# Patient Record
Sex: FEMALE | Race: WHITE | Employment: FULL TIME | URBAN - METROPOLITAN AREA
[De-identification: names, ages, dates, MRNs, and addresses within clinical notes are randomized per-mention and may not be internally consistent; named-entity substitution may affect disease eponyms.]

---
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
## 2018-02-25 PROBLEM — Z3A.15 15 WEEKS GESTATION OF PREGNANCY: Status: ACTIVE | Noted: 2018-02-25

## 2018-03-31 ENCOUNTER — HOSPITAL ENCOUNTER (OUTPATIENT)
Age: 23
Discharge: HOME | End: 2018-04-01
Attending: OBSTETRICS & GYNECOLOGY | Admitting: OBSTETRICS & GYNECOLOGY
Payer: COMMERCIAL

## 2018-03-31 PROBLEM — Z3A.20 20 WEEKS GESTATION OF PREGNANCY: Status: ACTIVE | Noted: 2018-03-31

## 2018-03-31 PROBLEM — Z3A.20 PREGNANCY WITH 20 COMPLETED WEEKS GESTATION: Status: ACTIVE | Noted: 2018-03-31

## 2018-03-31 LAB
ABO/RH: NORMAL
AMPHETAMINE SCREEN, URINE: NOT DETECTED
ANTIBODY SCREEN: NORMAL
BACTERIA: ABNORMAL /HPF
BARBITURATE SCREEN URINE: NOT DETECTED
BASOPHILS ABSOLUTE: 0.05 E9/L (ref 0–0.2)
BASOPHILS RELATIVE PERCENT: 0.4 % (ref 0–2)
BENZODIAZEPINE SCREEN, URINE: NOT DETECTED
BILIRUBIN URINE: NEGATIVE
BILIRUBIN URINE: NEGATIVE
BLOOD, URINE: NEGATIVE
BLOOD, URINE: NEGATIVE
CANNABINOID SCREEN URINE: NOT DETECTED
CLARITY: ABNORMAL
CLARITY: CLEAR
COCAINE METABOLITE SCREEN URINE: NOT DETECTED
COLOR: YELLOW
COLOR: YELLOW
EOSINOPHILS ABSOLUTE: 0.08 E9/L (ref 0.05–0.5)
EOSINOPHILS RELATIVE PERCENT: 0.7 % (ref 0–6)
GLUCOSE URINE: NEGATIVE MG/DL
GLUCOSE URINE: NEGATIVE MG/DL
HCT VFR BLD CALC: 37.2 % (ref 34–48)
HEMOGLOBIN: 12.5 G/DL (ref 11.5–15.5)
IMMATURE GRANULOCYTES #: 0.27 E9/L
IMMATURE GRANULOCYTES %: 2.4 % (ref 0–5)
KETONES, URINE: NEGATIVE MG/DL
KETONES, URINE: NEGATIVE MG/DL
LEUKOCYTE ESTERASE, URINE: NEGATIVE
LEUKOCYTE ESTERASE, URINE: NEGATIVE
LYMPHOCYTES ABSOLUTE: 2.04 E9/L (ref 1.5–4)
LYMPHOCYTES RELATIVE PERCENT: 18 % (ref 20–42)
MCH RBC QN AUTO: 30 PG (ref 26–35)
MCHC RBC AUTO-ENTMCNC: 33.6 % (ref 32–34.5)
MCV RBC AUTO: 89.2 FL (ref 80–99.9)
METHADONE SCREEN, URINE: NOT DETECTED
MONOCYTES ABSOLUTE: 0.94 E9/L (ref 0.1–0.95)
MONOCYTES RELATIVE PERCENT: 8.3 % (ref 2–12)
NEUTROPHILS ABSOLUTE: 7.97 E9/L (ref 1.8–7.3)
NEUTROPHILS RELATIVE PERCENT: 70.2 % (ref 43–80)
NITRITE, URINE: NEGATIVE
NITRITE, URINE: NEGATIVE
OPIATE SCREEN URINE: NOT DETECTED
PDW BLD-RTO: 14.5 FL (ref 11.5–15)
PH UA: 5.5 (ref 5–9)
PH UA: 7 (ref 5–9)
PHENCYCLIDINE SCREEN URINE: NOT DETECTED
PLATELET # BLD: 186 E9/L (ref 130–450)
PMV BLD AUTO: 11.7 FL (ref 7–12)
PROPOXYPHENE SCREEN: NOT DETECTED
PROTEIN UA: ABNORMAL MG/DL
PROTEIN UA: NEGATIVE MG/DL
RBC # BLD: 4.17 E12/L (ref 3.5–5.5)
RBC UA: ABNORMAL /HPF (ref 0–2)
SPECIFIC GRAVITY UA: 1.01 (ref 1–1.03)
SPECIFIC GRAVITY UA: >=1.03 (ref 1–1.03)
UROBILINOGEN, URINE: 0.2 E.U./DL
UROBILINOGEN, URINE: 0.2 E.U./DL
WBC # BLD: 11.4 E9/L (ref 4.5–11.5)
WBC UA: ABNORMAL /HPF (ref 0–5)

## 2018-03-31 PROCEDURE — 99213 OFFICE O/P EST LOW 20 MIN: CPT | Performed by: OBSTETRICS & GYNECOLOGY

## 2018-03-31 PROCEDURE — 36415 COLL VENOUS BLD VENIPUNCTURE: CPT

## 2018-03-31 PROCEDURE — 86901 BLOOD TYPING SEROLOGIC RH(D): CPT

## 2018-03-31 PROCEDURE — 87088 URINE BACTERIA CULTURE: CPT

## 2018-03-31 PROCEDURE — 85460 HEMOGLOBIN FETAL: CPT

## 2018-03-31 PROCEDURE — 86900 BLOOD TYPING SEROLOGIC ABO: CPT

## 2018-03-31 PROCEDURE — G0378 HOSPITAL OBSERVATION PER HR: HCPCS

## 2018-03-31 PROCEDURE — 81001 URINALYSIS AUTO W/SCOPE: CPT

## 2018-03-31 PROCEDURE — 85025 COMPLETE CBC W/AUTO DIFF WBC: CPT

## 2018-03-31 PROCEDURE — 6360000002 HC RX W HCPCS

## 2018-03-31 PROCEDURE — 2580000003 HC RX 258: Performed by: OBSTETRICS & GYNECOLOGY

## 2018-03-31 PROCEDURE — 86850 RBC ANTIBODY SCREEN: CPT

## 2018-03-31 PROCEDURE — 81003 URINALYSIS AUTO W/O SCOPE: CPT

## 2018-03-31 PROCEDURE — 80307 DRUG TEST PRSMV CHEM ANLYZR: CPT

## 2018-03-31 RX ORDER — SODIUM CHLORIDE, SODIUM LACTATE, POTASSIUM CHLORIDE, AND CALCIUM CHLORIDE .6; .31; .03; .02 G/100ML; G/100ML; G/100ML; G/100ML
500 INJECTION, SOLUTION INTRAVENOUS ONCE
Status: COMPLETED | OUTPATIENT
Start: 2018-03-31 | End: 2018-03-31

## 2018-03-31 RX ORDER — SODIUM CHLORIDE, SODIUM LACTATE, POTASSIUM CHLORIDE, CALCIUM CHLORIDE 600; 310; 30; 20 MG/100ML; MG/100ML; MG/100ML; MG/100ML
INJECTION, SOLUTION INTRAVENOUS CONTINUOUS
Status: DISCONTINUED | OUTPATIENT
Start: 2018-03-31 | End: 2018-04-01 | Stop reason: HOSPADM

## 2018-03-31 RX ADMIN — CEFAZOLIN SODIUM 2 G: 2 SOLUTION INTRAVENOUS at 18:09

## 2018-03-31 RX ADMIN — SODIUM CHLORIDE, POTASSIUM CHLORIDE, SODIUM LACTATE AND CALCIUM CHLORIDE: 600; 310; 30; 20 INJECTION, SOLUTION INTRAVENOUS at 20:29

## 2018-03-31 RX ADMIN — SODIUM CHLORIDE, POTASSIUM CHLORIDE, SODIUM LACTATE AND CALCIUM CHLORIDE 500 ML: 600; 310; 30; 20 INJECTION, SOLUTION INTRAVENOUS at 17:10

## 2018-03-31 RX ADMIN — SODIUM CHLORIDE, POTASSIUM CHLORIDE, SODIUM LACTATE AND CALCIUM CHLORIDE: 600; 310; 30; 20 INJECTION, SOLUTION INTRAVENOUS at 18:11

## 2018-04-01 VITALS
BODY MASS INDEX: 21.44 KG/M2 | HEART RATE: 89 BPM | DIASTOLIC BLOOD PRESSURE: 52 MMHG | WEIGHT: 121 LBS | TEMPERATURE: 98.7 F | HEIGHT: 63 IN | SYSTOLIC BLOOD PRESSURE: 98 MMHG | RESPIRATION RATE: 14 BRPM

## 2018-04-01 PROBLEM — O34.219 PREVIOUS CESAREAN SECTION COMPLICATING PREGNANCY, ANTEPARTUM CONDITION OR COMPLICATION: Status: ACTIVE | Noted: 2018-04-01

## 2018-04-01 PROBLEM — O46.8X2 SUBCHORIONIC HEMORRHAGE IN SECOND TRIMESTER: Status: ACTIVE | Noted: 2018-04-01

## 2018-04-01 PROBLEM — O41.8X20 SUBCHORIONIC HEMORRHAGE IN SECOND TRIMESTER: Status: ACTIVE | Noted: 2018-04-01

## 2018-04-01 PROBLEM — Z3A.15 15 WEEKS GESTATION OF PREGNANCY: Status: RESOLVED | Noted: 2018-02-25 | Resolved: 2018-04-01

## 2018-04-01 LAB
APTT: 28.1 SEC (ref 24.5–35.1)
D DIMER: 260 NG/ML DDU
FIBRINOGEN: 413 MG/DL (ref 225–540)
HCT VFR BLD CALC: 33.2 % (ref 34–48)
HEMOGLOBIN: 10.9 G/DL (ref 11.5–15.5)
INR BLD: 1
KLEIHAUER BETKE: NORMAL
MCH RBC QN AUTO: 30 PG (ref 26–35)
MCHC RBC AUTO-ENTMCNC: 32.8 % (ref 32–34.5)
MCV RBC AUTO: 91.5 FL (ref 80–99.9)
PDW BLD-RTO: 14.5 FL (ref 11.5–15)
PLATELET # BLD: 174 E9/L (ref 130–450)
PMV BLD AUTO: 11.4 FL (ref 7–12)
PROTHROMBIN TIME: 11.1 SEC (ref 9.3–12.4)
RBC # BLD: 3.63 E12/L (ref 3.5–5.5)
WBC # BLD: 9 E9/L (ref 4.5–11.5)

## 2018-04-01 PROCEDURE — 36415 COLL VENOUS BLD VENIPUNCTURE: CPT

## 2018-04-01 PROCEDURE — 99243 OFF/OP CNSLTJ NEW/EST LOW 30: CPT | Performed by: OBSTETRICS & GYNECOLOGY

## 2018-04-01 PROCEDURE — 6360000002 HC RX W HCPCS: Performed by: OBSTETRICS & GYNECOLOGY

## 2018-04-01 PROCEDURE — 85378 FIBRIN DEGRADE SEMIQUANT: CPT

## 2018-04-01 PROCEDURE — 85384 FIBRINOGEN ACTIVITY: CPT

## 2018-04-01 PROCEDURE — 76817 TRANSVAGINAL US OBSTETRIC: CPT

## 2018-04-01 PROCEDURE — 76811 OB US DETAILED SNGL FETUS: CPT | Performed by: OBSTETRICS & GYNECOLOGY

## 2018-04-01 PROCEDURE — 76817 TRANSVAGINAL US OBSTETRIC: CPT | Performed by: OBSTETRICS & GYNECOLOGY

## 2018-04-01 PROCEDURE — 2580000003 HC RX 258: Performed by: OBSTETRICS & GYNECOLOGY

## 2018-04-01 PROCEDURE — 76805 OB US >/= 14 WKS SNGL FETUS: CPT

## 2018-04-01 PROCEDURE — 85730 THROMBOPLASTIN TIME PARTIAL: CPT

## 2018-04-01 PROCEDURE — 85027 COMPLETE CBC AUTOMATED: CPT

## 2018-04-01 PROCEDURE — 85610 PROTHROMBIN TIME: CPT

## 2018-04-01 RX ADMIN — SODIUM CHLORIDE, POTASSIUM CHLORIDE, SODIUM LACTATE AND CALCIUM CHLORIDE: 600; 310; 30; 20 INJECTION, SOLUTION INTRAVENOUS at 13:11

## 2018-04-01 RX ADMIN — CEFAZOLIN SODIUM 1 G: 1 SOLUTION INTRAVENOUS at 10:47

## 2018-04-01 RX ADMIN — CEFAZOLIN SODIUM 1 G: 1 SOLUTION INTRAVENOUS at 02:07

## 2018-04-03 LAB — URINE CULTURE, ROUTINE: NORMAL

## 2018-04-11 ENCOUNTER — ROUTINE PRENATAL (OUTPATIENT)
Dept: OBGYN CLINIC | Age: 23
End: 2018-04-11
Payer: COMMERCIAL

## 2018-04-11 VITALS
SYSTOLIC BLOOD PRESSURE: 115 MMHG | HEART RATE: 102 BPM | WEIGHT: 120 LBS | DIASTOLIC BLOOD PRESSURE: 71 MMHG | BODY MASS INDEX: 21.26 KG/M2

## 2018-04-11 DIAGNOSIS — R10.2 PELVIC PRESSURE IN PREGNANCY, ANTEPARTUM, SECOND TRIMESTER: ICD-10-CM

## 2018-04-11 DIAGNOSIS — Z34.90 PREGNANCY, UNSPECIFIED GESTATIONAL AGE: ICD-10-CM

## 2018-04-11 DIAGNOSIS — O46.8X2 SUBCHORIONIC HEMORRHAGE OF PLACENTA IN SECOND TRIMESTER, SINGLE OR UNSPECIFIED FETUS: Primary | ICD-10-CM

## 2018-04-11 DIAGNOSIS — O26.892 PELVIC PRESSURE IN PREGNANCY, ANTEPARTUM, SECOND TRIMESTER: ICD-10-CM

## 2018-04-11 DIAGNOSIS — O41.8X20 SUBCHORIONIC HEMORRHAGE OF PLACENTA IN SECOND TRIMESTER, SINGLE OR UNSPECIFIED FETUS: Primary | ICD-10-CM

## 2018-04-11 DIAGNOSIS — O34.219 PREVIOUS CESAREAN SECTION COMPLICATING PREGNANCY, ANTEPARTUM CONDITION OR COMPLICATION: ICD-10-CM

## 2018-04-11 LAB
GLUCOSE URINE, POC: NORMAL
PROTEIN UA: POSITIVE

## 2018-04-11 PROCEDURE — 76817 TRANSVAGINAL US OBSTETRIC: CPT | Performed by: OBSTETRICS & GYNECOLOGY

## 2018-04-11 PROCEDURE — 81002 URINALYSIS NONAUTO W/O SCOPE: CPT | Performed by: OBSTETRICS & GYNECOLOGY

## 2018-04-11 PROCEDURE — 76816 OB US FOLLOW-UP PER FETUS: CPT | Performed by: OBSTETRICS & GYNECOLOGY

## 2018-04-11 PROCEDURE — 99213 OFFICE O/P EST LOW 20 MIN: CPT | Performed by: OBSTETRICS & GYNECOLOGY

## 2018-04-11 PROCEDURE — 1036F TOBACCO NON-USER: CPT | Performed by: OBSTETRICS & GYNECOLOGY

## 2018-04-11 PROCEDURE — G8427 DOCREV CUR MEDS BY ELIG CLIN: HCPCS | Performed by: OBSTETRICS & GYNECOLOGY

## 2018-04-11 PROCEDURE — G8420 CALC BMI NORM PARAMETERS: HCPCS | Performed by: OBSTETRICS & GYNECOLOGY

## 2018-04-11 PROCEDURE — 99201 HC NEW PT, E/M LEVEL 1: CPT

## 2018-04-11 RX ORDER — ASCORBIC ACID, CHOLECALCIFEROL, .ALPHA.-TOCOPHEROL ACETATE, DL-, PYRIDOXINE HYDROCHLORIDE, FOLIC ACID, CYANOCOBALAMIN, BIOTIN, CALCIUM CARBONATE, FERROUS ASPARTO GLYCINATE, IRON, POTASSIUM IODIDE, MAGNESIUM OXIDE, DOCONEXENT AND LOWBUSH BLUEBERRY 60; 1000; 10; 26; 400; 13; 280; 80; 9; 9; 150; 25; 350; 25; 600 MG/1; [IU]/1; [IU]/1; MG/1; UG/1; UG/1; UG/1; MG/1; MG/1; MG/1; UG/1; MG/1; MG/1; MG/1; UG/1
CAPSULE, GELATIN COATED ORAL
Refills: 11 | COMMUNITY
Start: 2018-01-02 | End: 2019-02-17

## 2018-04-23 ENCOUNTER — HOSPITAL ENCOUNTER (OUTPATIENT)
Age: 23
Setting detail: OBSERVATION
Discharge: HOME | End: 2018-04-24
Attending: OBSTETRICS & GYNECOLOGY | Admitting: OBSTETRICS & GYNECOLOGY
Payer: COMMERCIAL

## 2018-04-23 PROBLEM — Z34.92 PREGNANT AND NOT YET DELIVERED, SECOND TRIMESTER: Status: ACTIVE | Noted: 2018-04-23

## 2018-04-23 LAB
BACTERIA: ABNORMAL /HPF
BILIRUBIN URINE: NEGATIVE
BLOOD, URINE: NEGATIVE
CLARITY: CLEAR
COLOR: YELLOW
EPITHELIAL CELLS, UA: ABNORMAL /HPF
FETAL FIBRONECTIN: NEGATIVE
GLUCOSE URINE: NEGATIVE MG/DL
KETONES, URINE: NEGATIVE MG/DL
LEUKOCYTE ESTERASE, URINE: ABNORMAL
NITRITE, URINE: NEGATIVE
PH UA: 6.5 (ref 5–9)
PROTEIN UA: NEGATIVE MG/DL
RBC UA: ABNORMAL /HPF (ref 0–2)
SPECIFIC GRAVITY UA: 1.01 (ref 1–1.03)
UROBILINOGEN, URINE: 0.2 E.U./DL
WBC UA: ABNORMAL /HPF (ref 0–5)

## 2018-04-23 PROCEDURE — G0378 HOSPITAL OBSERVATION PER HR: HCPCS

## 2018-04-23 PROCEDURE — 81001 URINALYSIS AUTO W/SCOPE: CPT

## 2018-04-23 PROCEDURE — 2580000003 HC RX 258: Performed by: OBSTETRICS & GYNECOLOGY

## 2018-04-23 PROCEDURE — G0379 DIRECT REFER HOSPITAL OBSERV: HCPCS

## 2018-04-23 PROCEDURE — 82731 ASSAY OF FETAL FIBRONECTIN: CPT

## 2018-04-23 RX ORDER — SODIUM CHLORIDE, SODIUM LACTATE, POTASSIUM CHLORIDE, CALCIUM CHLORIDE 600; 310; 30; 20 MG/100ML; MG/100ML; MG/100ML; MG/100ML
INJECTION, SOLUTION INTRAVENOUS CONTINUOUS
Status: DISCONTINUED | OUTPATIENT
Start: 2018-04-23 | End: 2018-04-24 | Stop reason: HOSPADM

## 2018-04-23 RX ADMIN — SODIUM CHLORIDE, POTASSIUM CHLORIDE, SODIUM LACTATE AND CALCIUM CHLORIDE: 600; 310; 30; 20 INJECTION, SOLUTION INTRAVENOUS at 22:18

## 2018-04-24 VITALS
HEIGHT: 63 IN | SYSTOLIC BLOOD PRESSURE: 108 MMHG | DIASTOLIC BLOOD PRESSURE: 62 MMHG | TEMPERATURE: 98 F | HEART RATE: 77 BPM | BODY MASS INDEX: 21.44 KG/M2 | WEIGHT: 121 LBS | RESPIRATION RATE: 14 BRPM

## 2018-04-24 PROBLEM — Z3A.20 20 WEEKS GESTATION OF PREGNANCY: Status: RESOLVED | Noted: 2018-03-31 | Resolved: 2018-04-24

## 2018-04-24 PROCEDURE — G0378 HOSPITAL OBSERVATION PER HR: HCPCS

## 2018-04-24 PROCEDURE — 99224 PR SBSQ OBSERVATION CARE/DAY 15 MINUTES: CPT | Performed by: OBSTETRICS & GYNECOLOGY

## 2018-04-24 PROCEDURE — 76816 OB US FOLLOW-UP PER FETUS: CPT | Performed by: OBSTETRICS & GYNECOLOGY

## 2018-04-24 PROCEDURE — 76817 TRANSVAGINAL US OBSTETRIC: CPT

## 2018-04-24 PROCEDURE — 2580000003 HC RX 258: Performed by: OBSTETRICS & GYNECOLOGY

## 2018-04-24 PROCEDURE — 99201 HC NEW PT, OUTPT VISIT LEVEL 1: CPT

## 2018-04-24 PROCEDURE — 76816 OB US FOLLOW-UP PER FETUS: CPT

## 2018-04-24 PROCEDURE — 76817 TRANSVAGINAL US OBSTETRIC: CPT | Performed by: OBSTETRICS & GYNECOLOGY

## 2018-04-24 RX ADMIN — SODIUM CHLORIDE, POTASSIUM CHLORIDE, SODIUM LACTATE AND CALCIUM CHLORIDE: 600; 310; 30; 20 INJECTION, SOLUTION INTRAVENOUS at 03:07

## 2018-04-24 ASSESSMENT — PAIN DESCRIPTION - DESCRIPTORS: DESCRIPTORS: ACHING

## 2018-05-22 ENCOUNTER — HOSPITAL ENCOUNTER (OUTPATIENT)
Age: 23
Discharge: HOME | End: 2018-05-22
Attending: OBSTETRICS & GYNECOLOGY | Admitting: OBSTETRICS & GYNECOLOGY
Payer: COMMERCIAL

## 2018-05-22 VITALS
DIASTOLIC BLOOD PRESSURE: 60 MMHG | HEART RATE: 87 BPM | TEMPERATURE: 97.8 F | BODY MASS INDEX: 22.5 KG/M2 | RESPIRATION RATE: 14 BRPM | SYSTOLIC BLOOD PRESSURE: 102 MMHG | WEIGHT: 127 LBS | HEIGHT: 63 IN

## 2018-05-22 PROBLEM — Z3A.28 28 WEEKS GESTATION OF PREGNANCY: Status: ACTIVE | Noted: 2018-05-22

## 2018-05-22 LAB
BILIRUBIN URINE: NEGATIVE
BLOOD, URINE: NEGATIVE
CLARITY: CLEAR
COLOR: YELLOW
GLUCOSE URINE: NEGATIVE MG/DL
KETONES, URINE: NEGATIVE MG/DL
LEUKOCYTE ESTERASE, URINE: NEGATIVE
NITRITE, URINE: NEGATIVE
PH UA: 7 (ref 5–9)
PROTEIN UA: NEGATIVE MG/DL
SPECIFIC GRAVITY UA: <=1.005 (ref 1–1.03)
UROBILINOGEN, URINE: 0.2 E.U./DL

## 2018-05-22 PROCEDURE — 81003 URINALYSIS AUTO W/O SCOPE: CPT

## 2018-05-22 PROCEDURE — 2580000003 HC RX 258: Performed by: OBSTETRICS & GYNECOLOGY

## 2018-05-22 PROCEDURE — 36415 COLL VENOUS BLD VENIPUNCTURE: CPT

## 2018-05-22 PROCEDURE — 99212 OFFICE O/P EST SF 10 MIN: CPT

## 2018-05-22 PROCEDURE — 6360000002 HC RX W HCPCS: Performed by: OBSTETRICS & GYNECOLOGY

## 2018-05-22 RX ORDER — SODIUM CHLORIDE, SODIUM LACTATE, POTASSIUM CHLORIDE, AND CALCIUM CHLORIDE .6; .31; .03; .02 G/100ML; G/100ML; G/100ML; G/100ML
1000 INJECTION, SOLUTION INTRAVENOUS ONCE
Status: COMPLETED | OUTPATIENT
Start: 2018-05-22 | End: 2018-05-22

## 2018-05-22 RX ORDER — ONDANSETRON 2 MG/ML
4 INJECTION INTRAMUSCULAR; INTRAVENOUS EVERY 6 HOURS PRN
Status: DISCONTINUED | OUTPATIENT
Start: 2018-05-22 | End: 2018-05-22 | Stop reason: HOSPADM

## 2018-05-22 RX ADMIN — ONDANSETRON 4 MG: 2 INJECTION INTRAMUSCULAR; INTRAVENOUS at 18:12

## 2018-05-22 RX ADMIN — SODIUM CHLORIDE, POTASSIUM CHLORIDE, SODIUM LACTATE AND CALCIUM CHLORIDE 1000 ML: 600; 310; 30; 20 INJECTION, SOLUTION INTRAVENOUS at 18:05

## 2018-06-09 ENCOUNTER — HOSPITAL ENCOUNTER (OUTPATIENT)
Age: 23
Discharge: HOME | End: 2018-06-09
Attending: OBSTETRICS & GYNECOLOGY | Admitting: OBSTETRICS & GYNECOLOGY
Payer: COMMERCIAL

## 2018-06-09 VITALS
DIASTOLIC BLOOD PRESSURE: 71 MMHG | HEIGHT: 63 IN | SYSTOLIC BLOOD PRESSURE: 115 MMHG | WEIGHT: 137 LBS | TEMPERATURE: 97.9 F | RESPIRATION RATE: 18 BRPM | BODY MASS INDEX: 24.27 KG/M2 | HEART RATE: 96 BPM

## 2018-06-09 PROBLEM — Z3A.30 30 WEEKS GESTATION OF PREGNANCY: Status: ACTIVE | Noted: 2018-06-09

## 2018-06-09 LAB
BACTERIA: ABNORMAL /HPF
BILIRUBIN URINE: NEGATIVE
BLOOD, URINE: NEGATIVE
CLARITY: ABNORMAL
COLOR: ABNORMAL
GLUCOSE URINE: NEGATIVE MG/DL
KETONES, URINE: NEGATIVE MG/DL
LEUKOCYTE ESTERASE, URINE: ABNORMAL
NEGATIVE CONTROL: NORMAL
NITRITE, URINE: NEGATIVE
PH UA: 5.5 (ref 5–9)
PROTEIN UA: NEGATIVE MG/DL
RBC UA: ABNORMAL /HPF (ref 0–2)
SPECIFIC GRAVITY UA: >=1.03 (ref 1–1.03)
UROBILINOGEN, URINE: 0.2 E.U./DL
WBC UA: >20 /HPF (ref 0–5)

## 2018-06-09 PROCEDURE — 81001 URINALYSIS AUTO W/SCOPE: CPT

## 2018-06-09 PROCEDURE — 6360000002 HC RX W HCPCS: Performed by: OBSTETRICS & GYNECOLOGY

## 2018-06-09 PROCEDURE — 84112 EVAL AMNIOTIC FLUID PROTEIN: CPT

## 2018-06-09 PROCEDURE — 2580000003 HC RX 258: Performed by: OBSTETRICS & GYNECOLOGY

## 2018-06-09 PROCEDURE — 99211 OFF/OP EST MAY X REQ PHY/QHP: CPT

## 2018-06-09 PROCEDURE — 87088 URINE BACTERIA CULTURE: CPT

## 2018-06-09 RX ORDER — SODIUM CHLORIDE, SODIUM LACTATE, POTASSIUM CHLORIDE, CALCIUM CHLORIDE 600; 310; 30; 20 MG/100ML; MG/100ML; MG/100ML; MG/100ML
INJECTION, SOLUTION INTRAVENOUS CONTINUOUS
Status: DISCONTINUED | OUTPATIENT
Start: 2018-06-09 | End: 2018-06-09 | Stop reason: HOSPADM

## 2018-06-09 RX ORDER — SODIUM CHLORIDE, SODIUM LACTATE, POTASSIUM CHLORIDE, AND CALCIUM CHLORIDE .6; .31; .03; .02 G/100ML; G/100ML; G/100ML; G/100ML
500 INJECTION, SOLUTION INTRAVENOUS ONCE
Status: COMPLETED | OUTPATIENT
Start: 2018-06-09 | End: 2018-06-09

## 2018-06-09 RX ADMIN — SODIUM CHLORIDE, POTASSIUM CHLORIDE, SODIUM LACTATE AND CALCIUM CHLORIDE 500 ML: 600; 310; 30; 20 INJECTION, SOLUTION INTRAVENOUS at 19:06

## 2018-06-09 RX ADMIN — WATER 1 G: 1 INJECTION INTRAMUSCULAR; INTRAVENOUS; SUBCUTANEOUS at 20:20

## 2018-06-11 LAB — URINE CULTURE, ROUTINE: NORMAL

## 2018-06-20 ENCOUNTER — ANESTHESIA (OUTPATIENT)
Dept: MATERNITY | Age: 23
End: 2018-06-20

## 2018-06-20 ENCOUNTER — ANESTHESIA EVENT (OUTPATIENT)
Dept: MATERNITY | Age: 23
End: 2018-06-20

## 2018-06-20 ENCOUNTER — HOSPITAL ENCOUNTER (OUTPATIENT)
Age: 23
Setting detail: OBSERVATION
Discharge: HOME | End: 2018-06-21
Attending: OBSTETRICS & GYNECOLOGY | Admitting: OBSTETRICS & GYNECOLOGY
Payer: COMMERCIAL

## 2018-06-20 PROBLEM — O26.853 SPOTTING AFFECTING PREGNANCY IN THIRD TRIMESTER: Status: ACTIVE | Noted: 2018-06-20

## 2018-06-20 LAB
BACTERIA: ABNORMAL /HPF
BILIRUBIN URINE: NEGATIVE
BLOOD, URINE: NEGATIVE
CLARITY: CLEAR
COLOR: ABNORMAL
GLUCOSE URINE: NEGATIVE MG/DL
KETONES, URINE: NEGATIVE MG/DL
LEUKOCYTE ESTERASE, URINE: NEGATIVE
MUCUS: PRESENT
NITRITE, URINE: NEGATIVE
PH UA: 6 (ref 5–9)
PROTEIN UA: ABNORMAL MG/DL
RBC UA: ABNORMAL /HPF (ref 0–2)
SPECIFIC GRAVITY UA: 1.02 (ref 1–1.03)
UROBILINOGEN, URINE: 0.2 E.U./DL
WBC UA: ABNORMAL /HPF (ref 0–5)

## 2018-06-20 PROCEDURE — 76817 TRANSVAGINAL US OBSTETRIC: CPT | Performed by: OBSTETRICS & GYNECOLOGY

## 2018-06-20 PROCEDURE — 96365 THER/PROPH/DIAG IV INF INIT: CPT

## 2018-06-20 PROCEDURE — 2580000003 HC RX 258: Performed by: OBSTETRICS & GYNECOLOGY

## 2018-06-20 PROCEDURE — 76816 OB US FOLLOW-UP PER FETUS: CPT | Performed by: OBSTETRICS & GYNECOLOGY

## 2018-06-20 PROCEDURE — 6360000002 HC RX W HCPCS: Performed by: OBSTETRICS & GYNECOLOGY

## 2018-06-20 PROCEDURE — 96372 THER/PROPH/DIAG INJ SC/IM: CPT

## 2018-06-20 PROCEDURE — 76817 TRANSVAGINAL US OBSTETRIC: CPT

## 2018-06-20 PROCEDURE — G0378 HOSPITAL OBSERVATION PER HR: HCPCS

## 2018-06-20 PROCEDURE — 76819 FETAL BIOPHYS PROFIL W/O NST: CPT

## 2018-06-20 PROCEDURE — 76816 OB US FOLLOW-UP PER FETUS: CPT

## 2018-06-20 PROCEDURE — 96366 THER/PROPH/DIAG IV INF ADDON: CPT

## 2018-06-20 PROCEDURE — 81001 URINALYSIS AUTO W/SCOPE: CPT

## 2018-06-20 PROCEDURE — 99242 OFF/OP CONSLTJ NEW/EST SF 20: CPT | Performed by: OBSTETRICS & GYNECOLOGY

## 2018-06-20 PROCEDURE — 76819 FETAL BIOPHYS PROFIL W/O NST: CPT | Performed by: OBSTETRICS & GYNECOLOGY

## 2018-06-20 RX ORDER — ONDANSETRON 2 MG/ML
4 INJECTION INTRAMUSCULAR; INTRAVENOUS EVERY 6 HOURS PRN
Status: DISCONTINUED | OUTPATIENT
Start: 2018-06-20 | End: 2018-06-21 | Stop reason: HOSPADM

## 2018-06-20 RX ORDER — MAGNESIUM SULFATE IN WATER 40 MG/ML
2 INJECTION, SOLUTION INTRAVENOUS ONCE
Status: DISCONTINUED | OUTPATIENT
Start: 2018-06-20 | End: 2018-06-20

## 2018-06-20 RX ORDER — SODIUM CHLORIDE, SODIUM LACTATE, POTASSIUM CHLORIDE, AND CALCIUM CHLORIDE .6; .31; .03; .02 G/100ML; G/100ML; G/100ML; G/100ML
250 INJECTION, SOLUTION INTRAVENOUS ONCE
Status: COMPLETED | OUTPATIENT
Start: 2018-06-20 | End: 2018-06-20

## 2018-06-20 RX ORDER — MAGNESIUM SULFATE IN WATER 40 MG/ML
2 INJECTION, SOLUTION INTRAVENOUS ONCE
Status: COMPLETED | OUTPATIENT
Start: 2018-06-20 | End: 2018-06-20

## 2018-06-20 RX ORDER — BETAMETHASONE SODIUM PHOSPHATE AND BETAMETHASONE ACETATE 3; 3 MG/ML; MG/ML
12 INJECTION, SUSPENSION INTRA-ARTICULAR; INTRALESIONAL; INTRAMUSCULAR; SOFT TISSUE EVERY 24 HOURS
Status: COMPLETED | OUTPATIENT
Start: 2018-06-20 | End: 2018-06-21

## 2018-06-20 RX ORDER — SODIUM CHLORIDE, SODIUM LACTATE, POTASSIUM CHLORIDE, CALCIUM CHLORIDE 600; 310; 30; 20 MG/100ML; MG/100ML; MG/100ML; MG/100ML
INJECTION, SOLUTION INTRAVENOUS CONTINUOUS
Status: DISCONTINUED | OUTPATIENT
Start: 2018-06-20 | End: 2018-06-21 | Stop reason: HOSPADM

## 2018-06-20 RX ORDER — ACETAMINOPHEN 325 MG/1
650 TABLET ORAL EVERY 4 HOURS PRN
Status: DISCONTINUED | OUTPATIENT
Start: 2018-06-20 | End: 2018-06-21 | Stop reason: HOSPADM

## 2018-06-20 RX ADMIN — SODIUM CHLORIDE, POTASSIUM CHLORIDE, SODIUM LACTATE AND CALCIUM CHLORIDE: 600; 310; 30; 20 INJECTION, SOLUTION INTRAVENOUS at 22:25

## 2018-06-20 RX ADMIN — MAGNESIUM SULFATE IN WATER: 40 INJECTION, SOLUTION INTRAVENOUS at 13:51

## 2018-06-20 RX ADMIN — BETAMETHASONE SODIUM PHOSPHATE AND BETAMETHASONE ACETATE 12 MG: 3; 3 INJECTION, SUSPENSION INTRA-ARTICULAR; INTRALESIONAL; INTRAMUSCULAR at 13:54

## 2018-06-20 RX ADMIN — SODIUM CHLORIDE, POTASSIUM CHLORIDE, SODIUM LACTATE AND CALCIUM CHLORIDE: 600; 310; 30; 20 INJECTION, SOLUTION INTRAVENOUS at 13:45

## 2018-06-20 RX ADMIN — MAGNESIUM SULFATE IN WATER 1 G/HR: 40 INJECTION, SOLUTION INTRAVENOUS at 14:10

## 2018-06-20 RX ADMIN — SODIUM CHLORIDE, POTASSIUM CHLORIDE, SODIUM LACTATE AND CALCIUM CHLORIDE 250 ML: 600; 310; 30; 20 INJECTION, SOLUTION INTRAVENOUS at 13:00

## 2018-06-21 VITALS
TEMPERATURE: 97.8 F | WEIGHT: 138 LBS | RESPIRATION RATE: 18 BRPM | HEIGHT: 63 IN | OXYGEN SATURATION: 99 % | DIASTOLIC BLOOD PRESSURE: 59 MMHG | SYSTOLIC BLOOD PRESSURE: 106 MMHG | BODY MASS INDEX: 24.45 KG/M2 | HEART RATE: 88 BPM

## 2018-06-21 PROCEDURE — 99213 OFFICE O/P EST LOW 20 MIN: CPT | Performed by: OBSTETRICS & GYNECOLOGY

## 2018-06-21 PROCEDURE — G0378 HOSPITAL OBSERVATION PER HR: HCPCS

## 2018-06-21 PROCEDURE — 76817 TRANSVAGINAL US OBSTETRIC: CPT

## 2018-06-21 PROCEDURE — 99212 OFFICE O/P EST SF 10 MIN: CPT

## 2018-06-21 PROCEDURE — 76819 FETAL BIOPHYS PROFIL W/O NST: CPT

## 2018-06-21 PROCEDURE — 2580000003 HC RX 258: Performed by: OBSTETRICS & GYNECOLOGY

## 2018-06-21 PROCEDURE — 76819 FETAL BIOPHYS PROFIL W/O NST: CPT | Performed by: OBSTETRICS & GYNECOLOGY

## 2018-06-21 PROCEDURE — 76817 TRANSVAGINAL US OBSTETRIC: CPT | Performed by: OBSTETRICS & GYNECOLOGY

## 2018-06-21 PROCEDURE — 96372 THER/PROPH/DIAG INJ SC/IM: CPT

## 2018-06-21 PROCEDURE — 6360000002 HC RX W HCPCS: Performed by: OBSTETRICS & GYNECOLOGY

## 2018-06-21 RX ADMIN — SODIUM CHLORIDE, POTASSIUM CHLORIDE, SODIUM LACTATE AND CALCIUM CHLORIDE: 600; 310; 30; 20 INJECTION, SOLUTION INTRAVENOUS at 09:00

## 2018-06-21 RX ADMIN — BETAMETHASONE SODIUM PHOSPHATE AND BETAMETHASONE ACETATE 12 MG: 3; 3 INJECTION, SUSPENSION INTRA-ARTICULAR; INTRALESIONAL; INTRAMUSCULAR at 14:06

## 2018-07-19 ENCOUNTER — HOSPITAL ENCOUNTER (OUTPATIENT)
Age: 23
Discharge: HOME | End: 2018-07-20
Attending: OBSTETRICS & GYNECOLOGY | Admitting: OBSTETRICS & GYNECOLOGY
Payer: COMMERCIAL

## 2018-07-19 PROBLEM — Z3A.36 PREGNANCY WITH 36 COMPLETED WEEKS GESTATION: Status: ACTIVE | Noted: 2018-07-19

## 2018-07-19 LAB
HCT VFR BLD CALC: 33 % (ref 34–48)
HEMOGLOBIN: 10.8 G/DL (ref 11.5–15.5)
MCH RBC QN AUTO: 27.6 PG (ref 26–35)
MCHC RBC AUTO-ENTMCNC: 32.7 % (ref 32–34.5)
MCV RBC AUTO: 84.2 FL (ref 80–99.9)
PDW BLD-RTO: 15.3 FL (ref 11.5–15)
PLATELET # BLD: 166 E9/L (ref 130–450)
PMV BLD AUTO: 12.6 FL (ref 7–12)
RBC # BLD: 3.92 E12/L (ref 3.5–5.5)
WBC # BLD: 10.2 E9/L (ref 4.5–11.5)

## 2018-07-19 PROCEDURE — 36415 COLL VENOUS BLD VENIPUNCTURE: CPT

## 2018-07-19 PROCEDURE — 86850 RBC ANTIBODY SCREEN: CPT

## 2018-07-19 PROCEDURE — 85027 COMPLETE CBC AUTOMATED: CPT

## 2018-07-19 PROCEDURE — 86900 BLOOD TYPING SEROLOGIC ABO: CPT

## 2018-07-19 PROCEDURE — G0378 HOSPITAL OBSERVATION PER HR: HCPCS

## 2018-07-19 PROCEDURE — 86901 BLOOD TYPING SEROLOGIC RH(D): CPT

## 2018-07-19 RX ORDER — SODIUM CHLORIDE, SODIUM LACTATE, POTASSIUM CHLORIDE, CALCIUM CHLORIDE 600; 310; 30; 20 MG/100ML; MG/100ML; MG/100ML; MG/100ML
INJECTION, SOLUTION INTRAVENOUS CONTINUOUS
Status: DISCONTINUED | OUTPATIENT
Start: 2018-07-19 | End: 2018-07-20 | Stop reason: HOSPADM

## 2018-07-20 VITALS
RESPIRATION RATE: 16 BRPM | TEMPERATURE: 98.4 F | SYSTOLIC BLOOD PRESSURE: 110 MMHG | DIASTOLIC BLOOD PRESSURE: 69 MMHG | HEART RATE: 84 BPM

## 2018-07-20 PROBLEM — Z3A.36 36 WEEKS GESTATION OF PREGNANCY: Status: ACTIVE | Noted: 2018-07-20

## 2018-07-20 LAB
ABO/RH: NORMAL
ANTIBODY SCREEN: NORMAL

## 2018-07-20 PROCEDURE — G0378 HOSPITAL OBSERVATION PER HR: HCPCS

## 2018-07-20 PROCEDURE — 99211 OFF/OP EST MAY X REQ PHY/QHP: CPT

## 2018-07-20 NOTE — PROGRESS NOTES
Dr. Dye Hurt rounding on patients. Instructed this nurse to recheck patient at 0700, if SVE unchanged, ok to discharge.

## 2018-07-20 NOTE — PROGRESS NOTES
Discharge instructions given and understood. Patient waiting for family member to give her ride home.

## 2018-07-20 NOTE — H&P
Department of Obstetrics and Gynecology  Attending Obstetrics History and Physical        CHIEF COMPLAINT:  Bleeding and contractions     HISTORY OF PRESENT ILLNESS:      The patient is a 25 y.o. female , Patient's last menstrual period was 2017.,  at 36w3d. OB History      Para Term  AB Living    3 1 1 0 1 1    SAB TAB Ectopic Molar Multiple Live Births    1 0 0 0   1      Patient presents with a chief complaint as above.     Estimated Due Date: Estimated Date of Delivery: 18    PRENATAL CARE:    Complicated by:   Patient Active Problem List   Diagnosis Code    Suspected shortening of cervix not found Z03.75    Near syncope R55    Subchorionic hemorrhage in second trimester O41.8X20, O46.8X2    Previous  section complicating pregnancy, antepartum condition or complication F39.425    Pregnant and not yet delivered, second trimester Z34.92    Premature uterine contractions causing threatened premature labor in second trimester O47.02    28 weeks gestation of pregnancy Z3A.28    30 weeks gestation of pregnancy Z3A.30    Spotting affecting pregnancy in third trimester O26.853    Threatened premature labor, third trimester O47.03    32 weeks gestation of pregnancy Z3A.32    Cervical shortening, antepartum condition or complication Q79.986       PAST OB HISTORY  OB History      Para Term  AB Living    3 1 1 0 1 1    SAB TAB Ectopic Molar Multiple Live Births    1 0 0 0   1          Past Medical History:        Diagnosis Date    Abdominal pain     Biliary dyskinesia     Epigastric abdominal pain     Gastritis     Hemorrhoids     Rectal bleed      Past Surgical History:        Procedure Laterality Date     SECTION  1/3/2015    CHOLECYSTECTOMY  2017    ROBOT ASSISTED    COLONOSCOPY  2012    COLONOSCOPY      DILATION AND CURETTAGE OF UTERUS      ablation    UPPER GASTROINTESTINAL ENDOSCOPY  2012    UPPER GASTROINTESTINAL

## 2018-08-06 ENCOUNTER — ANESTHESIA (OUTPATIENT)
Dept: LABOR AND DELIVERY | Age: 23
DRG: 540 | End: 2018-08-06
Payer: COMMERCIAL

## 2018-08-06 ENCOUNTER — ANESTHESIA EVENT (OUTPATIENT)
Dept: LABOR AND DELIVERY | Age: 23
DRG: 540 | End: 2018-08-06
Payer: COMMERCIAL

## 2018-08-06 ENCOUNTER — HOSPITAL ENCOUNTER (INPATIENT)
Age: 23
LOS: 3 days | Discharge: HOME | DRG: 540 | End: 2018-08-09
Attending: OBSTETRICS & GYNECOLOGY | Admitting: OBSTETRICS & GYNECOLOGY
Payer: COMMERCIAL

## 2018-08-06 ENCOUNTER — PREP FOR PROCEDURE (OUTPATIENT)
Dept: OBGYN | Age: 23
End: 2018-08-06

## 2018-08-06 VITALS — SYSTOLIC BLOOD PRESSURE: 102 MMHG | OXYGEN SATURATION: 100 % | DIASTOLIC BLOOD PRESSURE: 58 MMHG

## 2018-08-06 DIAGNOSIS — G89.18 POST-OPERATIVE PAIN: Primary | ICD-10-CM

## 2018-08-06 PROBLEM — Z34.93 THIRD TRIMESTER PREGNANCY: Status: ACTIVE | Noted: 2018-08-06

## 2018-08-06 LAB
ABO/RH: NORMAL
ANTIBODY SCREEN: NORMAL
HCT VFR BLD CALC: 34.9 % (ref 34–48)
HEMOGLOBIN: 11.6 G/DL (ref 11.5–15.5)
MCH RBC QN AUTO: 27.2 PG (ref 26–35)
MCHC RBC AUTO-ENTMCNC: 33.2 % (ref 32–34.5)
MCV RBC AUTO: 81.7 FL (ref 80–99.9)
PDW BLD-RTO: 15.9 FL (ref 11.5–15)
PLATELET # BLD: 197 E9/L (ref 130–450)
PMV BLD AUTO: 12.4 FL (ref 7–12)
RBC # BLD: 4.27 E12/L (ref 3.5–5.5)
WBC # BLD: 11.8 E9/L (ref 4.5–11.5)

## 2018-08-06 PROCEDURE — 86850 RBC ANTIBODY SCREEN: CPT

## 2018-08-06 PROCEDURE — 3700000001 HC ADD 15 MINUTES (ANESTHESIA): Performed by: OBSTETRICS & GYNECOLOGY

## 2018-08-06 PROCEDURE — 3609079900 HC CESAREAN SECTION: Performed by: OBSTETRICS & GYNECOLOGY

## 2018-08-06 PROCEDURE — 7100000000 HC PACU RECOVERY - FIRST 15 MIN: Performed by: OBSTETRICS & GYNECOLOGY

## 2018-08-06 PROCEDURE — 3700000000 HC ANESTHESIA ATTENDED CARE: Performed by: OBSTETRICS & GYNECOLOGY

## 2018-08-06 PROCEDURE — 6360000002 HC RX W HCPCS: Performed by: NURSE ANESTHETIST, CERTIFIED REGISTERED

## 2018-08-06 PROCEDURE — 85027 COMPLETE CBC AUTOMATED: CPT

## 2018-08-06 PROCEDURE — 36415 COLL VENOUS BLD VENIPUNCTURE: CPT

## 2018-08-06 PROCEDURE — 2580000003 HC RX 258: Performed by: NURSE ANESTHETIST, CERTIFIED REGISTERED

## 2018-08-06 PROCEDURE — 86901 BLOOD TYPING SEROLOGIC RH(D): CPT

## 2018-08-06 PROCEDURE — 59514 CESAREAN DELIVERY ONLY: CPT | Performed by: OBSTETRICS & GYNECOLOGY

## 2018-08-06 PROCEDURE — 2500000003 HC RX 250 WO HCPCS: Performed by: NURSE ANESTHETIST, CERTIFIED REGISTERED

## 2018-08-06 PROCEDURE — 6360000002 HC RX W HCPCS: Performed by: OBSTETRICS & GYNECOLOGY

## 2018-08-06 PROCEDURE — 1220000000 HC SEMI PRIVATE OB R&B

## 2018-08-06 PROCEDURE — 7100000001 HC PACU RECOVERY - ADDTL 15 MIN: Performed by: OBSTETRICS & GYNECOLOGY

## 2018-08-06 PROCEDURE — 86900 BLOOD TYPING SEROLOGIC ABO: CPT

## 2018-08-06 PROCEDURE — 6370000000 HC RX 637 (ALT 250 FOR IP): Performed by: OBSTETRICS & GYNECOLOGY

## 2018-08-06 PROCEDURE — 2709999900 HC NON-CHARGEABLE SUPPLY: Performed by: OBSTETRICS & GYNECOLOGY

## 2018-08-06 PROCEDURE — 2580000003 HC RX 258: Performed by: OBSTETRICS & GYNECOLOGY

## 2018-08-06 RX ORDER — SODIUM CHLORIDE 0.9 % (FLUSH) 0.9 %
10 SYRINGE (ML) INJECTION PRN
Status: DISCONTINUED | OUTPATIENT
Start: 2018-08-06 | End: 2018-08-09 | Stop reason: HOSPADM

## 2018-08-06 RX ORDER — ONDANSETRON 2 MG/ML
INJECTION INTRAMUSCULAR; INTRAVENOUS PRN
Status: DISCONTINUED | OUTPATIENT
Start: 2018-08-06 | End: 2018-08-06 | Stop reason: SDUPTHER

## 2018-08-06 RX ORDER — SODIUM CHLORIDE, SODIUM LACTATE, POTASSIUM CHLORIDE, CALCIUM CHLORIDE 600; 310; 30; 20 MG/100ML; MG/100ML; MG/100ML; MG/100ML
INJECTION, SOLUTION INTRAVENOUS CONTINUOUS PRN
Status: DISCONTINUED | OUTPATIENT
Start: 2018-08-06 | End: 2018-08-06 | Stop reason: SDUPTHER

## 2018-08-06 RX ORDER — SODIUM CHLORIDE, SODIUM LACTATE, POTASSIUM CHLORIDE, CALCIUM CHLORIDE 600; 310; 30; 20 MG/100ML; MG/100ML; MG/100ML; MG/100ML
INJECTION, SOLUTION INTRAVENOUS CONTINUOUS
Status: DISCONTINUED | OUTPATIENT
Start: 2018-08-06 | End: 2018-08-09 | Stop reason: HOSPADM

## 2018-08-06 RX ORDER — TRISODIUM CITRATE DIHYDRATE AND CITRIC ACID MONOHYDRATE 500; 334 MG/5ML; MG/5ML
30 SOLUTION ORAL ONCE
Status: DISCONTINUED | OUTPATIENT
Start: 2018-08-06 | End: 2018-08-06 | Stop reason: SDUPTHER

## 2018-08-06 RX ORDER — CITRIC ACID/SODIUM CITRATE 334-500MG
30 SOLUTION, ORAL ORAL ONCE
Status: CANCELLED | OUTPATIENT
Start: 2018-08-06 | End: 2018-08-06

## 2018-08-06 RX ORDER — SODIUM CHLORIDE 0.9 % (FLUSH) 0.9 %
10 SYRINGE (ML) INJECTION EVERY 12 HOURS SCHEDULED
Status: DISCONTINUED | OUTPATIENT
Start: 2018-08-06 | End: 2018-08-09 | Stop reason: HOSPADM

## 2018-08-06 RX ORDER — SODIUM CHLORIDE 0.9 % (FLUSH) 0.9 %
10 SYRINGE (ML) INJECTION PRN
Status: DISCONTINUED | OUTPATIENT
Start: 2018-08-06 | End: 2018-08-06 | Stop reason: HOSPADM

## 2018-08-06 RX ORDER — OXYCODONE HYDROCHLORIDE AND ACETAMINOPHEN 5; 325 MG/1; MG/1
2 TABLET ORAL EVERY 4 HOURS PRN
Status: DISCONTINUED | OUTPATIENT
Start: 2018-08-06 | End: 2018-08-07

## 2018-08-06 RX ORDER — ACETAMINOPHEN 325 MG/1
650 TABLET ORAL EVERY 4 HOURS PRN
Status: DISCONTINUED | OUTPATIENT
Start: 2018-08-06 | End: 2018-08-09 | Stop reason: HOSPADM

## 2018-08-06 RX ORDER — KETOROLAC TROMETHAMINE 30 MG/ML
30 INJECTION, SOLUTION INTRAMUSCULAR; INTRAVENOUS EVERY 6 HOURS
Status: DISCONTINUED | OUTPATIENT
Start: 2018-08-06 | End: 2018-08-07 | Stop reason: SDUPTHER

## 2018-08-06 RX ORDER — DOCUSATE SODIUM 100 MG/1
100 CAPSULE, LIQUID FILLED ORAL 2 TIMES DAILY
Status: DISCONTINUED | OUTPATIENT
Start: 2018-08-06 | End: 2018-08-09 | Stop reason: HOSPADM

## 2018-08-06 RX ORDER — SODIUM CHLORIDE, SODIUM LACTATE, POTASSIUM CHLORIDE, CALCIUM CHLORIDE 600; 310; 30; 20 MG/100ML; MG/100ML; MG/100ML; MG/100ML
INJECTION, SOLUTION INTRAVENOUS CONTINUOUS
Status: CANCELLED | OUTPATIENT
Start: 2018-08-06 | End: 2019-08-06

## 2018-08-06 RX ORDER — MEPERIDINE HYDROCHLORIDE 50 MG/ML
50 INJECTION INTRAMUSCULAR; INTRAVENOUS; SUBCUTANEOUS EVERY 4 HOURS PRN
Status: DISCONTINUED | OUTPATIENT
Start: 2018-08-06 | End: 2018-08-09 | Stop reason: HOSPADM

## 2018-08-06 RX ORDER — BUPIVACAINE HYDROCHLORIDE 7.5 MG/ML
INJECTION, SOLUTION INTRASPINAL
Status: COMPLETED
Start: 2018-08-06 | End: 2018-08-06

## 2018-08-06 RX ORDER — BISACODYL 10 MG
10 SUPPOSITORY, RECTAL RECTAL DAILY PRN
Status: DISCONTINUED | OUTPATIENT
Start: 2018-08-06 | End: 2018-08-09 | Stop reason: HOSPADM

## 2018-08-06 RX ORDER — SIMETHICONE 80 MG
80 TABLET,CHEWABLE ORAL EVERY 6 HOURS PRN
Status: DISCONTINUED | OUTPATIENT
Start: 2018-08-06 | End: 2018-08-09 | Stop reason: HOSPADM

## 2018-08-06 RX ORDER — PRENATAL WITH FERROUS FUM AND FOLIC ACID 3080; 920; 120; 400; 22; 1.84; 3; 20; 10; 1; 12; 200; 27; 25; 2 [IU]/1; [IU]/1; MG/1; [IU]/1; MG/1; MG/1; MG/1; MG/1; MG/1; MG/1; UG/1; MG/1; MG/1; MG/1; MG/1
1 TABLET ORAL DAILY
Status: DISCONTINUED | OUTPATIENT
Start: 2018-08-06 | End: 2018-08-09 | Stop reason: HOSPADM

## 2018-08-06 RX ORDER — TRISODIUM CITRATE DIHYDRATE AND CITRIC ACID MONOHYDRATE 500; 334 MG/5ML; MG/5ML
30 SOLUTION ORAL ONCE
Status: COMPLETED | OUTPATIENT
Start: 2018-08-06 | End: 2018-08-06

## 2018-08-06 RX ORDER — BUPIVACAINE HYDROCHLORIDE 7.5 MG/ML
INJECTION, SOLUTION INTRASPINAL PRN
Status: DISCONTINUED | OUTPATIENT
Start: 2018-08-06 | End: 2018-08-06 | Stop reason: SDUPTHER

## 2018-08-06 RX ORDER — KETOROLAC TROMETHAMINE 30 MG/ML
INJECTION, SOLUTION INTRAMUSCULAR; INTRAVENOUS PRN
Status: DISCONTINUED | OUTPATIENT
Start: 2018-08-06 | End: 2018-08-06 | Stop reason: SDUPTHER

## 2018-08-06 RX ORDER — SODIUM CHLORIDE 0.9 % (FLUSH) 0.9 %
10 SYRINGE (ML) INJECTION EVERY 12 HOURS SCHEDULED
Status: DISCONTINUED | OUTPATIENT
Start: 2018-08-06 | End: 2018-08-06 | Stop reason: HOSPADM

## 2018-08-06 RX ORDER — LANOLIN 100 %
OINTMENT (GRAM) TOPICAL
Status: DISCONTINUED | OUTPATIENT
Start: 2018-08-06 | End: 2018-08-09 | Stop reason: HOSPADM

## 2018-08-06 RX ORDER — IBUPROFEN 800 MG/1
800 TABLET ORAL EVERY 8 HOURS
Status: DISCONTINUED | OUTPATIENT
Start: 2018-08-07 | End: 2018-08-07 | Stop reason: SDUPTHER

## 2018-08-06 RX ORDER — SODIUM CHLORIDE, SODIUM LACTATE, POTASSIUM CHLORIDE, CALCIUM CHLORIDE 600; 310; 30; 20 MG/100ML; MG/100ML; MG/100ML; MG/100ML
INJECTION, SOLUTION INTRAVENOUS CONTINUOUS
Status: DISCONTINUED | OUTPATIENT
Start: 2018-08-06 | End: 2018-08-06 | Stop reason: SDUPTHER

## 2018-08-06 RX ADMIN — SODIUM CHLORIDE, POTASSIUM CHLORIDE, SODIUM LACTATE AND CALCIUM CHLORIDE: 600; 310; 30; 20 INJECTION, SOLUTION INTRAVENOUS at 18:08

## 2018-08-06 RX ADMIN — Medication 2 G: at 18:03

## 2018-08-06 RX ADMIN — PHENYLEPHRINE HYDROCHLORIDE 100 MCG: 10 INJECTION INTRAMUSCULAR; INTRAVENOUS; SUBCUTANEOUS at 18:32

## 2018-08-06 RX ADMIN — PHENYLEPHRINE HYDROCHLORIDE 100 MCG: 10 INJECTION INTRAMUSCULAR; INTRAVENOUS; SUBCUTANEOUS at 18:35

## 2018-08-06 RX ADMIN — SODIUM CHLORIDE, POTASSIUM CHLORIDE, SODIUM LACTATE AND CALCIUM CHLORIDE: 600; 310; 30; 20 INJECTION, SOLUTION INTRAVENOUS at 17:30

## 2018-08-06 RX ADMIN — BUPIVACAINE HYDROCHLORIDE IN DEXTROSE 1.6 ML: 7.5 INJECTION, SOLUTION SUBARACHNOID at 18:13

## 2018-08-06 RX ADMIN — SODIUM CHLORIDE, POTASSIUM CHLORIDE, SODIUM LACTATE AND CALCIUM CHLORIDE: 600; 310; 30; 20 INJECTION, SOLUTION INTRAVENOUS at 16:30

## 2018-08-06 RX ADMIN — KETOROLAC TROMETHAMINE 30 MG: 30 INJECTION, SOLUTION INTRAMUSCULAR; INTRAVENOUS at 18:40

## 2018-08-06 RX ADMIN — SODIUM CITRATE AND CITRIC ACID MONOHYDRATE 30 ML: 500; 334 SOLUTION ORAL at 18:00

## 2018-08-06 RX ADMIN — Medication 999 ML/HR: at 18:26

## 2018-08-06 RX ADMIN — ONDANSETRON HYDROCHLORIDE 4 MG: 2 INJECTION, SOLUTION INTRAMUSCULAR; INTRAVENOUS at 18:26

## 2018-08-06 ASSESSMENT — PULMONARY FUNCTION TESTS
PIF_VALUE: 0

## 2018-08-06 NOTE — H&P
Ebsfnz-MlEwk-TtHex-Meth-FA-DHA (PRENATE MINI) 18-0.6-0.4-350 MG CAPS,   Prenatal Vit-Fe Fumarate-FA (PRENATAL 1+1 PO), Take 1 tablet by mouth daily    REVIEW OF SYSTEMS:    CONSTITUTIONAL:  negative  RESPIRATORY:  negative  CARDIOVASCULAR:  negative  GASTROINTESTINAL:  negative  ALLERGIC/IMMUNOLOGIC:  negative  NEUROLOGICAL:  negative  BEHAVIOR/PSYCH:  negative    PHYSICAL EXAM:  Vitals:    18 1614 18 1637   BP: 115/65    Pulse: 108    Resp: 18    Temp: 98.2 °F (36.8 °C)    TempSrc: Oral    Weight:  144 lb (65.3 kg)   Height:  5' 3\" (1.6 m)     General appearance:  awake, alert, cooperative, no apparent distress, and appears stated age  Neurologic:  Awake, alert, oriented to name, place and time. Lungs:  No increased work of breathing, good air exchange  Abdomen:  Soft, non tender, gravid, consistent with her gestational age, EFW by Leopald's manouever was cephalic   Fetal heart rate:  Reassuring. Pelvis:  Adequate pelvis  Cervix: 3 cm 50% soft -2  Contraction frequency:  0 minutes    Membranes:  Intact    ASSESSMENT AND PLAN:    Labor: Admit,  routine labor orders  Fetus: Reassuring  GBS: No  Other: ,     Dr.J. Chrissy ADAMS

## 2018-08-06 NOTE — ANESTHESIA PRE PROCEDURE
Department of Anesthesiology  Preprocedure Note       Name:  Hilda Centeno   Age:  25 y.o.  :  1995                                          MRN:  [id number]         Date:  2018      Surgeon: Shalini Rodriguez):  Kailey Jones DO    Procedure: Procedure(s):   SECTION    Medications prior to admission:   Prior to Admission medications    Medication Sig Start Date End Date Taking? Authorizing Provider   Zuddfg-HxJvm-JfNab-Meth-FA-DHA (PRENATE MINI) 18-0.6-0.4-350 MG CAPS  18   Historical Provider, MD   Prenatal Vit-Fe Fumarate-FA (PRENATAL 1+1 PO) Take 1 tablet by mouth daily    Historical Provider, MD       Current medications:    Current Facility-Administered Medications   Medication Dose Route Frequency Provider Last Rate Last Dose    bupivacaine 0.75% in dextrose 8.25% (intrathecal) (SENSORCAINE) 0.75-8.25 % injection             sodium chloride flush 0.9 % injection 10 mL  10 mL Intravenous 2 times per day Gregory Lowe DO        sodium chloride flush 0.9 % injection 10 mL  10 mL Intravenous PRN Gregory Lowe DO        ceFAZolin (ANCEF) 2 g in dextrose 5 % 100 mL IVPB  2 g Intravenous On Call to [facility], DO        citric acid-sodium citrate (BICITRA) solution 30 mL  30 mL Oral Once Kailey Jones DO        lactated ringers infusion   Intravenous Continuous Gregory Lowe  mL/hr at 18 1630      oxytocin (PITOCIN) 30 units in 500 mL infusion  1 agueda-units/min Intravenous Continuous Gregory Lowe DO           Allergies:     Allergies   Allergen Reactions    No Known Allergies        Problem List:    Patient Active Problem List   Diagnosis Code    Suspected shortening of cervix not found Z03.75    Near syncope R55    Subchorionic hemorrhage in second trimester O41.8X20, O46.8X2    Previous  section complicating pregnancy, antepartum condition or complication V46.622    Pregnant and not yet delivered, second trimester Z34.92    Premature uterine contractions

## 2018-08-06 NOTE — L&D DELIVERY NOTE
PREOPERATIVE DIAGNOSES:     1.  39 week intrauterine pregnancy. 2.  repeat section      POSTOPERATIVE DIAGNOSES:     Same.      PROCEDURE:  repeat low transverse  section.      SURGEON: Dr.J. Chrissy ADAMS     ASST:  [facility]      ESTIMATED BLOOD LOSS:  350 mL.      COMPLICATIONS:  None.         ANESTHESIA: spinal       FINDINGS:   Baby girl at  [phone]  apgars 9 9 bw 8 1  .  Normal  tubes and ovaries bilaterally.       INDICATION/CONSENT: Risks were discussed with patient including bleeding requiring transfusion, infection, injury to bowel/urinary tract, anesthesia, postop thromboembolism and cardiorespiratory complications. Gives consent.         DETAILS OF PROCEDURE: After satisfactory anesthesia was instituted, the patient was prepped and draped in usual sterile fashion. Patient placed in dorsal supine position with leftward tilt of the abdomen. A Pfannenstiel skin incision was made using a sharp scalpel and carried down to the fascia using Bovie cautery. Bovie cautery was used to control hemostasis where needed. The fascia was then incised with Bovie cautery and extended laterally. Kocher clamps were then used to grasp the fascia both superiorly and inferiorly using blunt dissection and Bovie cautery. The midline was bluntly divided and the peritoneal cavity entered via sharp and blunt dissection. The incision was extended with blunt dissection. A bladder flap was created in the lower uterine segment using Metzenbaum scissors and blunt dissection. Bladder blade was placed and bladder retracted. An incision was made in the lower uterine segment with a sharp scalpel and extended laterally with blunt dissection. Amniotomy was performed and a viable fetus was delivered from Cephalic. The cord was clamped and baby was handed to the awaiting attendants. Apgar's and weight are found above. Cord blood and gases were obtained. The placenta was manually removed and uterus exteriorized.  The uterus was cleared of any

## 2018-08-06 NOTE — PROGRESS NOTES
Patient presents to L&D for scheduled repeat c/s. , IUP at 39 weeks. Denies vaginal bleeding, LOF, or contractions. Positive fetal movement. Placed on efm.

## 2018-08-06 NOTE — ANESTHESIA PROCEDURE NOTES
Spinal Block    Patient location during procedure: OB  Reason for block: procedure for pain, primary anesthetic and at surgeon's request  Staffing  Anesthesiologist: Levi Mckeon  Resident/CRNA: ANN ARMANDO  Other anesthesia staff: Justine Watson  Performed: other anesthesia staff   Preanesthetic Checklist  Completed: patient identified, site marked, surgical consent, pre-op evaluation, timeout performed, IV checked, risks and benefits discussed, monitors and equipment checked, anesthesia consent given, oxygen available and patient being monitored  Spinal Block  Patient position: sitting  Prep: ChloraPrep  Patient monitoring: cardiac monitor, continuous pulse ox and frequent blood pressure checks  Approach: midline  Location: L3/L4  Provider prep: sterile gloves and mask  Local infiltration: lidocaine  Agent: bupivacaine  Dose: 1.6  Dose: 1.6  Needle  Needle type: Pencan   Needle gauge: 25 G  Needle length: 3.5 in  Assessment  Swirl obtained: Yes  CSF: clear  Attempts: 1  Hemodynamics: stable

## 2018-08-06 NOTE — PROGRESS NOTES
PreOp DX:  39w Pregnancy    Previous C/Section      Post OP Dx:  39w Pregnancy delivered by                        Living female baby delivered      Procedure:  Surgery/ Assistance    Anesthesia: Spinal      Under Spinal anesthesia the abdomen was prepared and draped . With my technical assistance Dr Karen Escalera performed  a , opening the abdomen, incising the uterus and delivering a living female baby at [phone], weighing 8 lbs 1 oz/3660 gms with Apgar: 9/9    Then the uterus and the abdomen were closed. Procedure was well tolerated.

## 2018-08-07 LAB
ANION GAP SERPL CALCULATED.3IONS-SCNC: 10 MMOL/L (ref 7–16)
BUN BLDV-MCNC: 4 MG/DL (ref 6–20)
CALCIUM SERPL-MCNC: 8.4 MG/DL (ref 8.6–10.2)
CHLORIDE BLD-SCNC: 106 MMOL/L (ref 98–107)
CO2: 20 MMOL/L (ref 22–29)
CREAT SERPL-MCNC: 0.4 MG/DL (ref 0.5–1)
GFR AFRICAN AMERICAN: >60
GFR NON-AFRICAN AMERICAN: >60 ML/MIN/1.73
GLUCOSE BLD-MCNC: 83 MG/DL (ref 74–109)
HCT VFR BLD CALC: 33.1 % (ref 34–48)
HEMOGLOBIN: 10.7 G/DL (ref 11.5–15.5)
MCH RBC QN AUTO: 26.6 PG (ref 26–35)
MCHC RBC AUTO-ENTMCNC: 32.3 % (ref 32–34.5)
MCV RBC AUTO: 82.1 FL (ref 80–99.9)
PDW BLD-RTO: 16 FL (ref 11.5–15)
PLATELET # BLD: 193 E9/L (ref 130–450)
PMV BLD AUTO: 12.6 FL (ref 7–12)
POTASSIUM SERPL-SCNC: 4 MMOL/L (ref 3.5–5)
RBC # BLD: 4.03 E12/L (ref 3.5–5.5)
SODIUM BLD-SCNC: 136 MMOL/L (ref 132–146)
WBC # BLD: 14.5 E9/L (ref 4.5–11.5)

## 2018-08-07 PROCEDURE — 6370000000 HC RX 637 (ALT 250 FOR IP): Performed by: OBSTETRICS & GYNECOLOGY

## 2018-08-07 PROCEDURE — 85027 COMPLETE CBC AUTOMATED: CPT

## 2018-08-07 PROCEDURE — 6360000002 HC RX W HCPCS: Performed by: OBSTETRICS & GYNECOLOGY

## 2018-08-07 PROCEDURE — 2580000003 HC RX 258: Performed by: OBSTETRICS & GYNECOLOGY

## 2018-08-07 PROCEDURE — 80048 BASIC METABOLIC PNL TOTAL CA: CPT

## 2018-08-07 PROCEDURE — 1220000000 HC SEMI PRIVATE OB R&B

## 2018-08-07 PROCEDURE — 36415 COLL VENOUS BLD VENIPUNCTURE: CPT

## 2018-08-07 RX ORDER — KETOROLAC TROMETHAMINE 30 MG/ML
30 INJECTION, SOLUTION INTRAMUSCULAR; INTRAVENOUS EVERY 6 HOURS
Status: ACTIVE | OUTPATIENT
Start: 2018-08-07 | End: 2018-08-09

## 2018-08-07 RX ORDER — IBUPROFEN 800 MG/1
800 TABLET ORAL EVERY 8 HOURS
Status: DISCONTINUED | OUTPATIENT
Start: 2018-08-09 | End: 2018-08-07

## 2018-08-07 RX ORDER — IBUPROFEN 800 MG/1
800 TABLET ORAL EVERY 8 HOURS
Status: DISCONTINUED | OUTPATIENT
Start: 2018-08-07 | End: 2018-08-09 | Stop reason: HOSPADM

## 2018-08-07 RX ORDER — OXYCODONE HYDROCHLORIDE AND ACETAMINOPHEN 5; 325 MG/1; MG/1
1 TABLET ORAL EVERY 4 HOURS PRN
Status: DISCONTINUED | OUTPATIENT
Start: 2018-08-07 | End: 2018-08-09 | Stop reason: HOSPADM

## 2018-08-07 RX ORDER — OXYCODONE HYDROCHLORIDE AND ACETAMINOPHEN 5; 325 MG/1; MG/1
2 TABLET ORAL EVERY 4 HOURS PRN
Status: DISCONTINUED | OUTPATIENT
Start: 2018-08-07 | End: 2018-08-09 | Stop reason: HOSPADM

## 2018-08-07 RX ADMIN — DOCUSATE SODIUM 100 MG: 100 CAPSULE, LIQUID FILLED ORAL at 21:28

## 2018-08-07 RX ADMIN — OXYCODONE HYDROCHLORIDE AND ACETAMINOPHEN 1 TABLET: 5; 325 TABLET ORAL at 00:37

## 2018-08-07 RX ADMIN — Medication 1 TABLET: at 08:53

## 2018-08-07 RX ADMIN — Medication 10 ML: at 08:56

## 2018-08-07 RX ADMIN — KETOROLAC TROMETHAMINE 30 MG: 30 INJECTION, SOLUTION INTRAMUSCULAR at 00:51

## 2018-08-07 RX ADMIN — SIMETHICONE CHEW TAB 80 MG 80 MG: 80 TABLET ORAL at 08:53

## 2018-08-07 RX ADMIN — DOCUSATE SODIUM 100 MG: 100 CAPSULE, LIQUID FILLED ORAL at 08:53

## 2018-08-07 RX ADMIN — OXYCODONE HYDROCHLORIDE AND ACETAMINOPHEN 2 TABLET: 5; 325 TABLET ORAL at 17:24

## 2018-08-07 RX ADMIN — OXYCODONE HYDROCHLORIDE AND ACETAMINOPHEN 2 TABLET: 5; 325 TABLET ORAL at 21:29

## 2018-08-07 RX ADMIN — OXYCODONE HYDROCHLORIDE AND ACETAMINOPHEN 2 TABLET: 5; 325 TABLET ORAL at 13:01

## 2018-08-07 RX ADMIN — OXYCODONE HYDROCHLORIDE AND ACETAMINOPHEN 2 TABLET: 5; 325 TABLET ORAL at 08:53

## 2018-08-07 RX ADMIN — Medication: at 08:53

## 2018-08-07 ASSESSMENT — PAIN DESCRIPTION - PROGRESSION
CLINICAL_PROGRESSION: GRADUALLY WORSENING
CLINICAL_PROGRESSION: GRADUALLY WORSENING

## 2018-08-07 ASSESSMENT — PAIN DESCRIPTION - FREQUENCY
FREQUENCY: INTERMITTENT
FREQUENCY: INTERMITTENT

## 2018-08-07 ASSESSMENT — PAIN SCALES - GENERAL
PAINLEVEL_OUTOF10: 6
PAINLEVEL_OUTOF10: 0
PAINLEVEL_OUTOF10: 7
PAINLEVEL_OUTOF10: 6
PAINLEVEL_OUTOF10: 6
PAINLEVEL_OUTOF10: 1

## 2018-08-07 ASSESSMENT — PAIN DESCRIPTION - LOCATION
LOCATION: ABDOMEN
LOCATION: ABDOMEN;INCISION

## 2018-08-07 ASSESSMENT — PAIN DESCRIPTION - ORIENTATION
ORIENTATION: LOWER;MID
ORIENTATION: LOWER;MID

## 2018-08-07 ASSESSMENT — PAIN DESCRIPTION - DESCRIPTORS
DESCRIPTORS: CRAMPING;DISCOMFORT
DESCRIPTORS: CRAMPING;DISCOMFORT

## 2018-08-07 ASSESSMENT — PAIN DESCRIPTION - ONSET: ONSET: GRADUAL

## 2018-08-07 ASSESSMENT — PAIN DESCRIPTION - PAIN TYPE
TYPE: SURGICAL PAIN
TYPE: SURGICAL PAIN

## 2018-08-07 ASSESSMENT — PAIN DESCRIPTION - RADICULAR PAIN: RADICULAR_PAIN: ABSENT

## 2018-08-07 NOTE — ANESTHESIA POSTPROCEDURE EVALUATION
Department of Anesthesiology  Postprocedure Note    Patient: Kalee Ray  MRN: [id number]  YOB: 1995  Date of evaluation: 2018  Time:  5:32 PM     Procedure Summary     Date:  18 Room / Location:  [facility] L&D OR   [facility] L&D    Anesthesia Start:  1808 Anesthesia Stop:      Procedure:   SECTION (N/A Uterus) Diagnosis:  (repeat csection)    Surgeon:  Dolan Pallas, DO Responsible Provider:  Micaela Salgado MD    Anesthesia Type:  spinal ASA Status:  2          Anesthesia Type: spinal    Viry Phase I: Viry Score: 10    Viry Phase II:      Last vitals: Reviewed and per EMR flowsheets.        Anesthesia Post Evaluation    Patient location during evaluation: floor  Patient participation: complete - patient participated  Level of consciousness: awake and alert  Nausea & Vomiting: no nausea  Complications: no  Cardiovascular status: hemodynamically stable  Respiratory status: acceptable  Hydration status: stable

## 2018-08-07 NOTE — PROGRESS NOTES
Assessment as charted. Denies discomfort. Family at bedside. Discussed incision care and signs and symptoms of infection. Denied questions. Instructed to call RN for needs.

## 2018-08-08 PROCEDURE — 6370000000 HC RX 637 (ALT 250 FOR IP): Performed by: OBSTETRICS & GYNECOLOGY

## 2018-08-08 PROCEDURE — 1220000000 HC SEMI PRIVATE OB R&B

## 2018-08-08 RX ORDER — IBUPROFEN 800 MG/1
800 TABLET ORAL EVERY 8 HOURS
Qty: 120 TABLET | Refills: 1 | Status: SHIPPED | OUTPATIENT
Start: 2018-08-08 | End: 2019-02-17

## 2018-08-08 RX ORDER — OXYCODONE HYDROCHLORIDE AND ACETAMINOPHEN 5; 325 MG/1; MG/1
2 TABLET ORAL EVERY 4 HOURS PRN
Qty: 20 TABLET | Refills: 0 | Status: SHIPPED | OUTPATIENT
Start: 2018-08-08 | End: 2018-08-15

## 2018-08-08 RX ADMIN — OXYCODONE HYDROCHLORIDE AND ACETAMINOPHEN 2 TABLET: 5; 325 TABLET ORAL at 02:11

## 2018-08-08 RX ADMIN — OXYCODONE HYDROCHLORIDE AND ACETAMINOPHEN 1 TABLET: 5; 325 TABLET ORAL at 15:51

## 2018-08-08 RX ADMIN — IBUPROFEN 800 MG: 800 TABLET ORAL at 12:39

## 2018-08-08 RX ADMIN — OXYCODONE HYDROCHLORIDE AND ACETAMINOPHEN 2 TABLET: 5; 325 TABLET ORAL at 08:51

## 2018-08-08 RX ADMIN — SIMETHICONE CHEW TAB 80 MG 80 MG: 80 TABLET ORAL at 08:51

## 2018-08-08 RX ADMIN — IBUPROFEN 800 MG: 800 TABLET ORAL at 21:04

## 2018-08-08 RX ADMIN — DOCUSATE SODIUM 100 MG: 100 CAPSULE, LIQUID FILLED ORAL at 21:03

## 2018-08-08 RX ADMIN — DOCUSATE SODIUM 100 MG: 100 CAPSULE, LIQUID FILLED ORAL at 08:50

## 2018-08-08 RX ADMIN — SIMETHICONE CHEW TAB 80 MG 80 MG: 80 TABLET ORAL at 15:51

## 2018-08-08 RX ADMIN — Medication 1 TABLET: at 08:50

## 2018-08-08 ASSESSMENT — PAIN SCALES - GENERAL
PAINLEVEL_OUTOF10: 7
PAINLEVEL_OUTOF10: 4
PAINLEVEL_OUTOF10: 0
PAINLEVEL_OUTOF10: 4
PAINLEVEL_OUTOF10: 6
PAINLEVEL_OUTOF10: 7

## 2018-08-08 ASSESSMENT — PAIN DESCRIPTION - PROGRESSION: CLINICAL_PROGRESSION: GRADUALLY IMPROVING

## 2018-08-08 NOTE — DISCHARGE SUMMARY
Admitted 8/6/18  Pregnancy 39 weeks  Repeat section  Delivery of female  Discharged third po day rx percocet and motrin  Follow up 2 weeks

## 2018-08-09 VITALS
RESPIRATION RATE: 18 BRPM | HEIGHT: 63 IN | BODY MASS INDEX: 25.52 KG/M2 | WEIGHT: 144 LBS | OXYGEN SATURATION: 100 % | TEMPERATURE: 98.2 F | SYSTOLIC BLOOD PRESSURE: 101 MMHG | DIASTOLIC BLOOD PRESSURE: 58 MMHG | HEART RATE: 65 BPM

## 2018-08-09 PROCEDURE — 90715 TDAP VACCINE 7 YRS/> IM: CPT | Performed by: OBSTETRICS & GYNECOLOGY

## 2018-08-09 PROCEDURE — 90471 IMMUNIZATION ADMIN: CPT | Performed by: OBSTETRICS & GYNECOLOGY

## 2018-08-09 PROCEDURE — 6370000000 HC RX 637 (ALT 250 FOR IP): Performed by: OBSTETRICS & GYNECOLOGY

## 2018-08-09 PROCEDURE — 6360000002 HC RX W HCPCS: Performed by: OBSTETRICS & GYNECOLOGY

## 2018-08-09 RX ADMIN — TETANUS TOXOID, REDUCED DIPHTHERIA TOXOID AND ACELLULAR PERTUSSIS VACCINE, ADSORBED 0.5 ML: 5; 2.5; 8; 8; 2.5 SUSPENSION INTRAMUSCULAR at 09:20

## 2018-08-09 RX ADMIN — IBUPROFEN 800 MG: 800 TABLET ORAL at 05:37

## 2018-08-09 RX ADMIN — Medication 1 TABLET: at 09:20

## 2018-08-09 RX ADMIN — DOCUSATE SODIUM 100 MG: 100 CAPSULE, LIQUID FILLED ORAL at 09:20

## 2018-08-09 RX ADMIN — SIMETHICONE CHEW TAB 80 MG 80 MG: 80 TABLET ORAL at 09:20

## 2018-08-09 ASSESSMENT — PAIN SCALES - GENERAL: PAINLEVEL_OUTOF10: 4

## 2018-08-09 NOTE — PLAN OF CARE
Problem: Infection - Surgical Site:  Goal: Will show no infection signs and symptoms  Will show no infection signs and symptoms   Outcome: Met This Shift      Problem: Pain - Acute:  Goal: Pain level will decrease  Pain level will decrease   Outcome: Met This Shift

## 2018-10-19 ENCOUNTER — HOSPITAL ENCOUNTER (EMERGENCY)
Age: 23
Discharge: HOME | End: 2018-10-19
Attending: EMERGENCY MEDICINE
Payer: COMMERCIAL

## 2018-10-19 ENCOUNTER — APPOINTMENT (OUTPATIENT)
Dept: ULTRASOUND IMAGING | Age: 23
End: 2018-10-19
Payer: COMMERCIAL

## 2018-10-19 VITALS
RESPIRATION RATE: 16 BRPM | BODY MASS INDEX: 20.55 KG/M2 | SYSTOLIC BLOOD PRESSURE: 96 MMHG | HEIGHT: 63 IN | TEMPERATURE: 99 F | DIASTOLIC BLOOD PRESSURE: 60 MMHG | HEART RATE: 80 BPM | OXYGEN SATURATION: 100 % | WEIGHT: 116 LBS

## 2018-10-19 DIAGNOSIS — R19.7 DIARRHEA, UNSPECIFIED TYPE: Primary | ICD-10-CM

## 2018-10-19 DIAGNOSIS — R10.32 ABDOMINAL PAIN, LEFT LOWER QUADRANT: ICD-10-CM

## 2018-10-19 LAB
ALBUMIN SERPL-MCNC: 4.3 G/DL (ref 3.5–5.2)
ALP BLD-CCNC: 79 U/L (ref 35–104)
ALT SERPL-CCNC: 16 U/L (ref 0–32)
ANION GAP SERPL CALCULATED.3IONS-SCNC: 9 MMOL/L (ref 7–16)
AST SERPL-CCNC: 17 U/L (ref 0–31)
BACTERIA: ABNORMAL /HPF
BASOPHILS ABSOLUTE: 0.05 E9/L (ref 0–0.2)
BASOPHILS RELATIVE PERCENT: 0.6 % (ref 0–2)
BILIRUB SERPL-MCNC: 0.3 MG/DL (ref 0–1.2)
BILIRUBIN URINE: NEGATIVE
BLOOD, URINE: NEGATIVE
BUN BLDV-MCNC: 10 MG/DL (ref 6–20)
CALCIUM SERPL-MCNC: 9.4 MG/DL (ref 8.6–10.2)
CHLORIDE BLD-SCNC: 107 MMOL/L (ref 98–107)
CHP ED QC CHECK: NORMAL
CLARITY: CLEAR
CO2: 24 MMOL/L (ref 22–29)
COLOR: YELLOW
CREAT SERPL-MCNC: 0.7 MG/DL (ref 0.5–1)
EOSINOPHILS ABSOLUTE: 0.16 E9/L (ref 0.05–0.5)
EOSINOPHILS RELATIVE PERCENT: 1.9 % (ref 0–6)
EPITHELIAL CELLS, UA: ABNORMAL /HPF
GFR AFRICAN AMERICAN: >60
GFR NON-AFRICAN AMERICAN: >60 ML/MIN/1.73
GLUCOSE BLD-MCNC: 83 MG/DL (ref 74–109)
GLUCOSE URINE: NEGATIVE MG/DL
HCT VFR BLD CALC: 39.2 % (ref 34–48)
HEMOGLOBIN: 12.6 G/DL (ref 11.5–15.5)
IMMATURE GRANULOCYTES #: 0.01 E9/L
IMMATURE GRANULOCYTES %: 0.1 % (ref 0–5)
KETONES, URINE: NEGATIVE MG/DL
LACTIC ACID: 0.8 MMOL/L (ref 0.5–2.2)
LEUKOCYTE ESTERASE, URINE: NEGATIVE
LIPASE: 37 U/L (ref 13–60)
LYMPHOCYTES ABSOLUTE: 2.87 E9/L (ref 1.5–4)
LYMPHOCYTES RELATIVE PERCENT: 34.8 % (ref 20–42)
MCH RBC QN AUTO: 27.7 PG (ref 26–35)
MCHC RBC AUTO-ENTMCNC: 32.1 % (ref 32–34.5)
MCV RBC AUTO: 86.2 FL (ref 80–99.9)
MONOCYTES ABSOLUTE: 0.67 E9/L (ref 0.1–0.95)
MONOCYTES RELATIVE PERCENT: 8.1 % (ref 2–12)
NEUTROPHILS ABSOLUTE: 4.48 E9/L (ref 1.8–7.3)
NEUTROPHILS RELATIVE PERCENT: 54.5 % (ref 43–80)
NITRITE, URINE: NEGATIVE
PDW BLD-RTO: 17.6 FL (ref 11.5–15)
PH UA: 5.5 (ref 5–9)
PLATELET # BLD: 245 E9/L (ref 130–450)
PMV BLD AUTO: 11.2 FL (ref 7–12)
POTASSIUM SERPL-SCNC: 3.9 MMOL/L (ref 3.5–5)
PREGNANCY TEST URINE, POC: NEGATIVE
PROTEIN UA: NEGATIVE MG/DL
RBC # BLD: 4.55 E12/L (ref 3.5–5.5)
RBC UA: ABNORMAL /HPF (ref 0–2)
SODIUM BLD-SCNC: 140 MMOL/L (ref 132–146)
SPECIFIC GRAVITY UA: 1.02 (ref 1–1.03)
TOTAL PROTEIN: 6.9 G/DL (ref 6.4–8.3)
UROBILINOGEN, URINE: 0.2 E.U./DL
WBC # BLD: 8.2 E9/L (ref 4.5–11.5)
WBC UA: ABNORMAL /HPF (ref 0–5)

## 2018-10-19 PROCEDURE — 99284 EMERGENCY DEPT VISIT MOD MDM: CPT

## 2018-10-19 PROCEDURE — 80053 COMPREHEN METABOLIC PANEL: CPT

## 2018-10-19 PROCEDURE — 85025 COMPLETE CBC W/AUTO DIFF WBC: CPT

## 2018-10-19 PROCEDURE — 6360000002 HC RX W HCPCS: Performed by: EMERGENCY MEDICINE

## 2018-10-19 PROCEDURE — 76830 TRANSVAGINAL US NON-OB: CPT

## 2018-10-19 PROCEDURE — 87088 URINE BACTERIA CULTURE: CPT

## 2018-10-19 PROCEDURE — 83605 ASSAY OF LACTIC ACID: CPT

## 2018-10-19 PROCEDURE — 81001 URINALYSIS AUTO W/SCOPE: CPT

## 2018-10-19 PROCEDURE — 96374 THER/PROPH/DIAG INJ IV PUSH: CPT

## 2018-10-19 PROCEDURE — 2580000003 HC RX 258: Performed by: EMERGENCY MEDICINE

## 2018-10-19 PROCEDURE — 96372 THER/PROPH/DIAG INJ SC/IM: CPT

## 2018-10-19 PROCEDURE — 96375 TX/PRO/DX INJ NEW DRUG ADDON: CPT

## 2018-10-19 PROCEDURE — 83690 ASSAY OF LIPASE: CPT

## 2018-10-19 RX ORDER — KETOROLAC TROMETHAMINE 30 MG/ML
15 INJECTION, SOLUTION INTRAMUSCULAR; INTRAVENOUS ONCE
Status: COMPLETED | OUTPATIENT
Start: 2018-10-19 | End: 2018-10-19

## 2018-10-19 RX ORDER — ONDANSETRON 2 MG/ML
4 INJECTION INTRAMUSCULAR; INTRAVENOUS ONCE
Status: COMPLETED | OUTPATIENT
Start: 2018-10-19 | End: 2018-10-19

## 2018-10-19 RX ORDER — DICYCLOMINE HYDROCHLORIDE 10 MG/1
20 CAPSULE ORAL
Qty: 56 CAPSULE | Refills: 0 | Status: SHIPPED | OUTPATIENT
Start: 2018-10-19 | End: 2019-02-17

## 2018-10-19 RX ORDER — 0.9 % SODIUM CHLORIDE 0.9 %
1000 INTRAVENOUS SOLUTION INTRAVENOUS ONCE
Status: COMPLETED | OUTPATIENT
Start: 2018-10-19 | End: 2018-10-19

## 2018-10-19 RX ORDER — DICYCLOMINE HYDROCHLORIDE 10 MG/ML
20 INJECTION INTRAMUSCULAR ONCE
Status: COMPLETED | OUTPATIENT
Start: 2018-10-19 | End: 2018-10-19

## 2018-10-19 RX ADMIN — DICYCLOMINE HYDROCHLORIDE 20 MG: 10 INJECTION INTRAMUSCULAR at 17:57

## 2018-10-19 RX ADMIN — ONDANSETRON 4 MG: 2 INJECTION INTRAMUSCULAR; INTRAVENOUS at 16:19

## 2018-10-19 RX ADMIN — KETOROLAC TROMETHAMINE 15 MG: 30 INJECTION INTRAMUSCULAR; INTRAVENOUS at 17:57

## 2018-10-19 RX ADMIN — SODIUM CHLORIDE 1000 ML: 9 INJECTION, SOLUTION INTRAVENOUS at 17:57

## 2018-10-19 ASSESSMENT — PAIN DESCRIPTION - LOCATION: LOCATION: ABDOMEN

## 2018-10-19 ASSESSMENT — ENCOUNTER SYMPTOMS
NAUSEA: 0
EYE PAIN: 0
BACK PAIN: 0
DIARRHEA: 1
SORE THROAT: 0
ABDOMINAL PAIN: 1
SINUS PRESSURE: 0
EYE DISCHARGE: 0
COUGH: 0
WHEEZING: 0
SHORTNESS OF BREATH: 0
EYE REDNESS: 0
ABDOMINAL DISTENTION: 0
VOMITING: 0

## 2018-10-19 ASSESSMENT — PAIN DESCRIPTION - ORIENTATION: ORIENTATION: LOWER;MID

## 2018-10-19 ASSESSMENT — PAIN DESCRIPTION - PAIN TYPE: TYPE: ACUTE PAIN

## 2018-10-19 ASSESSMENT — PAIN SCALES - GENERAL
PAINLEVEL_OUTOF10: 8
PAINLEVEL_OUTOF10: 8
PAINLEVEL_OUTOF10: 4

## 2018-10-19 ASSESSMENT — PAIN DESCRIPTION - DESCRIPTORS: DESCRIPTORS: PRESSURE

## 2018-10-21 LAB — URINE CULTURE, ROUTINE: NORMAL

## 2018-12-03 ENCOUNTER — APPOINTMENT (OUTPATIENT)
Dept: CT IMAGING | Age: 23
End: 2018-12-03
Payer: COMMERCIAL

## 2018-12-03 ENCOUNTER — APPOINTMENT (OUTPATIENT)
Dept: ULTRASOUND IMAGING | Age: 23
End: 2018-12-03
Payer: COMMERCIAL

## 2018-12-03 ENCOUNTER — HOSPITAL ENCOUNTER (EMERGENCY)
Age: 23
Discharge: HOME | End: 2018-12-03
Attending: EMERGENCY MEDICINE
Payer: COMMERCIAL

## 2018-12-03 VITALS
BODY MASS INDEX: 21.26 KG/M2 | DIASTOLIC BLOOD PRESSURE: 68 MMHG | RESPIRATION RATE: 14 BRPM | TEMPERATURE: 98.7 F | HEIGHT: 63 IN | OXYGEN SATURATION: 99 % | WEIGHT: 120 LBS | HEART RATE: 90 BPM | SYSTOLIC BLOOD PRESSURE: 113 MMHG

## 2018-12-03 DIAGNOSIS — K59.00 CONSTIPATION, UNSPECIFIED CONSTIPATION TYPE: Primary | ICD-10-CM

## 2018-12-03 LAB
ALBUMIN SERPL-MCNC: 4.5 G/DL (ref 3.5–5.2)
ALP BLD-CCNC: 74 U/L (ref 35–104)
ALT SERPL-CCNC: 21 U/L (ref 0–32)
ANION GAP SERPL CALCULATED.3IONS-SCNC: 13 MMOL/L (ref 7–16)
AST SERPL-CCNC: 16 U/L (ref 0–31)
BACTERIA: ABNORMAL /HPF
BASOPHILS ABSOLUTE: 0.06 E9/L (ref 0–0.2)
BASOPHILS RELATIVE PERCENT: 0.9 % (ref 0–2)
BILIRUB SERPL-MCNC: 0.3 MG/DL (ref 0–1.2)
BILIRUBIN URINE: NEGATIVE
BLOOD, URINE: ABNORMAL
BUN BLDV-MCNC: 10 MG/DL (ref 6–20)
CALCIUM SERPL-MCNC: 9.5 MG/DL (ref 8.6–10.2)
CHLORIDE BLD-SCNC: 105 MMOL/L (ref 98–107)
CHP ED QC CHECK: NORMAL
CLARITY: CLEAR
CO2: 22 MMOL/L (ref 22–29)
COLOR: YELLOW
CREAT SERPL-MCNC: 0.7 MG/DL (ref 0.5–1)
EOSINOPHILS ABSOLUTE: 0.11 E9/L (ref 0.05–0.5)
EOSINOPHILS RELATIVE PERCENT: 1.6 % (ref 0–6)
EPITHELIAL CELLS, UA: ABNORMAL /HPF
GFR AFRICAN AMERICAN: >60
GFR NON-AFRICAN AMERICAN: >60 ML/MIN/1.73
GLUCOSE BLD-MCNC: 82 MG/DL (ref 74–99)
GLUCOSE URINE: NEGATIVE MG/DL
HCT VFR BLD CALC: 41.2 % (ref 34–48)
HEMOGLOBIN: 13.2 G/DL (ref 11.5–15.5)
IMMATURE GRANULOCYTES #: 0.02 E9/L
IMMATURE GRANULOCYTES %: 0.3 % (ref 0–5)
KETONES, URINE: NEGATIVE MG/DL
LACTIC ACID: 0.8 MMOL/L (ref 0.5–2.2)
LEUKOCYTE ESTERASE, URINE: ABNORMAL
LYMPHOCYTES ABSOLUTE: 2.33 E9/L (ref 1.5–4)
LYMPHOCYTES RELATIVE PERCENT: 33.6 % (ref 20–42)
MCH RBC QN AUTO: 28.4 PG (ref 26–35)
MCHC RBC AUTO-ENTMCNC: 32 % (ref 32–34.5)
MCV RBC AUTO: 88.6 FL (ref 80–99.9)
MONOCYTES ABSOLUTE: 0.63 E9/L (ref 0.1–0.95)
MONOCYTES RELATIVE PERCENT: 9.1 % (ref 2–12)
MUCUS: PRESENT
NEUTROPHILS ABSOLUTE: 3.79 E9/L (ref 1.8–7.3)
NEUTROPHILS RELATIVE PERCENT: 54.5 % (ref 43–80)
NITRITE, URINE: NEGATIVE
PDW BLD-RTO: 13.7 FL (ref 11.5–15)
PH UA: 6 (ref 5–9)
PLATELET # BLD: 270 E9/L (ref 130–450)
PMV BLD AUTO: 11.8 FL (ref 7–12)
POTASSIUM SERPL-SCNC: 3.7 MMOL/L (ref 3.5–5)
PREGNANCY TEST URINE, POC: NORMAL
PROTEIN UA: NEGATIVE MG/DL
RBC # BLD: 4.65 E12/L (ref 3.5–5.5)
RBC UA: ABNORMAL /HPF (ref 0–2)
RENAL EPITHELIAL, UA: ABNORMAL /HPF
SODIUM BLD-SCNC: 140 MMOL/L (ref 132–146)
SPECIFIC GRAVITY UA: 1.02 (ref 1–1.03)
TOTAL PROTEIN: 7.2 G/DL (ref 6.4–8.3)
UROBILINOGEN, URINE: 0.2 E.U./DL
WBC # BLD: 6.9 E9/L (ref 4.5–11.5)
WBC UA: ABNORMAL /HPF (ref 0–5)

## 2018-12-03 PROCEDURE — 96375 TX/PRO/DX INJ NEW DRUG ADDON: CPT

## 2018-12-03 PROCEDURE — 6360000002 HC RX W HCPCS: Performed by: STUDENT IN AN ORGANIZED HEALTH CARE EDUCATION/TRAINING PROGRAM

## 2018-12-03 PROCEDURE — 2580000003 HC RX 258: Performed by: STUDENT IN AN ORGANIZED HEALTH CARE EDUCATION/TRAINING PROGRAM

## 2018-12-03 PROCEDURE — 80053 COMPREHEN METABOLIC PANEL: CPT

## 2018-12-03 PROCEDURE — 85025 COMPLETE CBC W/AUTO DIFF WBC: CPT

## 2018-12-03 PROCEDURE — 6360000004 HC RX CONTRAST MEDICATION: Performed by: RADIOLOGY

## 2018-12-03 PROCEDURE — 74177 CT ABD & PELVIS W/CONTRAST: CPT

## 2018-12-03 PROCEDURE — 83605 ASSAY OF LACTIC ACID: CPT

## 2018-12-03 PROCEDURE — 96374 THER/PROPH/DIAG INJ IV PUSH: CPT

## 2018-12-03 PROCEDURE — 76830 TRANSVAGINAL US NON-OB: CPT

## 2018-12-03 PROCEDURE — 99284 EMERGENCY DEPT VISIT MOD MDM: CPT

## 2018-12-03 PROCEDURE — 81001 URINALYSIS AUTO W/SCOPE: CPT

## 2018-12-03 RX ORDER — 0.9 % SODIUM CHLORIDE 0.9 %
1000 INTRAVENOUS SOLUTION INTRAVENOUS ONCE
Status: COMPLETED | OUTPATIENT
Start: 2018-12-03 | End: 2018-12-03

## 2018-12-03 RX ORDER — KETOROLAC TROMETHAMINE 30 MG/ML
30 INJECTION, SOLUTION INTRAMUSCULAR; INTRAVENOUS ONCE
Status: COMPLETED | OUTPATIENT
Start: 2018-12-03 | End: 2018-12-03

## 2018-12-03 RX ORDER — ONDANSETRON 2 MG/ML
4 INJECTION INTRAMUSCULAR; INTRAVENOUS ONCE
Status: COMPLETED | OUTPATIENT
Start: 2018-12-03 | End: 2018-12-03

## 2018-12-03 RX ORDER — POLYETHYLENE GLYCOL 3350 17 G/17G
POWDER, FOR SOLUTION ORAL
Qty: 1 BOTTLE | Refills: 0 | Status: SHIPPED | OUTPATIENT
Start: 2018-12-03 | End: 2018-12-17

## 2018-12-03 RX ORDER — DOCUSATE SODIUM 100 MG/1
100 CAPSULE, LIQUID FILLED ORAL 2 TIMES DAILY PRN
Qty: 20 CAPSULE | Refills: 0 | Status: SHIPPED | OUTPATIENT
Start: 2018-12-03 | End: 2018-12-08

## 2018-12-03 RX ORDER — MORPHINE SULFATE 2 MG/ML
4 INJECTION, SOLUTION INTRAMUSCULAR; INTRAVENOUS ONCE
Status: COMPLETED | OUTPATIENT
Start: 2018-12-03 | End: 2018-12-03

## 2018-12-03 RX ADMIN — SODIUM CHLORIDE 1000 ML: 9 INJECTION, SOLUTION INTRAVENOUS at 14:56

## 2018-12-03 RX ADMIN — MORPHINE SULFATE 4 MG: 2 INJECTION, SOLUTION INTRAMUSCULAR; INTRAVENOUS at 17:54

## 2018-12-03 RX ADMIN — KETOROLAC TROMETHAMINE 30 MG: 30 INJECTION, SOLUTION INTRAMUSCULAR at 15:03

## 2018-12-03 RX ADMIN — ONDANSETRON 4 MG: 2 INJECTION INTRAMUSCULAR; INTRAVENOUS at 17:54

## 2018-12-03 RX ADMIN — IOPAMIDOL 110 ML: 755 INJECTION, SOLUTION INTRAVENOUS at 17:11

## 2018-12-03 ASSESSMENT — ENCOUNTER SYMPTOMS
CHEST TIGHTNESS: 0
DIARRHEA: 0
RHINORRHEA: 0
NAUSEA: 1
ABDOMINAL PAIN: 1
VOMITING: 1
WHEEZING: 0
BACK PAIN: 0
SORE THROAT: 0
SHORTNESS OF BREATH: 0
CONSTIPATION: 0
BLOOD IN STOOL: 0
COUGH: 0

## 2018-12-03 ASSESSMENT — PAIN DESCRIPTION - DESCRIPTORS: DESCRIPTORS: ACHING;CRAMPING;SPASM

## 2018-12-03 ASSESSMENT — PAIN DESCRIPTION - ONSET: ONSET: SUDDEN

## 2018-12-03 ASSESSMENT — PAIN SCALES - GENERAL
PAINLEVEL_OUTOF10: 7
PAINLEVEL_OUTOF10: 5
PAINLEVEL_OUTOF10: 8

## 2018-12-03 ASSESSMENT — PAIN DESCRIPTION - LOCATION: LOCATION: ABDOMEN;BACK

## 2018-12-03 ASSESSMENT — PAIN DESCRIPTION - PAIN TYPE: TYPE: ACUTE PAIN

## 2018-12-03 ASSESSMENT — PAIN DESCRIPTION - ORIENTATION: ORIENTATION: LOWER

## 2018-12-03 ASSESSMENT — PAIN DESCRIPTION - FREQUENCY: FREQUENCY: CONTINUOUS

## 2018-12-03 NOTE — ED PROVIDER NOTES
Constipation, unspecified constipation type        Disposition:  Patient's disposition: Discharge to home  Patient's condition is stable.            Vandana Dixon,   12/05/18 5312

## 2019-02-17 ENCOUNTER — APPOINTMENT (OUTPATIENT)
Dept: CT IMAGING | Age: 24
End: 2019-02-17
Payer: COMMERCIAL

## 2019-02-17 ENCOUNTER — HOSPITAL ENCOUNTER (EMERGENCY)
Age: 24
Discharge: HOME | End: 2019-02-17
Attending: EMERGENCY MEDICINE
Payer: COMMERCIAL

## 2019-02-17 VITALS
RESPIRATION RATE: 16 BRPM | WEIGHT: 114 LBS | HEART RATE: 76 BPM | TEMPERATURE: 97.9 F | DIASTOLIC BLOOD PRESSURE: 66 MMHG | BODY MASS INDEX: 20.2 KG/M2 | SYSTOLIC BLOOD PRESSURE: 104 MMHG | HEIGHT: 63 IN | OXYGEN SATURATION: 99 %

## 2019-02-17 DIAGNOSIS — R10.9 LEFT FLANK PAIN: ICD-10-CM

## 2019-02-17 DIAGNOSIS — M79.18 MUSCULOSKELETAL PAIN: Primary | ICD-10-CM

## 2019-02-17 PROBLEM — O34.219 PREVIOUS CESAREAN SECTION COMPLICATING PREGNANCY, ANTEPARTUM CONDITION OR COMPLICATION: Status: RESOLVED | Noted: 2018-04-01 | Resolved: 2019-02-17

## 2019-02-17 PROBLEM — O26.853 SPOTTING AFFECTING PREGNANCY IN THIRD TRIMESTER: Status: RESOLVED | Noted: 2018-06-20 | Resolved: 2019-02-17

## 2019-02-17 PROBLEM — O46.8X2 SUBCHORIONIC HEMORRHAGE IN SECOND TRIMESTER: Status: RESOLVED | Noted: 2018-04-01 | Resolved: 2019-02-17

## 2019-02-17 PROBLEM — Z3A.36 36 WEEKS GESTATION OF PREGNANCY: Status: RESOLVED | Noted: 2018-07-20 | Resolved: 2019-02-17

## 2019-02-17 PROBLEM — Z3A.28 28 WEEKS GESTATION OF PREGNANCY: Status: RESOLVED | Noted: 2018-05-22 | Resolved: 2019-02-17

## 2019-02-17 PROBLEM — Z3A.36 PREGNANCY WITH 36 COMPLETED WEEKS GESTATION: Status: RESOLVED | Noted: 2018-07-19 | Resolved: 2019-02-17

## 2019-02-17 PROBLEM — Z34.92 PREGNANT AND NOT YET DELIVERED, SECOND TRIMESTER: Status: RESOLVED | Noted: 2018-04-23 | Resolved: 2019-02-17

## 2019-02-17 PROBLEM — O41.8X20 SUBCHORIONIC HEMORRHAGE IN SECOND TRIMESTER: Status: RESOLVED | Noted: 2018-04-01 | Resolved: 2019-02-17

## 2019-02-17 PROBLEM — Z34.93 THIRD TRIMESTER PREGNANCY: Status: RESOLVED | Noted: 2018-08-06 | Resolved: 2019-02-17

## 2019-02-17 PROBLEM — Z3A.30 30 WEEKS GESTATION OF PREGNANCY: Status: RESOLVED | Noted: 2018-06-09 | Resolved: 2019-02-17

## 2019-02-17 LAB
ALBUMIN SERPL-MCNC: 4.7 G/DL (ref 3.5–5.2)
ALP BLD-CCNC: 73 U/L (ref 35–104)
ALT SERPL-CCNC: 19 U/L (ref 0–32)
ANION GAP SERPL CALCULATED.3IONS-SCNC: 14 MMOL/L (ref 7–16)
AST SERPL-CCNC: 19 U/L (ref 0–31)
BACTERIA: NORMAL /HPF
BILIRUB SERPL-MCNC: 0.2 MG/DL (ref 0–1.2)
BILIRUBIN URINE: NEGATIVE
BLOOD, URINE: NORMAL
BUN BLDV-MCNC: 11 MG/DL (ref 6–20)
CALCIUM SERPL-MCNC: 9.7 MG/DL (ref 8.6–10.2)
CHLORIDE BLD-SCNC: 104 MMOL/L (ref 98–107)
CLARITY: CLEAR
CO2: 21 MMOL/L (ref 22–29)
COLOR: YELLOW
CREAT SERPL-MCNC: 0.7 MG/DL (ref 0.5–1)
EPITHELIAL CELLS, UA: NORMAL /HPF
GFR AFRICAN AMERICAN: >60
GFR NON-AFRICAN AMERICAN: >60 ML/MIN/1.73
GLUCOSE BLD-MCNC: 86 MG/DL (ref 74–99)
GLUCOSE URINE: NEGATIVE MG/DL
HCG(URINE) PREGNANCY TEST: NEGATIVE
HCT VFR BLD CALC: 45.3 % (ref 34–48)
HEMOGLOBIN: 14.3 G/DL (ref 11.5–15.5)
KETONES, URINE: NEGATIVE MG/DL
LEUKOCYTE ESTERASE, URINE: NEGATIVE
LIPASE: 44 U/L (ref 13–60)
MCH RBC QN AUTO: 28.1 PG (ref 26–35)
MCHC RBC AUTO-ENTMCNC: 31.6 % (ref 32–34.5)
MCV RBC AUTO: 89.2 FL (ref 80–99.9)
MUCUS: PRESENT
NITRITE, URINE: NEGATIVE
PDW BLD-RTO: 14.1 FL (ref 11.5–15)
PH UA: 5.5 (ref 5–9)
PLATELET # BLD: 247 E9/L (ref 130–450)
PMV BLD AUTO: 12.1 FL (ref 7–12)
POTASSIUM REFLEX MAGNESIUM: 3.8 MMOL/L (ref 3.5–5)
PROTEIN UA: NEGATIVE MG/DL
RBC # BLD: 5.08 E12/L (ref 3.5–5.5)
RBC UA: NORMAL /HPF (ref 0–2)
SODIUM BLD-SCNC: 139 MMOL/L (ref 132–146)
SPECIFIC GRAVITY UA: >=1.03 (ref 1–1.03)
TOTAL PROTEIN: 7.9 G/DL (ref 6.4–8.3)
UROBILINOGEN, URINE: 0.2 E.U./DL
WBC # BLD: 6.9 E9/L (ref 4.5–11.5)
WBC UA: NORMAL /HPF (ref 0–5)

## 2019-02-17 PROCEDURE — 83690 ASSAY OF LIPASE: CPT

## 2019-02-17 PROCEDURE — 74176 CT ABD & PELVIS W/O CONTRAST: CPT

## 2019-02-17 PROCEDURE — 80053 COMPREHEN METABOLIC PANEL: CPT

## 2019-02-17 PROCEDURE — 81001 URINALYSIS AUTO W/SCOPE: CPT

## 2019-02-17 PROCEDURE — 99284 EMERGENCY DEPT VISIT MOD MDM: CPT

## 2019-02-17 PROCEDURE — 81025 URINE PREGNANCY TEST: CPT

## 2019-02-17 PROCEDURE — 85027 COMPLETE CBC AUTOMATED: CPT

## 2019-02-17 RX ORDER — MEDROXYPROGESTERONE ACETATE 150 MG/ML
INJECTION, SUSPENSION INTRAMUSCULAR
Refills: 3 | COMMUNITY
Start: 2019-01-23 | End: 2019-10-16

## 2019-02-17 RX ORDER — NAPROXEN 250 MG/1
250 TABLET ORAL 2 TIMES DAILY PRN
Qty: 20 TABLET | Refills: 0 | Status: SHIPPED | OUTPATIENT
Start: 2019-02-17 | End: 2019-08-14 | Stop reason: ALTCHOICE

## 2019-02-17 RX ORDER — CYCLOBENZAPRINE HCL 10 MG
10 TABLET ORAL NIGHTLY PRN
Qty: 7 TABLET | Refills: 0 | Status: SHIPPED | OUTPATIENT
Start: 2019-02-17 | End: 2019-02-24

## 2019-02-17 ASSESSMENT — ENCOUNTER SYMPTOMS
COUGH: 0
DIARRHEA: 1
VOMITING: 1
SHORTNESS OF BREATH: 1
BLOOD IN STOOL: 1
ABDOMINAL PAIN: 1
NAUSEA: 1
CONSTIPATION: 0

## 2019-02-17 ASSESSMENT — PAIN SCALES - GENERAL: PAINLEVEL_OUTOF10: 7

## 2019-02-17 ASSESSMENT — PAIN DESCRIPTION - PAIN TYPE: TYPE: ACUTE PAIN

## 2019-02-17 ASSESSMENT — PAIN DESCRIPTION - ORIENTATION: ORIENTATION: LEFT

## 2019-02-17 ASSESSMENT — PAIN DESCRIPTION - LOCATION: LOCATION: FLANK

## 2019-02-24 LAB
EKG ATRIAL RATE: 77 BPM
EKG P AXIS: 59 DEGREES
EKG P-R INTERVAL: 154 MS
EKG Q-T INTERVAL: 388 MS
EKG QRS DURATION: 82 MS
EKG QTC CALCULATION (BAZETT): 439 MS
EKG R AXIS: 79 DEGREES
EKG T AXIS: 37 DEGREES
EKG VENTRICULAR RATE: 77 BPM

## 2019-03-07 ENCOUNTER — TELEPHONE (OUTPATIENT)
Dept: ADMINISTRATIVE | Age: 24
End: 2019-03-07

## 2019-03-11 PROBLEM — R07.89 OTHER CHEST PAIN: Status: ACTIVE | Noted: 2019-03-11

## 2019-03-15 ENCOUNTER — HOSPITAL ENCOUNTER (OUTPATIENT)
Dept: SLEEP CENTER | Age: 24
Discharge: HOME | End: 2019-03-15
Payer: COMMERCIAL

## 2019-03-15 ENCOUNTER — OFFICE VISIT (OUTPATIENT)
Dept: CARDIOLOGY CLINIC | Age: 24
End: 2019-03-15
Payer: COMMERCIAL

## 2019-03-15 VITALS
RESPIRATION RATE: 16 BRPM | HEIGHT: 63 IN | BODY MASS INDEX: 19.49 KG/M2 | WEIGHT: 110 LBS | HEART RATE: 78 BPM | SYSTOLIC BLOOD PRESSURE: 112 MMHG | DIASTOLIC BLOOD PRESSURE: 70 MMHG

## 2019-03-15 DIAGNOSIS — R00.2 PALPITATIONS: ICD-10-CM

## 2019-03-15 DIAGNOSIS — R07.89 OTHER CHEST PAIN: ICD-10-CM

## 2019-03-15 DIAGNOSIS — R00.2 PALPITATIONS: Primary | ICD-10-CM

## 2019-03-15 PROCEDURE — G8420 CALC BMI NORM PARAMETERS: HCPCS | Performed by: INTERNAL MEDICINE

## 2019-03-15 PROCEDURE — 93000 ELECTROCARDIOGRAM COMPLETE: CPT | Performed by: INTERNAL MEDICINE

## 2019-03-15 PROCEDURE — 99244 OFF/OP CNSLTJ NEW/EST MOD 40: CPT | Performed by: INTERNAL MEDICINE

## 2019-03-15 PROCEDURE — 93226 XTRNL ECG REC<48 HR SCAN A/R: CPT

## 2019-03-15 PROCEDURE — G8427 DOCREV CUR MEDS BY ELIG CLIN: HCPCS | Performed by: INTERNAL MEDICINE

## 2019-03-15 PROCEDURE — G8484 FLU IMMUNIZE NO ADMIN: HCPCS | Performed by: INTERNAL MEDICINE

## 2019-03-15 RX ORDER — ESCITALOPRAM OXALATE 10 MG/1
20 TABLET ORAL NIGHTLY
Refills: 1 | COMMUNITY
Start: 2019-03-01 | End: 2020-10-02

## 2019-04-03 ENCOUNTER — TELEPHONE (OUTPATIENT)
Dept: CARDIOLOGY CLINIC | Age: 24
End: 2019-04-03

## 2019-04-03 DIAGNOSIS — R07.89 OTHER CHEST PAIN: ICD-10-CM

## 2019-04-03 DIAGNOSIS — R00.2 PALPITATIONS: ICD-10-CM

## 2019-04-03 NOTE — TELEPHONE ENCOUNTER
Patient notified and instructed on holter results and follow-up as stated by Dr. Nikhil Nazario. She stated understanding.

## 2019-04-03 NOTE — TELEPHONE ENCOUNTER
----- Message from Callum Moore MD sent at 4/3/2019 10:51 AM EDT -----  NORMAL HEART BEAT RESPONSE TO ACTIVITY  OV PRN

## 2019-08-14 ENCOUNTER — APPOINTMENT (OUTPATIENT)
Dept: CT IMAGING | Age: 24
End: 2019-08-14
Payer: COMMERCIAL

## 2019-08-14 ENCOUNTER — HOSPITAL ENCOUNTER (EMERGENCY)
Age: 24
Discharge: HOME | End: 2019-08-14
Attending: EMERGENCY MEDICINE
Payer: COMMERCIAL

## 2019-08-14 VITALS
HEART RATE: 85 BPM | SYSTOLIC BLOOD PRESSURE: 125 MMHG | RESPIRATION RATE: 16 BRPM | OXYGEN SATURATION: 97 % | TEMPERATURE: 98.7 F | WEIGHT: 114 LBS | DIASTOLIC BLOOD PRESSURE: 78 MMHG | BODY MASS INDEX: 20.2 KG/M2 | HEIGHT: 63 IN

## 2019-08-14 DIAGNOSIS — N12 PYELONEPHRITIS: Primary | ICD-10-CM

## 2019-08-14 LAB
ALBUMIN SERPL-MCNC: 4.5 G/DL (ref 3.5–5.2)
ALP BLD-CCNC: 64 U/L (ref 35–104)
ALT SERPL-CCNC: 14 U/L (ref 0–32)
AMORPHOUS: ABNORMAL
ANION GAP SERPL CALCULATED.3IONS-SCNC: 11 MMOL/L (ref 7–16)
AST SERPL-CCNC: 14 U/L (ref 0–31)
BACTERIA: ABNORMAL /HPF
BASOPHILS ABSOLUTE: 0.05 E9/L (ref 0–0.2)
BASOPHILS RELATIVE PERCENT: 0.7 % (ref 0–2)
BILIRUB SERPL-MCNC: 0.3 MG/DL (ref 0–1.2)
BILIRUBIN URINE: NEGATIVE
BLOOD, URINE: ABNORMAL
BUN BLDV-MCNC: 8 MG/DL (ref 6–20)
CALCIUM SERPL-MCNC: 9.4 MG/DL (ref 8.6–10.2)
CHLORIDE BLD-SCNC: 106 MMOL/L (ref 98–107)
CLARITY: ABNORMAL
CO2: 22 MMOL/L (ref 22–29)
COLOR: YELLOW
CREAT SERPL-MCNC: 0.7 MG/DL (ref 0.5–1)
EOSINOPHILS ABSOLUTE: 0.12 E9/L (ref 0.05–0.5)
EOSINOPHILS RELATIVE PERCENT: 1.8 % (ref 0–6)
EPITHELIAL CELLS, UA: ABNORMAL /HPF
GFR AFRICAN AMERICAN: >60
GFR NON-AFRICAN AMERICAN: >60 ML/MIN/1.73
GLUCOSE BLD-MCNC: 80 MG/DL (ref 74–99)
GLUCOSE URINE: NEGATIVE MG/DL
HCG, URINE, POC: NEGATIVE
HCT VFR BLD CALC: 42.8 % (ref 34–48)
HEMOGLOBIN: 14 G/DL (ref 11.5–15.5)
IMMATURE GRANULOCYTES #: 0.01 E9/L
IMMATURE GRANULOCYTES %: 0.1 % (ref 0–5)
KETONES, URINE: NEGATIVE MG/DL
LACTIC ACID: 0.8 MMOL/L (ref 0.5–2.2)
LEUKOCYTE ESTERASE, URINE: ABNORMAL
LYMPHOCYTES ABSOLUTE: 3.03 E9/L (ref 1.5–4)
LYMPHOCYTES RELATIVE PERCENT: 44.3 % (ref 20–42)
Lab: NORMAL
MCH RBC QN AUTO: 29.5 PG (ref 26–35)
MCHC RBC AUTO-ENTMCNC: 32.7 % (ref 32–34.5)
MCV RBC AUTO: 90.1 FL (ref 80–99.9)
MONOCYTES ABSOLUTE: 0.62 E9/L (ref 0.1–0.95)
MONOCYTES RELATIVE PERCENT: 9.1 % (ref 2–12)
NEGATIVE QC PASS/FAIL: NORMAL
NEUTROPHILS ABSOLUTE: 3.01 E9/L (ref 1.8–7.3)
NEUTROPHILS RELATIVE PERCENT: 44 % (ref 43–80)
NITRITE, URINE: NEGATIVE
PDW BLD-RTO: 13.2 FL (ref 11.5–15)
PH UA: 7.5 (ref 5–9)
PLATELET # BLD: 228 E9/L (ref 130–450)
PMV BLD AUTO: 10.9 FL (ref 7–12)
POSITIVE QC PASS/FAIL: NORMAL
POTASSIUM SERPL-SCNC: 3.7 MMOL/L (ref 3.5–5)
PROTEIN UA: NEGATIVE MG/DL
RBC # BLD: 4.75 E12/L (ref 3.5–5.5)
RBC UA: ABNORMAL /HPF (ref 0–2)
SODIUM BLD-SCNC: 139 MMOL/L (ref 132–146)
SPECIFIC GRAVITY UA: 1.01 (ref 1–1.03)
TOTAL PROTEIN: 7.3 G/DL (ref 6.4–8.3)
UROBILINOGEN, URINE: 0.2 E.U./DL
WBC # BLD: 6.8 E9/L (ref 4.5–11.5)
WBC UA: >20 /HPF (ref 0–5)

## 2019-08-14 PROCEDURE — 85025 COMPLETE CBC W/AUTO DIFF WBC: CPT

## 2019-08-14 PROCEDURE — 81001 URINALYSIS AUTO W/SCOPE: CPT

## 2019-08-14 PROCEDURE — 6370000000 HC RX 637 (ALT 250 FOR IP): Performed by: EMERGENCY MEDICINE

## 2019-08-14 PROCEDURE — 80053 COMPREHEN METABOLIC PANEL: CPT

## 2019-08-14 PROCEDURE — 96374 THER/PROPH/DIAG INJ IV PUSH: CPT

## 2019-08-14 PROCEDURE — 2580000003 HC RX 258: Performed by: EMERGENCY MEDICINE

## 2019-08-14 PROCEDURE — 96375 TX/PRO/DX INJ NEW DRUG ADDON: CPT

## 2019-08-14 PROCEDURE — 99284 EMERGENCY DEPT VISIT MOD MDM: CPT

## 2019-08-14 PROCEDURE — 83605 ASSAY OF LACTIC ACID: CPT

## 2019-08-14 PROCEDURE — 74176 CT ABD & PELVIS W/O CONTRAST: CPT

## 2019-08-14 PROCEDURE — 6360000002 HC RX W HCPCS: Performed by: EMERGENCY MEDICINE

## 2019-08-14 RX ORDER — CEFDINIR 300 MG/1
300 CAPSULE ORAL ONCE
Status: COMPLETED | OUTPATIENT
Start: 2019-08-14 | End: 2019-08-14

## 2019-08-14 RX ORDER — DIPHENHYDRAMINE HYDROCHLORIDE 50 MG/ML
25 INJECTION INTRAMUSCULAR; INTRAVENOUS ONCE
Status: COMPLETED | OUTPATIENT
Start: 2019-08-14 | End: 2019-08-14

## 2019-08-14 RX ORDER — CEFDINIR 300 MG/1
300 CAPSULE ORAL 2 TIMES DAILY
Qty: 20 CAPSULE | Refills: 0 | Status: SHIPPED | OUTPATIENT
Start: 2019-08-14 | End: 2019-08-24

## 2019-08-14 RX ORDER — SODIUM CHLORIDE 0.9 % (FLUSH) 0.9 %
10 SYRINGE (ML) INJECTION PRN
Status: DISCONTINUED | OUTPATIENT
Start: 2019-08-14 | End: 2019-08-14 | Stop reason: HOSPADM

## 2019-08-14 RX ORDER — NAPROXEN 500 MG/1
500 TABLET ORAL 2 TIMES DAILY
Qty: 14 TABLET | Refills: 0 | Status: SHIPPED | OUTPATIENT
Start: 2019-08-14 | End: 2019-10-16

## 2019-08-14 RX ORDER — 0.9 % SODIUM CHLORIDE 0.9 %
1000 INTRAVENOUS SOLUTION INTRAVENOUS ONCE
Status: COMPLETED | OUTPATIENT
Start: 2019-08-14 | End: 2019-08-14

## 2019-08-14 RX ORDER — METOCLOPRAMIDE HYDROCHLORIDE 5 MG/ML
10 INJECTION INTRAMUSCULAR; INTRAVENOUS ONCE
Status: COMPLETED | OUTPATIENT
Start: 2019-08-14 | End: 2019-08-14

## 2019-08-14 RX ADMIN — METOCLOPRAMIDE 10 MG: 5 INJECTION, SOLUTION INTRAMUSCULAR; INTRAVENOUS at 18:27

## 2019-08-14 RX ADMIN — DIPHENHYDRAMINE HYDROCHLORIDE 25 MG: 50 INJECTION, SOLUTION INTRAMUSCULAR; INTRAVENOUS at 18:28

## 2019-08-14 RX ADMIN — CEFDINIR 300 MG: 300 CAPSULE ORAL at 20:37

## 2019-08-14 RX ADMIN — SODIUM CHLORIDE 1000 ML: 9 INJECTION, SOLUTION INTRAVENOUS at 18:20

## 2019-08-14 ASSESSMENT — PAIN DESCRIPTION - PAIN TYPE: TYPE: ACUTE PAIN

## 2019-08-14 ASSESSMENT — ENCOUNTER SYMPTOMS
NAUSEA: 0
BLOOD IN STOOL: 0
COUGH: 0
BACK PAIN: 1
HEMATEMESIS: 0
VOMITING: 0
ABDOMINAL PAIN: 1
SHORTNESS OF BREATH: 0
DIARRHEA: 0

## 2019-08-14 ASSESSMENT — PAIN DESCRIPTION - ORIENTATION: ORIENTATION: RIGHT

## 2019-08-14 ASSESSMENT — PAIN DESCRIPTION - FREQUENCY: FREQUENCY: CONTINUOUS

## 2019-08-14 ASSESSMENT — PAIN SCALES - GENERAL: PAINLEVEL_OUTOF10: 5

## 2019-08-14 ASSESSMENT — PAIN DESCRIPTION - ONSET: ONSET: SUDDEN

## 2019-08-14 ASSESSMENT — PAIN - FUNCTIONAL ASSESSMENT: PAIN_FUNCTIONAL_ASSESSMENT: PREVENTS OR INTERFERES SOME ACTIVE ACTIVITIES AND ADLS

## 2019-08-14 ASSESSMENT — PAIN DESCRIPTION - DESCRIPTORS: DESCRIPTORS: CRAMPING

## 2019-08-14 ASSESSMENT — PAIN DESCRIPTION - LOCATION: LOCATION: FLANK

## 2019-08-14 NOTE — ED PROVIDER NOTES
time. She appears well-developed and well-nourished. No distress. HENT:   Head: Normocephalic and atraumatic. Eyes: Pupils are equal, round, and reactive to light. EOM are normal.   Neck: Normal range of motion. Neck supple. Cardiovascular: Normal rate and regular rhythm. Pulmonary/Chest: Effort normal and breath sounds normal.   Abdominal: Soft. Bowel sounds are normal.   Musculoskeletal:        Lumbar back: She exhibits tenderness. She exhibits no bony tenderness. Back:    Neurological: She is alert and oriented to person, place, and time. Procedures    [facility]       --------------------------------------------- PAST HISTORY ---------------------------------------------  Past Medical History:  has a past medical history of Biliary dyskinesia, Cervical shortening, antepartum condition or complication, Epigastric abdominal pain, Gastritis, Hemorrhoids, Premature uterine contractions causing threatened premature labor in second trimester, Rectal bleed, Seizure ([address] and Subchorionic hemorrhage in second trimester. Past Surgical History:  has a past surgical history that includes Colonoscopy (2012); Upper gastrointestinal endoscopy (2012); Upper gastrointestinal endoscopy; Colonoscopy;  section (1/3/2015); Upper gastrointestinal endoscopy (2017); Dilation and curettage of uterus; Cholecystectomy (2017); and pr  delivery only (N/A, 2018). Social History:  reports that she quit smoking about 6 years ago. Her smoking use included cigarettes. She quit after 2.00 years of use. She has never used smokeless tobacco. She reports that she does not drink alcohol or use drugs. Family History: family history is not on file. The patients home medications have been reviewed.     Allergies: No known allergies    -------------------------------------------------- RESULTS -------------------------------------------------  Labs:  Results for orders placed or performed during the hospital encounter of 08/14/19   Urinalysis   Result Value Ref Range    Color, UA Yellow Straw/Yellow    Clarity, UA CLOUDY (A) Clear    Glucose, Ur Negative Negative mg/dL    Bilirubin Urine Negative Negative    Ketones, Urine Negative Negative mg/dL    Specific Gravity, UA 1.015 1.005 - 1.030    Blood, Urine MODERATE (A) Negative    pH, UA 7.5 5.0 - 9.0    Protein, UA Negative Negative mg/dL    Urobilinogen, Urine 0.2 <2.0 E.U./dL    Nitrite, Urine Negative Negative    Leukocyte Esterase, Urine LARGE (A) Negative   CBC auto differential   Result Value Ref Range    WBC 6.8 4.5 - 11.5 E9/L    RBC 4.75 3.50 - 5.50 E12/L    Hemoglobin 14.0 11.5 - 15.5 g/dL    Hematocrit 42.8 34.0 - 48.0 %    MCV 90.1 80.0 - 99.9 fL    MCH 29.5 26.0 - 35.0 pg    MCHC 32.7 32.0 - 34.5 %    RDW 13.2 11.5 - 15.0 fL    Platelets 343 575 - 371 E9/L    MPV 10.9 7.0 - 12.0 fL    Neutrophils % 44.0 43.0 - 80.0 %    Immature Granulocytes % 0.1 0.0 - 5.0 %    Lymphocytes % 44.3 (H) 20.0 - 42.0 %    Monocytes % 9.1 2.0 - 12.0 %    Eosinophils % 1.8 0.0 - 6.0 %    Basophils % 0.7 0.0 - 2.0 %    Neutrophils # 3.01 1.80 - 7.30 E9/L    Immature Granulocytes # 0.01 E9/L    Lymphocytes # 3.03 1.50 - 4.00 E9/L    Monocytes # 0.62 0.10 - 0.95 E9/L    Eosinophils # 0.12 0.05 - 0.50 E9/L    Basophils # 0.05 0.00 - 0.20 E9/L   Comprehensive Metabolic Panel   Result Value Ref Range    Sodium 139 132 - 146 mmol/L    Potassium 3.7 3.5 - 5.0 mmol/L    Chloride 106 98 - 107 mmol/L    CO2 22 22 - 29 mmol/L    Anion Gap 11 7 - 16 mmol/L    Glucose 80 74 - 99 mg/dL    BUN 8 6 - 20 mg/dL    CREATININE 0.7 0.5 - 1.0 mg/dL    GFR Non-African American >60 >=60 mL/min/1.73    GFR African American >60     Calcium 9.4 8.6 - 10.2 mg/dL    Total Protein 7.3 6.4 - 8.3 g/dL    Alb 4.5 3.5 - 5.2 g/dL    Total Bilirubin 0.3 0.0 - 1.2 mg/dL    Alkaline Phosphatase 64 35 - 104 U/L    ALT 14 0 - 32 U/L    AST 14 0 - 31 U/L   Lactic Acid, Plasma   Result Value

## 2019-10-10 ENCOUNTER — OFFICE VISIT (OUTPATIENT)
Dept: SURGERY | Age: 24
End: 2019-10-10
Payer: COMMERCIAL

## 2019-10-10 VITALS
SYSTOLIC BLOOD PRESSURE: 110 MMHG | TEMPERATURE: 99 F | BODY MASS INDEX: 20.55 KG/M2 | HEIGHT: 63 IN | DIASTOLIC BLOOD PRESSURE: 78 MMHG | WEIGHT: 116 LBS | OXYGEN SATURATION: 97 % | HEART RATE: 78 BPM | RESPIRATION RATE: 16 BRPM

## 2019-10-10 DIAGNOSIS — K43.2 INCISIONAL HERNIA, WITHOUT OBSTRUCTION OR GANGRENE: Primary | ICD-10-CM

## 2019-10-10 PROCEDURE — G8427 DOCREV CUR MEDS BY ELIG CLIN: HCPCS | Performed by: SURGERY

## 2019-10-10 PROCEDURE — 99214 OFFICE O/P EST MOD 30 MIN: CPT | Performed by: SURGERY

## 2019-10-10 PROCEDURE — 1036F TOBACCO NON-USER: CPT | Performed by: SURGERY

## 2019-10-10 PROCEDURE — G8484 FLU IMMUNIZE NO ADMIN: HCPCS | Performed by: SURGERY

## 2019-10-10 PROCEDURE — G8420 CALC BMI NORM PARAMETERS: HCPCS | Performed by: SURGERY

## 2019-10-15 ENCOUNTER — PREP FOR PROCEDURE (OUTPATIENT)
Dept: SURGERY | Age: 24
End: 2019-10-15

## 2019-10-15 RX ORDER — SODIUM CHLORIDE 0.9 % (FLUSH) 0.9 %
10 SYRINGE (ML) INJECTION PRN
Status: CANCELLED | OUTPATIENT
Start: 2019-10-15

## 2019-10-15 RX ORDER — SODIUM CHLORIDE 0.9 % (FLUSH) 0.9 %
10 SYRINGE (ML) INJECTION EVERY 12 HOURS SCHEDULED
Status: CANCELLED | OUTPATIENT
Start: 2019-10-15

## 2019-10-17 ENCOUNTER — ANESTHESIA EVENT (OUTPATIENT)
Dept: OPERATING ROOM | Age: 24
End: 2019-10-17
Payer: COMMERCIAL

## 2019-10-18 ENCOUNTER — HOSPITAL ENCOUNTER (OUTPATIENT)
Age: 24
Setting detail: OUTPATIENT SURGERY
Discharge: HOME | End: 2019-10-18
Attending: SURGERY | Admitting: SURGERY
Payer: COMMERCIAL

## 2019-10-18 ENCOUNTER — ANESTHESIA (OUTPATIENT)
Dept: OPERATING ROOM | Age: 24
End: 2019-10-18
Payer: COMMERCIAL

## 2019-10-18 VITALS
HEART RATE: 90 BPM | TEMPERATURE: 98 F | OXYGEN SATURATION: 96 % | SYSTOLIC BLOOD PRESSURE: 102 MMHG | DIASTOLIC BLOOD PRESSURE: 60 MMHG | RESPIRATION RATE: 18 BRPM | HEIGHT: 62 IN | WEIGHT: 116 LBS | BODY MASS INDEX: 21.35 KG/M2

## 2019-10-18 VITALS
OXYGEN SATURATION: 99 % | TEMPERATURE: 96.6 F | DIASTOLIC BLOOD PRESSURE: 88 MMHG | SYSTOLIC BLOOD PRESSURE: 129 MMHG | RESPIRATION RATE: 2 BRPM

## 2019-10-18 DIAGNOSIS — G89.18 POSTOPERATIVE PAIN: Primary | ICD-10-CM

## 2019-10-18 LAB — HCG(URINE) PREGNANCY TEST: NEGATIVE

## 2019-10-18 PROCEDURE — 3700000001 HC ADD 15 MINUTES (ANESTHESIA): Performed by: SURGERY

## 2019-10-18 PROCEDURE — 6360000002 HC RX W HCPCS: Performed by: NURSE ANESTHETIST, CERTIFIED REGISTERED

## 2019-10-18 PROCEDURE — 49560 PR REPAIR INCISIONAL HERNIA,REDUCIBLE: CPT | Performed by: SURGERY

## 2019-10-18 PROCEDURE — 7100000001 HC PACU RECOVERY - ADDTL 15 MIN: Performed by: SURGERY

## 2019-10-18 PROCEDURE — 2709999900 HC NON-CHARGEABLE SUPPLY: Performed by: SURGERY

## 2019-10-18 PROCEDURE — 81025 URINE PREGNANCY TEST: CPT

## 2019-10-18 PROCEDURE — 3600000003 HC SURGERY LEVEL 3 BASE: Performed by: SURGERY

## 2019-10-18 PROCEDURE — 6360000002 HC RX W HCPCS: Performed by: ANESTHESIOLOGY

## 2019-10-18 PROCEDURE — 7100000010 HC PHASE II RECOVERY - FIRST 15 MIN: Performed by: SURGERY

## 2019-10-18 PROCEDURE — 6360000002 HC RX W HCPCS: Performed by: SURGERY

## 2019-10-18 PROCEDURE — 7100000011 HC PHASE II RECOVERY - ADDTL 15 MIN: Performed by: SURGERY

## 2019-10-18 PROCEDURE — 7100000000 HC PACU RECOVERY - FIRST 15 MIN: Performed by: SURGERY

## 2019-10-18 PROCEDURE — 2580000003 HC RX 258: Performed by: NURSE ANESTHETIST, CERTIFIED REGISTERED

## 2019-10-18 PROCEDURE — 3600000013 HC SURGERY LEVEL 3 ADDTL 15MIN: Performed by: SURGERY

## 2019-10-18 PROCEDURE — 2500000003 HC RX 250 WO HCPCS: Performed by: NURSE ANESTHETIST, CERTIFIED REGISTERED

## 2019-10-18 PROCEDURE — 3700000000 HC ANESTHESIA ATTENDED CARE: Performed by: SURGERY

## 2019-10-18 PROCEDURE — 6370000000 HC RX 637 (ALT 250 FOR IP)

## 2019-10-18 PROCEDURE — 49585 REPAIR UMBILICAL HERN,5+Y/O,REDUC: CPT | Performed by: SURGERY

## 2019-10-18 RX ORDER — NEOSTIGMINE METHYLSULFATE 1 MG/ML
INJECTION, SOLUTION INTRAVENOUS PRN
Status: DISCONTINUED | OUTPATIENT
Start: 2019-10-18 | End: 2019-10-18 | Stop reason: SDUPTHER

## 2019-10-18 RX ORDER — PROMETHAZINE HYDROCHLORIDE 25 MG/ML
6.25 INJECTION, SOLUTION INTRAMUSCULAR; INTRAVENOUS PRN
Status: DISCONTINUED | OUTPATIENT
Start: 2019-10-18 | End: 2019-10-18 | Stop reason: HOSPADM

## 2019-10-18 RX ORDER — FENTANYL CITRATE 50 UG/ML
INJECTION, SOLUTION INTRAMUSCULAR; INTRAVENOUS PRN
Status: DISCONTINUED | OUTPATIENT
Start: 2019-10-18 | End: 2019-10-18 | Stop reason: SDUPTHER

## 2019-10-18 RX ORDER — HYDROCODONE BITARTRATE AND ACETAMINOPHEN 5; 325 MG/1; MG/1
1 TABLET ORAL ONCE
Status: COMPLETED | OUTPATIENT
Start: 2019-10-18 | End: 2019-10-18

## 2019-10-18 RX ORDER — GLYCOPYRROLATE 1 MG/5 ML
SYRINGE (ML) INTRAVENOUS PRN
Status: DISCONTINUED | OUTPATIENT
Start: 2019-10-18 | End: 2019-10-18 | Stop reason: SDUPTHER

## 2019-10-18 RX ORDER — KETOROLAC TROMETHAMINE 30 MG/ML
INJECTION, SOLUTION INTRAMUSCULAR; INTRAVENOUS PRN
Status: DISCONTINUED | OUTPATIENT
Start: 2019-10-18 | End: 2019-10-18 | Stop reason: SDUPTHER

## 2019-10-18 RX ORDER — HYDROCODONE BITARTRATE AND ACETAMINOPHEN 5; 325 MG/1; MG/1
TABLET ORAL
Status: COMPLETED
Start: 2019-10-18 | End: 2019-10-18

## 2019-10-18 RX ORDER — PROPOFOL 10 MG/ML
INJECTION, EMULSION INTRAVENOUS PRN
Status: DISCONTINUED | OUTPATIENT
Start: 2019-10-18 | End: 2019-10-18 | Stop reason: SDUPTHER

## 2019-10-18 RX ORDER — DEXAMETHASONE SODIUM PHOSPHATE 4 MG/ML
INJECTION, SOLUTION INTRA-ARTICULAR; INTRALESIONAL; INTRAMUSCULAR; INTRAVENOUS; SOFT TISSUE PRN
Status: DISCONTINUED | OUTPATIENT
Start: 2019-10-18 | End: 2019-10-18 | Stop reason: SDUPTHER

## 2019-10-18 RX ORDER — OXYCODONE HYDROCHLORIDE AND ACETAMINOPHEN 5; 325 MG/1; MG/1
1 TABLET ORAL
Status: DISCONTINUED | OUTPATIENT
Start: 2019-10-18 | End: 2019-10-18 | Stop reason: HOSPADM

## 2019-10-18 RX ORDER — ONDANSETRON 2 MG/ML
INJECTION INTRAMUSCULAR; INTRAVENOUS PRN
Status: DISCONTINUED | OUTPATIENT
Start: 2019-10-18 | End: 2019-10-18 | Stop reason: SDUPTHER

## 2019-10-18 RX ORDER — CEFAZOLIN SODIUM 2 G/50ML
2 SOLUTION INTRAVENOUS
Status: COMPLETED | OUTPATIENT
Start: 2019-10-18 | End: 2019-10-18

## 2019-10-18 RX ORDER — SODIUM CHLORIDE 0.9 % (FLUSH) 0.9 %
10 SYRINGE (ML) INJECTION EVERY 12 HOURS SCHEDULED
Status: DISCONTINUED | OUTPATIENT
Start: 2019-10-18 | End: 2019-10-18 | Stop reason: HOSPADM

## 2019-10-18 RX ORDER — SODIUM CHLORIDE, SODIUM LACTATE, POTASSIUM CHLORIDE, CALCIUM CHLORIDE 600; 310; 30; 20 MG/100ML; MG/100ML; MG/100ML; MG/100ML
INJECTION, SOLUTION INTRAVENOUS CONTINUOUS PRN
Status: DISCONTINUED | OUTPATIENT
Start: 2019-10-18 | End: 2019-10-18 | Stop reason: SDUPTHER

## 2019-10-18 RX ORDER — HYDROCODONE BITARTRATE AND ACETAMINOPHEN 5; 325 MG/1; MG/1
1 TABLET ORAL EVERY 4 HOURS PRN
Qty: 18 TABLET | Refills: 0 | Status: SHIPPED | OUTPATIENT
Start: 2019-10-18 | End: 2019-10-21

## 2019-10-18 RX ORDER — SODIUM CHLORIDE 0.9 % (FLUSH) 0.9 %
10 SYRINGE (ML) INJECTION PRN
Status: DISCONTINUED | OUTPATIENT
Start: 2019-10-18 | End: 2019-10-18 | Stop reason: HOSPADM

## 2019-10-18 RX ORDER — ROCURONIUM BROMIDE 10 MG/ML
INJECTION, SOLUTION INTRAVENOUS PRN
Status: DISCONTINUED | OUTPATIENT
Start: 2019-10-18 | End: 2019-10-18 | Stop reason: SDUPTHER

## 2019-10-18 RX ORDER — LIDOCAINE HYDROCHLORIDE 20 MG/ML
INJECTION, SOLUTION EPIDURAL; INFILTRATION; INTRACAUDAL; PERINEURAL PRN
Status: DISCONTINUED | OUTPATIENT
Start: 2019-10-18 | End: 2019-10-18 | Stop reason: SDUPTHER

## 2019-10-18 RX ORDER — MIDAZOLAM HYDROCHLORIDE 1 MG/ML
INJECTION INTRAMUSCULAR; INTRAVENOUS PRN
Status: DISCONTINUED | OUTPATIENT
Start: 2019-10-18 | End: 2019-10-18 | Stop reason: SDUPTHER

## 2019-10-18 RX ORDER — MEPERIDINE HYDROCHLORIDE 25 MG/ML
12.5 INJECTION INTRAMUSCULAR; INTRAVENOUS; SUBCUTANEOUS EVERY 10 MIN PRN
Status: DISCONTINUED | OUTPATIENT
Start: 2019-10-18 | End: 2019-10-18 | Stop reason: HOSPADM

## 2019-10-18 RX ORDER — FENTANYL CITRATE 50 UG/ML
50 INJECTION, SOLUTION INTRAMUSCULAR; INTRAVENOUS EVERY 5 MIN PRN
Status: DISCONTINUED | OUTPATIENT
Start: 2019-10-18 | End: 2019-10-18 | Stop reason: HOSPADM

## 2019-10-18 RX ADMIN — ROCURONIUM BROMIDE 35 MG: 10 SOLUTION INTRAVENOUS at 10:52

## 2019-10-18 RX ADMIN — HYDROMORPHONE HYDROCHLORIDE 0.5 MG: 1 INJECTION, SOLUTION INTRAMUSCULAR; INTRAVENOUS; SUBCUTANEOUS at 12:07

## 2019-10-18 RX ADMIN — CEFAZOLIN SODIUM 2 G: 2 SOLUTION INTRAVENOUS at 11:02

## 2019-10-18 RX ADMIN — ONDANSETRON HYDROCHLORIDE 4 MG: 2 INJECTION, SOLUTION INTRAMUSCULAR; INTRAVENOUS at 11:38

## 2019-10-18 RX ADMIN — MEPERIDINE HYDROCHLORIDE 12.5 MG: 25 INJECTION INTRAMUSCULAR; INTRAVENOUS; SUBCUTANEOUS at 12:24

## 2019-10-18 RX ADMIN — Medication 0.6 MG: at 11:38

## 2019-10-18 RX ADMIN — KETOROLAC TROMETHAMINE 30 MG: 30 INJECTION, SOLUTION INTRAMUSCULAR; INTRAVENOUS at 11:38

## 2019-10-18 RX ADMIN — HYDROCODONE BITARTRATE AND ACETAMINOPHEN 1 TABLET: 5; 325 TABLET ORAL at 13:28

## 2019-10-18 RX ADMIN — FENTANYL CITRATE 50 MCG: 50 INJECTION, SOLUTION INTRAMUSCULAR; INTRAVENOUS at 11:12

## 2019-10-18 RX ADMIN — DEXAMETHASONE SODIUM PHOSPHATE 8 MG: 4 INJECTION, SOLUTION INTRAMUSCULAR; INTRAVENOUS at 10:52

## 2019-10-18 RX ADMIN — FENTANYL CITRATE 50 MCG: 50 INJECTION, SOLUTION INTRAMUSCULAR; INTRAVENOUS at 11:42

## 2019-10-18 RX ADMIN — PROPOFOL 170 MG: 10 INJECTION, EMULSION INTRAVENOUS at 10:52

## 2019-10-18 RX ADMIN — FENTANYL CITRATE 50 MCG: 50 INJECTION, SOLUTION INTRAMUSCULAR; INTRAVENOUS at 11:55

## 2019-10-18 RX ADMIN — Medication 3 MG: at 11:38

## 2019-10-18 RX ADMIN — FENTANYL CITRATE 50 MCG: 50 INJECTION, SOLUTION INTRAMUSCULAR; INTRAVENOUS at 10:52

## 2019-10-18 RX ADMIN — MIDAZOLAM HYDROCHLORIDE 2 MG: 1 INJECTION, SOLUTION INTRAMUSCULAR; INTRAVENOUS at 10:42

## 2019-10-18 RX ADMIN — SODIUM CHLORIDE, POTASSIUM CHLORIDE, SODIUM LACTATE AND CALCIUM CHLORIDE: 600; 310; 30; 20 INJECTION, SOLUTION INTRAVENOUS at 10:42

## 2019-10-18 RX ADMIN — LIDOCAINE HYDROCHLORIDE 40 MG: 20 INJECTION, SOLUTION EPIDURAL; INFILTRATION; INTRACAUDAL; PERINEURAL at 10:52

## 2019-10-18 ASSESSMENT — PAIN - FUNCTIONAL ASSESSMENT: PAIN_FUNCTIONAL_ASSESSMENT: 0-10

## 2019-10-18 ASSESSMENT — PULMONARY FUNCTION TESTS
PIF_VALUE: 0
PIF_VALUE: 23
PIF_VALUE: 15
PIF_VALUE: 27
PIF_VALUE: 14
PIF_VALUE: 15
PIF_VALUE: 16
PIF_VALUE: 16
PIF_VALUE: 15
PIF_VALUE: 14
PIF_VALUE: 17
PIF_VALUE: 2
PIF_VALUE: 13
PIF_VALUE: 1
PIF_VALUE: 20
PIF_VALUE: 3
PIF_VALUE: 3
PIF_VALUE: 23
PIF_VALUE: 15
PIF_VALUE: 2
PIF_VALUE: 1
PIF_VALUE: 14
PIF_VALUE: 15
PIF_VALUE: 15
PIF_VALUE: 1
PIF_VALUE: 23
PIF_VALUE: 15
PIF_VALUE: 15
PIF_VALUE: 3
PIF_VALUE: 16
PIF_VALUE: 15
PIF_VALUE: 1
PIF_VALUE: 15
PIF_VALUE: 15
PIF_VALUE: 3
PIF_VALUE: 3
PIF_VALUE: 27
PIF_VALUE: 15
PIF_VALUE: 16
PIF_VALUE: 1
PIF_VALUE: 18
PIF_VALUE: 22
PIF_VALUE: 21
PIF_VALUE: 15
PIF_VALUE: 3
PIF_VALUE: 24
PIF_VALUE: 19
PIF_VALUE: 15
PIF_VALUE: 2
PIF_VALUE: 17
PIF_VALUE: 3
PIF_VALUE: 22
PIF_VALUE: 2
PIF_VALUE: 14
PIF_VALUE: 14
PIF_VALUE: 1
PIF_VALUE: 16
PIF_VALUE: 1
PIF_VALUE: 15
PIF_VALUE: 3
PIF_VALUE: 1
PIF_VALUE: 20
PIF_VALUE: 18
PIF_VALUE: 22
PIF_VALUE: 22
PIF_VALUE: 19
PIF_VALUE: 17
PIF_VALUE: 15
PIF_VALUE: 1

## 2019-10-18 ASSESSMENT — PAIN DESCRIPTION - LOCATION
LOCATION: ABDOMEN

## 2019-10-18 ASSESSMENT — PAIN SCALES - GENERAL
PAINLEVEL_OUTOF10: 5
PAINLEVEL_OUTOF10: 7
PAINLEVEL_OUTOF10: 5
PAINLEVEL_OUTOF10: 4
PAINLEVEL_OUTOF10: 7
PAINLEVEL_OUTOF10: 7
PAINLEVEL_OUTOF10: 3

## 2019-10-18 ASSESSMENT — PAIN DESCRIPTION - PAIN TYPE
TYPE: SURGICAL PAIN

## 2020-01-08 ENCOUNTER — HOSPITAL ENCOUNTER (EMERGENCY)
Age: 25
Discharge: HOME | End: 2020-01-08
Payer: COMMERCIAL

## 2020-01-08 ENCOUNTER — APPOINTMENT (OUTPATIENT)
Dept: CT IMAGING | Age: 25
End: 2020-01-08
Payer: COMMERCIAL

## 2020-01-08 VITALS
OXYGEN SATURATION: 100 % | HEART RATE: 71 BPM | HEIGHT: 63 IN | WEIGHT: 112 LBS | BODY MASS INDEX: 19.84 KG/M2 | RESPIRATION RATE: 16 BRPM | DIASTOLIC BLOOD PRESSURE: 58 MMHG | SYSTOLIC BLOOD PRESSURE: 100 MMHG | TEMPERATURE: 98.1 F

## 2020-01-08 LAB
HCG, URINE, POC: NEGATIVE
Lab: NORMAL
NEGATIVE QC PASS/FAIL: NORMAL
POSITIVE QC PASS/FAIL: NORMAL

## 2020-01-08 PROCEDURE — 99283 EMERGENCY DEPT VISIT LOW MDM: CPT

## 2020-01-08 PROCEDURE — 2580000003 HC RX 258: Performed by: RADIOLOGY

## 2020-01-08 PROCEDURE — 74177 CT ABD & PELVIS W/CONTRAST: CPT

## 2020-01-08 PROCEDURE — 6360000004 HC RX CONTRAST MEDICATION: Performed by: RADIOLOGY

## 2020-01-08 RX ORDER — SODIUM CHLORIDE 0.9 % (FLUSH) 0.9 %
10 SYRINGE (ML) INJECTION ONCE
Status: COMPLETED | OUTPATIENT
Start: 2020-01-08 | End: 2020-01-08

## 2020-01-08 RX ADMIN — Medication 10 ML: at 15:30

## 2020-01-08 RX ADMIN — IOPAMIDOL 110 ML: 755 INJECTION, SOLUTION INTRAVENOUS at 15:30

## 2020-01-08 ASSESSMENT — PAIN DESCRIPTION - LOCATION: LOCATION: ABDOMEN

## 2020-01-08 ASSESSMENT — PAIN DESCRIPTION - PAIN TYPE: TYPE: ACUTE PAIN

## 2020-01-08 NOTE — ED NOTES
Radiology Procedure Waiver   Name: Favian Myles  : 1995  MRN: [id number]    Date:  20    Time: 1:58 PM    Benefits of immediately proceeding with Radiology exam(s) without pre-testing outweigh the risks or are not indicated as specified below and therefore the following is/are being waived:    [] Pregnancy test   [] Patients LMP on-time and regular or recent negative pregnancy test   [] Patient had Tubal Ligation or has other Contraception Device. [] Patient  is Menopausal or Premenarcheal.    [] Patient had Full or Partial Hysterectomy. [] Protocol for Iodine allergy   [] Patient states they can tolerate IV dye    [x] BUN/Creatinine   [x] Patient age w/no hx of renal dysfunction. [] Patient on Dialysis. [] Recent Normal Labs.     Electronically signed by Linda Foster PA-C on 20 at 1:58 PM                 Linda Foster PA-C  20 0328

## 2020-01-08 NOTE — ED PROVIDER NOTES
[facility]     Department of Emergency Medicine   ED  Provider Note  Admit Date/RoomTime: 2020  1:10 PM  ED Room:     CHIEF COMPLAINT:   Chief Complaint   Patient presents with    Wound Check     lump under surgical scar     ---------------------------------HISTORY OF PRESENT ILLNESS-----------------------------------     Gurvinder Reid is a 25 y.o. female presenting to the ED for wound check. Patient states she has felt a \"lump\" under her surgical scar from a hernia repair in October. She states the area is occasionally painful with touch. She reports occasional nausea but denies vomiting, diarrhea, rectal bleeding, dark/tarry stools, chest pain, shortness of breath, fever/chills, limb pain or swelling, urinary complaints, hematuria, abnormal vaginal bleeding or discharge, or recent trauma/injury. Patient is alert and oriented x3 and in no apparent distress at this exam.  She is nontoxic-appearing. She is politely declining any pain medication. Patient states she is still having normal bowel movements. General surgeon: Dr. Suri Carr     Review of Systems:   Pertinent positives and negatives are stated within HPI, all other systems reviewed and are negative.     --------------------------------------------- PAST HISTORY ---------------------------------------------    Past Medical History:  has a past medical history of Biliary dyskinesia, Cervical shortening, antepartum condition or complication, Epigastric abdominal pain, Fetal renal anomaly, Gastritis, Hemorrhoids, Premature uterine contractions causing threatened premature labor in second trimester, Rectal bleed, Seizure ([facility], [address] and Subchorionic hemorrhage in second trimester. Past Surgical History:  has a past surgical history that includes Colonoscopy (2012); Upper gastrointestinal endoscopy (2012); Upper gastrointestinal endoscopy; Colonoscopy;  section (1/3/2015);  Upper gastrointestinal endoscopy (2017); Dilation and curettage of uterus; Cholecystectomy (2017); pr  delivery only (N/A, 2018); and laparoscopy (N/A, 10/18/2019). Social History:  reports that she quit smoking about 6 years ago. Her smoking use included cigarettes. She quit after 2.00 years of use. She has never used smokeless tobacco. She reports that she does not drink alcohol or use drugs. Family History: family history is not on file. The patients home medications have been reviewed. Allergies: Patient has no known allergies. Allergies have been reviewed with patient.     -------------------------------------------------- RESULTS -------------------------------------------------  All laboratory and radiology results have been personally reviewed by myself   LABS:  Results for orders placed or performed during the hospital encounter of 20   POC Pregnancy Urine Qual   Result Value Ref Range    HCG, Urine, POC Negative Negative    Lot Number [id number]     Positive QC Pass/Fail Pass     Negative QC Pass/Fail Pass      RADIOLOGY:  Interpreted by Radiologist.  CT ABDOMEN PELVIS W IV CONTRAST Additional Contrast? None   Final Result   Postsurgical changes in the umbilicus with  1.6 x 1.3 cm soft tissue   density which could represent postsurgical scar versus phlegmon. Small bowel ileus      Possible pelvic congestion syndrome.              ------------------------- NURSING NOTES AND VITALS REVIEWED ---------------------------  The nursing notes within the ED encounter and vital signs as below have been reviewed.      /65   Pulse 87   Temp 98.4 °F (36.9 °C) (Oral)   Resp 16   Ht 5' 3\" (1.6 m)   Wt 112 lb (50.8 kg)   SpO2 97%   BMI 19.84 kg/m²   Oxygen Saturation Interpretation: Normal    ---------------------------------------------------PHYSICAL EXAM--------------------------------------    Constitutional/General: Alert and oriented x3, well appearing, NAD  HEENT: NC/AT, EOMI, Airway patent  Neck: Supple. Non-tender with no lymphadenopathy   CVS: Regular rate and rhythm  Resp: Clear and equal bilaterally with good airflow, no distress  Abdomen:  Abdomen soft, No guarding or rigidity, scar tissue under well healed surgical scar, no open wounds or drainage, mild tenderness near umbilicus    Back:  No midline tenderness. No CVA tenderness. Musculo: Moves all extremities x 4. Warm and well perfused, No edema   Integument:  Normal turgor. Warm, dry, without visible rash, unless noted elsewhere. Neurological:  Motor functions intact. GCS 15, CN II-XII grossly intact     ------------------------------ ED COURSE/MEDICAL DECISION MAKING----------------------  ED Medications:  Medications   iopamidol (ISOVUE-370) 76 % injection 110 mL (110 mLs Intravenous Given 1/8/20 1530)   sodium chloride flush 0.9 % injection 10 mL (10 mLs Intravenous Given 1/8/20 1530)     Procedures:  None    Consultations:   None    Counseling: The emergency provider has spoken with the patient/caregiver and discussed todays results, in addition to providing specific details for the plan of care and counseling regarding the diagnosis and prognosis. Questions are answered at this time and they are agreeable with the plan. All results reviewed with pt and all questions answered. I discussed the differential, results and discharge plan with the patient and/or family/friend/caregiver if present. I emphasized the importance of follow-up with the physician I referred them to in the timeframe recommended. I explained reasons for the patient to return to the Emergency Department. Additional verbal discharge instructions were also given and discussed with the patient to supplement those generated by the EMR. We also discussed medications that were prescribed (if any) including common side effects and interactions.  The patient was advised to abstain from driving, operating heavy machinery or making significant decisions while taking medications such as opiates and muscle relaxers that may impair this. All questions were addressed. They understand return precautions and discharge instructions. The patient and/or family/friend/caregiver expressed understanding. Vitals were stable and they were in no distress at discharge.      --------------------------------- IMPRESSION AND DISPOSITION ---------------------------------    IMPRESSION  1. Visit for wound check    2. Status post hernia repair      DISPOSITION  Discharge to home  Patient condition is good    NEW MEDICATIONS   Current Discharge Medication List        NOTE: This report was transcribed using voice recognition software.  Every effort was made to ensure accuracy; however, inadvertent computerized transcription errors may be present    END OF PROVIDER NOTE         Marjorie Vasquez PA-C  01/08/20 4020

## 2020-02-11 ENCOUNTER — TELEPHONE (OUTPATIENT)
Dept: SURGERY | Age: 25
End: 2020-02-11

## 2020-04-30 ENCOUNTER — HOSPITAL ENCOUNTER (OUTPATIENT)
Age: 25
Discharge: HOME | End: 2020-05-02
Payer: COMMERCIAL

## 2020-04-30 ENCOUNTER — HOSPITAL ENCOUNTER (OUTPATIENT)
Dept: GENERAL RADIOLOGY | Age: 25
Discharge: HOME | End: 2020-05-02
Payer: COMMERCIAL

## 2020-04-30 PROCEDURE — 73080 X-RAY EXAM OF ELBOW: CPT

## 2020-08-13 ENCOUNTER — OFFICE VISIT (OUTPATIENT)
Dept: SURGERY | Age: 25
End: 2020-08-13
Payer: COMMERCIAL

## 2020-08-13 VITALS
HEIGHT: 63 IN | SYSTOLIC BLOOD PRESSURE: 98 MMHG | OXYGEN SATURATION: 99 % | TEMPERATURE: 97.2 F | BODY MASS INDEX: 19.45 KG/M2 | HEART RATE: 84 BPM | DIASTOLIC BLOOD PRESSURE: 68 MMHG | WEIGHT: 109.8 LBS | RESPIRATION RATE: 16 BRPM

## 2020-08-13 PROCEDURE — 99999 PR OFFICE/OUTPT VISIT,PROCEDURE ONLY: CPT | Performed by: SURGERY

## 2020-08-13 PROCEDURE — G8420 CALC BMI NORM PARAMETERS: HCPCS | Performed by: SURGERY

## 2020-08-13 PROCEDURE — G8427 DOCREV CUR MEDS BY ELIG CLIN: HCPCS | Performed by: SURGERY

## 2020-08-13 PROCEDURE — 99213 OFFICE O/P EST LOW 20 MIN: CPT | Performed by: SURGERY

## 2020-08-13 PROCEDURE — 1036F TOBACCO NON-USER: CPT | Performed by: SURGERY

## 2020-08-13 NOTE — PROGRESS NOTES
Lidocaine 1% 5 ML was used during procedure lot # [id number]. Exp date 3/1/2021. Kenalog 10, 1 ML used. 10 MG used. Lot # [id number] EXP 1/2021.     Electronically signed by Matilda Gray MA on 8/13/20 at 11:27 AM EDT
stitch/surgical site pain prepped with alcohol. A 1 cc 50/50 mixture of kenalog 10 and 1% lidocaine was injected at the site of her pain. The patient tolerated the injection well.

## 2020-08-13 NOTE — LETTER
[facility] General Surgery  [address] [place] [address], [address] [facility]  [address] [address]  Phone: [phone]  Fax: [phone]    Vonnie Grubbs MD        August 13, 2020       Patient: Tacho Maxwell   MR Number: [id number]   YOB: 1995   Date of Visit: 8/13/2020       Dear Dr. Huey Zamarripa: Thank you for the request for consultation for Supriya Pennington to me for the evaluation. Below are the relevant portions of my assessment and plan of care. Progress Note - Follow up    Patient's Name/Date of Birth: Tacho Maxwell / 1995    Date: 8/13/2020    PCP: Yamilka Galloway MD    Referring Physician:   Vonnie Grubbs MD  [phone]    Chief Complaint   Patient presents with    Other     pt had hernia repair last october. States there is a lump in lower abd. HPI:    The patient said she has a burning sensatino and feels a lump at the site of her umbilical hernia repair. She said she will get associated nausea. She said this is a short episode. It occurs a few times a week. It has been off and on for the last 7-8 months. Her surgery was 10 months ago. Patient's medications, allergies, past medical, surgical, social and family histories were reviewed and updated as appropriate. Review of Systems  Constitutional: negative  Respiratory: negative  Cardiovascular: negative  Gastrointestinal: as in hpi  Genitourinary:negative  Integument/breast: negative    Physical Exam:  BP 98/68 (Site: Left Upper Arm, Position: Sitting, Cuff Size: Medium Adult)   Pulse 84   Temp 97.2 °F (36.2 °C) (Oral)   Resp 16   Ht 5' 3\" (1.6 m)   Wt 109 lb 12.8 oz (49.8 kg)   SpO2 99%   BMI 19.45 kg/m²   General appearance: alert, cooperative and in no acute distress.   Lungs: clear to auscultation bilaterally  Heart: regular rate and rhythm  Abdomen: mild tenderness at site of hernia repair, supraumbilical site, tenderness over stitch Musculoskeletal: symmetrical without clubbing cyanosis or edema. Skin: normal     Impression/Plan:  25y.o. year old female with pain over hernia site, stitch    Offered options of reoperation to remove stitch, can inject with lidocaine/kenalog today, or can watch. The patient would like an injection today. Electronically by Jackie Madrigal MD, General Surgery  on 8/13/2020 at 9:21 AM      Send copy of H&P to PCP, Mario Carter MD and referring physician, Jose Amador MD      8/13/2020     Procedure:    Area over stitch/surgical site pain prepped with alcohol. A 1 cc 50/50 mixture of kenalog 10 and 1% lidocaine was injected at the site of her pain. The patient tolerated the injection well. If you have questions, please do not hesitate to call me. I look forward to following Sandrine Zarco along with you.     Sincerely,        Laverne Adames MD    CC providers:  Mario Carter MD  [address] [facility]  VIA Mail

## 2020-08-14 ENCOUNTER — APPOINTMENT (OUTPATIENT)
Dept: ULTRASOUND IMAGING | Age: 25
End: 2020-08-14
Payer: COMMERCIAL

## 2020-08-14 ENCOUNTER — HOSPITAL ENCOUNTER (EMERGENCY)
Age: 25
Discharge: HOME | End: 2020-08-14
Attending: EMERGENCY MEDICINE
Payer: COMMERCIAL

## 2020-08-14 VITALS
BODY MASS INDEX: 19.49 KG/M2 | DIASTOLIC BLOOD PRESSURE: 73 MMHG | HEART RATE: 114 BPM | HEIGHT: 63 IN | OXYGEN SATURATION: 99 % | TEMPERATURE: 97.4 F | RESPIRATION RATE: 14 BRPM | SYSTOLIC BLOOD PRESSURE: 130 MMHG | WEIGHT: 110 LBS

## 2020-08-14 LAB
BACTERIA: NORMAL /HPF
BILIRUBIN URINE: NEGATIVE
BLOOD, URINE: ABNORMAL
CLARITY: CLEAR
COLOR: YELLOW
GLUCOSE URINE: NEGATIVE MG/DL
HCG, URINE, POC: NEGATIVE
KETONES, URINE: ABNORMAL MG/DL
LEUKOCYTE ESTERASE, URINE: NEGATIVE
Lab: NORMAL
NEGATIVE QC PASS/FAIL: NORMAL
NITRITE, URINE: NEGATIVE
PH UA: 6 (ref 5–9)
POSITIVE QC PASS/FAIL: NORMAL
PROTEIN UA: NEGATIVE MG/DL
RBC UA: NORMAL /HPF (ref 0–2)
SPECIFIC GRAVITY UA: <=1.005 (ref 1–1.03)
UROBILINOGEN, URINE: 0.2 E.U./DL
WBC UA: NORMAL /HPF (ref 0–5)

## 2020-08-14 PROCEDURE — 6360000002 HC RX W HCPCS: Performed by: EMERGENCY MEDICINE

## 2020-08-14 PROCEDURE — 96372 THER/PROPH/DIAG INJ SC/IM: CPT

## 2020-08-14 PROCEDURE — 99282 EMERGENCY DEPT VISIT SF MDM: CPT

## 2020-08-14 PROCEDURE — 99283 EMERGENCY DEPT VISIT LOW MDM: CPT

## 2020-08-14 PROCEDURE — 76830 TRANSVAGINAL US NON-OB: CPT

## 2020-08-14 PROCEDURE — 81001 URINALYSIS AUTO W/SCOPE: CPT

## 2020-08-14 RX ORDER — KETOROLAC TROMETHAMINE 30 MG/ML
30 INJECTION, SOLUTION INTRAMUSCULAR; INTRAVENOUS ONCE
Status: COMPLETED | OUTPATIENT
Start: 2020-08-14 | End: 2020-08-14

## 2020-08-14 RX ORDER — KETOROLAC TROMETHAMINE 10 MG/1
10 TABLET, FILM COATED ORAL EVERY 8 HOURS PRN
Qty: 15 TABLET | Refills: 0 | Status: SHIPPED | OUTPATIENT
Start: 2020-08-14 | End: 2020-10-02

## 2020-08-14 RX ADMIN — KETOROLAC TROMETHAMINE 30 MG: 30 INJECTION, SOLUTION INTRAMUSCULAR at 19:27

## 2020-08-14 ASSESSMENT — PAIN SCALES - GENERAL
PAINLEVEL_OUTOF10: 7
PAINLEVEL_OUTOF10: 7

## 2020-08-14 ASSESSMENT — PAIN DESCRIPTION - LOCATION: LOCATION: ABDOMEN

## 2020-08-14 ASSESSMENT — PAIN DESCRIPTION - PROGRESSION: CLINICAL_PROGRESSION: NOT CHANGED

## 2020-08-14 ASSESSMENT — PAIN DESCRIPTION - FREQUENCY: FREQUENCY: CONTINUOUS

## 2020-08-14 ASSESSMENT — PAIN DESCRIPTION - PAIN TYPE: TYPE: ACUTE PAIN

## 2020-08-14 ASSESSMENT — PAIN DESCRIPTION - ORIENTATION: ORIENTATION: LOWER

## 2020-08-14 ASSESSMENT — PAIN DESCRIPTION - DESCRIPTORS: DESCRIPTORS: ACHING

## 2020-08-17 RX ORDER — LIDOCAINE HYDROCHLORIDE 10 MG/ML
5 INJECTION, SOLUTION EPIDURAL; INFILTRATION; INTRACAUDAL; PERINEURAL ONCE
Status: DISCONTINUED | OUTPATIENT
Start: 2020-08-17 | End: 2022-01-14 | Stop reason: HOSPADM

## 2020-08-17 ASSESSMENT — ENCOUNTER SYMPTOMS
BLOOD IN STOOL: 0
COUGH: 0
VOMITING: 0
BACK PAIN: 0
SHORTNESS OF BREATH: 0
NAUSEA: 0
ABDOMINAL PAIN: 1

## 2020-08-18 NOTE — ED PROVIDER NOTES
57-year-old female presents to the emergency department left lower quadrant pain. Patient had a recent IUD placement has had some discomfort in the left lower quadrant since that was done. She states no fevers chills nausea vomiting diarrhea or constipation. States no urinary symptoms though she has had some intermittent vaginal bleeding. She states no other previous surgeries and no other complaints at this time. She states she is got no risk factors for STDs. The history is provided by the patient. Abdominal Pain   Pain location:  LLQ  Pain quality: aching and sharp    Pain severity:  Mild  Onset quality:  Gradual  Duration:  5 days  Timing:  Intermittent  Progression:  Waxing and waning  Chronicity:  New  Associated symptoms: vaginal bleeding    Associated symptoms: no chest pain, no chills, no cough, no dysuria, no fever, no nausea, no shortness of breath and no vomiting    Vaginal bleeding:     Quality:  Spotting       Review of Systems   Constitutional: Negative for chills and fever. Respiratory: Negative for cough and shortness of breath. Cardiovascular: Negative for chest pain. Gastrointestinal: Positive for abdominal pain. Negative for blood in stool, nausea and vomiting. Genitourinary: Positive for vaginal bleeding. Negative for dysuria and frequency. Musculoskeletal: Negative for back pain. Skin: Negative for rash. Neurological: Negative for weakness and headaches. All other systems reviewed and are negative. Physical Exam  Constitutional:       Appearance: Normal appearance. HENT:      Head: Normocephalic and atraumatic. Right Ear: Tympanic membrane normal.      Left Ear: Tympanic membrane normal.      Mouth/Throat:      Mouth: Mucous membranes are moist.   Eyes:      Extraocular Movements: Extraocular movements intact. Pupils: Pupils are equal, round, and reactive to light. Cardiovascular:      Rate and Rhythm: Normal rate and regular rhythm. Pulses: Normal pulses. Heart sounds: Normal heart sounds. Pulmonary:      Effort: Pulmonary effort is normal.      Breath sounds: Normal breath sounds. Abdominal:      General: Abdomen is flat. Bowel sounds are normal.      Palpations: Abdomen is soft. Tenderness: There is abdominal tenderness in the left lower quadrant. There is no right CVA tenderness or left CVA tenderness. Neurological:      General: No focal deficit present. Mental Status: She is alert and oriented to person, place, and time. Procedures     MDM  Number of Diagnoses or Management Options  Abdominal pain, left lower quadrant:   IUD (intrauterine device) in place:   Left ovarian cyst:   Diagnosis management comments: Patient seen and examined. Urinalysis and ultrasound was ordered to evaluate for placement of IUD. Patient symptoms are likely related to spotting associated with recent IUD placement. On ultrasound has been noted patient has a left ovarian cyst this could also be causing her left lower quadrant pain. Urinalysis revealed no infection. This point patient was having improvement of symptoms she was felt safe for discharge and recommended to follow-up with her primary care physician.             --------------------------------------------- PAST HISTORY ---------------------------------------------  Past Medical History:  has a past medical history of Biliary dyskinesia, Cervical shortening, antepartum condition or complication, Epigastric abdominal pain, Fetal renal anomaly, Gastritis, Hemorrhoids, Premature uterine contractions causing threatened premature labor in second trimester, Rectal bleed, Seizure ([address] and Subchorionic hemorrhage in second trimester. Past Surgical History:  has a past surgical history that includes Colonoscopy (2012); Upper gastrointestinal endoscopy (2012); Upper gastrointestinal endoscopy; Colonoscopy;  section (1/3/2015);  Upper gastrointestinal endoscopy (2017); Dilation and curettage of uterus; Cholecystectomy (2017); pr  delivery only (N/A, 2018); and laparoscopy (N/A, 10/18/2019). Social History:  reports that she quit smoking about 7 years ago. Her smoking use included cigarettes. She quit after 2.00 years of use. She has never used smokeless tobacco. She reports that she does not drink alcohol or use drugs. Family History: family history includes Cancer in her father. The patients home medications have been reviewed. Allergies: Patient has no known allergies. -------------------------------------------------- RESULTS -------------------------------------------------  Labs:  Results for orders placed or performed during the hospital encounter of 20   URINALYSIS   Result Value Ref Range    Color, UA Yellow Straw/Yellow    Clarity, UA Clear Clear    Glucose, Ur Negative Negative mg/dL    Bilirubin Urine Negative Negative    Ketones, Urine TRACE (A) Negative mg/dL    Specific Gravity, UA <=1.005 1.005 - 1.030    Blood, Urine TRACE-INTACT Negative    pH, UA 6.0 5.0 - 9.0    Protein, UA Negative Negative mg/dL    Urobilinogen, Urine 0.2 <2.0 E.U./dL    Nitrite, Urine Negative Negative    Leukocyte Esterase, Urine Negative Negative   Microscopic Urinalysis   Result Value Ref Range    WBC, UA NONE 0 - 5 /HPF    RBC, UA 1-3 0 - 2 /HPF    Bacteria, UA NONE SEEN None Seen /HPF   POC Pregnancy Urine Qual   Result Value Ref Range    HCG, Urine, POC Negative Negative    Lot Number [id number]     Positive QC Pass/Fail Pass     Negative QC Pass/Fail Pass        Radiology:  US NON OB TRANSVAGINAL   Final Result      1. Dominant follicle or simple cyst is associated with the left ovary   which measures 2.7 x 2.2 x 2.4 cm. This finding appears new compared   to prior from December 3, 2018. 2. No evidence of ovarian torsion. 3. No free fluid. 4. Retroverted uterus.                       ------------------------- NURSING NOTES AND VITALS REVIEWED ---------------------------  Date / Time Roomed:  8/14/2020  5:01 PM  ED Bed Assignment:  ROBBY/LINDA    The nursing notes within the ED encounter and vital signs as below have been reviewed. /73   Pulse 114   Temp 97.4 °F (36.3 °C)   Resp 14   Ht 5' 3\" (1.6 m)   Wt 110 lb (49.9 kg)   SpO2 99%   BMI 19.49 kg/m²   Oxygen Saturation Interpretation: Normal      ------------------------------------------ PROGRESS NOTES ------------------------------------------  I have spoken with the patient and discussed todays results, in addition to providing specific details for the plan of care and counseling regarding the diagnosis and prognosis. Their questions are answered at this time and they are agreeable with the plan. I discussed at length with them reasons for immediate return here for re evaluation. They will followup with their primary care physician by calling their office tomorrow. --------------------------------- ADDITIONAL PROVIDER NOTES ---------------------------------  At this time the patient is without objective evidence of an acute process requiring hospitalization or inpatient management. They have remained hemodynamically stable throughout their entire ED visit and are stable for discharge with outpatient follow-up. The plan has been discussed in detail and they are aware of the specific conditions for emergent return, as well as the importance of follow-up. Discharge Medication List as of 8/14/2020  7:55 PM      START taking these medications    Details   ketorolac (TORADOL) 10 MG tablet Take 1 tablet by mouth every 8 hours as needed for Pain, Disp-15 tablet,R-0Print             Diagnosis:  1. Abdominal pain, left lower quadrant    2. Left ovarian cyst    3. IUD (intrauterine device) in place        Disposition:  Patient's disposition: Discharge to home  Patient's condition is stable.            Jill Freeman DO  08/17/20 2047

## 2020-10-02 ENCOUNTER — OFFICE VISIT (OUTPATIENT)
Dept: FAMILY MEDICINE CLINIC | Age: 25
End: 2020-10-02
Payer: COMMERCIAL

## 2020-10-02 ENCOUNTER — HOSPITAL ENCOUNTER (OUTPATIENT)
Age: 25
Discharge: HOME | End: 2020-10-04
Payer: COMMERCIAL

## 2020-10-02 VITALS
SYSTOLIC BLOOD PRESSURE: 106 MMHG | HEART RATE: 82 BPM | DIASTOLIC BLOOD PRESSURE: 70 MMHG | WEIGHT: 110 LBS | OXYGEN SATURATION: 98 % | BODY MASS INDEX: 19.49 KG/M2 | TEMPERATURE: 97.5 F

## 2020-10-02 LAB — HETEROPHILE ANTIBODIES: NORMAL

## 2020-10-02 PROCEDURE — 36415 COLL VENOUS BLD VENIPUNCTURE: CPT

## 2020-10-02 PROCEDURE — G8428 CUR MEDS NOT DOCUMENT: HCPCS | Performed by: FAMILY MEDICINE

## 2020-10-02 PROCEDURE — G8484 FLU IMMUNIZE NO ADMIN: HCPCS | Performed by: FAMILY MEDICINE

## 2020-10-02 PROCEDURE — G8420 CALC BMI NORM PARAMETERS: HCPCS | Performed by: FAMILY MEDICINE

## 2020-10-02 PROCEDURE — 99213 OFFICE O/P EST LOW 20 MIN: CPT | Performed by: FAMILY MEDICINE

## 2020-10-02 PROCEDURE — 86308 HETEROPHILE ANTIBODY SCREEN: CPT

## 2020-10-02 PROCEDURE — 86308 HETEROPHILE ANTIBODY SCREEN: CPT | Performed by: FAMILY MEDICINE

## 2020-10-02 PROCEDURE — 96372 THER/PROPH/DIAG INJ SC/IM: CPT | Performed by: FAMILY MEDICINE

## 2020-10-02 PROCEDURE — 1036F TOBACCO NON-USER: CPT | Performed by: FAMILY MEDICINE

## 2020-10-02 RX ORDER — CETIRIZINE HYDROCHLORIDE 10 MG/1
10 TABLET ORAL DAILY
Qty: 30 TABLET | Refills: 1 | Status: SHIPPED
Start: 2020-10-02 | End: 2021-03-29

## 2020-10-02 RX ORDER — SERTRALINE HYDROCHLORIDE 25 MG/1
25 TABLET, FILM COATED ORAL DAILY
COMMUNITY
End: 2021-04-29

## 2020-10-02 RX ORDER — SERTRALINE HYDROCHLORIDE 25 MG/1
25 TABLET, FILM COATED ORAL DAILY
COMMUNITY
End: 2021-03-29

## 2020-10-02 RX ORDER — TRIAMCINOLONE ACETONIDE 40 MG/ML
80 INJECTION, SUSPENSION INTRA-ARTICULAR; INTRAMUSCULAR ONCE
Status: COMPLETED | OUTPATIENT
Start: 2020-10-02 | End: 2020-10-02

## 2020-10-02 RX ADMIN — TRIAMCINOLONE ACETONIDE 80 MG: 40 INJECTION, SUSPENSION INTRA-ARTICULAR; INTRAMUSCULAR at 11:04

## 2020-10-02 ASSESSMENT — ENCOUNTER SYMPTOMS
RHINORRHEA: 1
SORE THROAT: 0
SINUS PAIN: 1
RESPIRATORY NEGATIVE: 1
SINUS PRESSURE: 1
EYES NEGATIVE: 1
ABDOMINAL PAIN: 1

## 2020-10-02 NOTE — PROGRESS NOTES
10/2/20  Rita Shown : 1995 Sex: female  Age: 22 y.o. Chief Complaint   Patient presents with    Fatigue       Patient is a 55-year-old white female with chief complaint of having fatigue very severely being that she was unable even to hardly get out of bed over the past several weeks she was tested for mono and had a positive test so we are examining her today. She has had no sore throat some dark urine no light stools no fevers chills some loss of appetite. Review of Systems   Constitutional: Positive for appetite change, fatigue and fever. Negative for chills. HENT: Positive for congestion, postnasal drip, rhinorrhea, sinus pressure and sinus pain. Negative for sore throat. Eyes: Negative. Respiratory: Negative. Cardiovascular: Negative. Gastrointestinal: Positive for abdominal pain. Genitourinary: Negative.           Current Outpatient Medications:     sertraline (ZOLOFT) 25 MG tablet, Take 25 mg by mouth daily, Disp: , Rfl:     sertraline (ZOLOFT) 25 MG tablet, Take 25 mg by mouth daily, Disp: , Rfl:   No Known Allergies    Past Medical History:   Diagnosis Date    Biliary dyskinesia     Cervical shortening, antepartum condition or complication     Epigastric abdominal pain     Fetal renal anomaly     Gastritis     Hemorrhoids     Premature uterine contractions causing threatened premature labor in second trimester     Rectal bleed     Seizure ([address] 2015    Subchorionic hemorrhage in second trimester 2018     Past Surgical History:   Procedure Laterality Date     SECTION  1/3/2015    CHOLECYSTECTOMY  2017    ROBOT ASSISTED    COLONOSCOPY  2012    COLONOSCOPY      DILATION AND CURETTAGE OF UTERUS      ablation    LAPAROSCOPY N/A 10/18/2019    DIAGNOSTIC LAPAROSCOPY, OPEN VENTRAL HERNIA REPAIR performed by Eliz العراقي MD at [address] N/A 2018     SECTION performed by Gregorio Monson MITCH Lowe DO at [facility] L&D    UPPER GASTROINTESTINAL ENDOSCOPY  2012    UPPER GASTROINTESTINAL ENDOSCOPY      UPPER GASTROINTESTINAL ENDOSCOPY  2017     Family History   Problem Relation Age of Onset    Cancer Father      Social History     Tobacco Use    Smoking status: Former Smoker     Years: 2.00     Types: Cigarettes     Last attempt to quit: 3/31/2013     Years since quittin.5    Smokeless tobacco: Never Used   Substance Use Topics    Alcohol use: No    Drug use: No        Vitals:    10/02/20 1038   Weight: 110 lb (49.9 kg)       Physical Exam  Vitals signs reviewed. Constitutional:       Appearance: Normal appearance. HENT:      Head: Normocephalic and atraumatic. Right Ear: Tympanic membrane, ear canal and external ear normal.      Left Ear: Tympanic membrane, ear canal and external ear normal. There is no impacted cerumen. Nose: Congestion and rhinorrhea present. Mouth/Throat:      Mouth: Mucous membranes are moist.      Pharynx: No oropharyngeal exudate or posterior oropharyngeal erythema. Cardiovascular:      Rate and Rhythm: Normal rate and regular rhythm. Heart sounds: No murmur. Pulmonary:      Effort: Pulmonary effort is normal.      Breath sounds: Normal breath sounds. Abdominal:      General: There is no distension. Palpations: There is no mass. Tenderness: There is abdominal tenderness. There is no right CVA tenderness, left CVA tenderness, guarding or rebound. Hernia: No hernia is present. Lymphadenopathy:      Cervical: No cervical adenopathy. Skin:     General: Skin is warm and dry. Assessment and Plan:  [facility] was seen today for fatigue. Diagnoses and all orders for this visit:    Infectious mononucleosis without complication, infectious mononucleosis due to unspecified organism        No orders of the defined types were placed in this encounter. Patient advised to follow up with PCP as needed.         Seen By:  Torres Number, DO

## 2020-10-05 LAB — MONO TEST: NEGATIVE

## 2020-10-19 ENCOUNTER — HOSPITAL ENCOUNTER (EMERGENCY)
Age: 25
Discharge: HOME | End: 2020-10-19
Attending: EMERGENCY MEDICINE
Payer: COMMERCIAL

## 2020-10-19 VITALS
BODY MASS INDEX: 19.32 KG/M2 | RESPIRATION RATE: 14 BRPM | SYSTOLIC BLOOD PRESSURE: 95 MMHG | HEIGHT: 62 IN | OXYGEN SATURATION: 98 % | HEART RATE: 67 BPM | TEMPERATURE: 98.7 F | WEIGHT: 105 LBS | DIASTOLIC BLOOD PRESSURE: 60 MMHG

## 2020-10-19 LAB
ALBUMIN SERPL-MCNC: 4.3 G/DL (ref 3.5–5.2)
ALP BLD-CCNC: 66 U/L (ref 35–104)
ALT SERPL-CCNC: 12 U/L (ref 0–32)
ANION GAP SERPL CALCULATED.3IONS-SCNC: 7 MMOL/L (ref 7–16)
AST SERPL-CCNC: 13 U/L (ref 0–31)
BACTERIA: ABNORMAL /HPF
BASOPHILS ABSOLUTE: 0.05 E9/L (ref 0–0.2)
BASOPHILS RELATIVE PERCENT: 0.5 % (ref 0–2)
BILIRUB SERPL-MCNC: 0.4 MG/DL (ref 0–1.2)
BILIRUBIN URINE: NEGATIVE
BLOOD, URINE: ABNORMAL
BUN BLDV-MCNC: 8 MG/DL (ref 6–20)
CALCIUM SERPL-MCNC: 9 MG/DL (ref 8.6–10.2)
CHLORIDE BLD-SCNC: 106 MMOL/L (ref 98–107)
CLARITY: CLEAR
CO2: 24 MMOL/L (ref 22–29)
COLOR: YELLOW
CREAT SERPL-MCNC: 0.7 MG/DL (ref 0.5–1)
EOSINOPHILS ABSOLUTE: 0.17 E9/L (ref 0.05–0.5)
EOSINOPHILS RELATIVE PERCENT: 1.6 % (ref 0–6)
EPITHELIAL CELLS, UA: ABNORMAL /HPF
GFR AFRICAN AMERICAN: >60
GFR NON-AFRICAN AMERICAN: >60 ML/MIN/1.73
GLUCOSE BLD-MCNC: 88 MG/DL (ref 74–99)
GLUCOSE URINE: NEGATIVE MG/DL
HCG, URINE, POC: NEGATIVE
HCT VFR BLD CALC: 42.7 % (ref 34–48)
HEMOGLOBIN: 13.5 G/DL (ref 11.5–15.5)
IMMATURE GRANULOCYTES #: 0.03 E9/L
IMMATURE GRANULOCYTES %: 0.3 % (ref 0–5)
KETONES, URINE: NEGATIVE MG/DL
LACTIC ACID: 0.9 MMOL/L (ref 0.5–2.2)
LEUKOCYTE ESTERASE, URINE: ABNORMAL
LIPASE: 26 U/L (ref 13–60)
LYMPHOCYTES ABSOLUTE: 1.55 E9/L (ref 1.5–4)
LYMPHOCYTES RELATIVE PERCENT: 14.5 % (ref 20–42)
Lab: NORMAL
MCH RBC QN AUTO: 29.2 PG (ref 26–35)
MCHC RBC AUTO-ENTMCNC: 31.6 % (ref 32–34.5)
MCV RBC AUTO: 92.2 FL (ref 80–99.9)
MONOCYTES ABSOLUTE: 1.02 E9/L (ref 0.1–0.95)
MONOCYTES RELATIVE PERCENT: 9.5 % (ref 2–12)
MUCUS: PRESENT /LPF
NEGATIVE QC PASS/FAIL: NORMAL
NEUTROPHILS ABSOLUTE: 7.87 E9/L (ref 1.8–7.3)
NEUTROPHILS RELATIVE PERCENT: 73.6 % (ref 43–80)
NITRITE, URINE: NEGATIVE
PDW BLD-RTO: 13.6 FL (ref 11.5–15)
PH UA: 5.5 (ref 5–9)
PLATELET # BLD: 227 E9/L (ref 130–450)
PMV BLD AUTO: 11.2 FL (ref 7–12)
POSITIVE QC PASS/FAIL: NORMAL
POTASSIUM SERPL-SCNC: 3.6 MMOL/L (ref 3.5–5)
PROTEIN UA: NEGATIVE MG/DL
RBC # BLD: 4.63 E12/L (ref 3.5–5.5)
RBC UA: ABNORMAL /HPF (ref 0–2)
SODIUM BLD-SCNC: 137 MMOL/L (ref 132–146)
SPECIFIC GRAVITY UA: >=1.03 (ref 1–1.03)
TOTAL PROTEIN: 7.2 G/DL (ref 6.4–8.3)
UROBILINOGEN, URINE: 0.2 E.U./DL
WBC # BLD: 10.7 E9/L (ref 4.5–11.5)
WBC UA: ABNORMAL /HPF (ref 0–5)

## 2020-10-19 PROCEDURE — 99283 EMERGENCY DEPT VISIT LOW MDM: CPT

## 2020-10-19 PROCEDURE — 96360 HYDRATION IV INFUSION INIT: CPT

## 2020-10-19 PROCEDURE — 80053 COMPREHEN METABOLIC PANEL: CPT

## 2020-10-19 PROCEDURE — 81001 URINALYSIS AUTO W/SCOPE: CPT

## 2020-10-19 PROCEDURE — 99284 EMERGENCY DEPT VISIT MOD MDM: CPT

## 2020-10-19 PROCEDURE — 2580000003 HC RX 258: Performed by: EMERGENCY MEDICINE

## 2020-10-19 PROCEDURE — 96361 HYDRATE IV INFUSION ADD-ON: CPT

## 2020-10-19 PROCEDURE — 83605 ASSAY OF LACTIC ACID: CPT

## 2020-10-19 PROCEDURE — 83690 ASSAY OF LIPASE: CPT

## 2020-10-19 PROCEDURE — 85025 COMPLETE CBC W/AUTO DIFF WBC: CPT

## 2020-10-19 RX ORDER — 0.9 % SODIUM CHLORIDE 0.9 %
1000 INTRAVENOUS SOLUTION INTRAVENOUS ONCE
Status: COMPLETED | OUTPATIENT
Start: 2020-10-19 | End: 2020-10-19

## 2020-10-19 RX ORDER — SODIUM CHLORIDE 0.9 % (FLUSH) 0.9 %
10 SYRINGE (ML) INJECTION PRN
Status: DISCONTINUED | OUTPATIENT
Start: 2020-10-19 | End: 2020-10-19 | Stop reason: HOSPADM

## 2020-10-19 RX ADMIN — SODIUM CHLORIDE 1000 ML: 9 INJECTION, SOLUTION INTRAVENOUS at 10:45

## 2020-10-19 ASSESSMENT — ENCOUNTER SYMPTOMS
EYE PAIN: 0
SORE THROAT: 0
EYE REDNESS: 0
NAUSEA: 0
SHORTNESS OF BREATH: 0
ABDOMINAL PAIN: 0
ABDOMINAL DISTENTION: 0
VOMITING: 0
BACK PAIN: 0
WHEEZING: 0
COUGH: 0
DIARRHEA: 0
SINUS PRESSURE: 0
EYE DISCHARGE: 0

## 2020-10-19 ASSESSMENT — PAIN DESCRIPTION - LOCATION: LOCATION: BACK

## 2020-10-19 ASSESSMENT — PAIN SCALES - GENERAL: PAINLEVEL_OUTOF10: 6

## 2020-10-19 ASSESSMENT — PAIN DESCRIPTION - ORIENTATION: ORIENTATION: LOWER

## 2020-10-19 ASSESSMENT — PAIN DESCRIPTION - PAIN TYPE: TYPE: ACUTE PAIN

## 2020-10-19 NOTE — ED PROVIDER NOTES
Heart sounds: Normal heart sounds. No murmur. Pulmonary:      Effort: Pulmonary effort is normal. No respiratory distress. Breath sounds: Normal breath sounds. No wheezing or rales. Abdominal:      General: Bowel sounds are normal.      Palpations: Abdomen is soft. Tenderness: There is abdominal tenderness in the left lower quadrant. There is no guarding or rebound. Comments: Mild tenderness to the left upper quadrant with palpation only. Skin:     General: Skin is warm and dry. Neurological:      Mental Status: She is alert and oriented to person, place, and time. Cranial Nerves: No cranial nerve deficit. Coordination: Coordination normal.          Procedures     MDM            --------------------------------------------- PAST HISTORY ---------------------------------------------  Past Medical History:  has a past medical history of Biliary dyskinesia, Cervical shortening, antepartum condition or complication, Epigastric abdominal pain, Fetal renal anomaly, Gastritis, Hemorrhoids, Premature uterine contractions causing threatened premature labor in second trimester, Rectal bleed, Seizure ([facility] [address] and Subchorionic hemorrhage in second trimester. Past Surgical History:  has a past surgical history that includes Colonoscopy (2012); Upper gastrointestinal endoscopy (2012); Upper gastrointestinal endoscopy; Colonoscopy;  section (1/3/2015); Upper gastrointestinal endoscopy (2017); Dilation and curettage of uterus; Cholecystectomy (2017); pr  delivery only (N/A, 2018); and laparoscopy (N/A, 10/18/2019). Social History:  reports that she quit smoking about 7 years ago. Her smoking use included cigarettes. She quit after 2.00 years of use. She has never used smokeless tobacco. She reports that she does not drink alcohol or use drugs. Family History: family history includes Cancer in her father.      The patients home medications have been reviewed. Allergies: Patient has no known allergies.     -------------------------------------------------- RESULTS -------------------------------------------------  Labs:  Results for orders placed or performed during the hospital encounter of 10/19/20   CBC Auto Differential   Result Value Ref Range    WBC 10.7 4.5 - 11.5 E9/L    RBC 4.63 3.50 - 5.50 E12/L    Hemoglobin 13.5 11.5 - 15.5 g/dL    Hematocrit 42.7 34.0 - 48.0 %    MCV 92.2 80.0 - 99.9 fL    MCH 29.2 26.0 - 35.0 pg    MCHC 31.6 (L) 32.0 - 34.5 %    RDW 13.6 11.5 - 15.0 fL    Platelets 851 406 - 382 E9/L    MPV 11.2 7.0 - 12.0 fL    Neutrophils % 73.6 43.0 - 80.0 %    Immature Granulocytes % 0.3 0.0 - 5.0 %    Lymphocytes % 14.5 (L) 20.0 - 42.0 %    Monocytes % 9.5 2.0 - 12.0 %    Eosinophils % 1.6 0.0 - 6.0 %    Basophils % 0.5 0.0 - 2.0 %    Neutrophils Absolute 7.87 (H) 1.80 - 7.30 E9/L    Immature Granulocytes # 0.03 E9/L    Lymphocytes Absolute 1.55 1.50 - 4.00 E9/L    Monocytes Absolute 1.02 (H) 0.10 - 0.95 E9/L    Eosinophils Absolute 0.17 0.05 - 0.50 E9/L    Basophils Absolute 0.05 0.00 - 0.20 E9/L   Comprehensive Metabolic Panel   Result Value Ref Range    Sodium 137 132 - 146 mmol/L    Potassium 3.6 3.5 - 5.0 mmol/L    Chloride 106 98 - 107 mmol/L    CO2 24 22 - 29 mmol/L    Anion Gap 7 7 - 16 mmol/L    Glucose 88 74 - 99 mg/dL    BUN 8 6 - 20 mg/dL    CREATININE 0.7 0.5 - 1.0 mg/dL    GFR Non-African American >60 >=60 mL/min/1.73    GFR African American >60     Calcium 9.0 8.6 - 10.2 mg/dL    Total Protein 7.2 6.4 - 8.3 g/dL    Alb 4.3 3.5 - 5.2 g/dL    Total Bilirubin 0.4 0.0 - 1.2 mg/dL    Alkaline Phosphatase 66 35 - 104 U/L    ALT 12 0 - 32 U/L    AST 13 0 - 31 U/L   Lactic Acid, Plasma   Result Value Ref Range    Lactic Acid 0.9 0.5 - 2.2 mmol/L   Lipase   Result Value Ref Range    Lipase 26 13 - 60 U/L   Urinalysis   Result Value Ref Range    Color, UA Yellow Straw/Yellow    Clarity, UA Clear Clear    Glucose, Ur Negative Negative mg/dL    Bilirubin Urine Negative Negative    Ketones, Urine Negative Negative mg/dL    Specific Gravity, UA >=1.030 1.005 - 1.030    Blood, Urine TRACE-INTACT Negative    pH, UA 5.5 5.0 - 9.0    Protein, UA Negative Negative mg/dL    Urobilinogen, Urine 0.2 <2.0 E.U./dL    Nitrite, Urine Negative Negative    Leukocyte Esterase, Urine TRACE (A) Negative   Microscopic Urinalysis   Result Value Ref Range    Mucus, UA Present (A) None Seen /LPF    WBC, UA 1-3 0 - 5 /HPF    RBC, UA 0-1 0 - 2 /HPF    Epithelial Cells, UA RARE /HPF    Bacteria, UA FEW (A) None Seen /HPF   POC Pregnancy Urine Qual   Result Value Ref Range    HCG, Urine, POC Negative Negative    Lot Number [id number]     Positive QC Pass/Fail Pass     Negative QC Pass/Fail Pass        Radiology:  No orders to display       ------------------------- NURSING NOTES AND VITALS REVIEWED ---------------------------  Date / Time Roomed:  10/19/2020 10:18 AM  ED Bed Assignment:  12/12    The nursing notes within the ED encounter and vital signs as below have been reviewed. /70   Pulse 105   Temp 98.7 °F (37.1 °C) (Temporal)   Resp 14   Ht 5' 2\" (1.575 m)   Wt 105 lb (47.6 kg)   SpO2 96%   BMI 19.20 kg/m²   Oxygen Saturation Interpretation: Normal      ------------------------------------------ PROGRESS NOTES ------------------------------------------  12:28 PM EDT  Discussed results with the patient. She does feel better following fluids here. Discussed her need to increase fluid intake as well as some form of sustenance. I have spoken with the patient and discussed todays results, in addition to providing specific details for the plan of care and counseling regarding the diagnosis and prognosis. Their questions are answered at this time and they are agreeable with the plan. I discussed at length with them reasons for immediate return here for re evaluation.  They will followup with their primary care physician by calling their office tomorrow. --------------------------------- ADDITIONAL PROVIDER NOTES ---------------------------------  At this time the patient is without objective evidence of an acute process requiring hospitalization or inpatient management. They have remained hemodynamically stable throughout their entire ED visit and are stable for discharge with outpatient follow-up. The plan has been discussed in detail and they are aware of the specific conditions for emergent return, as well as the importance of follow-up. New Prescriptions    No medications on file       Diagnosis:  1. Fatigue, unspecified type    2. Dehydration        Disposition:  Patient's disposition: Discharge to home  Patient's condition is stable.            Sony Verde DO  10/19/20 1228

## 2021-01-08 ENCOUNTER — APPOINTMENT (OUTPATIENT)
Dept: ULTRASOUND IMAGING | Age: 26
End: 2021-01-08
Payer: COMMERCIAL

## 2021-01-08 ENCOUNTER — APPOINTMENT (OUTPATIENT)
Dept: CT IMAGING | Age: 26
End: 2021-01-08
Payer: COMMERCIAL

## 2021-01-08 ENCOUNTER — HOSPITAL ENCOUNTER (EMERGENCY)
Age: 26
Discharge: HOME | End: 2021-01-08
Payer: COMMERCIAL

## 2021-01-08 VITALS
OXYGEN SATURATION: 98 % | SYSTOLIC BLOOD PRESSURE: 106 MMHG | HEART RATE: 97 BPM | HEIGHT: 62 IN | DIASTOLIC BLOOD PRESSURE: 63 MMHG | TEMPERATURE: 97.4 F | WEIGHT: 105 LBS | RESPIRATION RATE: 14 BRPM | BODY MASS INDEX: 19.32 KG/M2

## 2021-01-08 DIAGNOSIS — R10.30 LOWER ABDOMINAL PAIN: Primary | ICD-10-CM

## 2021-01-08 DIAGNOSIS — N94.89 PELVIC CONGESTION: ICD-10-CM

## 2021-01-08 LAB
ALBUMIN SERPL-MCNC: 4.5 G/DL (ref 3.5–5.2)
ALP BLD-CCNC: 72 U/L (ref 35–104)
ALT SERPL-CCNC: 11 U/L (ref 0–32)
ANION GAP SERPL CALCULATED.3IONS-SCNC: 6 MMOL/L (ref 7–16)
AST SERPL-CCNC: 16 U/L (ref 0–31)
BACTERIA: ABNORMAL /HPF
BASOPHILS ABSOLUTE: 0.06 E9/L (ref 0–0.2)
BASOPHILS RELATIVE PERCENT: 0.6 % (ref 0–2)
BILIRUB SERPL-MCNC: 0.2 MG/DL (ref 0–1.2)
BILIRUBIN URINE: NEGATIVE
BLOOD, URINE: NEGATIVE
BUN BLDV-MCNC: 7 MG/DL (ref 6–20)
CALCIUM SERPL-MCNC: 9.3 MG/DL (ref 8.6–10.2)
CHLORIDE BLD-SCNC: 104 MMOL/L (ref 98–107)
CLARITY: CLEAR
CLUE CELLS: NORMAL
CO2: 26 MMOL/L (ref 22–29)
COLOR: YELLOW
CREAT SERPL-MCNC: 0.6 MG/DL (ref 0.5–1)
EOSINOPHILS ABSOLUTE: 0.11 E9/L (ref 0.05–0.5)
EOSINOPHILS RELATIVE PERCENT: 1.1 % (ref 0–6)
GFR AFRICAN AMERICAN: >60
GFR NON-AFRICAN AMERICAN: >60 ML/MIN/1.73
GLUCOSE BLD-MCNC: 78 MG/DL (ref 74–99)
GLUCOSE URINE: NEGATIVE MG/DL
HCG, URINE, POC: NEGATIVE
HCT VFR BLD CALC: 42.5 % (ref 34–48)
HEMOGLOBIN: 13.2 G/DL (ref 11.5–15.5)
IMMATURE GRANULOCYTES #: 0.04 E9/L
IMMATURE GRANULOCYTES %: 0.4 % (ref 0–5)
KETONES, URINE: NEGATIVE MG/DL
LACTIC ACID: 1.1 MMOL/L (ref 0.5–2.2)
LEUKOCYTE ESTERASE, URINE: ABNORMAL
LIPASE: 35 U/L (ref 13–60)
LYMPHOCYTES ABSOLUTE: 2.96 E9/L (ref 1.5–4)
LYMPHOCYTES RELATIVE PERCENT: 28.9 % (ref 20–42)
Lab: NORMAL
MCH RBC QN AUTO: 29.1 PG (ref 26–35)
MCHC RBC AUTO-ENTMCNC: 31.1 % (ref 32–34.5)
MCV RBC AUTO: 93.6 FL (ref 80–99.9)
MONO TEST: NEGATIVE
MONOCYTES ABSOLUTE: 0.82 E9/L (ref 0.1–0.95)
MONOCYTES RELATIVE PERCENT: 8 % (ref 2–12)
NEGATIVE QC PASS/FAIL: NORMAL
NEUTROPHILS ABSOLUTE: 6.25 E9/L (ref 1.8–7.3)
NEUTROPHILS RELATIVE PERCENT: 61 % (ref 43–80)
NITRITE, URINE: NEGATIVE
PDW BLD-RTO: 13.2 FL (ref 11.5–15)
PH UA: 7.5 (ref 5–9)
PLATELET # BLD: 250 E9/L (ref 130–450)
PMV BLD AUTO: 11.1 FL (ref 7–12)
POSITIVE QC PASS/FAIL: NORMAL
POTASSIUM SERPL-SCNC: 4.1 MMOL/L (ref 3.5–5)
PROTEIN UA: NEGATIVE MG/DL
RBC # BLD: 4.54 E12/L (ref 3.5–5.5)
RBC UA: ABNORMAL /HPF (ref 0–2)
SODIUM BLD-SCNC: 136 MMOL/L (ref 132–146)
SOURCE WET PREP: NORMAL
SPECIFIC GRAVITY UA: 1.01 (ref 1–1.03)
TOTAL PROTEIN: 7 G/DL (ref 6.4–8.3)
TRICHOMONAS PREP: NORMAL
UROBILINOGEN, URINE: 0.2 E.U./DL
WBC # BLD: 10.2 E9/L (ref 4.5–11.5)
WBC UA: ABNORMAL /HPF (ref 0–5)
YEAST WET PREP: NORMAL

## 2021-01-08 PROCEDURE — 74177 CT ABD & PELVIS W/CONTRAST: CPT

## 2021-01-08 PROCEDURE — 99284 EMERGENCY DEPT VISIT MOD MDM: CPT

## 2021-01-08 PROCEDURE — 76830 TRANSVAGINAL US NON-OB: CPT

## 2021-01-08 PROCEDURE — 86308 HETEROPHILE ANTIBODY SCREEN: CPT

## 2021-01-08 PROCEDURE — 6360000004 HC RX CONTRAST MEDICATION: Performed by: RADIOLOGY

## 2021-01-08 PROCEDURE — 81001 URINALYSIS AUTO W/SCOPE: CPT

## 2021-01-08 PROCEDURE — 80053 COMPREHEN METABOLIC PANEL: CPT

## 2021-01-08 PROCEDURE — 87491 CHLMYD TRACH DNA AMP PROBE: CPT

## 2021-01-08 PROCEDURE — 83605 ASSAY OF LACTIC ACID: CPT

## 2021-01-08 PROCEDURE — 2580000003 HC RX 258: Performed by: NURSE PRACTITIONER

## 2021-01-08 PROCEDURE — 96374 THER/PROPH/DIAG INJ IV PUSH: CPT

## 2021-01-08 PROCEDURE — 83690 ASSAY OF LIPASE: CPT

## 2021-01-08 PROCEDURE — 93975 VASCULAR STUDY: CPT

## 2021-01-08 PROCEDURE — 87210 SMEAR WET MOUNT SALINE/INK: CPT

## 2021-01-08 PROCEDURE — 96375 TX/PRO/DX INJ NEW DRUG ADDON: CPT

## 2021-01-08 PROCEDURE — 87591 N.GONORRHOEAE DNA AMP PROB: CPT

## 2021-01-08 PROCEDURE — 6360000002 HC RX W HCPCS: Performed by: NURSE PRACTITIONER

## 2021-01-08 PROCEDURE — 85025 COMPLETE CBC W/AUTO DIFF WBC: CPT

## 2021-01-08 RX ORDER — ONDANSETRON 2 MG/ML
4 INJECTION INTRAMUSCULAR; INTRAVENOUS ONCE
Status: COMPLETED | OUTPATIENT
Start: 2021-01-08 | End: 2021-01-08

## 2021-01-08 RX ORDER — 0.9 % SODIUM CHLORIDE 0.9 %
1000 INTRAVENOUS SOLUTION INTRAVENOUS ONCE
Status: COMPLETED | OUTPATIENT
Start: 2021-01-08 | End: 2021-01-08

## 2021-01-08 RX ORDER — NAPROXEN 500 MG/1
500 TABLET ORAL 2 TIMES DAILY WITH MEALS
Qty: 10 TABLET | Refills: 0 | Status: SHIPPED | OUTPATIENT
Start: 2021-01-08 | End: 2021-01-18 | Stop reason: ALTCHOICE

## 2021-01-08 RX ORDER — KETOROLAC TROMETHAMINE 30 MG/ML
15 INJECTION, SOLUTION INTRAMUSCULAR; INTRAVENOUS ONCE
Status: COMPLETED | OUTPATIENT
Start: 2021-01-08 | End: 2021-01-08

## 2021-01-08 RX ORDER — SODIUM CHLORIDE 0.9 % (FLUSH) 0.9 %
SYRINGE (ML) INJECTION
Status: DISCONTINUED
Start: 2021-01-08 | End: 2021-01-08 | Stop reason: HOSPADM

## 2021-01-08 RX ADMIN — IOPAMIDOL 75 ML: 755 INJECTION, SOLUTION INTRAVENOUS at 17:16

## 2021-01-08 RX ADMIN — KETOROLAC TROMETHAMINE 15 MG: 30 INJECTION, SOLUTION INTRAMUSCULAR at 16:40

## 2021-01-08 RX ADMIN — SODIUM CHLORIDE 1000 ML: 9 INJECTION, SOLUTION INTRAVENOUS at 16:39

## 2021-01-08 RX ADMIN — ONDANSETRON 4 MG: 2 INJECTION INTRAMUSCULAR; INTRAVENOUS at 16:40

## 2021-01-08 ASSESSMENT — PAIN DESCRIPTION - LOCATION: LOCATION: ABDOMEN

## 2021-01-08 ASSESSMENT — PAIN SCALES - GENERAL: PAINLEVEL_OUTOF10: 5

## 2021-01-09 NOTE — ED NOTES
Bed: 12  Expected date:   Expected time:   Means of arrival:   Comments:  [id number] Jillian Avila RN  01/08/21 1925

## 2021-01-12 LAB
C TRACH DNA GENITAL QL NAA+PROBE: NEGATIVE
N. GONORRHOEAE DNA: NEGATIVE
SOURCE: NORMAL

## 2021-01-18 ENCOUNTER — OFFICE VISIT (OUTPATIENT)
Dept: FAMILY MEDICINE CLINIC | Age: 26
End: 2021-01-18
Payer: COMMERCIAL

## 2021-01-18 VITALS
HEIGHT: 63 IN | DIASTOLIC BLOOD PRESSURE: 64 MMHG | HEART RATE: 84 BPM | SYSTOLIC BLOOD PRESSURE: 104 MMHG | OXYGEN SATURATION: 99 % | BODY MASS INDEX: 19.31 KG/M2 | WEIGHT: 109 LBS | TEMPERATURE: 97.8 F

## 2021-01-18 DIAGNOSIS — N73.0 PID (ACUTE PELVIC INFLAMMATORY DISEASE): ICD-10-CM

## 2021-01-18 DIAGNOSIS — N76.0 ACUTE VAGINITIS: ICD-10-CM

## 2021-01-18 DIAGNOSIS — R10.9 ABDOMINAL PAIN DETERMINED BY EXAMINATION: Primary | ICD-10-CM

## 2021-01-18 PROCEDURE — G8428 CUR MEDS NOT DOCUMENT: HCPCS | Performed by: FAMILY MEDICINE

## 2021-01-18 PROCEDURE — G8420 CALC BMI NORM PARAMETERS: HCPCS | Performed by: FAMILY MEDICINE

## 2021-01-18 PROCEDURE — 99214 OFFICE O/P EST MOD 30 MIN: CPT | Performed by: FAMILY MEDICINE

## 2021-01-18 PROCEDURE — 96372 THER/PROPH/DIAG INJ SC/IM: CPT | Performed by: FAMILY MEDICINE

## 2021-01-18 PROCEDURE — 1036F TOBACCO NON-USER: CPT | Performed by: FAMILY MEDICINE

## 2021-01-18 PROCEDURE — G8484 FLU IMMUNIZE NO ADMIN: HCPCS | Performed by: FAMILY MEDICINE

## 2021-01-18 RX ORDER — FLUCONAZOLE 100 MG/1
100 TABLET ORAL 2 TIMES DAILY
Qty: 20 TABLET | Refills: 1 | Status: SHIPPED
Start: 2021-01-18 | End: 2021-03-29

## 2021-01-18 RX ORDER — CEFTRIAXONE 1 G/1
1 INJECTION, POWDER, FOR SOLUTION INTRAMUSCULAR; INTRAVENOUS ONCE
Status: COMPLETED | OUTPATIENT
Start: 2021-01-18 | End: 2021-01-18

## 2021-01-18 RX ORDER — CEPHALEXIN 500 MG/1
500 CAPSULE ORAL 4 TIMES DAILY
Qty: 40 CAPSULE | Refills: 0 | Status: SHIPPED | OUTPATIENT
Start: 2021-01-18 | End: 2021-01-28

## 2021-01-18 RX ADMIN — CEFTRIAXONE 1 G: 1 INJECTION, POWDER, FOR SOLUTION INTRAMUSCULAR; INTRAVENOUS at 16:26

## 2021-01-18 ASSESSMENT — ENCOUNTER SYMPTOMS
ABDOMINAL DISTENTION: 1
ABDOMINAL PAIN: 1
DIARRHEA: 1
RESPIRATORY NEGATIVE: 1

## 2021-01-18 NOTE — PROGRESS NOTES
21  [facility] : 1995 Sex: female  Age: 22 y.o. Chief Complaint   Patient presents with    Abdominal Pain    Vaginal Discharge    Diarrhea       HPI    Review of Systems   Constitutional: Negative. Respiratory: Negative. Cardiovascular: Negative. Gastrointestinal: Positive for abdominal distention, abdominal pain and diarrhea. Genitourinary: Positive for pelvic pain, vaginal discharge and vaginal pain.          Current Outpatient Medications:     cephALEXin (KEFLEX) 500 MG capsule, Take 1 capsule by mouth 4 times daily for 10 days, Disp: 40 capsule, Rfl: 0    sertraline (ZOLOFT) 25 MG tablet, Take 25 mg by mouth daily, Disp: , Rfl:     sertraline (ZOLOFT) 25 MG tablet, Take 25 mg by mouth daily, Disp: , Rfl:     cetirizine (ZYRTEC) 10 MG tablet, Take 1 tablet by mouth daily, Disp: 30 tablet, Rfl: 1  No Known Allergies    Past Medical History:   Diagnosis Date    Biliary dyskinesia     Cervical shortening, antepartum condition or complication     Epigastric abdominal pain     Fetal renal anomaly     Gastritis     Hemorrhoids     Premature uterine contractions causing threatened premature labor in second trimester     Rectal bleed     Seizure ([facility] [address] 2015    Subchorionic hemorrhage in second trimester 2018     Past Surgical History:   Procedure Laterality Date     SECTION  1/3/2015    CHOLECYSTECTOMY  2017    ROBOT ASSISTED    COLONOSCOPY  2012    COLONOSCOPY      DILATION AND CURETTAGE OF UTERUS      ablation    HEMORRHOID SURGERY  2020    LAPAROSCOPY N/A 10/18/2019    DIAGNOSTIC LAPAROSCOPY, OPEN VENTRAL HERNIA REPAIR performed by Shelli Hodgkin, MD at [address] N/A 2018     SECTION performed by Sameera England DO at [facility] L[facility]D    UPPER GASTROINTESTINAL ENDOSCOPY  2012    UPPER GASTROINTESTINAL ENDOSCOPY      UPPER GASTROINTESTINAL ENDOSCOPY  2017     Family History determined by examination    Acute vaginitis    PID (acute pelvic inflammatory disease)    Other orders  -     cefTRIAXone (ROCEPHIN) injection 1 g  -     cephALEXin (KEFLEX) 500 MG capsule; Take 1 capsule by mouth 4 times daily for 10 days        Orders Placed This Encounter   Medications    cefTRIAXone (ROCEPHIN) injection 1 g     Order Specific Question:   Antimicrobial Indications     Answer:   OB/Gyn Infection    cephALEXin (KEFLEX) 500 MG capsule     Sig: Take 1 capsule by mouth 4 times daily for 10 days     Dispense:  40 capsule     Refill:  0        Patient advised to follow up with PCP as needed. Seen By:  Kelsey Camara, DO  This patient was given Rocephin IM along with Keflex 500 4 times daily she needs to be reexamined in 10 days to 2 weeks.

## 2021-03-29 ENCOUNTER — OFFICE VISIT (OUTPATIENT)
Dept: FAMILY MEDICINE CLINIC | Age: 26
End: 2021-03-29
Payer: COMMERCIAL

## 2021-03-29 VITALS — WEIGHT: 108.2 LBS | HEART RATE: 103 BPM | TEMPERATURE: 97.1 F | OXYGEN SATURATION: 98 % | BODY MASS INDEX: 19.79 KG/M2

## 2021-03-29 DIAGNOSIS — N89.8 VAGINAL DISCHARGE: Primary | ICD-10-CM

## 2021-03-29 DIAGNOSIS — N89.8 VAGINAL DISCHARGE: ICD-10-CM

## 2021-03-29 DIAGNOSIS — A59.01 TRICHOMONAL VULVOVAGINITIS: ICD-10-CM

## 2021-03-29 LAB
BILIRUBIN, POC: NEGATIVE
BLOOD URINE, POC: NEGATIVE
CLARITY, POC: CLEAR
COLOR, POC: NORMAL
CONTROL: NORMAL
GLUCOSE URINE, POC: NEGATIVE
KETONES, POC: NEGATIVE
LEUKOCYTE EST, POC: NEGATIVE
NITRITE, POC: NEGATIVE
PH, POC: 6.5
PREGNANCY TEST URINE, POC: NEGATIVE
PROTEIN, POC: NEGATIVE
SPECIFIC GRAVITY, POC: >=1.03
UROBILINOGEN, POC: NORMAL

## 2021-03-29 PROCEDURE — 81025 URINE PREGNANCY TEST: CPT | Performed by: FAMILY MEDICINE

## 2021-03-29 PROCEDURE — G8420 CALC BMI NORM PARAMETERS: HCPCS | Performed by: FAMILY MEDICINE

## 2021-03-29 PROCEDURE — 99213 OFFICE O/P EST LOW 20 MIN: CPT | Performed by: FAMILY MEDICINE

## 2021-03-29 PROCEDURE — 81002 URINALYSIS NONAUTO W/O SCOPE: CPT | Performed by: FAMILY MEDICINE

## 2021-03-29 PROCEDURE — G8427 DOCREV CUR MEDS BY ELIG CLIN: HCPCS | Performed by: FAMILY MEDICINE

## 2021-03-29 PROCEDURE — G8484 FLU IMMUNIZE NO ADMIN: HCPCS | Performed by: FAMILY MEDICINE

## 2021-03-29 PROCEDURE — 1036F TOBACCO NON-USER: CPT | Performed by: FAMILY MEDICINE

## 2021-03-29 RX ORDER — METRONIDAZOLE 7.5 MG/G
GEL TOPICAL
Qty: 1 TUBE | Refills: 1 | Status: ON HOLD
Start: 2021-03-29 | End: 2022-01-14 | Stop reason: HOSPADM

## 2021-03-29 RX ORDER — METRONIDAZOLE 7.5 MG/G
GEL TOPICAL
Qty: 1 TUBE | Refills: 1 | Status: SHIPPED | OUTPATIENT
Start: 2021-03-29 | End: 2021-03-29

## 2021-03-29 NOTE — PROGRESS NOTES
OFFICE NOTE    3/29/21  Name: Tj Schaefer  :1995   Sex:female   Age:25 y.o. SUBJECTIVE  Chief Complaint   Patient presents with    Vaginal Discharge       HPI comes in with vaginal discharge. Was tested for STD's multiple times and recent test for Trich was negative and had negative clue cells. Some suspicion for pelvic congestion syndrdome. Has mIrena IUD, because she got pregnant on OCs Dr Barry Haney saw her and recommended Lap to r/o     Review of Systems         Current Outpatient Medications:     metroNIDAZOLE (METROGEL) 0.75 % gel, Apply topically 2 times daily.  For 10 days, Disp: 1 Tube, Rfl: 1    sertraline (ZOLOFT) 25 MG tablet, Take 25 mg by mouth daily, Disp: , Rfl:   No Known Allergies    Past Medical History:   Diagnosis Date    Biliary dyskinesia     Cervical shortening, antepartum condition or complication     Epigastric abdominal pain     Fetal renal anomaly     Gastritis     Hemorrhoids     Premature uterine contractions causing threatened premature labor in second trimester     Rectal bleed     Seizure ([address] 2015    Subchorionic hemorrhage in second trimester 2018     Past Surgical History:   Procedure Laterality Date     SECTION  1/3/2015    CHOLECYSTECTOMY  2017    ROBOT ASSISTED    COLONOSCOPY  2012    COLONOSCOPY      DILATION AND CURETTAGE OF UTERUS      ablation    HEMORRHOID SURGERY  2020    LAPAROSCOPY N/A 10/18/2019    DIAGNOSTIC LAPAROSCOPY, OPEN VENTRAL HERNIA REPAIR performed by Yvette Redding MD at [address] N/A 2018     SECTION performed by Michael Garcia DO at [facility]    UPPER GASTROINTESTINAL ENDOSCOPY  2012    UPPER GASTROINTESTINAL ENDOSCOPY      UPPER GASTROINTESTINAL ENDOSCOPY  2017     Family History   Problem Relation Age of Onset    Cancer Father         bladder     Social History     Tobacco History     Smoking Status  Former Smoker Quit date  3/31/2013 Smoking Frequency  For 2 years Smoking Tobacco Type  Cigarettes    Smokeless Tobacco Use  Never Used          Alcohol History     Alcohol Use Status  No          Drug Use     Drug Use Status  No          Sexual Activity     Sexually Active  Not Currently                OBJECTIVE  Vitals:    03/29/21 1623   Pulse: 103   Temp: 97.1 °F (36.2 °C)   TempSrc: Temporal   SpO2: 98%   Weight: 108 lb 3.2 oz (49.1 kg)        Body mass index is 19.79 kg/m². Orders Placed This Encounter   Procedures    C.trachomatis N.gonorrhoeae DNA, Urine     Standing Status:   Future     Standing Expiration Date:   3/29/2022    Culture, Urine     Standing Status:   Future     Standing Expiration Date:   3/29/2022     Order Specific Question:   Specify (ex-cath, midstream, cysto, etc)? Answer:   midstream    POCT Urinalysis no Micro    POCT urine pregnancy        EXAM   Physical Exam  Vitals signs and nursing note reviewed. Constitutional:       Appearance: Normal appearance. She is normal weight. Genitourinary:     General: Normal vulva. Vagina: Vaginal discharge present. Rectum: Normal.      Comments: Strawberry cervix. Some greenish discharge evident. Specimen obtained for trichimonas testing. Cx parous. Slightly tender. No adnexal mass or tenderness. Uterus unremarkable  Neurological:      Mental Status: She is alert. Ervin Deng was seen today for vaginal discharge. Diagnoses and all orders for this visit:    Vaginal discharge  -     C.trachomatis N.gonorrhoeae DNA, Urine; Future  -     POCT Urinalysis no Micro  -     Culture, Urine; Future  -     POCT urine pregnancy    Trichomonal vulvovaginitis  -     metroNIDAZOLE (METROGEL) 0.75 % gel; Apply topically 2 times daily. 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 No follow-ups on file.     Electronically signed by Lonnie Curtis MD on 3/29/21 at 4:32 PM EDT

## 2021-03-30 ENCOUNTER — TELEPHONE (OUTPATIENT)
Dept: FAMILY MEDICINE CLINIC | Age: 26
End: 2021-03-30

## 2021-03-30 NOTE — TELEPHONE ENCOUNTER
Cream fine with the vaginal applicator. I thought the gel was still being used.  Thanks for the help

## 2021-03-30 NOTE — TELEPHONE ENCOUNTER
metroNIDAZOLE (METROGEL) 0.75 % gel [[id number]]     Order Details  Dose, Route, Frequency: As Directed   Dispense Quantity: 1 Tube Refills: 1          Sig: Apply topically 2 times daily. For 10 days     Pharmacist calling do you want the vaginal metronidazole cream? And if so do you want the internal inserting applicator? Or do you want her to apply externally?

## 2021-04-01 LAB
C. TRACHOMATIS DNA ,URINE: NEGATIVE
N. GONORRHOEAE DNA, URINE: NEGATIVE
SOURCE: NORMAL
URINE CULTURE, ROUTINE: NORMAL

## 2021-04-03 LAB — CULTURE, TRICHOMONAS: NORMAL

## 2021-04-28 ENCOUNTER — PREP FOR PROCEDURE (OUTPATIENT)
Dept: SURGERY | Age: 26
End: 2021-04-28

## 2021-04-28 RX ORDER — SODIUM CHLORIDE 0.9 % (FLUSH) 0.9 %
5-40 SYRINGE (ML) INJECTION PRN
Status: CANCELLED | OUTPATIENT
Start: 2021-04-28

## 2021-04-28 RX ORDER — SODIUM CHLORIDE, SODIUM LACTATE, POTASSIUM CHLORIDE, CALCIUM CHLORIDE 600; 310; 30; 20 MG/100ML; MG/100ML; MG/100ML; MG/100ML
INJECTION, SOLUTION INTRAVENOUS CONTINUOUS
Status: CANCELLED | OUTPATIENT
Start: 2021-04-28

## 2021-04-28 RX ORDER — SODIUM CHLORIDE 0.9 % (FLUSH) 0.9 %
5-40 SYRINGE (ML) INJECTION EVERY 12 HOURS SCHEDULED
Status: CANCELLED | OUTPATIENT
Start: 2021-04-28

## 2021-04-28 NOTE — H&P (VIEW-ONLY)
Date:   [facility]  Name: Mildred Villarreal             : 1995 Sex: F  Age: 22 yrs  Acct#:  [id number]            Patient was referred by . Patient's primary care provider is Jumana De La Fuente MD.  CC:  Procedure    HPI: See below for procedure. Patient also complaining of increased molding at midline scar. Meds Prior to Visit:  Anusol-HC  2.5 %  aaa twice a day 30 gm tube  Lexapro     Mirena (52 MG)     Diflucan     Flagyl     Remeron     Tri-Lo-Sprintec        Allergies:  NKDA        Wt Prior: 110lb as of 20    Exam:  Constitution:  Non-toxic, no acute distress  Heart :  RRR  Lungs CTA bilaterally  Abdomen: Soft, diastatic midline with hernia  Extremeties: No rash, cyanosis, or jaundice         Assessment #1: Hx S30.851A Superficial foreign body of abdominal wall, initial encounter   Care Plan:              Comments       :  Area cleansed with alcohol, local anesthetic injected, a 15 blade used to make small vertical incision in the old scar. Piece of suture material grasped with a hemostat and cut with scissors. Skin repaired with interrupted 5-0 nylon sutures. Assessment #2: Hx K43.2 Incisional hernia without obstruction or gangrene   Care Plan:              Comments       :  Repair of incisional hernia with imbrication of muscle to bring together diastases          Time spent reviewing records, discussing findings, answering questions, reviewing laboratory studies, radiologic exams, and other diagnostics, and reviewing the diagnostic and therapeutic plan: 20 minutes    Voice recognition software was used in the preparation of this document. Despite all efforts to prevent them, transcription errors may have occurred.   Seen by:

## 2021-04-29 ENCOUNTER — HOSPITAL ENCOUNTER (OUTPATIENT)
Age: 26
Discharge: HOME | End: 2021-05-01
Payer: COMMERCIAL

## 2021-04-29 DIAGNOSIS — Z01.818 PREOP TESTING: ICD-10-CM

## 2021-04-29 PROCEDURE — U0003 INFECTIOUS AGENT DETECTION BY NUCLEIC ACID (DNA OR RNA); SEVERE ACUTE RESPIRATORY SYNDROME CORONAVIRUS 2 (SARS-COV-2) (CORONAVIRUS DISEASE [COVID-19]), AMPLIFIED PROBE TECHNIQUE, MAKING USE OF HIGH THROUGHPUT TECHNOLOGIES AS DESCRIBED BY CMS-2020-01-R: HCPCS

## 2021-04-29 PROCEDURE — U0005 INFEC AGEN DETEC AMPLI PROBE: HCPCS

## 2021-04-29 RX ORDER — DOCUSATE SODIUM 100 MG/1
100 CAPSULE, LIQUID FILLED ORAL 2 TIMES DAILY
COMMUNITY
End: 2021-10-10

## 2021-04-29 RX ORDER — MIRTAZAPINE 45 MG/1
45 TABLET, FILM COATED ORAL NIGHTLY
COMMUNITY
End: 2021-10-10

## 2021-04-29 NOTE — PROGRESS NOTES
Have you been tested for COVID  Yes           Have you been told you were positive for COVID No  Have you had any known exposure to someone that is positive for COVID No  Do you have a cough                   No              Do you have shortness of breath No                 Do you have a sore throat            No                Are you having chills                    No                Are you having muscle aches. No                    Please come to the hospital wearing a mask and have your significant other wear a mask as well. Both of you should check your temperature before leaving to come here,  if it is 100 or higher please call [phone] for instruction.

## 2021-04-30 LAB
SARS-COV-2: NOT DETECTED
SOURCE: NORMAL

## 2021-05-04 ENCOUNTER — ANESTHESIA (OUTPATIENT)
Dept: OPERATING ROOM | Age: 26
End: 2021-05-04
Payer: COMMERCIAL

## 2021-05-04 ENCOUNTER — ANESTHESIA EVENT (OUTPATIENT)
Dept: OPERATING ROOM | Age: 26
End: 2021-05-04
Payer: COMMERCIAL

## 2021-05-04 ENCOUNTER — HOSPITAL ENCOUNTER (OUTPATIENT)
Age: 26
Setting detail: OUTPATIENT SURGERY
Discharge: HOME | End: 2021-05-04
Attending: SURGERY | Admitting: SURGERY
Payer: COMMERCIAL

## 2021-05-04 VITALS
OXYGEN SATURATION: 100 % | SYSTOLIC BLOOD PRESSURE: 119 MMHG | TEMPERATURE: 96.8 F | DIASTOLIC BLOOD PRESSURE: 66 MMHG | RESPIRATION RATE: 12 BRPM

## 2021-05-04 VITALS
DIASTOLIC BLOOD PRESSURE: 71 MMHG | SYSTOLIC BLOOD PRESSURE: 118 MMHG | WEIGHT: 109 LBS | OXYGEN SATURATION: 98 % | BODY MASS INDEX: 19.31 KG/M2 | HEART RATE: 75 BPM | HEIGHT: 63 IN | TEMPERATURE: 97.5 F | RESPIRATION RATE: 20 BRPM

## 2021-05-04 DIAGNOSIS — Z01.818 PREOP TESTING: Primary | ICD-10-CM

## 2021-05-04 DIAGNOSIS — G89.18 POST-OP PAIN: ICD-10-CM

## 2021-05-04 LAB
HCG, URINE, POC: NEGATIVE
Lab: NORMAL
NEGATIVE QC PASS/FAIL: NORMAL
POSITIVE QC PASS/FAIL: NORMAL

## 2021-05-04 PROCEDURE — 3700000000 HC ANESTHESIA ATTENDED CARE: Performed by: SURGERY

## 2021-05-04 PROCEDURE — 6360000002 HC RX W HCPCS: Performed by: SURGERY

## 2021-05-04 PROCEDURE — 7100000010 HC PHASE II RECOVERY - FIRST 15 MIN: Performed by: SURGERY

## 2021-05-04 PROCEDURE — 7100000001 HC PACU RECOVERY - ADDTL 15 MIN: Performed by: SURGERY

## 2021-05-04 PROCEDURE — 6370000000 HC RX 637 (ALT 250 FOR IP): Performed by: ANESTHESIOLOGY

## 2021-05-04 PROCEDURE — 7100000011 HC PHASE II RECOVERY - ADDTL 15 MIN: Performed by: SURGERY

## 2021-05-04 PROCEDURE — 7100000000 HC PACU RECOVERY - FIRST 15 MIN: Performed by: SURGERY

## 2021-05-04 PROCEDURE — 3600000012 HC SURGERY LEVEL 2 ADDTL 15MIN: Performed by: SURGERY

## 2021-05-04 PROCEDURE — 3700000001 HC ADD 15 MINUTES (ANESTHESIA): Performed by: SURGERY

## 2021-05-04 PROCEDURE — 2709999900 HC NON-CHARGEABLE SUPPLY: Performed by: SURGERY

## 2021-05-04 PROCEDURE — 6360000002 HC RX W HCPCS: Performed by: NURSE ANESTHETIST, CERTIFIED REGISTERED

## 2021-05-04 PROCEDURE — 3600000002 HC SURGERY LEVEL 2 BASE: Performed by: SURGERY

## 2021-05-04 PROCEDURE — 2500000003 HC RX 250 WO HCPCS: Performed by: SURGERY

## 2021-05-04 PROCEDURE — 2580000003 HC RX 258: Performed by: NURSE ANESTHETIST, CERTIFIED REGISTERED

## 2021-05-04 PROCEDURE — 2500000003 HC RX 250 WO HCPCS: Performed by: NURSE ANESTHETIST, CERTIFIED REGISTERED

## 2021-05-04 RX ORDER — ONDANSETRON 2 MG/ML
INJECTION INTRAMUSCULAR; INTRAVENOUS PRN
Status: DISCONTINUED | OUTPATIENT
Start: 2021-05-04 | End: 2021-05-04 | Stop reason: SDUPTHER

## 2021-05-04 RX ORDER — MEPERIDINE HYDROCHLORIDE 25 MG/ML
12.5 INJECTION INTRAMUSCULAR; INTRAVENOUS; SUBCUTANEOUS EVERY 10 MIN PRN
Status: DISCONTINUED | OUTPATIENT
Start: 2021-05-04 | End: 2021-05-04 | Stop reason: HOSPADM

## 2021-05-04 RX ORDER — DIPHENHYDRAMINE HYDROCHLORIDE 50 MG/ML
12.5 INJECTION INTRAMUSCULAR; INTRAVENOUS
Status: DISCONTINUED | OUTPATIENT
Start: 2021-05-04 | End: 2021-05-04 | Stop reason: HOSPADM

## 2021-05-04 RX ORDER — OXYCODONE HYDROCHLORIDE AND ACETAMINOPHEN 5; 325 MG/1; MG/1
1 TABLET ORAL EVERY 6 HOURS PRN
Qty: 12 TABLET | Refills: 0 | Status: SHIPPED | OUTPATIENT
Start: 2021-05-04 | End: 2021-05-11

## 2021-05-04 RX ORDER — ACETAMINOPHEN 500 MG
500 TABLET ORAL ONCE
Status: DISCONTINUED | OUTPATIENT
Start: 2021-05-04 | End: 2021-05-04 | Stop reason: HOSPADM

## 2021-05-04 RX ORDER — SODIUM CHLORIDE, SODIUM LACTATE, POTASSIUM CHLORIDE, CALCIUM CHLORIDE 600; 310; 30; 20 MG/100ML; MG/100ML; MG/100ML; MG/100ML
INJECTION, SOLUTION INTRAVENOUS CONTINUOUS
Status: DISCONTINUED | OUTPATIENT
Start: 2021-05-04 | End: 2021-05-04 | Stop reason: HOSPADM

## 2021-05-04 RX ORDER — PROMETHAZINE HYDROCHLORIDE 25 MG/ML
6.25 INJECTION, SOLUTION INTRAMUSCULAR; INTRAVENOUS PRN
Status: DISCONTINUED | OUTPATIENT
Start: 2021-05-04 | End: 2021-05-04 | Stop reason: HOSPADM

## 2021-05-04 RX ORDER — LIDOCAINE HYDROCHLORIDE 20 MG/ML
INJECTION, SOLUTION EPIDURAL; INFILTRATION; INTRACAUDAL; PERINEURAL PRN
Status: DISCONTINUED | OUTPATIENT
Start: 2021-05-04 | End: 2021-05-04 | Stop reason: SDUPTHER

## 2021-05-04 RX ORDER — PROPOFOL 10 MG/ML
INJECTION, EMULSION INTRAVENOUS PRN
Status: DISCONTINUED | OUTPATIENT
Start: 2021-05-04 | End: 2021-05-04 | Stop reason: SDUPTHER

## 2021-05-04 RX ORDER — SODIUM CHLORIDE 0.9 % (FLUSH) 0.9 %
5-40 SYRINGE (ML) INJECTION PRN
Status: DISCONTINUED | OUTPATIENT
Start: 2021-05-04 | End: 2021-05-04 | Stop reason: HOSPADM

## 2021-05-04 RX ORDER — MIDAZOLAM HYDROCHLORIDE 1 MG/ML
INJECTION INTRAMUSCULAR; INTRAVENOUS PRN
Status: DISCONTINUED | OUTPATIENT
Start: 2021-05-04 | End: 2021-05-04 | Stop reason: SDUPTHER

## 2021-05-04 RX ORDER — DEXAMETHASONE SODIUM PHOSPHATE 4 MG/ML
INJECTION, SOLUTION INTRA-ARTICULAR; INTRALESIONAL; INTRAMUSCULAR; INTRAVENOUS; SOFT TISSUE PRN
Status: DISCONTINUED | OUTPATIENT
Start: 2021-05-04 | End: 2021-05-04 | Stop reason: SDUPTHER

## 2021-05-04 RX ORDER — GLYCOPYRROLATE 1 MG/5 ML
SYRINGE (ML) INTRAVENOUS PRN
Status: DISCONTINUED | OUTPATIENT
Start: 2021-05-04 | End: 2021-05-04 | Stop reason: SDUPTHER

## 2021-05-04 RX ORDER — SODIUM CHLORIDE, SODIUM LACTATE, POTASSIUM CHLORIDE, CALCIUM CHLORIDE 600; 310; 30; 20 MG/100ML; MG/100ML; MG/100ML; MG/100ML
INJECTION, SOLUTION INTRAVENOUS CONTINUOUS PRN
Status: DISCONTINUED | OUTPATIENT
Start: 2021-05-04 | End: 2021-05-04 | Stop reason: SDUPTHER

## 2021-05-04 RX ORDER — ROCURONIUM BROMIDE 10 MG/ML
INJECTION, SOLUTION INTRAVENOUS PRN
Status: DISCONTINUED | OUTPATIENT
Start: 2021-05-04 | End: 2021-05-04 | Stop reason: SDUPTHER

## 2021-05-04 RX ORDER — FENTANYL CITRATE 50 UG/ML
INJECTION, SOLUTION INTRAMUSCULAR; INTRAVENOUS PRN
Status: DISCONTINUED | OUTPATIENT
Start: 2021-05-04 | End: 2021-05-04 | Stop reason: SDUPTHER

## 2021-05-04 RX ORDER — FENTANYL CITRATE 50 UG/ML
25 INJECTION, SOLUTION INTRAMUSCULAR; INTRAVENOUS EVERY 5 MIN PRN
Status: DISCONTINUED | OUTPATIENT
Start: 2021-05-04 | End: 2021-05-04 | Stop reason: HOSPADM

## 2021-05-04 RX ORDER — OXYCODONE HYDROCHLORIDE AND ACETAMINOPHEN 5; 325 MG/1; MG/1
1 TABLET ORAL PRN
Status: COMPLETED | OUTPATIENT
Start: 2021-05-04 | End: 2021-05-04

## 2021-05-04 RX ORDER — SODIUM CHLORIDE 0.9 % (FLUSH) 0.9 %
5-40 SYRINGE (ML) INJECTION EVERY 12 HOURS SCHEDULED
Status: DISCONTINUED | OUTPATIENT
Start: 2021-05-04 | End: 2021-05-04 | Stop reason: HOSPADM

## 2021-05-04 RX ORDER — NEOSTIGMINE METHYLSULFATE 1 MG/ML
INJECTION, SOLUTION INTRAVENOUS PRN
Status: DISCONTINUED | OUTPATIENT
Start: 2021-05-04 | End: 2021-05-04 | Stop reason: SDUPTHER

## 2021-05-04 RX ADMIN — FENTANYL CITRATE 50 MCG: 50 INJECTION, SOLUTION INTRAMUSCULAR; INTRAVENOUS at 10:52

## 2021-05-04 RX ADMIN — SODIUM CHLORIDE, POTASSIUM CHLORIDE, SODIUM LACTATE AND CALCIUM CHLORIDE: 600; 310; 30; 20 INJECTION, SOLUTION INTRAVENOUS at 09:33

## 2021-05-04 RX ADMIN — Medication 3 MG: at 10:21

## 2021-05-04 RX ADMIN — ONDANSETRON 4 MG: 2 INJECTION INTRAMUSCULAR; INTRAVENOUS at 09:38

## 2021-05-04 RX ADMIN — DEXAMETHASONE SODIUM PHOSPHATE 8 MG: 4 INJECTION, SOLUTION INTRAMUSCULAR; INTRAVENOUS at 09:38

## 2021-05-04 RX ADMIN — FENTANYL CITRATE 50 MCG: 50 INJECTION, SOLUTION INTRAMUSCULAR; INTRAVENOUS at 10:48

## 2021-05-04 RX ADMIN — ROCURONIUM BROMIDE 30 MG: 10 INJECTION, SOLUTION INTRAVENOUS at 09:38

## 2021-05-04 RX ADMIN — LIDOCAINE HYDROCHLORIDE 50 MG: 20 INJECTION, SOLUTION EPIDURAL; INFILTRATION; INTRACAUDAL; PERINEURAL at 09:38

## 2021-05-04 RX ADMIN — MIDAZOLAM 2 MG: 1 INJECTION INTRAMUSCULAR; INTRAVENOUS at 09:38

## 2021-05-04 RX ADMIN — FENTANYL CITRATE 100 MCG: 50 INJECTION, SOLUTION INTRAMUSCULAR; INTRAVENOUS at 09:38

## 2021-05-04 RX ADMIN — Medication 0.6 MG: at 10:21

## 2021-05-04 RX ADMIN — OXYCODONE HYDROCHLORIDE AND ACETAMINOPHEN 1 TABLET: 5; 325 TABLET ORAL at 11:52

## 2021-05-04 RX ADMIN — PROPOFOL 200 MG: 10 INJECTION, EMULSION INTRAVENOUS at 09:38

## 2021-05-04 RX ADMIN — Medication 2000 MG: at 09:35

## 2021-05-04 ASSESSMENT — PULMONARY FUNCTION TESTS
PIF_VALUE: 14
PIF_VALUE: 11
PIF_VALUE: 15
PIF_VALUE: 11
PIF_VALUE: 12
PIF_VALUE: 12
PIF_VALUE: 11
PIF_VALUE: 15
PIF_VALUE: 2
PIF_VALUE: 13
PIF_VALUE: 11
PIF_VALUE: 16
PIF_VALUE: 1
PIF_VALUE: 12
PIF_VALUE: 3
PIF_VALUE: 14
PIF_VALUE: 12
PIF_VALUE: 11
PIF_VALUE: 11
PIF_VALUE: 12
PIF_VALUE: 3
PIF_VALUE: 11
PIF_VALUE: 15
PIF_VALUE: 9
PIF_VALUE: 12
PIF_VALUE: 11
PIF_VALUE: 2
PIF_VALUE: 9
PIF_VALUE: 14
PIF_VALUE: 15
PIF_VALUE: 1
PIF_VALUE: 0

## 2021-05-04 ASSESSMENT — PAIN DESCRIPTION - LOCATION
LOCATION: ABDOMEN

## 2021-05-04 ASSESSMENT — PAIN SCALES - GENERAL: PAINLEVEL_OUTOF10: 6

## 2021-05-04 ASSESSMENT — PAIN DESCRIPTION - FREQUENCY: FREQUENCY: CONTINUOUS

## 2021-05-04 ASSESSMENT — PAIN DESCRIPTION - PAIN TYPE
TYPE: SURGICAL PAIN

## 2021-05-04 ASSESSMENT — PAIN DESCRIPTION - ONSET: ONSET: ON-GOING

## 2021-05-04 ASSESSMENT — PAIN - FUNCTIONAL ASSESSMENT: PAIN_FUNCTIONAL_ASSESSMENT: 0-10

## 2021-05-04 ASSESSMENT — LIFESTYLE VARIABLES: SMOKING_STATUS: 0

## 2021-05-04 NOTE — ANESTHESIA PRE PROCEDURE
after delivery / not sure was a seizure, no issues since , no medication       Past Surgical History:        Procedure Laterality Date     SECTION  1/3/2015    CHOLECYSTECTOMY  2017    ROBOT ASSISTED    COLONOSCOPY  2012    COLONOSCOPY      DILATION AND CURETTAGE OF UTERUS      ablation    HEMORRHOID SURGERY  2020and 2021    LAPAROSCOPY N/A 10/18/2019    DIAGNOSTIC LAPAROSCOPY, OPEN VENTRAL HERNIA REPAIR performed by Paige Kay MD at [address] N/A 2018     SECTION performed by Leopold Plover, DO at [facility]    UPPER GASTROINTESTINAL ENDOSCOPY  2012    UPPER GASTROINTESTINAL ENDOSCOPY      UPPER GASTROINTESTINAL ENDOSCOPY  2017       Social History:    Social History     Tobacco Use    Smoking status: Former Smoker     Years: 2.00     Types: Cigarettes     Quit date: 3/31/2013     Years since quittin.0    Smokeless tobacco: Never Used   Substance Use Topics    Alcohol use: No                                Counseling given: Not Answered      Vital Signs (Current):   Vitals:    21 1749   Weight: 109 lb (49.4 kg)   Height: 5' 3\" (1.6 m)                                              BP Readings from Last 3 Encounters:   21 102/64   21 104/64   21 106/63       NPO Status:                                                                                 BMI:   Wt Readings from Last 3 Encounters:   21 109 lb (49.4 kg)   21 108 lb 3.2 oz (49.1 kg)   21 106 lb 3.2 oz (48.2 kg)     Body mass index is 19.31 kg/m².     CBC:   Lab Results   Component Value Date    WBC 10.2 2021    RBC 4.54 2021    HGB 13.2 2021    HCT 42.5 2021    MCV 93.6 2021    RDW 13.2 2021     2021       CMP:   Lab Results   Component Value Date     2021    K 4.1 2021    K 3.8 2019     2021    CO2 26 2021    BUN 7

## 2021-05-04 NOTE — INTERVAL H&P NOTE
Update History & Physical    The patient's History and Physical of April 26, 2021 was reviewed with the patient and I examined the patient. There was no change. The surgical site was confirmed by the patient and me. Plan: The risks, benefits, expected outcome, and alternative to the recommended procedure have been discussed with the patient. Patient understands and wants to proceed with the procedure.      Electronically signed by Cleven Felty, MD on 5/4/2021 at 7:45 AM

## 2021-05-04 NOTE — ANESTHESIA POSTPROCEDURE EVALUATION
Department of Anesthesiology  Postprocedure Note    Patient: Bethanie Saenz  MRN: [id number]  YOB: 1995  Date of evaluation: 5/4/2021  Time:  1:38 PM     Procedure Summary     Date: 05/04/21 Room / Location: [address]    Anesthesia Start: 0854 Anesthesia Stop: 6575    Procedure: OPEN INCISIONAL HERNIA REPAIR (N/A Abdomen) Diagnosis: (INCISIONAL HERNIA)    Surgeons: Miriam Her MD Responsible Provider: Madonna Hector MD    Anesthesia Type: general ASA Status: 2          Anesthesia Type: general    Viry Phase I: Viry Score: 10    Viry Phase II: Viry Score: 10    Last vitals: Reviewed and per EMR flowsheets.        Anesthesia Post Evaluation    Patient location during evaluation: PACU  Patient participation: complete - patient participated  Level of consciousness: awake and alert  Airway patency: patent  Nausea & Vomiting: no vomiting and no nausea  Complications: no  Cardiovascular status: blood pressure returned to baseline  Respiratory status: acceptable  Hydration status: euvolemic

## 2021-05-04 NOTE — OP NOTE
Operative Note      Patient: Rafiq Gramajo  YOB: 1995  MRN: [id number]    Date of Procedure: 5/4/2021    Pre-Op Diagnosis: INCISIONAL HERNIA    Post-Op Diagnosis: Same       Procedure(s):  OPEN INCISIONAL HERNIA REPAIR    Surgeon(s):  Re Fuentes MD    Assistant:   Resident: Mark Barr DO    Anesthesia: General    Estimated Blood Loss (mL): Minimal    Complications: None    Specimens:   * No specimens in log *    Implants:  * No implants in log *      Drains: * No LDAs found *    Findings: diastasis    Detailed Description of Procedure:   : The patient was brought to the operating room and positioned supine on the OR table. Sequential compression devices were placed on the patient's lower extremities and functioning. Preoperative antibiotics were administered, 2g Ancef. General anesthesia was obtained without complication as per the anesthesia record. Immediately prior to the procedure a time-out was called and the surgical checklist was reviewed and agreed upon by all present. The patient was prepped with chloraprep and draped in the usual sterile fashion and the procedure went forth with strict aseptic technique under maximal barrier precautions. The area of previous sutures were removed and a midline incision was made through skin and subcutaneous tissue until the fascia and diastasis was encountered. Dissection continued bluntly to each side of the diastasis to expose the area anteriorly until the rectus muscle were encountered laterally. The peritoneum was entered with a scalpel and open the entire length of the incision. The rectus muscle were palpated from inside the abdomen and the fascial tissue of the diastasis was excised sharply. The rectus muscle on either side were brought together with 2-0 Ethibond sutures in figure of 8 fashion. Hemostasis was achieved with cautery.  The cavity was irrigated with saline and 3-0 vicryl sutures were used to approximated the underlying tissue and the skin. The skin was closed with 5-0 vicryl and skin glue    Needle sponge and instrument count was correct x2  Dr. Lexis Edge was present for entire procedure      DISPOSITION: Anesthesia was discontinued and the patient was extubated prior to leaving the OR. She was transferred to the PACU in good condition. Discharge to home is expected following appropriate post-anesthesia recovery in the PACU.     Electronically signed by Shaheen Colunga DO on 5/4/2021 at 10:42 AM

## 2021-05-05 ENCOUNTER — HOSPITAL ENCOUNTER (OUTPATIENT)
Age: 26
Setting detail: OBSERVATION
Discharge: HOME | End: 2021-05-07
Attending: EMERGENCY MEDICINE | Admitting: SURGERY
Payer: COMMERCIAL

## 2021-05-05 DIAGNOSIS — K91.89 POSTOPERATIVE ILEUS (HCC): ICD-10-CM

## 2021-05-05 DIAGNOSIS — K56.7 POSTOPERATIVE ILEUS (HCC): ICD-10-CM

## 2021-05-05 DIAGNOSIS — R10.84 INTRACTABLE GENERALIZED ABDOMINAL PAIN: Primary | ICD-10-CM

## 2021-05-05 PROCEDURE — 99285 EMERGENCY DEPT VISIT HI MDM: CPT

## 2021-05-05 PROCEDURE — 96376 TX/PRO/DX INJ SAME DRUG ADON: CPT

## 2021-05-05 PROCEDURE — 96374 THER/PROPH/DIAG INJ IV PUSH: CPT

## 2021-05-05 PROCEDURE — 6360000002 HC RX W HCPCS: Performed by: EMERGENCY MEDICINE

## 2021-05-05 PROCEDURE — 81001 URINALYSIS AUTO W/SCOPE: CPT

## 2021-05-05 PROCEDURE — 83605 ASSAY OF LACTIC ACID: CPT

## 2021-05-05 PROCEDURE — 2500000003 HC RX 250 WO HCPCS: Performed by: EMERGENCY MEDICINE

## 2021-05-05 PROCEDURE — 96361 HYDRATE IV INFUSION ADD-ON: CPT

## 2021-05-05 PROCEDURE — 2580000003 HC RX 258: Performed by: EMERGENCY MEDICINE

## 2021-05-05 PROCEDURE — 36415 COLL VENOUS BLD VENIPUNCTURE: CPT

## 2021-05-05 PROCEDURE — 96375 TX/PRO/DX INJ NEW DRUG ADDON: CPT

## 2021-05-05 PROCEDURE — 80053 COMPREHEN METABOLIC PANEL: CPT

## 2021-05-05 PROCEDURE — 85025 COMPLETE CBC W/AUTO DIFF WBC: CPT

## 2021-05-05 RX ORDER — ONDANSETRON 2 MG/ML
4 INJECTION INTRAMUSCULAR; INTRAVENOUS ONCE
Status: COMPLETED | OUTPATIENT
Start: 2021-05-05 | End: 2021-05-05

## 2021-05-05 RX ORDER — 0.9 % SODIUM CHLORIDE 0.9 %
1000 INTRAVENOUS SOLUTION INTRAVENOUS ONCE
Status: COMPLETED | OUTPATIENT
Start: 2021-05-05 | End: 2021-05-06

## 2021-05-05 RX ORDER — MORPHINE SULFATE 4 MG/ML
4 INJECTION, SOLUTION INTRAMUSCULAR; INTRAVENOUS ONCE
Status: COMPLETED | OUTPATIENT
Start: 2021-05-05 | End: 2021-05-05

## 2021-05-05 RX ADMIN — FAMOTIDINE 20 MG: 10 INJECTION, SOLUTION INTRAVENOUS at 23:41

## 2021-05-05 RX ADMIN — ONDANSETRON 4 MG: 2 INJECTION INTRAMUSCULAR; INTRAVENOUS at 23:41

## 2021-05-05 RX ADMIN — SODIUM CHLORIDE 1000 ML: 9 INJECTION, SOLUTION INTRAVENOUS at 23:44

## 2021-05-05 RX ADMIN — MORPHINE SULFATE 4 MG: 4 INJECTION, SOLUTION INTRAMUSCULAR; INTRAVENOUS at 23:41

## 2021-05-05 ASSESSMENT — PAIN DESCRIPTION - LOCATION: LOCATION: ABDOMEN

## 2021-05-05 ASSESSMENT — PAIN SCALES - GENERAL
PAINLEVEL_OUTOF10: 9
PAINLEVEL_OUTOF10: 9

## 2021-05-06 ENCOUNTER — APPOINTMENT (OUTPATIENT)
Dept: CT IMAGING | Age: 26
End: 2021-05-06
Payer: COMMERCIAL

## 2021-05-06 PROBLEM — R10.9 INTRACTABLE ABDOMINAL PAIN: Status: ACTIVE | Noted: 2021-05-06

## 2021-05-06 LAB
ALBUMIN SERPL-MCNC: 4.3 G/DL (ref 3.5–5.2)
ALP BLD-CCNC: 57 U/L (ref 35–104)
ALT SERPL-CCNC: 21 U/L (ref 0–32)
ANION GAP SERPL CALCULATED.3IONS-SCNC: 12 MMOL/L (ref 7–16)
AST SERPL-CCNC: 29 U/L (ref 0–31)
BACTERIA: NORMAL /HPF
BASOPHILS ABSOLUTE: 0.03 E9/L (ref 0–0.2)
BASOPHILS RELATIVE PERCENT: 0.2 % (ref 0–2)
BILIRUB SERPL-MCNC: 0.4 MG/DL (ref 0–1.2)
BILIRUBIN URINE: NEGATIVE
BLOOD, URINE: ABNORMAL
BUN BLDV-MCNC: 7 MG/DL (ref 6–20)
CALCIUM SERPL-MCNC: 9.1 MG/DL (ref 8.6–10.2)
CHLORIDE BLD-SCNC: 103 MMOL/L (ref 98–107)
CLARITY: CLEAR
CO2: 23 MMOL/L (ref 22–29)
COLOR: YELLOW
CREAT SERPL-MCNC: 0.6 MG/DL (ref 0.5–1)
EOSINOPHILS ABSOLUTE: 0.05 E9/L (ref 0.05–0.5)
EOSINOPHILS RELATIVE PERCENT: 0.4 % (ref 0–6)
EPITHELIAL CELLS, UA: NORMAL /HPF
GFR AFRICAN AMERICAN: >60
GFR NON-AFRICAN AMERICAN: >60 ML/MIN/1.73
GLUCOSE BLD-MCNC: 97 MG/DL (ref 74–99)
GLUCOSE URINE: NEGATIVE MG/DL
HCG, URINE, POC: NEGATIVE
HCT VFR BLD CALC: 40.1 % (ref 34–48)
HEMOGLOBIN: 13.1 G/DL (ref 11.5–15.5)
IMMATURE GRANULOCYTES #: 0.07 E9/L
IMMATURE GRANULOCYTES %: 0.5 % (ref 0–5)
KETONES, URINE: 15 MG/DL
LACTIC ACID: 1.9 MMOL/L (ref 0.5–2.2)
LEUKOCYTE ESTERASE, URINE: NEGATIVE
LYMPHOCYTES ABSOLUTE: 2.04 E9/L (ref 1.5–4)
LYMPHOCYTES RELATIVE PERCENT: 14.5 % (ref 20–42)
Lab: NORMAL
MCH RBC QN AUTO: 29.4 PG (ref 26–35)
MCHC RBC AUTO-ENTMCNC: 32.7 % (ref 32–34.5)
MCV RBC AUTO: 90.1 FL (ref 80–99.9)
MONOCYTES ABSOLUTE: 1.29 E9/L (ref 0.1–0.95)
MONOCYTES RELATIVE PERCENT: 9.1 % (ref 2–12)
NEGATIVE QC PASS/FAIL: NORMAL
NEUTROPHILS ABSOLUTE: 10.63 E9/L (ref 1.8–7.3)
NEUTROPHILS RELATIVE PERCENT: 75.3 % (ref 43–80)
NITRITE, URINE: NEGATIVE
PDW BLD-RTO: 13.3 FL (ref 11.5–15)
PH UA: 6 (ref 5–9)
PLATELET # BLD: 203 E9/L (ref 130–450)
PMV BLD AUTO: 11.8 FL (ref 7–12)
POSITIVE QC PASS/FAIL: NORMAL
POTASSIUM REFLEX MAGNESIUM: 3.6 MMOL/L (ref 3.5–5)
PROTEIN UA: NEGATIVE MG/DL
RBC # BLD: 4.45 E12/L (ref 3.5–5.5)
RBC UA: NORMAL /HPF (ref 0–2)
SODIUM BLD-SCNC: 138 MMOL/L (ref 132–146)
SPECIFIC GRAVITY UA: >=1.03 (ref 1–1.03)
TOTAL PROTEIN: 7 G/DL (ref 6.4–8.3)
UROBILINOGEN, URINE: 0.2 E.U./DL
WBC # BLD: 14.1 E9/L (ref 4.5–11.5)
WBC UA: NORMAL /HPF (ref 0–5)

## 2021-05-06 PROCEDURE — 74177 CT ABD & PELVIS W/CONTRAST: CPT

## 2021-05-06 PROCEDURE — 6370000000 HC RX 637 (ALT 250 FOR IP): Performed by: SURGERY

## 2021-05-06 PROCEDURE — G0378 HOSPITAL OBSERVATION PER HR: HCPCS

## 2021-05-06 PROCEDURE — 6360000004 HC RX CONTRAST MEDICATION: Performed by: RADIOLOGY

## 2021-05-06 PROCEDURE — 6360000002 HC RX W HCPCS: Performed by: SURGERY

## 2021-05-06 PROCEDURE — 6360000002 HC RX W HCPCS: Performed by: EMERGENCY MEDICINE

## 2021-05-06 PROCEDURE — 2580000003 HC RX 258: Performed by: SURGERY

## 2021-05-06 PROCEDURE — 96376 TX/PRO/DX INJ SAME DRUG ADON: CPT

## 2021-05-06 PROCEDURE — 96375 TX/PRO/DX INJ NEW DRUG ADDON: CPT

## 2021-05-06 PROCEDURE — 6360000002 HC RX W HCPCS

## 2021-05-06 RX ORDER — ONDANSETRON 2 MG/ML
INJECTION INTRAMUSCULAR; INTRAVENOUS
Status: COMPLETED
Start: 2021-05-06 | End: 2021-05-06

## 2021-05-06 RX ORDER — POLYETHYLENE GLYCOL 3350 17 G/17G
17 POWDER, FOR SOLUTION ORAL DAILY
Status: DISCONTINUED | OUTPATIENT
Start: 2021-05-06 | End: 2021-05-07 | Stop reason: HOSPADM

## 2021-05-06 RX ORDER — PROCHLORPERAZINE EDISYLATE 5 MG/ML
10 INJECTION INTRAMUSCULAR; INTRAVENOUS ONCE
Status: COMPLETED | OUTPATIENT
Start: 2021-05-06 | End: 2021-05-06

## 2021-05-06 RX ORDER — MORPHINE SULFATE 4 MG/ML
4 INJECTION, SOLUTION INTRAMUSCULAR; INTRAVENOUS ONCE
Status: COMPLETED | OUTPATIENT
Start: 2021-05-06 | End: 2021-05-06

## 2021-05-06 RX ORDER — ACETAMINOPHEN 325 MG/1
650 TABLET ORAL EVERY 4 HOURS PRN
Status: DISCONTINUED | OUTPATIENT
Start: 2021-05-06 | End: 2021-05-07 | Stop reason: HOSPADM

## 2021-05-06 RX ORDER — SENNA AND DOCUSATE SODIUM 50; 8.6 MG/1; MG/1
2 TABLET, FILM COATED ORAL DAILY
Status: DISCONTINUED | OUTPATIENT
Start: 2021-05-06 | End: 2021-05-07 | Stop reason: HOSPADM

## 2021-05-06 RX ORDER — ONDANSETRON 2 MG/ML
4 INJECTION INTRAMUSCULAR; INTRAVENOUS ONCE
Status: COMPLETED | OUTPATIENT
Start: 2021-05-06 | End: 2021-05-06

## 2021-05-06 RX ORDER — SODIUM CHLORIDE 0.9 % (FLUSH) 0.9 %
SYRINGE (ML) INJECTION
Status: DISPENSED
Start: 2021-05-06 | End: 2021-05-06

## 2021-05-06 RX ORDER — KETOROLAC TROMETHAMINE 30 MG/ML
15 INJECTION, SOLUTION INTRAMUSCULAR; INTRAVENOUS ONCE
Status: COMPLETED | OUTPATIENT
Start: 2021-05-06 | End: 2021-05-06

## 2021-05-06 RX ORDER — ONDANSETRON 2 MG/ML
4 INJECTION INTRAMUSCULAR; INTRAVENOUS EVERY 8 HOURS PRN
Status: DISCONTINUED | OUTPATIENT
Start: 2021-05-06 | End: 2021-05-07 | Stop reason: HOSPADM

## 2021-05-06 RX ORDER — SODIUM CHLORIDE 0.9 % (FLUSH) 0.9 %
5-40 SYRINGE (ML) INJECTION EVERY 12 HOURS SCHEDULED
Status: DISCONTINUED | OUTPATIENT
Start: 2021-05-06 | End: 2021-05-07 | Stop reason: HOSPADM

## 2021-05-06 RX ORDER — SODIUM CHLORIDE 0.9 % (FLUSH) 0.9 %
5-40 SYRINGE (ML) INJECTION PRN
Status: DISCONTINUED | OUTPATIENT
Start: 2021-05-06 | End: 2021-05-07 | Stop reason: HOSPADM

## 2021-05-06 RX ORDER — DIPHENHYDRAMINE HYDROCHLORIDE 50 MG/ML
50 INJECTION INTRAMUSCULAR; INTRAVENOUS ONCE
Status: COMPLETED | OUTPATIENT
Start: 2021-05-06 | End: 2021-05-06

## 2021-05-06 RX ORDER — SODIUM CHLORIDE 9 MG/ML
25 INJECTION, SOLUTION INTRAVENOUS PRN
Status: DISCONTINUED | OUTPATIENT
Start: 2021-05-06 | End: 2021-05-07 | Stop reason: HOSPADM

## 2021-05-06 RX ORDER — SODIUM CHLORIDE 9 MG/ML
INJECTION, SOLUTION INTRAVENOUS CONTINUOUS
Status: DISCONTINUED | OUTPATIENT
Start: 2021-05-06 | End: 2021-05-07

## 2021-05-06 RX ADMIN — PROCHLORPERAZINE EDISYLATE 10 MG: 5 INJECTION INTRAMUSCULAR; INTRAVENOUS at 02:43

## 2021-05-06 RX ADMIN — ACETAMINOPHEN 650 MG: 325 TABLET ORAL at 22:23

## 2021-05-06 RX ADMIN — ACETAMINOPHEN 650 MG: 325 TABLET ORAL at 09:41

## 2021-05-06 RX ADMIN — IOPAMIDOL 75 ML: 755 INJECTION, SOLUTION INTRAVENOUS at 00:45

## 2021-05-06 RX ADMIN — KETOROLAC TROMETHAMINE 15 MG: 30 INJECTION, SOLUTION INTRAMUSCULAR; INTRAVENOUS at 02:42

## 2021-05-06 RX ADMIN — MAGNESIUM HYDROXIDE 60 ML: 400 SUSPENSION ORAL at 09:38

## 2021-05-06 RX ADMIN — MORPHINE SULFATE 4 MG: 4 INJECTION, SOLUTION INTRAMUSCULAR; INTRAVENOUS at 01:43

## 2021-05-06 RX ADMIN — DIPHENHYDRAMINE HYDROCHLORIDE 50 MG: 50 INJECTION, SOLUTION INTRAMUSCULAR; INTRAVENOUS at 02:43

## 2021-05-06 RX ADMIN — ONDANSETRON 4 MG: 2 INJECTION INTRAMUSCULAR; INTRAVENOUS at 01:22

## 2021-05-06 RX ADMIN — POLYETHYLENE GLYCOL 3350 17 G: 17 POWDER, FOR SOLUTION ORAL at 09:38

## 2021-05-06 RX ADMIN — SODIUM CHLORIDE: 9 INJECTION, SOLUTION INTRAVENOUS at 09:39

## 2021-05-06 RX ADMIN — SODIUM CHLORIDE, PRESERVATIVE FREE 10 ML: 5 INJECTION INTRAVENOUS at 09:44

## 2021-05-06 RX ADMIN — DOCUSATE SODIUM 50 MG AND SENNOSIDES 8.6 MG 2 TABLET: 8.6; 5 TABLET, FILM COATED ORAL at 09:38

## 2021-05-06 ASSESSMENT — PAIN DESCRIPTION - LOCATION
LOCATION: ABDOMEN

## 2021-05-06 ASSESSMENT — PAIN - FUNCTIONAL ASSESSMENT: PAIN_FUNCTIONAL_ASSESSMENT: ACTIVITIES ARE NOT PREVENTED

## 2021-05-06 ASSESSMENT — PAIN SCALES - GENERAL
PAINLEVEL_OUTOF10: 0
PAINLEVEL_OUTOF10: 8
PAINLEVEL_OUTOF10: 4

## 2021-05-06 ASSESSMENT — PAIN DESCRIPTION - PAIN TYPE
TYPE: ACUTE PAIN
TYPE: ACUTE PAIN

## 2021-05-06 ASSESSMENT — PAIN DESCRIPTION - PROGRESSION: CLINICAL_PROGRESSION: NOT CHANGED

## 2021-05-06 ASSESSMENT — PAIN DESCRIPTION - ONSET
ONSET: ON-GOING
ONSET: ON-GOING

## 2021-05-06 ASSESSMENT — PAIN DESCRIPTION - ORIENTATION: ORIENTATION: LOWER

## 2021-05-06 ASSESSMENT — PAIN DESCRIPTION - DESCRIPTORS
DESCRIPTORS: ACHING;DISCOMFORT;DULL
DESCRIPTORS: ACHING;DISCOMFORT

## 2021-05-06 NOTE — ED NOTES
Spoke to [place] at ext [phone], confirmed sbar. Pt prepared for transport at this time.      Reji Landin RN  05/06/21 9048

## 2021-05-06 NOTE — ED PROVIDER NOTES
HPI:  21,   Time: 11:43 PM EDT         Iban Narayanan is a 22 y.o. female presenting to the ED for abdominal pain and vomiting status post hernia repair, beginning especially today ago. The complaint has been constant, moderate in severity, and worsened by nothing. States temperature was 101 earlier today but 98.1 now no chest pain or shortness of breath. No black or bloody stools    ROS:   Pertinent positives and negatives are stated within HPI, all other systems reviewed and are negative.  --------------------------------------------- PAST HISTORY ---------------------------------------------  Past Medical History:  has a past medical history of Depression, IBS (irritable bowel syndrome), Incisional hernia, and [facility] Past Surgical History:  has a past surgical history that includes Colonoscopy (2012); Upper gastrointestinal endoscopy (2012); Upper gastrointestinal endoscopy; Colonoscopy;  section (1/3/2015); Upper gastrointestinal endoscopy (2017); Dilation and curettage of uterus; Cholecystectomy (2017); pr  delivery only (N/A, 2018); laparoscopy (N/A, 10/18/2019); Hemorrhoid surgery (2020and 2021); and ventral hernia repair (N/A, 2021). Social History:  reports that she quit smoking about 8 years ago. Her smoking use included cigarettes. She quit after 2.00 years of use. She has never used smokeless tobacco. She reports that she does not drink alcohol or use drugs. Family History: family history includes Cancer in her father. The patients home medications have been reviewed.     Allergies: Naproxen    -------------------------------------------------- RESULTS -------------------------------------------------  All laboratory and radiology results have been personally reviewed by myself   LABS:  Results for orders placed or performed during the hospital encounter of 21   CBC Auto Differential   Result Value Ref Range WBC 14.1 (H) 4.5 - 11.5 E9/L    RBC 4.45 3.50 - 5.50 E12/L    Hemoglobin 13.1 11.5 - 15.5 g/dL    Hematocrit 40.1 34.0 - 48.0 %    MCV 90.1 80.0 - 99.9 fL    MCH 29.4 26.0 - 35.0 pg    MCHC 32.7 32.0 - 34.5 %    RDW 13.3 11.5 - 15.0 fL    Platelets 454 461 - 689 E9/L    MPV 11.8 7.0 - 12.0 fL    Neutrophils % 75.3 43.0 - 80.0 %    Immature Granulocytes % 0.5 0.0 - 5.0 %    Lymphocytes % 14.5 (L) 20.0 - 42.0 %    Monocytes % 9.1 2.0 - 12.0 %    Eosinophils % 0.4 0.0 - 6.0 %    Basophils % 0.2 0.0 - 2.0 %    Neutrophils Absolute 10.63 (H) 1.80 - 7.30 E9/L    Immature Granulocytes # 0.07 E9/L    Lymphocytes Absolute 2.04 1.50 - 4.00 E9/L    Monocytes Absolute 1.29 (H) 0.10 - 0.95 E9/L    Eosinophils Absolute 0.05 0.05 - 0.50 E9/L    Basophils Absolute 0.03 0.00 - 0.20 E9/L   Comprehensive Metabolic Panel w/ Reflex to MG   Result Value Ref Range    Sodium 138 132 - 146 mmol/L    Potassium reflex Magnesium 3.6 3.5 - 5.0 mmol/L    Chloride 103 98 - 107 mmol/L    CO2 23 22 - 29 mmol/L    Anion Gap 12 7 - 16 mmol/L    Glucose 97 74 - 99 mg/dL    BUN 7 6 - 20 mg/dL    CREATININE 0.6 0.5 - 1.0 mg/dL    GFR Non-African American >60 >=60 mL/min/1.73    GFR African American >60     Calcium 9.1 8.6 - 10.2 mg/dL    Total Protein 7.0 6.4 - 8.3 g/dL    Albumin 4.3 3.5 - 5.2 g/dL    Total Bilirubin 0.4 0.0 - 1.2 mg/dL    Alkaline Phosphatase 57 35 - 104 U/L    ALT 21 0 - 32 U/L    AST 29 0 - 31 U/L   Lactic Acid, Plasma   Result Value Ref Range    Lactic Acid 1.9 0.5 - 2.2 mmol/L   Urinalysis   Result Value Ref Range    Color, UA Yellow Straw/Yellow    Clarity, UA Clear Clear    Glucose, Ur Negative Negative mg/dL    Bilirubin Urine Negative Negative    Ketones, Urine 15 (A) Negative mg/dL    Specific Gravity, UA >=1.030 1.005 - 1.030    Blood, Urine TRACE-INTACT Negative    pH, UA 6.0 5.0 - 9.0    Protein, UA Negative Negative mg/dL    Urobilinogen, Urine 0.2 <2.0 E.U./dL    Nitrite, Urine Negative Negative    Leukocyte Esterase, Urine Negative Negative   Microscopic Urinalysis   Result Value Ref Range    WBC, UA 0-1 0 - 5 /HPF    RBC, UA 1-3 0 - 2 /HPF    Epithelial Cells, UA MODERATE /HPF    Bacteria, UA NONE SEEN None Seen /HPF   POC Pregnancy Urine Qual   Result Value Ref Range    HCG, Urine, POC Negative Negative    Lot Number [id number]     Positive QC Pass/Fail Pass     Negative QC Pass/Fail Pass        RADIOLOGY:  Interpreted by Radiologist.  CT ABDOMEN PELVIS W IV CONTRAST Additional Contrast? None   Final Result   Free air in the abdomen and pelvis, presumably related to recent surgery. Bowel perforation could also produce this but thought less likely. Recommend   correlation with clinical presentation. Subcutaneous gas and some ill-defined fluid in the anterior abdominal wall,   also likely related to recent surgery. Area of ill-defined phlegmon from   infection thought less likely. A definite drainable abscess is not   identified. Prominent distended loops of small bowel and some colonic distension. This   is probably due to ileus. Developing obstruction thought less likely. Follow-up may be useful. Some thickening of the distal stomach and gastroduodenal junction may be   related to underdistention. Infectious or inflammatory process in this   region is thought less likely. 4.3 cm left ovarian cyst which could be followed with ultrasound in 10-12   weeks to ensure resolution. Findings were discussed with Dr. Steven Valadez at approximately 0105 hours [place]   time on 05/06/2021.             ------------------------- NURSING NOTES AND VITALS REVIEWED ---------------------------   The nursing notes within the ED encounter and vital signs as below have been reviewed.    /62   Pulse 87   Temp 98.1 °F (36.7 °C) (Oral)   Resp 16   Ht 5' 3\" (1.6 m)   Wt 109 lb (49.4 kg)   SpO2 100%   BMI 19.31 kg/m²   Oxygen Saturation Interpretation: Normal      ---------------------------------------------------PHYSICAL EXAM--------------------------------------      Constitutional/General: Alert and oriented x3, well appearing, non toxic in moderate distress secondary to pain  Head: NC/AT  Eyes: PERRL, EOMI  Mouth: Oropharynx clear, handling secretions, no trismus  Neck: Supple, full ROM, no meningeal signs  Pulmonary: Lungs clear to auscultation bilaterally, no wheezes, rales, or rhonchi. Not in respiratory distress  Cardiovascular: Tachycardic with regular  rhythm, no murmurs, gallops, or rubs. 2+ distal pulses  Abdomen: Soft, diffuse moderate tenderness with slight guarding but no rebound  Extremities: Moves all extremities x 4.  Warm and well perfused  Skin: warm and dry without rash  Neurologic: GCS 15,  Psych: Normal Affect      ------------------------------ ED COURSE/MEDICAL DECISION MAKING----------------------  Medications   sodium chloride flush 0.9 % injection (has no administration in time range)   prochlorperazine (COMPAZINE) injection 10 mg (has no administration in time range)   diphenhydrAMINE (BENADRYL) injection 50 mg (has no administration in time range)   ketorolac (TORADOL) injection 15 mg (has no administration in time range)   sodium chloride flush 0.9 % injection 5-40 mL (has no administration in time range)   sodium chloride flush 0.9 % injection 5-40 mL (has no administration in time range)   0.9 % sodium chloride infusion (has no administration in time range)   enoxaparin (LOVENOX) injection 40 mg (has no administration in time range)   acetaminophen (TYLENOL) tablet 650 mg (has no administration in time range)   HYDROmorphone (DILAUDID) injection 0.5 mg (has no administration in time range)   ondansetron (ZOFRAN) injection 4 mg (has no administration in time range)   0.9 % sodium chloride bolus (0 mLs Intravenous Stopped 5/6/21 0044)   morphine sulfate (PF) injection 4 mg (4 mg Intravenous Given 5/5/21 2341)   ondansetron (ZOFRAN)

## 2021-05-06 NOTE — H&P
GENERAL SURGERY  HISTORY AND PHYSICAL  NOTE  2021    Cc: abd pain    HPI  Paul Gonzalez is a 22 y.o. female who presents for evaluation of abdominal pain and vomiting. She is s/p 5/4 open incisional hernia repair. Has passed flatus but states no BM since prior to surgery. Feels a bit better this am.    Denies nausea, vomiting overnight. CT ap:  Dilated  Loops of small bowel consistent with ileus. Past Medical History:   Diagnosis Date    Depression     IBS (irritable bowel syndrome)     Incisional hernia     Seizure ([address] 2015    after delivery / not sure was a seizure, no issues since , no medication       Past Surgical History:   Procedure Laterality Date     SECTION  1/3/2015    CHOLECYSTECTOMY  2017    ROBOT ASSISTED    COLONOSCOPY  2012    COLONOSCOPY      DILATION AND CURETTAGE OF UTERUS      ablation    HEMORRHOID SURGERY  2020and 2021    LAPAROSCOPY N/A 10/18/2019    DIAGNOSTIC LAPAROSCOPY, OPEN VENTRAL HERNIA REPAIR performed by Maddy Redding MD at [address] N/A 2018     SECTION performed by Kamille Hill DO at [facility] L&D    UPPER GASTROINTESTINAL ENDOSCOPY  2012    UPPER GASTROINTESTINAL ENDOSCOPY      UPPER GASTROINTESTINAL ENDOSCOPY  2017    VENTRAL HERNIA REPAIR N/A 2021    OPEN INCISIONAL HERNIA REPAIR performed by Alexandrea Curry MD at [facility] OR       Medications Prior to Admission    Prior to Admission medications    Medication Sig Start Date End Date Taking? Authorizing Provider   levonorgestrel (MIRENA, 52 MG,) IUD 52 mg 1 each by Intrauterine route once   Yes Historical Provider, MD   oxyCODONE-acetaminophen (PERCOCET) 5-325 MG per tablet Take 1 tablet by mouth every 6 hours as needed for Pain for up to 7 days. Intended supply: 7 days.  Take lowest dose possible to manage pain 21 Yes Khloe Guerrier DO   mirtazapine (REMERON) 45 MG tablet Take 45 mg by 0. 4   AST 29   ALT 21   ALKPHOS 57   PROT 7.0   LABALBU 4.3     No results for input(s): LACTATE in the last 72 hours. No results for input(s): INR, PTT in the last 72 hours. Invalid input(s): PT    RADIOLOGY    Ct Abdomen Pelvis W Iv Contrast Additional Contrast? None    Result Date: 5/6/2021  EXAMINATION: CT OF THE ABDOMEN AND PELVIS WITH CONTRAST 5/6/2021 12:45 am TECHNIQUE: CT of the abdomen and pelvis was performed with the administration of intravenous contrast. Multiplanar reformatted images are provided for review. Dose modulation, iterative reconstruction, and/or weight based adjustment of the mA/kV was utilized to reduce the radiation dose to as low as reasonably achievable. COMPARISON: 01/08/2021 HISTORY: ORDERING SYSTEM PROVIDED HISTORY: abdominal pain after hernia repair TECHNOLOGIST PROVIDED HISTORY: Additional Contrast?->None Reason for exam:->abdominal pain after hernia repair Decision Support Exception - unselect if not a suspected or confirmed emergency medical condition->Emergency Medical Condition (MA) FINDINGS: Gallbladder is not identified. Liver is homogeneous. Minimal periportal edema which is nonspecific and could be related to over-hydration or hepatitis. Spleen is normal in size. Pancreas is unremarkable. No adrenal mass. No hydronephrosis, cyst or solid renal mass identified. Small to moderate amount of free air in the abdomen and pelvis. Assessment of bowel is limited without oral contrast.  Moderate distention of the stomach with gas and food debris. There is some thickening of the gastric antrum and gastroduodenal junction. Moderate fluid distention of multiple small bowel loops. There is also some mild distention of multiple portions of the colon. The findings may represent ileus. A developing distal small-bowel obstruction is thought less likely but not excluded.   Notation is made of some a rectus diastasis near the level of the umbilicus with some anterior protrusion of gas-filled loops of bowel. Some soft tissue thickening is also present in this region which may be related to the reported history of hernia repair. Subcutaneous gas is present in this region of the anterior abdominal wall, as well as an ill-defined area of apparent fluid measuring 3.1 x 1.5 cm. Urinary bladder is unremarkable. The uterus is heterogeneous, enlarged and retroverted. IUD is in place. Cystic appearing focus in the left adnexa measures 4.3 x 3.9 cm. This is favored to represent an ovarian cyst. Persistent prominence of pelvic vascular structures which can be seen with pelvic congestion syndrome, similar to previous. No other fluid collection or abscess. Lung bases are clear. No bony destruction, dislocation or acute fracture identified. Free air in the abdomen and pelvis, presumably related to recent surgery. Bowel perforation could also produce this but thought less likely. Recommend correlation with clinical presentation. Subcutaneous gas and some ill-defined fluid in the anterior abdominal wall, also likely related to recent surgery. Area of ill-defined phlegmon from infection thought less likely. A definite drainable abscess is not identified. Prominent distended loops of small bowel and some colonic distension. This is probably due to ileus. Developing obstruction thought less likely. Follow-up may be useful. Some thickening of the distal stomach and gastroduodenal junction may be related to underdistention. Infectious or inflammatory process in this region is thought less likely. 4.3 cm left ovarian cyst which could be followed with ultrasound in 10-12 weeks to ensure resolution. Findings were discussed with Dr. Bia Alanis at approximately 0105 hours [facility] time on 05/06/2021.          ASSESSMENT/PLAN:  22 y.o. female with abdominal pain, ileus s/p open incisional hernia repair    Supportive care  IVFs  Clear liquid diet  Bowel regimen  Encourage ambulation      Plan discussed with Dr. Jing Mejia.     Mickey Mendoza DO  Resident, PGY-2  5/6/2021  6:07 AM    Seen/examined  Nausea and vomiting following ventral hernia repair  Findings consistent with ileus  Fluids and antiemetics  Clear liquids  Milk of orlin Ann MD

## 2021-05-06 NOTE — CARE COORDINATION
POD # 2 open hernia repair- post op ileus. Met w/ patient. Explained role of , OBS LOC, and plan of care. Lives w/ her 2 children ages 10 & 2 in a 1 story house- 4 steps to entrance. Independent PTA. PCP is Claudetta Shams NP and pharmacy is [address] [place] [address]. Per pt, plan is to return home on discharge- states has no needs.  Will follow Miley Larsen

## 2021-05-06 NOTE — PLAN OF CARE
Problem:  Bowel/Gastric:  Goal: Occurrences of nausea will decrease  Description: Occurrences of nausea will decrease  Outcome: Met This Shift  Goal: Occurrences of vomiting will decrease  Description: Occurrences of vomiting will decrease  Outcome: Met This Shift

## 2021-05-07 VITALS
DIASTOLIC BLOOD PRESSURE: 68 MMHG | HEART RATE: 83 BPM | RESPIRATION RATE: 16 BRPM | BODY MASS INDEX: 19.31 KG/M2 | WEIGHT: 109 LBS | SYSTOLIC BLOOD PRESSURE: 101 MMHG | TEMPERATURE: 98.6 F | HEIGHT: 63 IN | OXYGEN SATURATION: 96 %

## 2021-05-07 PROCEDURE — G0378 HOSPITAL OBSERVATION PER HR: HCPCS

## 2021-05-07 PROCEDURE — 2580000003 HC RX 258: Performed by: SURGERY

## 2021-05-07 RX ADMIN — SODIUM CHLORIDE, PRESERVATIVE FREE 10 ML: 5 INJECTION INTRAVENOUS at 08:25

## 2021-05-07 ASSESSMENT — PAIN SCALES - GENERAL: PAINLEVEL_OUTOF10: 0

## 2021-05-07 ASSESSMENT — PAIN DESCRIPTION - LOCATION: LOCATION: ABDOMEN

## 2021-05-07 ASSESSMENT — PAIN DESCRIPTION - DESCRIPTORS: DESCRIPTORS: DISCOMFORT

## 2021-05-07 NOTE — PROGRESS NOTES
GENERAL SURGERY  DAILY PROGRESS NOTE  5/7/2021    Subjective:  Feels better. Tolerating clears. Had 3 BMs yesterday. Feels less bloated    Objective:  BP (!) 107/57   Pulse 68   Temp 98.4 °F (36.9 °C) (Oral)   Resp 14   Ht 5' 3\" (1.6 m)   Wt 109 lb (49.4 kg)   SpO2 99%   BMI 19.31 kg/m²     GENERAL:  Laying in bed, awake, alert, cooperative, no apparent distress  HEAD: Normocephalic, atraumatic  EYES: No sclera icterus, pupils equal  LUNGS:  No increased work of breathing  CARDIOVASCULAR:  Regular rate  ABDOMEN:  Soft, non-tender, non-distended.   Incision healing nicely  EXTREMITIES: No edema or swelling  SKIN: Warm and dry, no rashes or lesions    Assessment/Plan:  22 y.o. female with abdominal pain, ileus s/p open incisional hernia repair    Advance diet  Continue bowel regimen, also at home  Likely DC later today if tolerating diet    D/w Dr. Jose Luis Campbell    Electronically signed by Marybeth Dos Santos DO on 5/7/2021 at 6:23 AM    Seen/examined  Agree with above  [email or web address]

## 2021-05-07 NOTE — PLAN OF CARE
Problem: Activity:  Goal: Risk for activity intolerance will decrease  Description: Risk for activity intolerance will decrease  Outcome: Met This Shift     Problem: Bowel/Gastric:  Goal: Bowel function will improve  Description: Bowel function will improve  Outcome: Met This Shift  Goal: Occurrences of nausea will decrease  Description: Occurrences of nausea will decrease  Outcome: Met This Shift  Goal: Occurrences of vomiting will decrease  Description: Occurrences of vomiting will decrease  Outcome: Met This Shift     Problem: Fluid Volume:  Goal: Maintenance of adequate hydration will improve  Description: Maintenance of adequate hydration will improve  Outcome: Met This Shift     Problem: Sensory:  Goal: Pain level will decrease  Description: Pain level will decrease  Outcome: Met This Shift     Problem: Pain:  Goal: Pain level will decrease  Description: Pain level will decrease  Outcome: Met This Shift  Goal: Control of acute pain  Description: Control of acute pain  Outcome: Met This Shift     Problem:  Bowel/Gastric:  Goal: Diagnostic test results will improve  Description: Diagnostic test results will improve  Outcome: Ongoing

## 2021-05-07 NOTE — DISCHARGE SUMMARY
Physician Discharge Summary     Leatha Marion  [id number]    Admit date: 5/5/2021    Discharge date and time: No discharge date for patient encounter. Admitting Physician: Becka Gottlieb MD     Admission Diagnoses:   Patient Active Problem List   Diagnosis    Gastroesophageal reflux disease    Seizure ([facility]    Vitamin D deficiency    Other chest pain    Palpitations    Intractable abdominal pain       Discharge Diagnoses:   Patient Active Problem List   Diagnosis    Gastroesophageal reflux disease    Seizure ([facility]    Vitamin D deficiency    Other chest pain    Palpitations    Intractable abdominal pain         Hospital Course: Leatha Marion is a 22 y.o. female who presented for evaluation of nausea, vomiting and constipation. She recently underwent an open incisional hernia repair two days prior to admission. She came in due to no bowel function, unable to tolerate PO, and abdominal pain. She had a CT which showed an ileus. She was put on a clear liquid diet and a bowel regimen. She proceed to have bowel function and felt better and was able to tolerate a general diet . She had an otherwise uneventful course and progressed well. Pain was controlled She was tolerating a regular diet with no nausea or vomiting, was ambulating well, and was in a suitable condition for discharge to home stable.      Lab Results   Component Value Date    WBC 14.1 05/05/2021    HGB 13.1 05/05/2021     05/05/2021     05/05/2021     05/05/2021    K 3.6 05/05/2021    BUN 7 05/05/2021    CREATININE 0.6 05/05/2021    GLUCOSE 97 05/05/2021    LABGLOM >60 05/05/2021    PROTIME 11.1 04/01/2018    INR 1.0 04/01/2018    LABALBU 4.3 05/05/2021    PROT 7.0 05/05/2021    CALCIUM 9.1 05/05/2021    MG 2.0 07/02/2016    PHOS 2.8 07/10/2015    BILITOT 0.4 05/05/2021    BILIDIR <0.2 04/01/2017    ALKPHOS 57 05/05/2021    AST 29 05/05/2021    ALT 21 05/05/2021       Discharge Exam:   VITALS: /68   Pulse 83 Temp 98.6 °F (37 °C) (Oral)   Resp 16   Ht 5' 3\" (1.6 m)   Wt 109 lb (49.4 kg)   SpO2 96%   BMI 19.31 kg/m²     GENERAL:  Laying in bed, awake, alert, cooperative, no apparent distress  HEAD: Normocephalic, atraumatic  EYES: No sclera icterus, pupils equal  LUNGS:  No increased work of breathing  CARDIOVASCULAR:  Regular rate  ABDOMEN:  Soft, non-tender, non-distended. Incision healing nicely  EXTREMITIES: No edema or swelling  SKIN: Warm and dry, no rashes or lesions    Disposition: home       Medication List      CONTINUE taking these medications    docusate sodium 100 MG capsule  Commonly known as: COLACE     Linzess 145 MCG capsule  Generic drug: linaclotide     metroNIDAZOLE 0.75 % gel  Commonly known as: METROGEL  Apply topically 2 times daily. For 10 days     Mirena (52 MG) IUD 52 mg  Generic drug: levonorgestrel     MULTIPLE VITAMIN PO     oxyCODONE-acetaminophen 5-325 MG per tablet  Commonly known as: Percocet  Take 1 tablet by mouth every 6 hours as needed for Pain for up to 7 days. Intended supply: 7 days. Take lowest dose possible to manage pain     Remeron 45 MG tablet  Generic drug: mirtazapine            Patient Instructions: Activity: activity as tolerated  Diet: regular diet  Wound Care: shower as needed and leave the incisions open to air     Follow-up with Dr. Ronald Choudhury in 2 weeks.     Signed:  Kya Presley  5/7/2021  12:54 PM

## 2021-06-25 ENCOUNTER — APPOINTMENT (OUTPATIENT)
Dept: ULTRASOUND IMAGING | Age: 26
End: 2021-06-25
Payer: COMMERCIAL

## 2021-06-25 ENCOUNTER — HOSPITAL ENCOUNTER (EMERGENCY)
Age: 26
Discharge: HOME | End: 2021-06-26
Attending: EMERGENCY MEDICINE
Payer: COMMERCIAL

## 2021-06-25 DIAGNOSIS — R10.30 LOWER ABDOMINAL PAIN: Primary | ICD-10-CM

## 2021-06-25 LAB
ALBUMIN SERPL-MCNC: 4.3 G/DL (ref 3.5–5.2)
ALP BLD-CCNC: 61 U/L (ref 35–104)
ALT SERPL-CCNC: 18 U/L (ref 0–32)
ANION GAP SERPL CALCULATED.3IONS-SCNC: 6 MMOL/L (ref 7–16)
AST SERPL-CCNC: 18 U/L (ref 0–31)
BACTERIA: NORMAL /HPF
BASOPHILS ABSOLUTE: 0.04 E9/L (ref 0–0.2)
BASOPHILS RELATIVE PERCENT: 0.4 % (ref 0–2)
BILIRUB SERPL-MCNC: 0.4 MG/DL (ref 0–1.2)
BILIRUBIN URINE: NEGATIVE
BLOOD, URINE: NEGATIVE
BUN BLDV-MCNC: 13 MG/DL (ref 6–20)
CALCIUM SERPL-MCNC: 9.2 MG/DL (ref 8.6–10.2)
CHLORIDE BLD-SCNC: 106 MMOL/L (ref 98–107)
CLARITY: CLEAR
CO2: 24 MMOL/L (ref 22–29)
COLOR: YELLOW
CREAT SERPL-MCNC: 0.6 MG/DL (ref 0.5–1)
EOSINOPHILS ABSOLUTE: 0.26 E9/L (ref 0.05–0.5)
EOSINOPHILS RELATIVE PERCENT: 2.6 % (ref 0–6)
GFR AFRICAN AMERICAN: >60
GFR NON-AFRICAN AMERICAN: >60 ML/MIN/1.73
GLUCOSE BLD-MCNC: 88 MG/DL (ref 74–99)
GLUCOSE URINE: NEGATIVE MG/DL
HCG, URINE, POC: NEGATIVE
HCT VFR BLD CALC: 40.6 % (ref 34–48)
HEMOGLOBIN: 13.1 G/DL (ref 11.5–15.5)
IMMATURE GRANULOCYTES #: 0.05 E9/L
IMMATURE GRANULOCYTES %: 0.5 % (ref 0–5)
KETONES, URINE: NEGATIVE MG/DL
LACTIC ACID: 0.6 MMOL/L (ref 0.5–2.2)
LEUKOCYTE ESTERASE, URINE: NEGATIVE
LIPASE: 21 U/L (ref 13–60)
LYMPHOCYTES ABSOLUTE: 2.96 E9/L (ref 1.5–4)
LYMPHOCYTES RELATIVE PERCENT: 29.7 % (ref 20–42)
Lab: NORMAL
MCH RBC QN AUTO: 29.6 PG (ref 26–35)
MCHC RBC AUTO-ENTMCNC: 32.3 % (ref 32–34.5)
MCV RBC AUTO: 91.6 FL (ref 80–99.9)
MONOCYTES ABSOLUTE: 0.98 E9/L (ref 0.1–0.95)
MONOCYTES RELATIVE PERCENT: 9.8 % (ref 2–12)
NEGATIVE QC PASS/FAIL: NORMAL
NEUTROPHILS ABSOLUTE: 5.67 E9/L (ref 1.8–7.3)
NEUTROPHILS RELATIVE PERCENT: 57 % (ref 43–80)
NITRITE, URINE: NEGATIVE
PDW BLD-RTO: 13.6 FL (ref 11.5–15)
PH UA: 7 (ref 5–9)
PLATELET # BLD: 247 E9/L (ref 130–450)
PMV BLD AUTO: 11.3 FL (ref 7–12)
POSITIVE QC PASS/FAIL: NORMAL
POTASSIUM REFLEX MAGNESIUM: 3.9 MMOL/L (ref 3.5–5)
PROTEIN UA: NEGATIVE MG/DL
RBC # BLD: 4.43 E12/L (ref 3.5–5.5)
RBC UA: NORMAL /HPF (ref 0–2)
SODIUM BLD-SCNC: 136 MMOL/L (ref 132–146)
SPECIFIC GRAVITY UA: 1.01 (ref 1–1.03)
TOTAL PROTEIN: 6.9 G/DL (ref 6.4–8.3)
UROBILINOGEN, URINE: 0.2 E.U./DL
WBC # BLD: 10 E9/L (ref 4.5–11.5)
WBC UA: NORMAL /HPF (ref 0–5)

## 2021-06-25 PROCEDURE — 85025 COMPLETE CBC W/AUTO DIFF WBC: CPT

## 2021-06-25 PROCEDURE — 96375 TX/PRO/DX INJ NEW DRUG ADDON: CPT

## 2021-06-25 PROCEDURE — 93975 VASCULAR STUDY: CPT

## 2021-06-25 PROCEDURE — 6360000002 HC RX W HCPCS: Performed by: EMERGENCY MEDICINE

## 2021-06-25 PROCEDURE — 99283 EMERGENCY DEPT VISIT LOW MDM: CPT

## 2021-06-25 PROCEDURE — 81001 URINALYSIS AUTO W/SCOPE: CPT

## 2021-06-25 PROCEDURE — 80053 COMPREHEN METABOLIC PANEL: CPT

## 2021-06-25 PROCEDURE — 96361 HYDRATE IV INFUSION ADD-ON: CPT

## 2021-06-25 PROCEDURE — 96374 THER/PROPH/DIAG INJ IV PUSH: CPT

## 2021-06-25 PROCEDURE — 96376 TX/PRO/DX INJ SAME DRUG ADON: CPT

## 2021-06-25 PROCEDURE — 76856 US EXAM PELVIC COMPLETE: CPT

## 2021-06-25 PROCEDURE — 2580000003 HC RX 258: Performed by: EMERGENCY MEDICINE

## 2021-06-25 PROCEDURE — 83690 ASSAY OF LIPASE: CPT

## 2021-06-25 PROCEDURE — 83605 ASSAY OF LACTIC ACID: CPT

## 2021-06-25 RX ORDER — HYDROCODONE BITARTRATE AND ACETAMINOPHEN 5; 325 MG/1; MG/1
1 TABLET ORAL EVERY 6 HOURS PRN
Qty: 10 TABLET | Refills: 0 | Status: SHIPPED | OUTPATIENT
Start: 2021-06-25 | End: 2021-06-28

## 2021-06-25 RX ORDER — PROCHLORPERAZINE MALEATE 10 MG
10 TABLET ORAL EVERY 6 HOURS PRN
Qty: 10 TABLET | Refills: 0 | Status: SHIPPED | OUTPATIENT
Start: 2021-06-25 | End: 2021-07-23

## 2021-06-25 RX ORDER — ONDANSETRON 2 MG/ML
4 INJECTION INTRAMUSCULAR; INTRAVENOUS ONCE
Status: COMPLETED | OUTPATIENT
Start: 2021-06-25 | End: 2021-06-25

## 2021-06-25 RX ORDER — FENTANYL CITRATE 50 UG/ML
50 INJECTION, SOLUTION INTRAMUSCULAR; INTRAVENOUS ONCE
Status: COMPLETED | OUTPATIENT
Start: 2021-06-25 | End: 2021-06-25

## 2021-06-25 RX ORDER — 0.9 % SODIUM CHLORIDE 0.9 %
1000 INTRAVENOUS SOLUTION INTRAVENOUS ONCE
Status: COMPLETED | OUTPATIENT
Start: 2021-06-25 | End: 2021-06-25

## 2021-06-25 RX ORDER — FENTANYL CITRATE 50 UG/ML
25 INJECTION, SOLUTION INTRAMUSCULAR; INTRAVENOUS ONCE
Status: COMPLETED | OUTPATIENT
Start: 2021-06-25 | End: 2021-06-25

## 2021-06-25 RX ADMIN — FENTANYL CITRATE 50 MCG: 50 INJECTION INTRAMUSCULAR; INTRAVENOUS at 23:14

## 2021-06-25 RX ADMIN — ONDANSETRON 4 MG: 2 INJECTION INTRAMUSCULAR; INTRAVENOUS at 21:15

## 2021-06-25 RX ADMIN — SODIUM CHLORIDE 1000 ML: 9 INJECTION, SOLUTION INTRAVENOUS at 21:15

## 2021-06-25 RX ADMIN — FENTANYL CITRATE 25 MCG: 50 INJECTION INTRAMUSCULAR; INTRAVENOUS at 21:15

## 2021-06-25 ASSESSMENT — ENCOUNTER SYMPTOMS
SHORTNESS OF BREATH: 0
BACK PAIN: 0
ABDOMINAL PAIN: 1
COUGH: 0

## 2021-06-25 ASSESSMENT — PAIN SCALES - GENERAL
PAINLEVEL_OUTOF10: 5
PAINLEVEL_OUTOF10: 6
PAINLEVEL_OUTOF10: 7

## 2021-06-25 NOTE — ED NOTES
Treatment-Testing, Proceed toTreatment Area When Bed Available for ED Attending/MLP to Continue Care  Patient aware to notify nurse for new or worsening symptoms while in the waiting room    -----------------END OF FIRST PROVIDER CONTACT ASSESSMENT NOTE--------------  Electronically signed by Gabrielle Drew PA-C   DD: 6/25/21             Paula León PA-C  06/25/21 [facility]  06/25/21 [id number]

## 2021-06-26 VITALS
OXYGEN SATURATION: 98 % | TEMPERATURE: 97.4 F | WEIGHT: 110 LBS | HEART RATE: 69 BPM | SYSTOLIC BLOOD PRESSURE: 96 MMHG | RESPIRATION RATE: 16 BRPM | HEIGHT: 63 IN | BODY MASS INDEX: 19.49 KG/M2 | DIASTOLIC BLOOD PRESSURE: 57 MMHG

## 2021-06-26 NOTE — ED PROVIDER NOTES
This is a 80-year-old female with a past medical history of IBS presents to the ED for evaluation of abdominal pain. Patient states she was told she has a hemorrhagic cyst to her right ovary. Patient states she has been having increased pain to that area. Patient states she does have some nausea but denies any fevers or chills. Patient states she is due to have surgery by her OB/GYN this upcoming week. Patient denies any vaginal bleeding discharge dysuria or hematuria. Patient describes the pain as a seven on a 10 on 10 pain scale. The history is provided by the patient. No  was used. Review of Systems   Constitutional: Negative for fever. HENT: Negative for congestion. Eyes: Negative for visual disturbance. Respiratory: Negative for cough and shortness of breath. Cardiovascular: Negative for chest pain. Gastrointestinal: Positive for abdominal pain. Endocrine: Negative for polyuria. Genitourinary: Negative for dysuria. Musculoskeletal: Negative for back pain. Skin: Negative for rash. Neurological: Negative for headaches. Hematological: Does not bruise/bleed easily. Psychiatric/Behavioral: Negative for confusion. Physical Exam  Vitals and nursing note reviewed. Constitutional:       General: She is not in acute distress. Appearance: She is well-developed. She is not ill-appearing. HENT:      Head: Normocephalic and atraumatic. Mouth/Throat:      Mouth: Mucous membranes are dry. Eyes:      Extraocular Movements: Extraocular movements intact. Pupils: Pupils are equal, round, and reactive to light. Neck:      Vascular: No JVD. Cardiovascular:      Rate and Rhythm: Regular rhythm. Tachycardia present. Pulmonary:      Effort: Pulmonary effort is normal.   Abdominal:      General: There is no distension. Palpations: Abdomen is soft. Tenderness:  There is no right CVA tenderness, left CVA tenderness, guarding or rebound. Hernia: No hernia is present. Comments: Slight tenderness to LLQ and RLQ   Musculoskeletal:      Cervical back: Normal range of motion and neck supple. Right lower leg: No edema. Left lower leg: No edema. Skin:     General: Skin is warm and dry. Capillary Refill: Capillary refill takes less than 2 seconds. Neurological:      General: No focal deficit present. Mental Status: She is alert and oriented to person, place, and time. Mental status is at baseline. Cranial Nerves: No cranial nerve deficit. Psychiatric:         Mood and Affect: Mood normal.         Behavior: Behavior normal.          Procedures     MDM  Number of Diagnoses or Management Options  Lower abdominal pain  Diagnosis management comments: Patient has a history of pelvic congestion syndrome presented to the ED for several days worth of lower abdominal pain. Patient has been seen multiple times this past week multiple ERs and is already had a CAT scan as well as multiple ultrasounds. Patient recently had a CT scan showed no signs of appendicitis here in the department she was slightly tachycardic initially did appear quite dehydrated. She was treated with IV fluids analgesics antiemetics which did improve her pain on recheck of her abdominal examination. Patient was offered a pelvic exam although deferred. Ultrasound is reassuring already has follow-up in surgery with her OB/GYN later this week. I did offer the patient a CT scan to evaluate her abdomen however shared decision-making was admitted given that she felt better we did not want more radiation she was given strict return precautions agreeable with plan.                       --------------------------------------------- PAST HISTORY ---------------------------------------------  Past Medical History:  has a past medical history of Depression, IBS (irritable bowel syndrome), Incisional hernia, and [facility] ([facility]     Past Surgical History: has a past surgical history that includes Colonoscopy (2012); Upper gastrointestinal endoscopy (2012); Upper gastrointestinal endoscopy; Colonoscopy;  section (1/3/2015); Upper gastrointestinal endoscopy (2017); Dilation and curettage of uterus; Cholecystectomy (2017); pr  delivery only (N/A, 2018); laparoscopy (N/A, 10/18/2019); Hemorrhoid surgery (2020and 2021); and ventral hernia repair (N/A, 2021). Social History:  reports that she quit smoking about 8 years ago. Her smoking use included cigarettes. She quit after 2.00 years of use. She has never used smokeless tobacco. She reports that she does not drink alcohol and does not use drugs. Family History: family history includes Cancer in her father. The patients home medications have been reviewed.     Allergies: Naproxen    -------------------------------------------------- RESULTS -------------------------------------------------  Labs:  Results for orders placed or performed during the hospital encounter of 21   CBC Auto Differential   Result Value Ref Range    WBC 10.0 4.5 - 11.5 E9/L    RBC 4.43 3.50 - 5.50 E12/L    Hemoglobin 13.1 11.5 - 15.5 g/dL    Hematocrit 40.6 34.0 - 48.0 %    MCV 91.6 80.0 - 99.9 fL    MCH 29.6 26.0 - 35.0 pg    MCHC 32.3 32.0 - 34.5 %    RDW 13.6 11.5 - 15.0 fL    Platelets 963 265 - 724 E9/L    MPV 11.3 7.0 - 12.0 fL    Neutrophils % 57.0 43.0 - 80.0 %    Immature Granulocytes % 0.5 0.0 - 5.0 %    Lymphocytes % 29.7 20.0 - 42.0 %    Monocytes % 9.8 2.0 - 12.0 %    Eosinophils % 2.6 0.0 - 6.0 %    Basophils % 0.4 0.0 - 2.0 %    Neutrophils Absolute 5.67 1.80 - 7.30 E9/L    Immature Granulocytes # 0.05 E9/L    Lymphocytes Absolute 2.96 1.50 - 4.00 E9/L    Monocytes Absolute 0.98 (H) 0.10 - 0.95 E9/L    Eosinophils Absolute 0.26 0.05 - 0.50 E9/L    Basophils Absolute 0.04 0.00 - 0.20 E9/L   Comprehensive Metabolic Panel w/ Reflex to MG   Result Value Ref Range Sodium 136 132 - 146 mmol/L    Potassium reflex Magnesium 3.9 3.5 - 5.0 mmol/L    Chloride 106 98 - 107 mmol/L    CO2 24 22 - 29 mmol/L    Anion Gap 6 (L) 7 - 16 mmol/L    Glucose 88 74 - 99 mg/dL    BUN 13 6 - 20 mg/dL    CREATININE 0.6 0.5 - 1.0 mg/dL    GFR Non-African American >60 >=60 mL/min/1.73    GFR African American >60     Calcium 9.2 8.6 - 10.2 mg/dL    Total Protein 6.9 6.4 - 8.3 g/dL    Albumin 4.3 3.5 - 5.2 g/dL    Total Bilirubin 0.4 0.0 - 1.2 mg/dL    Alkaline Phosphatase 61 35 - 104 U/L    ALT 18 0 - 32 U/L    AST 18 0 - 31 U/L   Lipase   Result Value Ref Range    Lipase 21 13 - 60 U/L   LACTIC ACID, PLASMA   Result Value Ref Range    Lactic Acid 0.6 0.5 - 2.2 mmol/L   Urinalysis with Microscopic   Result Value Ref Range    Color, UA Yellow Straw/Yellow    Clarity, UA Clear Clear    Glucose, Ur Negative Negative mg/dL    Bilirubin Urine Negative Negative    Ketones, Urine Negative Negative mg/dL    Specific Gravity, UA 1.010 1.005 - 1.030    Blood, Urine Negative Negative    pH, UA 7.0 5.0 - 9.0    Protein, UA Negative Negative mg/dL    Urobilinogen, Urine 0.2 <2.0 E.U./dL    Nitrite, Urine Negative Negative    Leukocyte Esterase, Urine Negative Negative    WBC, UA NONE 0 - 5 /HPF    RBC, UA NONE 0 - 2 /HPF    Bacteria, UA NONE SEEN None Seen /HPF   POC Pregnancy Urine Qual   Result Value Ref Range    HCG, Urine, POC Negative Negative    Lot Number [id number]     Positive QC Pass/Fail Pass     Negative QC Pass/Fail Pass        Radiology:  US DUP ABD PEL RETRO SCROT COMPLETE   Final Result   1. Heterogeneous appearance of the uterine myometrium. Consider adenomyosis   in the right clinical setting. 2.  Otherwise normal appearance of the uterus and endometrium. Appropriately   positioned intrauterine device. 3.  Normal appearance of the bilateral ovaries. There are no adnexal masses. Normal Doppler flow within the ovaries. US PELVIS COMPLETE   Final Result   1.   Heterogeneous appearance of the uterine myometrium. Consider adenomyosis   in the right clinical setting. 2.  Otherwise normal appearance of the uterus and endometrium. Appropriately   positioned intrauterine device. 3.  Normal appearance of the bilateral ovaries. There are no adnexal masses. Normal Doppler flow within the ovaries. ------------------------- NURSING NOTES AND VITALS REVIEWED ---------------------------  Date / Time Roomed:  6/25/2021  8:02 PM  ED Bed Assignment:  16/16    The nursing notes within the ED encounter and vital signs as below have been reviewed. BP (!) 96/57   Pulse 69   Temp 97.4 °F (36.3 °C)   Resp 16   Ht 5' 3\" (1.6 m)   Wt 110 lb (49.9 kg)   SpO2 98%   BMI 19.49 kg/m²   Oxygen Saturation Interpretation: Normal      ------------------------------------------ PROGRESS NOTES ------------------------------------------  11:25 PM EDT  I have spoken with the patient and discussed todays results, in addition to providing specific details for the plan of care and counseling regarding the diagnosis and prognosis. Their questions are answered at this time and they are agreeable with the plan. I discussed at length with them reasons for immediate return here for re evaluation. They will followup with their PCP.      --------------------------------- ADDITIONAL PROVIDER NOTES ---------------------------------  At this time the patient is without objective evidence of an acute process requiring hospitalization or inpatient management. They have remained hemodynamically stable throughout their entire ED visit and are stable for discharge with outpatient follow-up. The plan has been discussed in detail and they are aware of the specific conditions for emergent return, as well as the importance of follow-up.       Discharge Medication List as of 6/25/2021 11:48 PM      START taking these medications    Details   HYDROcodone-acetaminophen (NORCO) 5-325 MG per tablet Take 1 tablet by mouth every 6 hours as needed for Pain for up to 3 days. Intended supply: 3 days. Take lowest dose possible to manage pain, Disp-10 tablet, R-0Print      prochlorperazine (COMPAZINE) 10 MG tablet Take 1 tablet by mouth every 6 hours as needed (nausea), Disp-10 tablet, R-0Print             Diagnosis:  1. Lower abdominal pain        Disposition:  Patient's disposition: Discharge to home  Patient's condition is stable.      Rangel Driver DO  06/26/21 [id number]

## 2021-06-28 ENCOUNTER — HOSPITAL ENCOUNTER (EMERGENCY)
Age: 26
Discharge: HOME | End: 2021-06-28
Attending: EMERGENCY MEDICINE
Payer: COMMERCIAL

## 2021-06-28 ENCOUNTER — APPOINTMENT (OUTPATIENT)
Dept: CT IMAGING | Age: 26
End: 2021-06-28
Payer: COMMERCIAL

## 2021-06-28 ENCOUNTER — APPOINTMENT (OUTPATIENT)
Dept: ULTRASOUND IMAGING | Age: 26
End: 2021-06-28
Payer: COMMERCIAL

## 2021-06-28 VITALS
TEMPERATURE: 98.1 F | BODY MASS INDEX: 19.49 KG/M2 | HEART RATE: 90 BPM | OXYGEN SATURATION: 98 % | SYSTOLIC BLOOD PRESSURE: 100 MMHG | DIASTOLIC BLOOD PRESSURE: 61 MMHG | WEIGHT: 110 LBS | HEIGHT: 63 IN | RESPIRATION RATE: 16 BRPM

## 2021-06-28 DIAGNOSIS — D72.829 LEUKOCYTOSIS, UNSPECIFIED TYPE: ICD-10-CM

## 2021-06-28 DIAGNOSIS — R10.84 GENERALIZED ABDOMINAL PAIN: ICD-10-CM

## 2021-06-28 DIAGNOSIS — E86.0 DEHYDRATION: ICD-10-CM

## 2021-06-28 DIAGNOSIS — R11.2 NON-INTRACTABLE VOMITING WITH NAUSEA, UNSPECIFIED VOMITING TYPE: Primary | ICD-10-CM

## 2021-06-28 LAB
ALBUMIN SERPL-MCNC: 4.4 G/DL (ref 3.5–5.2)
ALP BLD-CCNC: 65 U/L (ref 35–104)
ALT SERPL-CCNC: 23 U/L (ref 0–32)
ANION GAP SERPL CALCULATED.3IONS-SCNC: 11 MMOL/L (ref 7–16)
AST SERPL-CCNC: 28 U/L (ref 0–31)
BACTERIA: ABNORMAL /HPF
BASOPHILS ABSOLUTE: 0.05 E9/L (ref 0–0.2)
BASOPHILS RELATIVE PERCENT: 0.3 % (ref 0–2)
BILIRUB SERPL-MCNC: 0.4 MG/DL (ref 0–1.2)
BILIRUBIN URINE: NEGATIVE
BLOOD, URINE: NEGATIVE
BUN BLDV-MCNC: 9 MG/DL (ref 6–20)
CALCIUM SERPL-MCNC: 9.5 MG/DL (ref 8.6–10.2)
CHLORIDE BLD-SCNC: 108 MMOL/L (ref 98–107)
CLARITY: CLEAR
CO2: 21 MMOL/L (ref 22–29)
COLOR: YELLOW
CREAT SERPL-MCNC: 0.6 MG/DL (ref 0.5–1)
EOSINOPHILS ABSOLUTE: 0.09 E9/L (ref 0.05–0.5)
EOSINOPHILS RELATIVE PERCENT: 0.5 % (ref 0–6)
EPITHELIAL CELLS, UA: ABNORMAL /HPF
GFR AFRICAN AMERICAN: >60
GFR NON-AFRICAN AMERICAN: >60 ML/MIN/1.73
GLUCOSE BLD-MCNC: 120 MG/DL (ref 74–99)
GLUCOSE URINE: NEGATIVE MG/DL
HCG, URINE, POC: NEGATIVE
HCT VFR BLD CALC: 41 % (ref 34–48)
HEMOGLOBIN: 13.7 G/DL (ref 11.5–15.5)
IMMATURE GRANULOCYTES #: 0.1 E9/L
IMMATURE GRANULOCYTES %: 0.5 % (ref 0–5)
KETONES, URINE: 40 MG/DL
LACTIC ACID: 1.3 MMOL/L (ref 0.5–2.2)
LEUKOCYTE ESTERASE, URINE: NEGATIVE
LIPASE: 27 U/L (ref 13–60)
LYMPHOCYTES ABSOLUTE: 1.39 E9/L (ref 1.5–4)
LYMPHOCYTES RELATIVE PERCENT: 7.1 % (ref 20–42)
Lab: NORMAL
MAGNESIUM: 1.7 MG/DL (ref 1.6–2.6)
MCH RBC QN AUTO: 29.7 PG (ref 26–35)
MCHC RBC AUTO-ENTMCNC: 33.4 % (ref 32–34.5)
MCV RBC AUTO: 88.7 FL (ref 80–99.9)
MONOCYTES ABSOLUTE: 1.31 E9/L (ref 0.1–0.95)
MONOCYTES RELATIVE PERCENT: 6.7 % (ref 2–12)
MUCUS: PRESENT /LPF
NEGATIVE QC PASS/FAIL: NORMAL
NEUTROPHILS ABSOLUTE: 16.53 E9/L (ref 1.8–7.3)
NEUTROPHILS RELATIVE PERCENT: 84.9 % (ref 43–80)
NITRITE, URINE: NEGATIVE
PDW BLD-RTO: 13.7 FL (ref 11.5–15)
PH UA: 7 (ref 5–9)
PLATELET # BLD: 268 E9/L (ref 130–450)
PMV BLD AUTO: 11.5 FL (ref 7–12)
POSITIVE QC PASS/FAIL: NORMAL
POTASSIUM REFLEX MAGNESIUM: 3.5 MMOL/L (ref 3.5–5)
PROTEIN UA: ABNORMAL MG/DL
RBC # BLD: 4.62 E12/L (ref 3.5–5.5)
RBC UA: ABNORMAL /HPF (ref 0–2)
SODIUM BLD-SCNC: 140 MMOL/L (ref 132–146)
SPECIFIC GRAVITY UA: 1.02 (ref 1–1.03)
TOTAL PROTEIN: 7.1 G/DL (ref 6.4–8.3)
UROBILINOGEN, URINE: 0.2 E.U./DL
WBC # BLD: 19.5 E9/L (ref 4.5–11.5)
WBC UA: ABNORMAL /HPF (ref 0–5)

## 2021-06-28 PROCEDURE — 83605 ASSAY OF LACTIC ACID: CPT

## 2021-06-28 PROCEDURE — 6360000004 HC RX CONTRAST MEDICATION: Performed by: RADIOLOGY

## 2021-06-28 PROCEDURE — 76817 TRANSVAGINAL US OBSTETRIC: CPT

## 2021-06-28 PROCEDURE — 96375 TX/PRO/DX INJ NEW DRUG ADDON: CPT

## 2021-06-28 PROCEDURE — 83690 ASSAY OF LIPASE: CPT

## 2021-06-28 PROCEDURE — 6370000000 HC RX 637 (ALT 250 FOR IP): Performed by: EMERGENCY MEDICINE

## 2021-06-28 PROCEDURE — 6360000002 HC RX W HCPCS: Performed by: EMERGENCY MEDICINE

## 2021-06-28 PROCEDURE — 74177 CT ABD & PELVIS W/CONTRAST: CPT

## 2021-06-28 PROCEDURE — 80053 COMPREHEN METABOLIC PANEL: CPT

## 2021-06-28 PROCEDURE — 93975 VASCULAR STUDY: CPT

## 2021-06-28 PROCEDURE — 96361 HYDRATE IV INFUSION ADD-ON: CPT

## 2021-06-28 PROCEDURE — 96374 THER/PROPH/DIAG INJ IV PUSH: CPT

## 2021-06-28 PROCEDURE — 2580000003 HC RX 258: Performed by: EMERGENCY MEDICINE

## 2021-06-28 PROCEDURE — 99285 EMERGENCY DEPT VISIT HI MDM: CPT

## 2021-06-28 PROCEDURE — 85025 COMPLETE CBC W/AUTO DIFF WBC: CPT

## 2021-06-28 PROCEDURE — 81001 URINALYSIS AUTO W/SCOPE: CPT

## 2021-06-28 PROCEDURE — 83735 ASSAY OF MAGNESIUM: CPT

## 2021-06-28 RX ORDER — OXYCODONE HYDROCHLORIDE AND ACETAMINOPHEN 5; 325 MG/1; MG/1
1 TABLET ORAL EVERY 6 HOURS PRN
Qty: 12 TABLET | Refills: 0 | Status: SHIPPED | OUTPATIENT
Start: 2021-06-28 | End: 2021-07-01

## 2021-06-28 RX ORDER — FAMOTIDINE 20 MG/1
20 TABLET, FILM COATED ORAL 2 TIMES DAILY
Qty: 14 TABLET | Refills: 0 | Status: SHIPPED | OUTPATIENT
Start: 2021-06-28 | End: 2022-02-16 | Stop reason: ALTCHOICE

## 2021-06-28 RX ORDER — OXYCODONE HYDROCHLORIDE AND ACETAMINOPHEN 5; 325 MG/1; MG/1
1 TABLET ORAL ONCE
Status: COMPLETED | OUTPATIENT
Start: 2021-06-28 | End: 2021-06-28

## 2021-06-28 RX ORDER — ONDANSETRON 2 MG/ML
4 INJECTION INTRAMUSCULAR; INTRAVENOUS ONCE
Status: COMPLETED | OUTPATIENT
Start: 2021-06-28 | End: 2021-06-28

## 2021-06-28 RX ORDER — FENTANYL CITRATE 50 UG/ML
25 INJECTION, SOLUTION INTRAMUSCULAR; INTRAVENOUS ONCE
Status: COMPLETED | OUTPATIENT
Start: 2021-06-28 | End: 2021-06-28

## 2021-06-28 RX ORDER — 0.9 % SODIUM CHLORIDE 0.9 %
1000 INTRAVENOUS SOLUTION INTRAVENOUS ONCE
Status: COMPLETED | OUTPATIENT
Start: 2021-06-28 | End: 2021-06-28

## 2021-06-28 RX ORDER — KETOROLAC TROMETHAMINE 30 MG/ML
15 INJECTION, SOLUTION INTRAMUSCULAR; INTRAVENOUS ONCE
Status: COMPLETED | OUTPATIENT
Start: 2021-06-28 | End: 2021-06-28

## 2021-06-28 RX ORDER — METOCLOPRAMIDE 10 MG/1
10 TABLET ORAL 4 TIMES DAILY PRN
Qty: 20 TABLET | Refills: 0 | Status: SHIPPED | OUTPATIENT
Start: 2021-06-28 | End: 2021-10-17 | Stop reason: SDUPTHER

## 2021-06-28 RX ADMIN — OXYCODONE HYDROCHLORIDE AND ACETAMINOPHEN 1 TABLET: 5; 325 TABLET ORAL at 11:52

## 2021-06-28 RX ADMIN — FENTANYL CITRATE 25 MCG: 50 INJECTION INTRAMUSCULAR; INTRAVENOUS at 11:51

## 2021-06-28 RX ADMIN — KETOROLAC TROMETHAMINE 15 MG: 30 INJECTION, SOLUTION INTRAMUSCULAR; INTRAVENOUS at 08:06

## 2021-06-28 RX ADMIN — SODIUM CHLORIDE 1000 ML: 9 INJECTION, SOLUTION INTRAVENOUS at 08:05

## 2021-06-28 RX ADMIN — ONDANSETRON 4 MG: 2 INJECTION INTRAMUSCULAR; INTRAVENOUS at 08:07

## 2021-06-28 RX ADMIN — IOPAMIDOL 75 ML: 755 INJECTION, SOLUTION INTRAVENOUS at 09:21

## 2021-06-28 ASSESSMENT — ENCOUNTER SYMPTOMS
EYE REDNESS: 0
EYE PAIN: 0
RHINORRHEA: 0
CONSTIPATION: 0
ABDOMINAL DISTENTION: 0
VOMITING: 0
NAUSEA: 0
DIARRHEA: 0
SORE THROAT: 0
SHORTNESS OF BREATH: 0
COUGH: 0
BLOOD IN STOOL: 0
BACK PAIN: 0
ABDOMINAL PAIN: 1
EYE DISCHARGE: 0
WHEEZING: 0

## 2021-06-28 ASSESSMENT — PAIN DESCRIPTION - PROGRESSION: CLINICAL_PROGRESSION: NOT CHANGED

## 2021-06-28 ASSESSMENT — PAIN DESCRIPTION - FREQUENCY
FREQUENCY: CONTINUOUS
FREQUENCY: CONTINUOUS

## 2021-06-28 ASSESSMENT — PAIN DESCRIPTION - PAIN TYPE
TYPE: ACUTE PAIN
TYPE: ACUTE PAIN

## 2021-06-28 ASSESSMENT — PAIN DESCRIPTION - ONSET
ONSET: AWAKENED FROM SLEEP
ONSET: ON-GOING

## 2021-06-28 ASSESSMENT — PAIN SCALES - GENERAL
PAINLEVEL_OUTOF10: 7
PAINLEVEL_OUTOF10: 7
PAINLEVEL_OUTOF10: 8
PAINLEVEL_OUTOF10: 8

## 2021-06-28 ASSESSMENT — PAIN DESCRIPTION - DESCRIPTORS: DESCRIPTORS: ACHING;CONSTANT;DISCOMFORT

## 2021-06-28 ASSESSMENT — PAIN DESCRIPTION - LOCATION
LOCATION: FLANK
LOCATION: ABDOMEN

## 2021-06-28 ASSESSMENT — PAIN DESCRIPTION - ORIENTATION: ORIENTATION: RIGHT

## 2021-06-28 NOTE — ED NOTES
Discharge instructions given. Patient verbalizes understanding. No other noted or stated problems at this time. Patient will follow up with primary care.      Lupe Arce RN  06/28/21 [id number]

## 2021-06-28 NOTE — LETTER
5 [facility] Emergency Department  [address] [place] [address]  Phone: [phone]               June 28, 2021    Patient: Agnes Perry   YOB: 1995   Date of Visit: 6/28/2021       To Whom It May Concern:    Josiah Bridges was seen and treated in our emergency department on 6/28/2021. She may return to work on 6/30/21.       Sincerely,       Ravindra Jeffers DO         Signature:__________________________________

## 2021-06-28 NOTE — ED PROVIDER NOTES
HPI Leopoldo Evan is a 22 y.o. female with a PMHx significant for GERD, IBS and depression who presents with abrupt onset right flank pain, nausea and vomiting getting this morning. The patient's complaints are constant, severe in severity and worsened by any movement. The patient states that she woke up this morning with a sharp stabbing sensation in her right flank radiating down into her right groin. She rates the pain as a 10/10. She notes that she had several episodes of NBNB emesis. She says that she did not take anything for her pain, but she did take a Compazine. Of note, the patient states that last month she had a ventral hernia repair and she is scheduled to have a right ovarian cyst removed in 2 days. The patient states that her last period was over a year ago when she had her IUD implanted. The patient denies recent trauma, fever, chills, fatigue, HA, dizziness, vision changes, congestion, rhinorrhea, neck pain, chest pain, palpitations, hx of MI, hx of blood clots, LE edema, SOB, cough, wheezing, D/C, hematochezia, melena, dysuria, hematuria, generalized weakness and paresthesias. The patient is currently taking no blood thinners. Tobacco Hx:   reports that she quit smoking about 8 years ago. Her smoking use included cigarettes. She quit after 2.00 years of use. She has never used smokeless tobacco.    Alcohol Hx:   reports no history of alcohol use. Illicit Drug Hx:  Reports no history of illicit drug use. The history is provided by the patient. Last Tetanus (if applicable): N/A    Review of Systems   Constitutional: Negative for chills, diaphoresis, fatigue and fever. HENT: Negative for congestion, rhinorrhea and sore throat. Eyes: Negative for pain, discharge and redness. Respiratory: Negative for cough, shortness of breath and wheezing. Cardiovascular: Negative for chest pain, palpitations and leg swelling.    Gastrointestinal: Positive for abdominal pain (Right lower quadrant). Negative for abdominal distention, blood in stool, constipation, diarrhea, nausea and vomiting. Genitourinary: Positive for flank pain (Right-sided) and pelvic pain. Negative for difficulty urinating, dysuria, frequency, hematuria, vaginal bleeding and vaginal discharge. Musculoskeletal: Negative for arthralgias, back pain, myalgias, neck pain and neck stiffness. Skin: Negative for rash and wound. Neurological: Negative for dizziness, syncope, weakness, light-headedness, numbness and headaches. All other systems reviewed and are negative. Physical Exam  Vitals and nursing note reviewed. Constitutional:       General: She is awake. She is in acute distress (Moderate secondary to right flank and right lower quadrant pain). Appearance: She is not diaphoretic. HENT:      Head: Normocephalic and atraumatic. Right Ear: External ear normal.      Left Ear: External ear normal.      Nose: Nose normal.      Mouth/Throat:      Mouth: Mucous membranes are dry. Pharynx: Oropharynx is clear. Eyes:      General: No scleral icterus. Right eye: No discharge. Left eye: No discharge. Extraocular Movements: Extraocular movements intact. Conjunctiva/sclera: Conjunctivae normal.   Cardiovascular:      Rate and Rhythm: Normal rate and regular rhythm. Heart sounds: Normal heart sounds. No murmur heard. No friction rub. No gallop. Comments: Upper extremity and lower extremity distal pulses intact bilaterally +2/4  Pulmonary:      Effort: Pulmonary effort is normal. No respiratory distress. Breath sounds: Normal breath sounds. No wheezing, rhonchi or rales. Abdominal:      General: Bowel sounds are normal. There is no distension. Palpations: Abdomen is soft. There is no mass. Tenderness: There is abdominal tenderness (To mild palpation in the right lower quadrant). There is right CVA tenderness.  There is no left CVA tenderness, guarding or rebound. Comments: Midline surgical scar inferior to the umbilicus. No abnormal drainage. Wound appears to be healing appropriately. Musculoskeletal:         General: No tenderness or deformity. Normal range of motion. Cervical back: Normal range of motion and neck supple. No rigidity. No muscular tenderness. Right lower leg: No edema. Left lower leg: No edema. Lymphadenopathy:      Cervical: No cervical adenopathy. Skin:     General: Skin is warm and dry. Capillary Refill: Capillary refill takes less than 2 seconds. Findings: No erythema or rash. Neurological:      General: No focal deficit present. Mental Status: She is alert and oriented to person, place, and time. Sensory: No sensory deficit. Motor: No weakness. Coordination: Coordination normal.          ---------------------------------- PAST HISTORY ---------------------------------------------  Past Medical History:  has a past medical history of Depression, IBS (irritable bowel syndrome), Incisional hernia, and Seizure ([facility] [address] Past Surgical History:  has a past surgical history that includes Colonoscopy (2012); Upper gastrointestinal endoscopy (2012); Upper gastrointestinal endoscopy; Colonoscopy;  section (1/3/2015); Upper gastrointestinal endoscopy (2017); Dilation and curettage of uterus; Cholecystectomy (2017); pr  delivery only (N/A, 2018); laparoscopy (N/A, 10/18/2019); Hemorrhoid surgery (2020and 2021); and ventral hernia repair (N/A, 2021). Social History:  reports that she quit smoking about 8 years ago. Her smoking use included cigarettes. She quit after 2.00 years of use. She has never used smokeless tobacco. She reports that she does not drink alcohol and does not use drugs. Family History: family history includes Cancer in her father. Home Meds: Not in a hospital admission.   The patients home medications have 0.6 0.5 - 1.0 mg/dL    GFR Non-African American >60 >=60 mL/min/1.73    GFR African American >60     Calcium 9.5 8.6 - 10.2 mg/dL    Total Protein 7.1 6.4 - 8.3 g/dL    Albumin 4.4 3.5 - 5.2 g/dL    Total Bilirubin 0.4 0.0 - 1.2 mg/dL    Alkaline Phosphatase 65 35 - 104 U/L    ALT 23 0 - 32 U/L    AST 28 0 - 31 U/L   Lipase   Result Value Ref Range    Lipase 27 13 - 60 U/L   Urinalysis, reflex to microscopic   Result Value Ref Range    Color, UA Yellow Straw/Yellow    Clarity, UA Clear Clear    Glucose, Ur Negative Negative mg/dL    Bilirubin Urine Negative Negative    Ketones, Urine 40 (A) Negative mg/dL    Specific Gravity, UA 1.025 1.005 - 1.030    Blood, Urine Negative Negative    pH, UA 7.0 5.0 - 9.0    Protein, UA TRACE Negative mg/dL    Urobilinogen, Urine 0.2 <2.0 E.U./dL    Nitrite, Urine Negative Negative    Leukocyte Esterase, Urine Negative Negative   Lactic Acid, Plasma   Result Value Ref Range    Lactic Acid 1.3 0.5 - 2.2 mmol/L   Microscopic Urinalysis   Result Value Ref Range    Mucus, UA Present (A) None Seen /LPF    WBC, UA 1-3 0 - 5 /HPF    RBC, UA 2-5 0 - 2 /HPF    Epithelial Cells, UA FEW /HPF    Bacteria, UA MODERATE (A) None Seen /HPF   Magnesium   Result Value Ref Range    Magnesium 1.7 1.6 - 2.6 mg/dL   POC Pregnancy Urine   Result Value Ref Range    HCG, Urine, POC Negative Negative    Lot Number [id number]     Positive QC Pass/Fail Pass     Negative QC Pass/Fail Pass        RADIOLOGY: Interpreted by Radiologist unless otherwise noted. US DUP ABD PEL RETRO SCROT COMPLETE   Final Result   1. Retroverted uterus which is normal in size and with IUD which appears in   appropriate position. 2.  No free fluid, ovarian torsion, or acute pelvic disease identified. US OB TRANSVAGINAL   Final Result   1. Retroverted uterus which is normal in size and with IUD which appears in   appropriate position. 2.  No free fluid, ovarian torsion, or acute pelvic disease identified.          CT ABDOMEN PELVIS W IV CONTRAST Additional Contrast? None   Final Result   Air-fluid levels in the small and large bowel loops are nonspecific but can   be seen with enteritis. No acute inflammatory process is identified. Oxygen Saturation Interpretation: Normal    Meds Given:  Medications   0.9 % sodium chloride bolus (0 mLs Intravenous Stopped 6/28/21 1156)   ondansetron (ZOFRAN) injection 4 mg (4 mg Intravenous Given 6/28/21 0807)   ketorolac (TORADOL) injection 15 mg (15 mg Intravenous Given 6/28/21 0806)   iopamidol (ISOVUE-370) 76 % injection 75 mL (75 mLs Intravenous Given 6/28/21 0921)   fentaNYL (SUBLIMAZE) injection 25 mcg (25 mcg Intravenous Given 6/28/21 1151)   oxyCODONE-acetaminophen (PERCOCET) 5-325 MG per tablet 1 tablet (1 tablet Oral Given 6/28/21 1152)       Procedures:  No procedures performed. --------------------------------- PROGRESS NOTES / ADDITIONAL PROVIDER NOTES ---------------------------------  Consultations:  As outlined below. ED Course:    ED Course as of Jun 29 0918   Mon Jun 28, 2021   0941 On reevaluation, the patient is resting in bed. She continues to have significant right lower quadrant abdominal pain. She states that her nausea is significantly improved. I will give her dose of pain medication. I informed her that we are waiting for the official read of her CT scan prior to making decision about her disposition. Will reevaluate. [ML]   9355 On reevaluation, the patient is resting comfortably in bed. She states that her pain is significantly improved. She states that she has not felt nauseous since we gave her the initial dose of Zofran. I updated her on the status of her work-up and our plan to discharge her with recommended follow-up with both her PCP and her OB/GYN as soon as possible (the patient has surgery scheduled with her OB/GYN for 2 days from now). The patient verbalized understanding and agree with that plan.   At this time the patient is stable and safe for discharge. [ML]      ED Course User Index  [ML] Walter Garcia DO       7736: All results were discussed with the patient and I have provided specific details regarding the plan of care, diagnosis and associated prognosis. The patient tolerated the visit well and, at the time of discharge, she was without objective evidence of hemodynamic instability or an acute process (biological or psychological) requiring hospitalization and inpatient management. The patient was seen by myself and the assigned attending physician, Dr. Ze Bernard, who agreed with my assessment and plan as laid out herein. The importance of follow-up was discussed at the end of the visit and I recommended that the patient be seen by her primary care physician in 2-3 days. The patient verbalized her understanding and agreement with the plan as presented and stated her intention to follow up. Reasons to return to the ER or seek immediate evaluation by a medical provider were discussed at length and all questions were answered. The patient was discharged home in stable condition. MDM:  Patient presented from home complaining of abrupt onset right flank pain, right lower quadrant pain and nausea and vomiting. On arrival, the patient was tachycardic with remaining vital signs within normal limits. Physical exam was as documented above. Work-up was initiated to further evaluate the patient presenting complaint. Labs were remarkable for a leukocytosis of approximately 20,000. Lactate was normal.  UA was not indicative of a urinary tract infection. CT of the abdomen and pelvis did not revealed signs of enteritis, but no other acute pathology was identified. Transvaginal ultrasound was done to further evaluate and was grossly unremarkable for acute pelvic pathology. Given the lack of findings necessitating further emergent management or admission to the hospital, the decision was made to discharge the patient.   She has previously scheduled follow up with her OB/GYN in two days. Symptomatic care and return precautions were discussed. The patient was stable at the time of her disposition. Discharge Medication List as of 6/28/2021 11:35 AM      START taking these medications    Details   metoclopramide (REGLAN) 10 MG tablet Take 1 tablet by mouth 4 times daily as needed (Nausea or vomiting, ensure you take 1 at night before bed), Disp-20 tablet, R-0Print      famotidine (PEPCID) 20 MG tablet Take 1 tablet by mouth 2 times daily for 7 days, Disp-14 tablet, R-0Print      oxyCODONE-acetaminophen (PERCOCET) 5-325 MG per tablet Take 1 tablet by mouth every 6 hours as needed for Pain for up to 3 days. Intended supply: 3 days. Take lowest dose possible to manage pain, Disp-12 tablet, R-0Print             Diagnosis:  1. Non-intractable vomiting with nausea, unspecified vomiting type    2. Dehydration    3. Generalized abdominal pain    4. Leukocytosis, unspecified type        Disposition:  Patient's disposition: Discharge to home  Patient's condition is stable. This patient was seen, examined and treated with Dr. Kerry Stinson. All pertinent aspects of the patient's care were discussed with the attending physician.        Sonya oRjas,   Resident  06/29/21 7819

## 2021-06-30 ENCOUNTER — HOSPITAL ENCOUNTER (OUTPATIENT)
Age: 26
Discharge: HOME | End: 2021-07-02

## 2021-06-30 PROCEDURE — 88305 TISSUE EXAM BY PATHOLOGIST: CPT

## 2021-06-30 PROCEDURE — 88304 TISSUE EXAM BY PATHOLOGIST: CPT

## 2021-06-30 PROCEDURE — 88112 CYTOPATH CELL ENHANCE TECH: CPT

## 2021-07-23 ENCOUNTER — HOSPITAL ENCOUNTER (EMERGENCY)
Age: 26
Discharge: HOME | End: 2021-07-23
Payer: COMMERCIAL

## 2021-07-23 ENCOUNTER — APPOINTMENT (OUTPATIENT)
Dept: CT IMAGING | Age: 26
End: 2021-07-23
Payer: COMMERCIAL

## 2021-07-23 VITALS
BODY MASS INDEX: 19.49 KG/M2 | HEIGHT: 63 IN | SYSTOLIC BLOOD PRESSURE: 110 MMHG | DIASTOLIC BLOOD PRESSURE: 69 MMHG | RESPIRATION RATE: 16 BRPM | TEMPERATURE: 98.9 F | HEART RATE: 71 BPM | OXYGEN SATURATION: 100 % | WEIGHT: 110 LBS

## 2021-07-23 DIAGNOSIS — R11.2 NON-INTRACTABLE VOMITING WITH NAUSEA, UNSPECIFIED VOMITING TYPE: ICD-10-CM

## 2021-07-23 DIAGNOSIS — G44.209 TENSION HEADACHE: Primary | ICD-10-CM

## 2021-07-23 LAB
HCG, URINE, POC: NEGATIVE
Lab: NORMAL
NEGATIVE QC PASS/FAIL: NORMAL
POSITIVE QC PASS/FAIL: NORMAL

## 2021-07-23 PROCEDURE — 2580000003 HC RX 258: Performed by: PHYSICIAN ASSISTANT

## 2021-07-23 PROCEDURE — 96375 TX/PRO/DX INJ NEW DRUG ADDON: CPT

## 2021-07-23 PROCEDURE — 6360000002 HC RX W HCPCS: Performed by: PHYSICIAN ASSISTANT

## 2021-07-23 PROCEDURE — 96374 THER/PROPH/DIAG INJ IV PUSH: CPT

## 2021-07-23 PROCEDURE — 99283 EMERGENCY DEPT VISIT LOW MDM: CPT

## 2021-07-23 PROCEDURE — 70450 CT HEAD/BRAIN W/O DYE: CPT

## 2021-07-23 RX ORDER — ORPHENADRINE CITRATE 100 MG/1
100 TABLET, EXTENDED RELEASE ORAL 2 TIMES DAILY
Qty: 20 TABLET | Refills: 0 | Status: SHIPPED | OUTPATIENT
Start: 2021-07-23 | End: 2021-08-02

## 2021-07-23 RX ORDER — KETOROLAC TROMETHAMINE 30 MG/ML
30 INJECTION, SOLUTION INTRAMUSCULAR; INTRAVENOUS ONCE
Status: COMPLETED | OUTPATIENT
Start: 2021-07-23 | End: 2021-07-23

## 2021-07-23 RX ORDER — BUTALBITAL, ACETAMINOPHEN AND CAFFEINE 50; 325; 40 MG/1; MG/1; MG/1
.5-1 TABLET ORAL EVERY 8 HOURS PRN
Qty: 10 TABLET | Refills: 0 | Status: SHIPPED | OUTPATIENT
Start: 2021-07-23 | End: 2021-10-10

## 2021-07-23 RX ORDER — PROMETHAZINE HYDROCHLORIDE 25 MG/1
25 TABLET ORAL EVERY 6 HOURS PRN
Qty: 30 TABLET | Refills: 0 | Status: SHIPPED | OUTPATIENT
Start: 2021-07-23 | End: 2021-10-10

## 2021-07-23 RX ORDER — 0.9 % SODIUM CHLORIDE 0.9 %
1000 INTRAVENOUS SOLUTION INTRAVENOUS ONCE
Status: COMPLETED | OUTPATIENT
Start: 2021-07-23 | End: 2021-07-23

## 2021-07-23 RX ORDER — PROMETHAZINE HYDROCHLORIDE 25 MG/ML
12.5 INJECTION, SOLUTION INTRAMUSCULAR; INTRAVENOUS ONCE
Status: COMPLETED | OUTPATIENT
Start: 2021-07-23 | End: 2021-07-23

## 2021-07-23 RX ADMIN — PROMETHAZINE HYDROCHLORIDE 12.5 MG: 25 INJECTION INTRAMUSCULAR; INTRAVENOUS at 17:36

## 2021-07-23 RX ADMIN — SODIUM CHLORIDE 1000 ML: 9 INJECTION, SOLUTION INTRAVENOUS at 17:34

## 2021-07-23 RX ADMIN — KETOROLAC TROMETHAMINE 30 MG: 30 INJECTION, SOLUTION INTRAMUSCULAR; INTRAVENOUS at 17:36

## 2021-07-23 ASSESSMENT — PAIN DESCRIPTION - LOCATION: LOCATION: HEAD

## 2021-07-23 ASSESSMENT — PAIN SCALES - GENERAL
PAINLEVEL_OUTOF10: 8
PAINLEVEL_OUTOF10: 8

## 2021-07-23 ASSESSMENT — PAIN DESCRIPTION - PAIN TYPE: TYPE: ACUTE PAIN

## 2021-07-23 NOTE — ED PROVIDER NOTES
[facility]        Department of Emergency Medicine   ED  Provider Note  Admit Date/RoomTime: 2021  4:51 PM  ED Room:      Chief Complaint:   Headache (x2 weeks intermittently, no hx headaches, improves w ibuprofen, left eye blurry) and Emesis (started today, 6x, denies blood )    History of Present Illness      Judy Gautam is a 22 y.o. old female who presents to the ED for headache for the past 2 weeks. She states the headache is located to the top of her head, posterior scalp, and does radiate down her neck. She denies any history of frequent headaches or migraines. Patient states she has also had vomiting that began today. She last vomited around 3 PM.  Patient states her headache does somewhat improved with ibuprofen but it does not last long. She does report occasional blurriness to her vision, left eye greater than right. She states she also has photophobia and phonophobia. She denies any trauma or injury. Patient denies any chest pain, shortness of breath, cough, congestion, fever/chills, urinary complaints, rash, or difficulty with ambulation or balance. Patient is alert and oriented x3 and in mild distress due to pain at this exam.  She does have dry heaves. She is nontoxic-appearing. ROS   Pertinent positives and negatives are stated within HPI, all other systems reviewed and are negative. Past Medical History:  has a past medical history of Depression, IBS (irritable bowel syndrome), Incisional hernia, and Seizure ([facility] [address] Past Surgical History:  has a past surgical history that includes Colonoscopy (2012); Upper gastrointestinal endoscopy (2012); Upper gastrointestinal endoscopy; Colonoscopy;  section (1/3/2015); Upper gastrointestinal endoscopy (2017); Dilation and curettage of uterus; Cholecystectomy (2017); pr  delivery only (N/A, 2018); laparoscopy (N/A, 10/18/2019);  Hemorrhoid surgery (2020and 2021); and ventral hernia repair (N/A, 5/4/2021). Social History:  reports that she quit smoking about 8 years ago. Her smoking use included cigarettes. She quit after 2.00 years of use. She has never used smokeless tobacco. She reports that she does not drink alcohol and does not use drugs. Family History: family history includes Cancer in her father. The patients home medications have been reviewed. Allergies: Naproxen  Allergies have been reviewed with patient. Physical Exam   VS:  /69   Pulse 71   Temp 98.9 °F (37.2 °C) (Oral)   Resp 16   Ht 5' 3\" (1.6 m)   Wt 110 lb (49.9 kg)   SpO2 100%   BMI 19.49 kg/m²      Oxygen Saturation Interpretation: Normal.    Constitutional:  Alert and oriented x4, development consistent with age, NAD  HEENT:  NC/NT, PERRLA, EOMI,  TMs normal bilaterally, Airway patent, No pharyngeal erythema, scalp tenderness  Neck:  Normal ROM. Supple. No meningeal signs. No lymphadenopathy, bilateral paravertebral tenderness  Respiratory:  Clear to auscultation and breath sounds equal. No respiratory distress  CV:  Regular rate and rhythm  GI:  Abdomen soft, nontender, No firm or pulsatile mass, No guarding or rigidity   Back:  No costovertebral tenderness. No midline tenderness   Extremities: No tenderness or edema noted. Integument:  Normal turgor. Warm, dry, without visible rash, unless noted elsewhere. Neurological: GCS 15. CNII-XII grossly intact. Motor functions intact. Lab / Imaging Results   (All laboratory and radiology results have been personally reviewed by myself)  Labs:  Results for orders placed or performed during the hospital encounter of 07/23/21   POC Pregnancy Urine Qual   Result Value Ref Range    HCG, Urine, POC Negative Negative    Lot Number [id number]     Positive QC Pass/Fail Pass     Negative QC Pass/Fail Pass      Imaging: All Radiology results interpreted by Radiologist unless otherwise noted.   CT HEAD WO CONTRAST   Final Result   No acute intracranial abnormality. ED Course / Medical Decision Making     Medications   ketorolac (TORADOL) injection 30 mg (30 mg Intravenous Given 7/23/21 1736)   promethazine (PHENERGAN) injection 12.5 mg (12.5 mg Intravenous Given 7/23/21 1736)   0.9 % sodium chloride bolus (1,000 mLs Intravenous New Bag 7/23/21 1734)     Re-examination:   Patients symptoms are improving. Consult(s):  None    Procedure(s):  none    MDM:      Counseling: The emergency provider has spoken with the patient/caregiver and discussed todays results, in addition to providing specific details for the plan of care and counseling regarding the diagnosis and prognosis. Questions are answered at this time and they are agreeable with the plan. All results reviewed with pt and all questions answered. Patient understands she must follow-up with PCP. They were advised to return to ED if symptoms worsen or if new symptoms develop. Pt remained nontoxic, afebrile, and A&O x4 during this ED visit. They agreed with plan of care, discharge, and importance of follow-up. Pt was in no distress at discharge. Vitals stable. They were educated on newly prescribed medications. Assessment      1. Tension headache    2. Non-intractable vomiting with nausea, unspecified vomiting type      Plan   Discharge to home  Patient condition is good    New Medications     New Prescriptions    BUTALBITAL-ACETAMINOPHEN-CAFFEINE (FIORICET, ESGIC) -40 MG PER TABLET    Take 0.5-1 tablets by mouth every 8 hours as needed for Headaches    ORPHENADRINE (NORFLEX) 100 MG EXTENDED RELEASE TABLET    Take 1 tablet by mouth 2 times daily for 10 days    PROMETHAZINE (PHENERGAN) 25 MG TABLET    Take 1 tablet by mouth every 6 hours as needed for Nausea       Electronically signed by Purvi Soni PA-C   DD: 7/23/21  **This report was transcribed using voice recognition software.  Every effort was made to ensure accuracy; however, inadvertent computerized transcription errors may be present.     END OF ED PROVIDER NOTE        Lane Alpers, PA-C  07/23/21 0138

## 2021-09-20 ENCOUNTER — APPOINTMENT (OUTPATIENT)
Dept: ULTRASOUND IMAGING | Age: 26
End: 2021-09-20
Payer: COMMERCIAL

## 2021-09-20 ENCOUNTER — HOSPITAL ENCOUNTER (EMERGENCY)
Age: 26
Discharge: LEFT BEFORE DISCHARGE | End: 2021-09-20
Payer: COMMERCIAL

## 2021-09-20 VITALS
BODY MASS INDEX: 19.31 KG/M2 | DIASTOLIC BLOOD PRESSURE: 51 MMHG | TEMPERATURE: 97.8 F | SYSTOLIC BLOOD PRESSURE: 97 MMHG | OXYGEN SATURATION: 98 % | HEART RATE: 84 BPM | WEIGHT: 109 LBS | RESPIRATION RATE: 16 BRPM | HEIGHT: 63 IN

## 2021-09-20 LAB
ALBUMIN SERPL-MCNC: 4.5 G/DL (ref 3.5–5.2)
ALP BLD-CCNC: 54 U/L (ref 35–104)
ALT SERPL-CCNC: 12 U/L (ref 0–32)
ANION GAP SERPL CALCULATED.3IONS-SCNC: 12 MMOL/L (ref 7–16)
AST SERPL-CCNC: 13 U/L (ref 0–31)
BASOPHILS ABSOLUTE: 0.05 E9/L (ref 0–0.2)
BASOPHILS RELATIVE PERCENT: 0.5 % (ref 0–2)
BILIRUB SERPL-MCNC: 0.5 MG/DL (ref 0–1.2)
BILIRUBIN URINE: NEGATIVE
BLOOD, URINE: NEGATIVE
BUN BLDV-MCNC: 7 MG/DL (ref 6–20)
CALCIUM SERPL-MCNC: 9.4 MG/DL (ref 8.6–10.2)
CHLORIDE BLD-SCNC: 104 MMOL/L (ref 98–107)
CLARITY: CLEAR
CO2: 21 MMOL/L (ref 22–29)
COLOR: YELLOW
CREAT SERPL-MCNC: 0.5 MG/DL (ref 0.5–1)
EOSINOPHILS ABSOLUTE: 0.07 E9/L (ref 0.05–0.5)
EOSINOPHILS RELATIVE PERCENT: 0.7 % (ref 0–6)
GFR AFRICAN AMERICAN: >60
GFR NON-AFRICAN AMERICAN: >60 ML/MIN/1.73
GLUCOSE BLD-MCNC: 79 MG/DL (ref 74–99)
GLUCOSE URINE: NEGATIVE MG/DL
GONADOTROPIN, CHORIONIC (HCG) QUANT: ABNORMAL MIU/ML
HCT VFR BLD CALC: 40.1 % (ref 34–48)
HEMOGLOBIN: 13.3 G/DL (ref 11.5–15.5)
IMMATURE GRANULOCYTES #: 0.04 E9/L
IMMATURE GRANULOCYTES %: 0.4 % (ref 0–5)
KETONES, URINE: >=80 MG/DL
LACTIC ACID: 0.8 MMOL/L (ref 0.5–2.2)
LEUKOCYTE ESTERASE, URINE: NEGATIVE
LIPASE: 21 U/L (ref 13–60)
LYMPHOCYTES ABSOLUTE: 2.18 E9/L (ref 1.5–4)
LYMPHOCYTES RELATIVE PERCENT: 22.4 % (ref 20–42)
MAGNESIUM: 1.9 MG/DL (ref 1.6–2.6)
MCH RBC QN AUTO: 29.8 PG (ref 26–35)
MCHC RBC AUTO-ENTMCNC: 33.2 % (ref 32–34.5)
MCV RBC AUTO: 89.7 FL (ref 80–99.9)
MONOCYTES ABSOLUTE: 0.78 E9/L (ref 0.1–0.95)
MONOCYTES RELATIVE PERCENT: 8 % (ref 2–12)
NEUTROPHILS ABSOLUTE: 6.62 E9/L (ref 1.8–7.3)
NEUTROPHILS RELATIVE PERCENT: 68 % (ref 43–80)
NITRITE, URINE: NEGATIVE
PDW BLD-RTO: 12.6 FL (ref 11.5–15)
PH UA: 6 (ref 5–9)
PLATELET # BLD: 230 E9/L (ref 130–450)
PMV BLD AUTO: 10.8 FL (ref 7–12)
POTASSIUM REFLEX MAGNESIUM: 3.7 MMOL/L (ref 3.5–5)
PROTEIN UA: NEGATIVE MG/DL
RBC # BLD: 4.47 E12/L (ref 3.5–5.5)
SODIUM BLD-SCNC: 137 MMOL/L (ref 132–146)
SPECIFIC GRAVITY UA: >=1.03 (ref 1–1.03)
TOTAL PROTEIN: 7.2 G/DL (ref 6.4–8.3)
UROBILINOGEN, URINE: 0.2 E.U./DL
WBC # BLD: 9.7 E9/L (ref 4.5–11.5)

## 2021-09-20 PROCEDURE — 84702 CHORIONIC GONADOTROPIN TEST: CPT

## 2021-09-20 PROCEDURE — 83735 ASSAY OF MAGNESIUM: CPT

## 2021-09-20 PROCEDURE — 76817 TRANSVAGINAL US OBSTETRIC: CPT

## 2021-09-20 PROCEDURE — 85025 COMPLETE CBC W/AUTO DIFF WBC: CPT

## 2021-09-20 PROCEDURE — 81003 URINALYSIS AUTO W/O SCOPE: CPT

## 2021-09-20 PROCEDURE — 80053 COMPREHEN METABOLIC PANEL: CPT

## 2021-09-20 PROCEDURE — 83605 ASSAY OF LACTIC ACID: CPT

## 2021-09-20 PROCEDURE — 93975 VASCULAR STUDY: CPT

## 2021-09-20 PROCEDURE — 4500000002 HC ER NO CHARGE

## 2021-09-20 PROCEDURE — 87088 URINE BACTERIA CULTURE: CPT

## 2021-09-20 PROCEDURE — 83690 ASSAY OF LIPASE: CPT

## 2021-09-20 NOTE — ED NOTES
FIRST PROVIDER CONTACT ASSESSMENT NOTE      Department of Emergency Medicine   21  4:51 PM EDT    Chief Complaint: Abdominal Cramping (6-7 weeks pregnant), Nausea, and Emesis During Pregnancy      History of Present Illness:   Krzysztof Quiñonez is a 32 y.o. female who presents to the ED for lower abdominal cramping with nausea and vomiting during early pregnancy. She states she is currently about 6 to 7 weeks pregnant. Has not had her first OB/GYN appointment due to [facility] and they do not do pregnancy. She is currently searching for an OB/GYN. She denies any vaginal bleeding or discharge. She is O+ blood type. Medical History:  has a past medical history of Depression, IBS (irritable bowel syndrome), Incisional hernia, and Seizure ([facility] [address] Surgical History:  has a past surgical history that includes Colonoscopy (2012); Upper gastrointestinal endoscopy (2012); Upper gastrointestinal endoscopy; Colonoscopy;  section (1/3/2015); Upper gastrointestinal endoscopy (2017); Dilation and curettage of uterus; Cholecystectomy (2017); pr  delivery only (N/A, 2018); laparoscopy (N/A, 10/18/2019); Hemorrhoid surgery (2020and 2021); and ventral hernia repair (N/A, 2021). Social History:  reports that she quit smoking about 8 years ago. Her smoking use included cigarettes. She quit after 2.00 years of use. She has never used smokeless tobacco. She reports that she does not drink alcohol and does not use drugs. Family History: family history includes Cancer in her father.     *ALLERGIES*     Naproxen     Physical Exam:      VS:  BP (!) 97/51   Pulse 84   Temp 97.8 °F (36.6 °C)   Resp 16   Ht 5' 3\" (1.6 m)   Wt 109 lb (49.4 kg)   SpO2 98%   BMI 19.31 kg/m²      Initial Plan of Care:  Initiate Treatment-Testing, Proceed toTreatment Area When Bed Available for ED Attending/MLP to Continue Care    -----------------END OF FIRST PROVIDER CONTACT ASSESSMENT NOTE--------------  Electronically signed by ERMIAS Javier CNP   DD: 9/20/21             ERMIAS Javier CNP  09/20/21 3909

## 2021-09-21 NOTE — ED NOTES
Patient presents to the desk stating that she leaving. Patient educated on bed availabilty and and still ready to leave. Patient instructed on the risk of leaving.       Álvaro Meyer RN  09/20/21 2028

## 2021-09-22 LAB — URINE CULTURE, ROUTINE: NORMAL

## 2021-10-10 ENCOUNTER — HOSPITAL ENCOUNTER (EMERGENCY)
Age: 26
Discharge: HOME | End: 2021-10-10
Attending: EMERGENCY MEDICINE
Payer: COMMERCIAL

## 2021-10-10 ENCOUNTER — APPOINTMENT (OUTPATIENT)
Dept: ULTRASOUND IMAGING | Age: 26
End: 2021-10-10
Payer: COMMERCIAL

## 2021-10-10 VITALS
HEART RATE: 83 BPM | OXYGEN SATURATION: 99 % | WEIGHT: 109 LBS | BODY MASS INDEX: 20.06 KG/M2 | TEMPERATURE: 98.9 F | HEIGHT: 62 IN | RESPIRATION RATE: 18 BRPM | DIASTOLIC BLOOD PRESSURE: 65 MMHG | SYSTOLIC BLOOD PRESSURE: 103 MMHG

## 2021-10-10 DIAGNOSIS — N93.9 VAGINAL BLEEDING: Primary | ICD-10-CM

## 2021-10-10 DIAGNOSIS — O20.0 THREATENED ABORTION IN FIRST TRIMESTER: ICD-10-CM

## 2021-10-10 LAB
ALBUMIN SERPL-MCNC: 4.1 G/DL (ref 3.5–5.2)
ALP BLD-CCNC: 49 U/L (ref 35–104)
ALT SERPL-CCNC: 19 U/L (ref 0–32)
ANION GAP SERPL CALCULATED.3IONS-SCNC: 9 MMOL/L (ref 7–16)
AST SERPL-CCNC: 17 U/L (ref 0–31)
BACTERIA: ABNORMAL /HPF
BASOPHILS ABSOLUTE: 0.03 E9/L (ref 0–0.2)
BASOPHILS RELATIVE PERCENT: 0.3 % (ref 0–2)
BILIRUB SERPL-MCNC: 0.3 MG/DL (ref 0–1.2)
BILIRUBIN URINE: NEGATIVE
BLOOD, URINE: ABNORMAL
BUN BLDV-MCNC: 11 MG/DL (ref 6–20)
CALCIUM SERPL-MCNC: 9 MG/DL (ref 8.6–10.2)
CHLORIDE BLD-SCNC: 101 MMOL/L (ref 98–107)
CLARITY: CLEAR
CO2: 23 MMOL/L (ref 22–29)
COLOR: YELLOW
CREAT SERPL-MCNC: 0.6 MG/DL (ref 0.5–1)
EOSINOPHILS ABSOLUTE: 0.06 E9/L (ref 0.05–0.5)
EOSINOPHILS RELATIVE PERCENT: 0.6 % (ref 0–6)
EPITHELIAL CELLS, UA: ABNORMAL /HPF
GFR AFRICAN AMERICAN: >60
GFR NON-AFRICAN AMERICAN: >60 ML/MIN/1.73
GLUCOSE BLD-MCNC: 84 MG/DL (ref 74–99)
GLUCOSE URINE: NEGATIVE MG/DL
GONADOTROPIN, CHORIONIC (HCG) QUANT: ABNORMAL MIU/ML
HCT VFR BLD CALC: 39.3 % (ref 34–48)
HEMOGLOBIN: 12.9 G/DL (ref 11.5–15.5)
IMMATURE GRANULOCYTES #: 0.08 E9/L
IMMATURE GRANULOCYTES %: 0.8 % (ref 0–5)
KETONES, URINE: NEGATIVE MG/DL
LEUKOCYTE ESTERASE, URINE: ABNORMAL
LYMPHOCYTES ABSOLUTE: 1.86 E9/L (ref 1.5–4)
LYMPHOCYTES RELATIVE PERCENT: 18.9 % (ref 20–42)
MCH RBC QN AUTO: 29.3 PG (ref 26–35)
MCHC RBC AUTO-ENTMCNC: 32.8 % (ref 32–34.5)
MCV RBC AUTO: 89.1 FL (ref 80–99.9)
MONOCYTES ABSOLUTE: 0.85 E9/L (ref 0.1–0.95)
MONOCYTES RELATIVE PERCENT: 8.6 % (ref 2–12)
NEUTROPHILS ABSOLUTE: 6.95 E9/L (ref 1.8–7.3)
NEUTROPHILS RELATIVE PERCENT: 70.8 % (ref 43–80)
NITRITE, URINE: NEGATIVE
PDW BLD-RTO: 12.7 FL (ref 11.5–15)
PH UA: 7.5 (ref 5–9)
PLATELET # BLD: 211 E9/L (ref 130–450)
PMV BLD AUTO: 11.2 FL (ref 7–12)
POTASSIUM REFLEX MAGNESIUM: 3.8 MMOL/L (ref 3.5–5)
PROTEIN UA: NEGATIVE MG/DL
RBC # BLD: 4.41 E12/L (ref 3.5–5.5)
RBC UA: ABNORMAL /HPF (ref 0–2)
SODIUM BLD-SCNC: 133 MMOL/L (ref 132–146)
SPECIFIC GRAVITY UA: 1.02 (ref 1–1.03)
TOTAL PROTEIN: 6.8 G/DL (ref 6.4–8.3)
UROBILINOGEN, URINE: 0.2 E.U./DL
WBC # BLD: 9.8 E9/L (ref 4.5–11.5)
WBC UA: ABNORMAL /HPF (ref 0–5)

## 2021-10-10 PROCEDURE — 84702 CHORIONIC GONADOTROPIN TEST: CPT

## 2021-10-10 PROCEDURE — 85025 COMPLETE CBC W/AUTO DIFF WBC: CPT

## 2021-10-10 PROCEDURE — 81001 URINALYSIS AUTO W/SCOPE: CPT

## 2021-10-10 PROCEDURE — 87088 URINE BACTERIA CULTURE: CPT

## 2021-10-10 PROCEDURE — 76801 OB US < 14 WKS SINGLE FETUS: CPT

## 2021-10-10 PROCEDURE — 80053 COMPREHEN METABOLIC PANEL: CPT

## 2021-10-10 PROCEDURE — 99284 EMERGENCY DEPT VISIT MOD MDM: CPT

## 2021-10-10 RX ORDER — CEPHALEXIN 500 MG/1
500 CAPSULE ORAL 2 TIMES DAILY
Qty: 20 CAPSULE | Refills: 0 | Status: SHIPPED | OUTPATIENT
Start: 2021-10-10 | End: 2021-10-20

## 2021-10-10 ASSESSMENT — PAIN DESCRIPTION - LOCATION: LOCATION: HEAD

## 2021-10-10 ASSESSMENT — PAIN SCALES - GENERAL: PAINLEVEL_OUTOF10: 4

## 2021-10-10 ASSESSMENT — PAIN DESCRIPTION - PAIN TYPE: TYPE: ACUTE PAIN

## 2021-10-10 NOTE — ED NOTES
FIRST PROVIDER CONTACT ASSESSMENT NOTE      Department of Emergency Medicine   10/10/21  3:49 PM EDT    Chief Complaint: Vaginal Bleeding (9 weeks pregnant, bleeding x 2 days )      History of Present Illness:   Frank Lagunas is a 32 y.o. female who presents to the ED for 9 weeks and 5 days pregnant and had vaginal bleeding for last 2 days with no clots. She also states she has low abdominal cramping. This is her third pregnancy follow-up with Dr. Joseph Castrejon. She is O+ blood type. Denies any recent trauma or sexual intercourse. Medical History:  has a past medical history of Depression, IBS (irritable bowel syndrome), Incisional hernia, and Seizure ([facility] [address] Surgical History:  has a past surgical history that includes Colonoscopy (2012); Upper gastrointestinal endoscopy (2012); Upper gastrointestinal endoscopy; Colonoscopy;  section (1/3/2015); Upper gastrointestinal endoscopy (2017); Dilation and curettage of uterus; Cholecystectomy (2017); pr  delivery only (N/A, 2018); laparoscopy (N/A, 10/18/2019); Hemorrhoid surgery (2020and 2021); and ventral hernia repair (N/A, 2021). Social History:  reports that she quit smoking about 8 years ago. Her smoking use included cigarettes. She quit after 2.00 years of use. She has never used smokeless tobacco. She reports that she does not drink alcohol and does not use drugs. Family History: family history includes Cancer in her father.     *ALLERGIES*     Naproxen     Physical Exam:      VS:  BP (!) 124/59   Pulse 98   Temp 97 °F (36.1 °C) (Tympanic)   Resp 20   Ht 5' 2\" (1.575 m)   Wt 109 lb (49.4 kg)   SpO2 99%   BMI 19.94 kg/m²      Initial Plan of Care:  Initiate Treatment-Testing, Proceed toTreatment Area When Bed Available for ED Attending/MLP to Continue Care    -----------------END OF FIRST PROVIDER CONTACT ASSESSMENT NOTE--------------  Electronically signed by ERMIAS Moeller - DONNIE   DD: 10/10/21             Milas Hodgkins, APRN - CNP  10/10/21 1544

## 2021-10-10 NOTE — ED PROVIDER NOTES
[address]      Pt Name: Dana Bob  MRN: [id number]  [place] 1995  Date of evaluation: 10/10/2021      CHIEF COMPLAINT       Chief Complaint   Patient presents with    Vaginal Bleeding     9 weeks pregnant, bleeding x 2 days, intermittent bleeding     Abdominal Pain     cramping started today     Dizziness    Nausea    Emesis        HPI  Dana Bob is a 32 y.o. female  @10wks presents with vaginal bleeding started today. Patient states that she was at a friend's party went to the bathroom and found that she was bleeding, she soaked through toilet paper at that time. Describes dark red, no clots, denies any vaginal discharge or gush of fluids. She also complaining of abdominal pain in the lower abdominal quadrants and suprapubic area. She describe it as cramping, rated 4 out of 10, nonradiating, no alleviating or aggravating factors. Associated with nausea and vomiting, nonbilious nonbloody. States she has been constipated, she is passing gas and her bowel movements have been small. Denies any blood in stool. Patient endorses lightheadedness specially when standing however no episodes of syncope or loss of consciousness. Patient is O positive, denies any social intercourse or recent trauma. She has a history of  with no complications on previous pregnancies. Except as noted above the remainder of the review of systems was reviewed and negative. Review of Systems   Constitutional: Negative for activity change, appetite change, chills, fatigue and fever. HENT: Negative for congestion, nosebleeds and tinnitus. Eyes: Negative for visual disturbance. Respiratory: Negative for cough, chest tightness, shortness of breath and wheezing. Cardiovascular: Negative for chest pain, palpitations and leg swelling.    Gastrointestinal: Positive for abdominal pain, nausea and vomiting. Negative for blood in stool, constipation and diarrhea. Genitourinary: Positive for vaginal bleeding. Negative for dysuria, flank pain, hematuria, vaginal discharge and vaginal pain. Skin: Negative for rash. Neurological: Negative for dizziness, syncope, weakness, numbness and headaches. Physical Exam  Vitals and nursing note reviewed. Constitutional:       General: She is not in acute distress. Appearance: Normal appearance. She is normal weight. She is not ill-appearing, toxic-appearing or diaphoretic. HENT:      Head: Normocephalic and atraumatic. Right Ear: External ear normal.      Left Ear: External ear normal.      Nose: Nose normal. No congestion or rhinorrhea. Mouth/Throat:      Mouth: Mucous membranes are moist.      Pharynx: Oropharynx is clear. No oropharyngeal exudate or posterior oropharyngeal erythema. Eyes:      Conjunctiva/sclera: Conjunctivae normal.      Pupils: Pupils are equal, round, and reactive to light. Cardiovascular:      Rate and Rhythm: Normal rate and regular rhythm. Pulses: Normal pulses. Heart sounds: Normal heart sounds. Pulmonary:      Effort: Pulmonary effort is normal.      Breath sounds: Normal breath sounds. Abdominal:      General: Abdomen is flat. Bowel sounds are normal. There is no distension. Palpations: Abdomen is soft. There is no mass. Tenderness: There is abdominal tenderness in the right upper quadrant, right lower quadrant and epigastric area. There is no right CVA tenderness, left CVA tenderness or guarding. Negative signs include Martínez's sign and McBurney's sign. Hernia: No hernia is present. Musculoskeletal:      Cervical back: Normal range of motion. Skin:     General: Skin is warm and dry. Capillary Refill: Capillary refill takes less than 2 seconds. Neurological:      General: No focal deficit present. Mental Status: She is alert and oriented to person, place, and time. Cholecystectomy (2017); pr  delivery only (N/A, 2018); laparoscopy (N/A, 10/18/2019); Hemorrhoid surgery (2020and 2021); and ventral hernia repair (N/A, 2021). Social History:  reports that she quit smoking about 8 years ago. Her smoking use included cigarettes. She quit after 2.00 years of use. She has never used smokeless tobacco. She reports that she does not drink alcohol and does not use drugs. Family History: family history includes Cancer in her father. The patients home medications have been reviewed.     Allergies: Naproxen    -------------------------------------------------- RESULTS -------------------------------------------------  Labs:  Results for orders placed or performed during the hospital encounter of 10/10/21   Culture, Urine    Specimen: Urine, clean catch   Result Value Ref Range    Urine Culture, Routine Growth not present, incubation continues    CBC Auto Differential   Result Value Ref Range    WBC 9.8 4.5 - 11.5 E9/L    RBC 4.41 3.50 - 5.50 E12/L    Hemoglobin 12.9 11.5 - 15.5 g/dL    Hematocrit 39.3 34.0 - 48.0 %    MCV 89.1 80.0 - 99.9 fL    MCH 29.3 26.0 - 35.0 pg    MCHC 32.8 32.0 - 34.5 %    RDW 12.7 11.5 - 15.0 fL    Platelets 810 047 - 352 E9/L    MPV 11.2 7.0 - 12.0 fL    Neutrophils % 70.8 43.0 - 80.0 %    Immature Granulocytes % 0.8 0.0 - 5.0 %    Lymphocytes % 18.9 (L) 20.0 - 42.0 %    Monocytes % 8.6 2.0 - 12.0 %    Eosinophils % 0.6 0.0 - 6.0 %    Basophils % 0.3 0.0 - 2.0 %    Neutrophils Absolute 6.95 1.80 - 7.30 E9/L    Immature Granulocytes # 0.08 E9/L    Lymphocytes Absolute 1.86 1.50 - 4.00 E9/L    Monocytes Absolute 0.85 0.10 - 0.95 E9/L    Eosinophils Absolute 0.06 0.05 - 0.50 E9/L    Basophils Absolute 0.03 0.00 - 0.20 E9/L   Comprehensive Metabolic Panel w/ Reflex to MG   Result Value Ref Range    Sodium 133 132 - 146 mmol/L    Potassium reflex Magnesium 3.8 3.5 - 5.0 mmol/L    Chloride 101 98 - 107 mmol/L    CO2 23 22 - 29 mmol/L Anion Gap 9 7 - 16 mmol/L    Glucose 84 74 - 99 mg/dL    BUN 11 6 - 20 mg/dL    CREATININE 0.6 0.5 - 1.0 mg/dL    GFR Non-African American >60 >=60 mL/min/1.73    GFR African American >60     Calcium 9.0 8.6 - 10.2 mg/dL    Total Protein 6.8 6.4 - 8.3 g/dL    Albumin 4.1 3.5 - 5.2 g/dL    Total Bilirubin 0.3 0.0 - 1.2 mg/dL    Alkaline Phosphatase 49 35 - 104 U/L    ALT 19 0 - 32 U/L    AST 17 0 - 31 U/L   Urinalysis, reflex to microscopic   Result Value Ref Range    Color, UA Yellow Straw/Yellow    Clarity, UA Clear Clear    Glucose, Ur Negative Negative mg/dL    Bilirubin Urine Negative Negative    Ketones, Urine Negative Negative mg/dL    Specific Gravity, UA 1.020 1.005 - 1.030    Blood, Urine MODERATE (A) Negative    pH, UA 7.5 5.0 - 9.0    Protein, UA Negative Negative mg/dL    Urobilinogen, Urine 0.2 <2.0 E.U./dL    Nitrite, Urine Negative Negative    Leukocyte Esterase, Urine TRACE (A) Negative   HCG, Quantitative, Pregnancy   Result Value Ref Range    hCG Quant 869844.0 (H) <10 mIU/mL   Microscopic Urinalysis   Result Value Ref Range    WBC, UA 0-1 0 - 5 /HPF    RBC, UA 0-1 0 - 2 /HPF    Epithelial Cells, UA RARE /HPF    Bacteria, UA RARE (A) None Seen /HPF       Radiology:  US OB LESS THAN 14 WEEKS SINGLE OR FIRST GESTATION   Final Result   Single live intrauterine gestation with estimated gestational age by   ultrasound of 10 weeks +/-6 days. Fetal heart rate measures 165 beats per   minute. Slightly decreased size of a right ovarian corpus luteum cyst, measuring up   to 3.5 cm on this exam.             ------------------------- NURSING NOTES AND VITALS REVIEWED ---------------------------  Date / Time Roomed:  10/10/2021  3:49 PM  ED Bed Assignment:  [id number]    The nursing notes within the ED encounter and vital signs as below have been reviewed.    /65   Pulse 83   Temp 98.9 °F (37.2 °C) (Oral)   Resp 18   Ht 5' 2\" (1.575 m)   Wt 109 lb (49.4 kg)   SpO2 99%   BMI 19.94 kg/m² Oxygen Saturation Interpretation: Normal      ------------------------------------------ PROGRESS NOTES ------------------------------------------  6:45 AM EDT  I have spoken with the patient and discussed todays results, in addition to providing specific details for the plan of care and counseling regarding the diagnosis and prognosis. Their questions are answered at this time and they are agreeable with the plan. I discussed at length with them reasons for immediate return here for re evaluation. They will followup with their ob by calling their office tomorrow. --------------------------------- ADDITIONAL PROVIDER NOTES ---------------------------------  At this time the patient is without objective evidence of an acute process requiring hospitalization or inpatient management. They have remained hemodynamically stable throughout their entire ED visit and are stable for discharge with outpatient follow-up. The plan has been discussed in detail and they are aware of the specific conditions for emergent return, as well as the importance of follow-up. Discharge Medication List as of 10/10/2021  6:31 PM      START taking these medications    Details   cephALEXin (KEFLEX) 500 MG capsule Take 1 capsule by mouth 2 times daily for 10 days, Disp-20 capsule, R-0Print             Diagnosis:  1. Vaginal bleeding    2. Threatened  in first trimester        Disposition:  Patient's disposition: Discharge to home  Patient's condition is stable.        Yannick Fajardo MD  Resident  10/12/21 0685

## 2021-10-12 ASSESSMENT — ENCOUNTER SYMPTOMS
BLOOD IN STOOL: 0
CONSTIPATION: 0
WHEEZING: 0
CHEST TIGHTNESS: 0
ABDOMINAL PAIN: 1
NAUSEA: 1
SHORTNESS OF BREATH: 0
VOMITING: 1
COUGH: 0
DIARRHEA: 0

## 2021-10-13 LAB — URINE CULTURE, ROUTINE: NORMAL

## 2021-10-17 ENCOUNTER — APPOINTMENT (OUTPATIENT)
Dept: ULTRASOUND IMAGING | Age: 26
End: 2021-10-17
Payer: COMMERCIAL

## 2021-10-17 ENCOUNTER — HOSPITAL ENCOUNTER (EMERGENCY)
Age: 26
Discharge: HOME | End: 2021-10-17
Attending: EMERGENCY MEDICINE
Payer: COMMERCIAL

## 2021-10-17 VITALS
BODY MASS INDEX: 19.31 KG/M2 | SYSTOLIC BLOOD PRESSURE: 96 MMHG | OXYGEN SATURATION: 99 % | WEIGHT: 109 LBS | DIASTOLIC BLOOD PRESSURE: 54 MMHG | HEART RATE: 89 BPM | HEIGHT: 63 IN | TEMPERATURE: 97 F | RESPIRATION RATE: 15 BRPM

## 2021-10-17 DIAGNOSIS — O20.0 THREATENED MISCARRIAGE: ICD-10-CM

## 2021-10-17 DIAGNOSIS — N83.10: ICD-10-CM

## 2021-10-17 DIAGNOSIS — O46.90 VAGINAL BLEEDING IN PREGNANCY: Primary | ICD-10-CM

## 2021-10-17 LAB
ALBUMIN SERPL-MCNC: 3.9 G/DL (ref 3.5–5.2)
ALP BLD-CCNC: 52 U/L (ref 35–104)
ALT SERPL-CCNC: 14 U/L (ref 0–32)
ANION GAP SERPL CALCULATED.3IONS-SCNC: 13 MMOL/L (ref 7–16)
AST SERPL-CCNC: 15 U/L (ref 0–31)
BACTERIA: ABNORMAL /HPF
BASOPHILS ABSOLUTE: 0.04 E9/L (ref 0–0.2)
BASOPHILS RELATIVE PERCENT: 0.4 % (ref 0–2)
BILIRUB SERPL-MCNC: 0.3 MG/DL (ref 0–1.2)
BILIRUBIN URINE: NEGATIVE
BLOOD, URINE: ABNORMAL
BUN BLDV-MCNC: 9 MG/DL (ref 6–20)
CALCIUM SERPL-MCNC: 9.1 MG/DL (ref 8.6–10.2)
CHLORIDE BLD-SCNC: 102 MMOL/L (ref 98–107)
CLARITY: CLEAR
CO2: 20 MMOL/L (ref 22–29)
COLOR: YELLOW
CREAT SERPL-MCNC: 0.6 MG/DL (ref 0.5–1)
EOSINOPHILS ABSOLUTE: 0.04 E9/L (ref 0.05–0.5)
EOSINOPHILS RELATIVE PERCENT: 0.4 % (ref 0–6)
GFR AFRICAN AMERICAN: >60
GFR NON-AFRICAN AMERICAN: >60 ML/MIN/1.73
GLUCOSE BLD-MCNC: 93 MG/DL (ref 74–99)
GLUCOSE URINE: NEGATIVE MG/DL
GONADOTROPIN, CHORIONIC (HCG) QUANT: ABNORMAL MIU/ML
HCT VFR BLD CALC: 37.6 % (ref 34–48)
HEMOGLOBIN: 12.7 G/DL (ref 11.5–15.5)
IMMATURE GRANULOCYTES #: 0.09 E9/L
IMMATURE GRANULOCYTES %: 0.9 % (ref 0–5)
KETONES, URINE: >=80 MG/DL
LEUKOCYTE ESTERASE, URINE: NEGATIVE
LYMPHOCYTES ABSOLUTE: 1.33 E9/L (ref 1.5–4)
LYMPHOCYTES RELATIVE PERCENT: 13.1 % (ref 20–42)
MCH RBC QN AUTO: 29.5 PG (ref 26–35)
MCHC RBC AUTO-ENTMCNC: 33.8 % (ref 32–34.5)
MCV RBC AUTO: 87.2 FL (ref 80–99.9)
MONOCYTES ABSOLUTE: 0.63 E9/L (ref 0.1–0.95)
MONOCYTES RELATIVE PERCENT: 6.2 % (ref 2–12)
NEUTROPHILS ABSOLUTE: 8.06 E9/L (ref 1.8–7.3)
NEUTROPHILS RELATIVE PERCENT: 79 % (ref 43–80)
NITRITE, URINE: NEGATIVE
PDW BLD-RTO: 12.8 FL (ref 11.5–15)
PH UA: 5.5 (ref 5–9)
PLATELET # BLD: 204 E9/L (ref 130–450)
PMV BLD AUTO: 11 FL (ref 7–12)
POTASSIUM REFLEX MAGNESIUM: 3.6 MMOL/L (ref 3.5–5)
PROTEIN UA: ABNORMAL MG/DL
RBC # BLD: 4.31 E12/L (ref 3.5–5.5)
RBC UA: ABNORMAL /HPF (ref 0–2)
SODIUM BLD-SCNC: 135 MMOL/L (ref 132–146)
SPECIFIC GRAVITY UA: >=1.03 (ref 1–1.03)
TOTAL PROTEIN: 6.9 G/DL (ref 6.4–8.3)
UROBILINOGEN, URINE: 0.2 E.U./DL
WBC # BLD: 10.2 E9/L (ref 4.5–11.5)
WBC UA: ABNORMAL /HPF (ref 0–5)

## 2021-10-17 PROCEDURE — 99284 EMERGENCY DEPT VISIT MOD MDM: CPT

## 2021-10-17 PROCEDURE — 76801 OB US < 14 WKS SINGLE FETUS: CPT

## 2021-10-17 PROCEDURE — 85025 COMPLETE CBC W/AUTO DIFF WBC: CPT

## 2021-10-17 PROCEDURE — 6360000002 HC RX W HCPCS: Performed by: EMERGENCY MEDICINE

## 2021-10-17 PROCEDURE — 80053 COMPREHEN METABOLIC PANEL: CPT

## 2021-10-17 PROCEDURE — 96361 HYDRATE IV INFUSION ADD-ON: CPT

## 2021-10-17 PROCEDURE — 81001 URINALYSIS AUTO W/SCOPE: CPT

## 2021-10-17 PROCEDURE — 87088 URINE BACTERIA CULTURE: CPT

## 2021-10-17 PROCEDURE — 96374 THER/PROPH/DIAG INJ IV PUSH: CPT

## 2021-10-17 PROCEDURE — 6370000000 HC RX 637 (ALT 250 FOR IP): Performed by: EMERGENCY MEDICINE

## 2021-10-17 PROCEDURE — 2580000003 HC RX 258: Performed by: EMERGENCY MEDICINE

## 2021-10-17 PROCEDURE — 84702 CHORIONIC GONADOTROPIN TEST: CPT

## 2021-10-17 RX ORDER — ACETAMINOPHEN 500 MG
1000 TABLET ORAL ONCE
Status: COMPLETED | OUTPATIENT
Start: 2021-10-17 | End: 2021-10-17

## 2021-10-17 RX ORDER — METOCLOPRAMIDE 10 MG/1
10 TABLET ORAL 4 TIMES DAILY PRN
Qty: 15 TABLET | Refills: 0 | Status: ON HOLD | OUTPATIENT
Start: 2021-10-17 | End: 2022-01-14 | Stop reason: HOSPADM

## 2021-10-17 RX ORDER — METOCLOPRAMIDE 10 MG/1
10 TABLET ORAL 4 TIMES DAILY
Qty: 120 TABLET | Refills: 0 | Status: ON HOLD | OUTPATIENT
Start: 2021-10-17 | End: 2022-01-14 | Stop reason: HOSPADM

## 2021-10-17 RX ORDER — METOCLOPRAMIDE HYDROCHLORIDE 5 MG/ML
10 INJECTION INTRAMUSCULAR; INTRAVENOUS ONCE
Status: COMPLETED | OUTPATIENT
Start: 2021-10-17 | End: 2021-10-17

## 2021-10-17 RX ORDER — 0.9 % SODIUM CHLORIDE 0.9 %
1000 INTRAVENOUS SOLUTION INTRAVENOUS ONCE
Status: COMPLETED | OUTPATIENT
Start: 2021-10-17 | End: 2021-10-17

## 2021-10-17 RX ADMIN — SODIUM CHLORIDE 1000 ML: 9 INJECTION, SOLUTION INTRAVENOUS at 13:47

## 2021-10-17 RX ADMIN — ACETAMINOPHEN 1000 MG: 500 TABLET ORAL at 13:52

## 2021-10-17 RX ADMIN — METOCLOPRAMIDE 10 MG: 5 INJECTION, SOLUTION INTRAMUSCULAR; INTRAVENOUS at 13:47

## 2021-10-17 ASSESSMENT — ENCOUNTER SYMPTOMS
COUGH: 0
SHORTNESS OF BREATH: 0
ABDOMINAL PAIN: 1
BACK PAIN: 0

## 2021-10-17 ASSESSMENT — PAIN SCALES - GENERAL
PAINLEVEL_OUTOF10: 10
PAINLEVEL_OUTOF10: 10

## 2021-10-17 ASSESSMENT — PAIN DESCRIPTION - LOCATION: LOCATION: ABDOMEN

## 2021-10-17 NOTE — ED PROVIDER NOTES
This is a 70-year-old female with no significant past medical history who is G3, P2 approximately 9 weeks pregnant presents to the ED for evaluation of abdominal pain. She states about 1 hour prior to arrival he was at [employer] when she developed some lower abdominal cramping-like sensation. States this pain seems to come in waves. Patient states that there is no radiating pain. Patient states she did urinate at the time and noticed some bright red/pinkish colored urine. Patient states she has no flank pain. Patient has no fevers or chills. States this is similar to when this occurred last week when she was here. The history is provided by the patient. Review of Systems   Constitutional: Negative for fever. HENT: Negative for congestion. Eyes: Negative for visual disturbance. Respiratory: Negative for cough and shortness of breath. Cardiovascular: Negative for chest pain. Gastrointestinal: Positive for abdominal pain. Endocrine: Negative for polyuria. Genitourinary: Positive for vaginal bleeding. Musculoskeletal: Negative for back pain. Skin: Negative for rash. Allergic/Immunologic: Negative for immunocompromised state. Neurological: Negative for headaches. Hematological: Does not bruise/bleed easily. Psychiatric/Behavioral: Negative for confusion. Physical Exam  Vitals and nursing note reviewed. Constitutional:       General: She is not in acute distress. Appearance: She is well-developed. HENT:      Head: Normocephalic and atraumatic. Mouth/Throat:      Mouth: Mucous membranes are moist.   Eyes:      Extraocular Movements: Extraocular movements intact. Pupils: Pupils are equal, round, and reactive to light. Neck:      Vascular: No JVD. Cardiovascular:      Rate and Rhythm: Normal rate and regular rhythm. Pulmonary:      Effort: Pulmonary effort is normal.      Breath sounds: No wheezing, rhonchi or rales.    Chest:      Chest wall: No tenderness. Abdominal:      General: There is no distension. Palpations: Abdomen is soft. Tenderness: There is no abdominal tenderness. There is no guarding or rebound. Hernia: No hernia is present. Musculoskeletal:      Cervical back: Normal range of motion and neck supple. Right lower leg: No edema. Left lower leg: No edema. Skin:     General: Skin is warm and dry. Capillary Refill: Capillary refill takes less than 2 seconds. Neurological:      General: No focal deficit present. Mental Status: She is alert and oriented to person, place, and time. Cranial Nerves: No cranial nerve deficit. Psychiatric:         Mood and Affect: Mood normal.         Behavior: Behavior normal.          Procedures     MDM  Number of Diagnoses or Management Options  Cyst, corpus, luteum ruptured  Threatened miscarriage  Vaginal bleeding in pregnancy  Diagnosis management comments: Patient presented to the ED for evaluation of abdominal pain coming in [place] Patient was nontoxic although did appear somewhat dehydrated. She was treated symptomatically and felt better on reccheck. US showed likely ruptured cyst but could not rule ectopic. Given patient's history and physical likely more likely a ruptured cyst. No active bleeding, hemoglobin stable. Patient has appointment tomorrow with OB and was given return precautions                       --------------------------------------------- PAST HISTORY ---------------------------------------------  Past Medical History:  has a past medical history of Depression, IBS (irritable bowel syndrome), Incisional hernia, and Seizure ([facility] [address] Past Surgical History:  has a past surgical history that includes Colonoscopy (2012); Upper gastrointestinal endoscopy (2012); Upper gastrointestinal endoscopy; Colonoscopy;  section (1/3/2015); Upper gastrointestinal endoscopy (2017);  Dilation and curettage of uterus; Cholecystectomy (2017); pr  delivery only (N/A, 2018); laparoscopy (N/A, 10/18/2019); Hemorrhoid surgery (2020and 2021); and ventral hernia repair (N/A, 2021). Social History:  reports that she quit smoking about 8 years ago. Her smoking use included cigarettes. She quit after 2.00 years of use. She has never used smokeless tobacco. She reports that she does not drink alcohol and does not use drugs. Family History: family history includes Cancer in her father. The patients home medications have been reviewed.     Allergies: Naproxen    -------------------------------------------------- RESULTS -------------------------------------------------  Labs:  Results for orders placed or performed during the hospital encounter of 10/17/21   Comprehensive Metabolic Panel w/ Reflex to MG   Result Value Ref Range    Sodium 135 132 - 146 mmol/L    Potassium reflex Magnesium 3.6 3.5 - 5.0 mmol/L    Chloride 102 98 - 107 mmol/L    CO2 20 (L) 22 - 29 mmol/L    Anion Gap 13 7 - 16 mmol/L    Glucose 93 74 - 99 mg/dL    BUN 9 6 - 20 mg/dL    CREATININE 0.6 0.5 - 1.0 mg/dL    GFR Non-African American >60 >=60 mL/min/1.73    GFR African American >60     Calcium 9.1 8.6 - 10.2 mg/dL    Total Protein 6.9 6.4 - 8.3 g/dL    Albumin 3.9 3.5 - 5.2 g/dL    Total Bilirubin 0.3 0.0 - 1.2 mg/dL    Alkaline Phosphatase 52 35 - 104 U/L    ALT 14 0 - 32 U/L    AST 15 0 - 31 U/L   CBC Auto Differential   Result Value Ref Range    WBC 10.2 4.5 - 11.5 E9/L    RBC 4.31 3.50 - 5.50 E12/L    Hemoglobin 12.7 11.5 - 15.5 g/dL    Hematocrit 37.6 34.0 - 48.0 %    MCV 87.2 80.0 - 99.9 fL    MCH 29.5 26.0 - 35.0 pg    MCHC 33.8 32.0 - 34.5 %    RDW 12.8 11.5 - 15.0 fL    Platelets 648 358 - 975 E9/L    MPV 11.0 7.0 - 12.0 fL    Neutrophils % 79.0 43.0 - 80.0 %    Immature Granulocytes % 0.9 0.0 - 5.0 %    Lymphocytes % 13.1 (L) 20.0 - 42.0 %    Monocytes % 6.2 2.0 - 12.0 %    Eosinophils % 0.4 0.0 - 6.0 %    Basophils % 0.4 0.0 - 2.0 %    Neutrophils Absolute 8.06 (H) 1.80 - 7.30 E9/L    Immature Granulocytes # 0.09 E9/L    Lymphocytes Absolute 1.33 (L) 1.50 - 4.00 E9/L    Monocytes Absolute 0.63 0.10 - 0.95 E9/L    Eosinophils Absolute 0.04 (L) 0.05 - 0.50 E9/L    Basophils Absolute 0.04 0.00 - 0.20 E9/L   HCG, Quantitative, Pregnancy   Result Value Ref Range    hCG Quant 992696.0 (H) <10 mIU/mL   Urinalysis with Microscopic   Result Value Ref Range    Color, UA Yellow Straw/Yellow    Clarity, UA Clear Clear    Glucose, Ur Negative Negative mg/dL    Bilirubin Urine Negative Negative    Ketones, Urine >=80 (A) Negative mg/dL    Specific Gravity, UA >=1.030 1.005 - 1.030    Blood, Urine SMALL (A) Negative    pH, UA 5.5 5.0 - 9.0    Protein, UA TRACE Negative mg/dL    Urobilinogen, Urine 0.2 <2.0 E.U./dL    Nitrite, Urine Negative Negative    Leukocyte Esterase, Urine Negative Negative    WBC, UA 2-5 0 - 5 /HPF    RBC, UA 1-3 0 - 2 /HPF    Bacteria, UA MODERATE (A) None Seen /HPF       Radiology:  US OB LESS THAN 14 WEEKS SINGLE OR FIRST GESTATION   Final Result   1. Single living intrauterine gestation with an estimated gestational age by   ultrasound of 10 weeks and 2 days which corresponds to an estimated date of   delivery by ultrasound of May 13, 2022. Gestational age by ultrasound is   concordant to the clinical age provided of 9 weeks and 5 days. Fetal heart   rate was 169 beats per minute. 2.  There is a normal appearance of the right ovary. Normal ovarian   vascularity. 3.  There appears to be some heterogeneous free fluid in the right adnexa   which contains some echogenic debris. This finding is nonspecific. (Primary differential would be ruptured corpus luteum cyst.  Given   presentation, a ruptured ectopic pregnancy from a heterotopic pregnancy is   not entirely excluded.   Suggest close clinical follow-up with repeat pelvic   ultrasound for worsening symptoms.)      These results were called by Dr. Corby Pa to ordering provider Heather Abdul on 10/17/2021 at 15:02.             ------------------------- NURSING NOTES AND VITALS REVIEWED ---------------------------  Date / Time Roomed:  10/17/2021  1:14 PM  ED Bed Assignment:  20/20    The nursing notes within the ED encounter and vital signs as below have been reviewed. BP (!) 96/54   Pulse 89   Temp 97 °F (36.1 °C) (Temporal)   Resp 15   Ht 5' 3\" (1.6 m)   Wt 109 lb (49.4 kg)   SpO2 99%   BMI 19.31 kg/m²   Oxygen Saturation Interpretation: Normal      ------------------------------------------ PROGRESS NOTES ------------------------------------------  1:42 PM EDT  I have spoken with the patient and discussed todays results, in addition to providing specific details for the plan of care and counseling regarding the diagnosis and prognosis. Their questions are answered at this time and they are agreeable with the plan. I discussed at length with them reasons for immediate return here for re evaluation. They will followup with their [facility].      --------------------------------- ADDITIONAL PROVIDER NOTES ---------------------------------  At this time the patient is without objective evidence of an acute process requiring hospitalization or inpatient management. They have remained hemodynamically stable throughout their entire ED visit and are stable for discharge with outpatient follow-up. The plan has been discussed in detail and they are aware of the specific conditions for emergent return, as well as the importance of follow-up. Discharge Medication List as of 10/17/2021  3:24 PM      START taking these medications    Details   !! metoclopramide (REGLAN) 10 MG tablet Take 1 tablet by mouth 4 times daily, Disp-120 tablet, R-0Print       !! - Potential duplicate medications found. Please discuss with provider. Diagnosis:  1. Vaginal bleeding in pregnancy    2. Threatened miscarriage    3.  Cyst, corpus, luteum ruptured        Disposition:  Patient's disposition: Discharge to home  Patient's condition is stable.      Amado Keene DO  10/19/21 1142

## 2021-10-19 ENCOUNTER — APPOINTMENT (OUTPATIENT)
Dept: MRI IMAGING | Age: 26
End: 2021-10-19
Payer: COMMERCIAL

## 2021-10-19 ENCOUNTER — HOSPITAL ENCOUNTER (EMERGENCY)
Age: 26
Discharge: HOME | End: 2021-10-19
Attending: EMERGENCY MEDICINE
Payer: COMMERCIAL

## 2021-10-19 VITALS
WEIGHT: 109 LBS | DIASTOLIC BLOOD PRESSURE: 64 MMHG | OXYGEN SATURATION: 96 % | RESPIRATION RATE: 16 BRPM | BODY MASS INDEX: 19.31 KG/M2 | HEART RATE: 90 BPM | HEIGHT: 63 IN | SYSTOLIC BLOOD PRESSURE: 102 MMHG | TEMPERATURE: 97 F

## 2021-10-19 DIAGNOSIS — R10.31 RIGHT LOWER QUADRANT ABDOMINAL PAIN: Primary | ICD-10-CM

## 2021-10-19 DIAGNOSIS — N83.201 CYST OF RIGHT OVARY: ICD-10-CM

## 2021-10-19 LAB
ALBUMIN SERPL-MCNC: 3.9 G/DL (ref 3.5–5.2)
ALP BLD-CCNC: 47 U/L (ref 35–104)
ALT SERPL-CCNC: 12 U/L (ref 0–32)
ANION GAP SERPL CALCULATED.3IONS-SCNC: 12 MMOL/L (ref 7–16)
AST SERPL-CCNC: 14 U/L (ref 0–31)
BACTERIA: ABNORMAL /HPF
BASOPHILS ABSOLUTE: 0.04 E9/L (ref 0–0.2)
BASOPHILS RELATIVE PERCENT: 0.5 % (ref 0–2)
BILIRUB SERPL-MCNC: 0.3 MG/DL (ref 0–1.2)
BILIRUBIN URINE: NEGATIVE
BLOOD, URINE: ABNORMAL
BUN BLDV-MCNC: 7 MG/DL (ref 6–20)
CALCIUM SERPL-MCNC: 9.1 MG/DL (ref 8.6–10.2)
CHLORIDE BLD-SCNC: 102 MMOL/L (ref 98–107)
CLARITY: CLEAR
CO2: 20 MMOL/L (ref 22–29)
COLOR: YELLOW
CREAT SERPL-MCNC: 0.4 MG/DL (ref 0.5–1)
EOSINOPHILS ABSOLUTE: 0.06 E9/L (ref 0.05–0.5)
EOSINOPHILS RELATIVE PERCENT: 0.7 % (ref 0–6)
EPITHELIAL CELLS, UA: ABNORMAL /HPF
GFR AFRICAN AMERICAN: >60
GFR NON-AFRICAN AMERICAN: >60 ML/MIN/1.73
GLUCOSE BLD-MCNC: 76 MG/DL (ref 74–99)
GLUCOSE URINE: NEGATIVE MG/DL
GONADOTROPIN, CHORIONIC (HCG) QUANT: ABNORMAL MIU/ML
HCT VFR BLD CALC: 37.2 % (ref 34–48)
HEMOGLOBIN: 12.2 G/DL (ref 11.5–15.5)
IMMATURE GRANULOCYTES #: 0.07 E9/L
IMMATURE GRANULOCYTES %: 0.8 % (ref 0–5)
KETONES, URINE: >=80 MG/DL
LEUKOCYTE ESTERASE, URINE: ABNORMAL
LYMPHOCYTES ABSOLUTE: 1.83 E9/L (ref 1.5–4)
LYMPHOCYTES RELATIVE PERCENT: 21 % (ref 20–42)
MAGNESIUM: 1.7 MG/DL (ref 1.6–2.6)
MCH RBC QN AUTO: 29.3 PG (ref 26–35)
MCHC RBC AUTO-ENTMCNC: 32.8 % (ref 32–34.5)
MCV RBC AUTO: 89.4 FL (ref 80–99.9)
MONOCYTES ABSOLUTE: 0.61 E9/L (ref 0.1–0.95)
MONOCYTES RELATIVE PERCENT: 7 % (ref 2–12)
NEUTROPHILS ABSOLUTE: 6.12 E9/L (ref 1.8–7.3)
NEUTROPHILS RELATIVE PERCENT: 70 % (ref 43–80)
NITRITE, URINE: NEGATIVE
PDW BLD-RTO: 13 FL (ref 11.5–15)
PH UA: 6 (ref 5–9)
PLATELET # BLD: 225 E9/L (ref 130–450)
PMV BLD AUTO: 11.5 FL (ref 7–12)
POTASSIUM REFLEX MAGNESIUM: 3.4 MMOL/L (ref 3.5–5)
PROTEIN UA: NEGATIVE MG/DL
RBC # BLD: 4.16 E12/L (ref 3.5–5.5)
RBC UA: ABNORMAL /HPF (ref 0–2)
SODIUM BLD-SCNC: 134 MMOL/L (ref 132–146)
SPECIFIC GRAVITY UA: >=1.03 (ref 1–1.03)
TOTAL PROTEIN: 6.7 G/DL (ref 6.4–8.3)
URINE CULTURE, ROUTINE: NORMAL
UROBILINOGEN, URINE: 0.2 E.U./DL
WBC # BLD: 8.7 E9/L (ref 4.5–11.5)
WBC UA: ABNORMAL /HPF (ref 0–5)

## 2021-10-19 PROCEDURE — 87088 URINE BACTERIA CULTURE: CPT

## 2021-10-19 PROCEDURE — 80053 COMPREHEN METABOLIC PANEL: CPT

## 2021-10-19 PROCEDURE — 96360 HYDRATION IV INFUSION INIT: CPT

## 2021-10-19 PROCEDURE — 81001 URINALYSIS AUTO W/SCOPE: CPT

## 2021-10-19 PROCEDURE — 2580000003 HC RX 258: Performed by: EMERGENCY MEDICINE

## 2021-10-19 PROCEDURE — 85025 COMPLETE CBC W/AUTO DIFF WBC: CPT

## 2021-10-19 PROCEDURE — 84702 CHORIONIC GONADOTROPIN TEST: CPT

## 2021-10-19 PROCEDURE — 96361 HYDRATE IV INFUSION ADD-ON: CPT

## 2021-10-19 PROCEDURE — 83735 ASSAY OF MAGNESIUM: CPT

## 2021-10-19 PROCEDURE — 74181 MRI ABDOMEN W/O CONTRAST: CPT

## 2021-10-19 PROCEDURE — 99283 EMERGENCY DEPT VISIT LOW MDM: CPT

## 2021-10-19 RX ORDER — 0.9 % SODIUM CHLORIDE 0.9 %
2000 INTRAVENOUS SOLUTION INTRAVENOUS ONCE
Status: COMPLETED | OUTPATIENT
Start: 2021-10-19 | End: 2021-10-19

## 2021-10-19 RX ADMIN — SODIUM CHLORIDE 2000 ML: 9 INJECTION, SOLUTION INTRAVENOUS at 14:21

## 2021-10-19 ASSESSMENT — ENCOUNTER SYMPTOMS
ABDOMINAL PAIN: 1
DIARRHEA: 0
COUGH: 0
BLOOD IN STOOL: 1
CHEST TIGHTNESS: 0
NAUSEA: 1
VOMITING: 0
SHORTNESS OF BREATH: 0
WHEEZING: 0
CONSTIPATION: 0

## 2021-10-19 ASSESSMENT — PAIN DESCRIPTION - PAIN TYPE: TYPE: ACUTE PAIN

## 2021-10-19 ASSESSMENT — PAIN SCALES - GENERAL: PAINLEVEL_OUTOF10: 6

## 2021-10-19 ASSESSMENT — PAIN DESCRIPTION - LOCATION: LOCATION: ABDOMEN

## 2021-10-19 NOTE — ED PROVIDER NOTES
[address]      Pt Name: Kadie Garza  MRN: [id number]  [place] 1995  Date of evaluation: 10/19/2021      CHIEF COMPLAINT       Chief Complaint   Patient presents with    Abdominal Pain     RLQ, right side, pt states that she was here on  for same issue, seen at [facility] yesterday, 11 weeks pregnant, slight vaginal bleeding this AM    Diarrhea     pt states that she had a bout of bloody diarrhea this AM        HPI  Kadie Garza is a 32 y.o. female with PMHx of IBS,  @10wks presents with abdominal pain since . Describes the pain as RLQ, started at umbilicus and radiated towards RLQ, R groin and R flank. Pain has remained the same since , rated 6 out of 10, achy with one recent sharp episodes on her R flank that intemittently increased her pain level 8 out 10. She denies any alleviating or aggravating factors. Associated with Nausea but no vomiting. One episode of Diarrhea- dark red bloody, states she has had many episodes of dark red stool in the past due to her IBS. Denies any fever, chills, vomiting, headache, dizziness, weakness, cp, palpitations, sob, cough, dysuria, hematuria, constipation. Patient was recently seen in the ED for vaginal bleeding and abd pain, she was treated for UTI and just finished her Abx last week. Patient also just followed up with OB( Dr. Dominic Guo) yesterday, they did a pelvic and ultrasound which were both normal with no signs of ectopic. Patient is no longer having vaginal bleeding. Patient is O positive, denies any sexual intercourse or recent trauma. She has a history of  with no complications of previous pregnancies. Patient still has her appendix but no gallbladder. Except as noted above the remainder of the review of systems was reviewed and negative.      Review of Systems   Constitutional: Negative for activity change, appetite change, Palpations: Abdomen is soft. There is no mass. Tenderness: There is abdominal tenderness in the right lower quadrant and epigastric area. There is right CVA tenderness, left CVA tenderness and rebound. There is no guarding. Positive signs include McBurney's sign. Negative signs include Martínez's sign, Rovsing's sign, psoas sign and obturator sign. Hernia: No hernia is present. Musculoskeletal:      Cervical back: Normal range of motion. Skin:     General: Skin is warm and dry. Capillary Refill: Capillary refill takes less than 2 seconds. Neurological:      General: No focal deficit present. Mental Status: She is alert and oriented to person, place, and time. Mental status is at baseline. Cranial Nerves: No cranial nerve deficit. Motor: No weakness. Psychiatric:         Mood and Affect: Mood normal.         Behavior: Behavior normal.         Thought Content: Thought content normal.          Procedures     MDM   32 y.o. female with PMHx of IBS,  @10wks presents with abdominal pain since . Describes the pain as RLQ, started at umbilicus and radiated towards RLQ, R groin and R flank. While in the ED patient was hemodynamically stable, nontoxic-appearing, in no respiratory distress. Physical exam remarkable for RLQ tenderness,McBurney's positive, abdomen soft and nontender with normal BS. Labs unremarkable with the exceptions of small leukocyte esterases, patient just concluded her antibiotic treatment for UTI and agrees with not restarting antibiotics. MRI negative for appendicitis, with incidental right ovarian cysts, patient is aware of cyst and monitors it with her OBGYN, Dr. Tomás Felipe. Patient received fluids with some symptomatic relief. Fetal heart tones normal. Patient feels comfortable going home and will follow up outpatient with OBGYN. Patient understands to return to the ED in case of worsening symptoms.        ED Course as of Oct 19 1949   Tue Oct 19, 2021   1340 Patient states that she saw her OB/GYN, Dr. Yancy Ellis, yesterday, and she states that everything was progressing appropriately with her pregnancy. She has not had any further vaginal bleeding. [JA]      ED Course User Index  [QUYNH] Octavia Roy MD       --------------------------------------------- PAST HISTORY ---------------------------------------------  Past Medical History:  has a past medical history of Depression, IBS (irritable bowel syndrome), Incisional hernia, and [facility] Past Surgical History:  has a past surgical history that includes Colonoscopy (2012); Upper gastrointestinal endoscopy (2012); Upper gastrointestinal endoscopy; Colonoscopy;  section (1/3/2015); Upper gastrointestinal endoscopy (2017); Dilation and curettage of uterus; Cholecystectomy (2017); pr  delivery only (N/A, 2018); laparoscopy (N/A, 10/18/2019); Hemorrhoid surgery (2020and 2021); and ventral hernia repair (N/A, 2021). Social History:  reports that she quit smoking about 8 years ago. Her smoking use included cigarettes. She quit after 2.00 years of use. She has never used smokeless tobacco. She reports that she does not drink alcohol and does not use drugs. Family History: family history includes Cancer in her father. The patients home medications have been reviewed.     Allergies: Naproxen    -------------------------------------------------- RESULTS -------------------------------------------------  Labs:  Results for orders placed or performed during the hospital encounter of 10/19/21   CBC Auto Differential   Result Value Ref Range    WBC 8.7 4.5 - 11.5 E9/L    RBC 4.16 3.50 - 5.50 E12/L    Hemoglobin 12.2 11.5 - 15.5 g/dL    Hematocrit 37.2 34.0 - 48.0 %    MCV 89.4 80.0 - 99.9 fL    MCH 29.3 26.0 - 35.0 pg    MCHC 32.8 32.0 - 34.5 %    RDW 13.0 11.5 - 15.0 fL    Platelets 013 221 - 695 E9/L    MPV 11.5 7.0 - 12.0 fL    Neutrophils % 70.0 43.0 - 80.0 %    Immature Granulocytes % 0.8 0.0 - 5.0 %    Lymphocytes % 21.0 20.0 - 42.0 %    Monocytes % 7.0 2.0 - 12.0 %    Eosinophils % 0.7 0.0 - 6.0 %    Basophils % 0.5 0.0 - 2.0 %    Neutrophils Absolute 6.12 1.80 - 7.30 E9/L    Immature Granulocytes # 0.07 E9/L    Lymphocytes Absolute 1.83 1.50 - 4.00 E9/L    Monocytes Absolute 0.61 0.10 - 0.95 E9/L    Eosinophils Absolute 0.06 0.05 - 0.50 E9/L    Basophils Absolute 0.04 0.00 - 0.20 E9/L   Comprehensive Metabolic Panel w/ Reflex to MG   Result Value Ref Range    Sodium 134 132 - 146 mmol/L    Potassium reflex Magnesium 3.4 (L) 3.5 - 5.0 mmol/L    Chloride 102 98 - 107 mmol/L    CO2 20 (L) 22 - 29 mmol/L    Anion Gap 12 7 - 16 mmol/L    Glucose 76 74 - 99 mg/dL    BUN 7 6 - 20 mg/dL    CREATININE 0.4 (L) 0.5 - 1.0 mg/dL    GFR Non-African American >60 >=60 mL/min/1.73    GFR African American >60     Calcium 9.1 8.6 - 10.2 mg/dL    Total Protein 6.7 6.4 - 8.3 g/dL    Albumin 3.9 3.5 - 5.2 g/dL    Total Bilirubin 0.3 0.0 - 1.2 mg/dL    Alkaline Phosphatase 47 35 - 104 U/L    ALT 12 0 - 32 U/L    AST 14 0 - 31 U/L   Urinalysis, reflex to microscopic   Result Value Ref Range    Color, UA Yellow Straw/Yellow    Clarity, UA Clear Clear    Glucose, Ur Negative Negative mg/dL    Bilirubin Urine Negative Negative    Ketones, Urine >=80 (A) Negative mg/dL    Specific Gravity, UA >=1.030 1.005 - 1.030    Blood, Urine SMALL (A) Negative    pH, UA 6.0 5.0 - 9.0    Protein, UA Negative Negative mg/dL    Urobilinogen, Urine 0.2 <2.0 E.U./dL    Nitrite, Urine Negative Negative    Leukocyte Esterase, Urine SMALL (A) Negative   HCG, Quantitative, Pregnancy   Result Value Ref Range    hCG Quant 580443.0 (H) <10 mIU/mL   Microscopic Urinalysis   Result Value Ref Range    WBC, UA 2-5 0 - 5 /HPF    RBC, UA 0-1 0 - 2 /HPF    Epithelial Cells, UA FEW /HPF    Bacteria, UA MODERATE (A) None Seen /HPF   Magnesium   Result Value Ref Range    Magnesium 1.7 1.6 - 2.6 mg/dL Radiology:  MRI ABDOMEN WO CONTRAST   Final Result   1. No evidence of acute appendicitis. 2. 3 cm simple cyst in the right ovary. ------------------------- NURSING NOTES AND VITALS REVIEWED ---------------------------  Date / Time Roomed:  10/19/2021 11:17 AM  ED Bed Assignment:  [id number]    The nursing notes within the ED encounter and vital signs as below have been reviewed. /64   Pulse 90   Temp 97 °F (36.1 °C) (Oral)   Resp 16   Ht 5' 3\" (1.6 m)   Wt 109 lb (49.4 kg)   SpO2 96%   BMI 19.31 kg/m²   Oxygen Saturation Interpretation: Normal      ------------------------------------------ PROGRESS NOTES ------------------------------------------  7:49 PM EDT  I have spoken with the patient and discussed todays results, in addition to providing specific details for the plan of care and counseling regarding the diagnosis and prognosis. Their questions are answered at this time and they are agreeable with the plan. I discussed at length with them reasons for immediate return here for re evaluation. They will followup with their OBGYN and primary care physician by calling their office tomorrow. --------------------------------- ADDITIONAL PROVIDER NOTES ---------------------------------  At this time the patient is without objective evidence of an acute process requiring hospitalization or inpatient management. They have remained hemodynamically stable throughout their entire ED visit and are stable for discharge with outpatient follow-up. The plan has been discussed in detail and they are aware of the specific conditions for emergent return, as well as the importance of follow-up. Discharge Medication List as of 10/19/2021  4:11 PM          Diagnosis:  1. Right lower quadrant abdominal pain    2. Cyst of right ovary        Disposition:  Patient's disposition: Discharge to home  Patient's condition is stable.        Keyla Garcia MD  Resident  10/19/21 [id number]

## 2021-10-21 LAB — URINE CULTURE, ROUTINE: NORMAL

## 2021-11-03 ENCOUNTER — HOSPITAL ENCOUNTER (EMERGENCY)
Age: 26
Discharge: LEFT BEFORE DISCHARGE | End: 2021-11-03
Payer: COMMERCIAL

## 2021-11-03 ENCOUNTER — APPOINTMENT (OUTPATIENT)
Dept: ULTRASOUND IMAGING | Age: 26
End: 2021-11-03
Payer: COMMERCIAL

## 2021-11-03 VITALS
DIASTOLIC BLOOD PRESSURE: 72 MMHG | SYSTOLIC BLOOD PRESSURE: 108 MMHG | RESPIRATION RATE: 14 BRPM | WEIGHT: 108 LBS | BODY MASS INDEX: 19.14 KG/M2 | OXYGEN SATURATION: 100 % | HEART RATE: 85 BPM | HEIGHT: 63 IN

## 2021-11-03 PROCEDURE — 76801 OB US < 14 WKS SINGLE FETUS: CPT

## 2021-11-03 PROCEDURE — 99282 EMERGENCY DEPT VISIT SF MDM: CPT

## 2021-11-03 ASSESSMENT — PAIN DESCRIPTION - ORIENTATION: ORIENTATION: LEFT;RIGHT;LOWER

## 2021-11-03 ASSESSMENT — PAIN DESCRIPTION - PAIN TYPE: TYPE: ACUTE PAIN

## 2021-11-03 ASSESSMENT — PAIN DESCRIPTION - LOCATION: LOCATION: BACK

## 2021-11-03 ASSESSMENT — PAIN SCALES - GENERAL: PAINLEVEL_OUTOF10: 2

## 2021-11-03 ASSESSMENT — PAIN DESCRIPTION - FREQUENCY: FREQUENCY: CONTINUOUS

## 2021-11-03 ASSESSMENT — PAIN DESCRIPTION - DESCRIPTORS: DESCRIPTORS: ACHING

## 2021-11-03 NOTE — ED PROVIDER NOTES
FIRST PROVIDER CONTACT ASSESSMENT NOTE           Department of Emergency Medicine                 First Provider Note            11/3/21  5:29 PM EDT    Date of Encounter: No admission date for patient encounter. Patient Name: Ronald Sanon  : 1995  MRN: [id number]    Chief Complaint: Vaginal Bleeding (dark brown x1.5 wks with clots.) and Back Pain (low back. Denies injury. )      History of Present Illness:   Ronald Sanon is a 32 y.o. female who presents to the ED for bleeding during pregnancy. States she is 13 weeks pregnant. Has had US documenting IUP. Reports dark brown bleeding for past few weeks. Persistent. Blood type O+. Focused Physical Exam:  VS:    ED Triage Vitals [21 1723]   BP Temp Temp src Pulse Resp SpO2 Height Weight   108/72 -- -- 85 14 100 % 5' 3\" (1.6 m) 108 lb (49 kg)        Physical Ex: Constitutional: Alert and non-toxic. Medical History:  has a past medical history of Depression, IBS (irritable bowel syndrome), Incisional hernia, and Seizure ([facility] Surgical History:  has a past surgical history that includes Colonoscopy (2012); Upper gastrointestinal endoscopy (2012); Upper gastrointestinal endoscopy; Colonoscopy;  section (1/3/2015); Upper gastrointestinal endoscopy (2017); Dilation and curettage of uterus; Cholecystectomy (2017); pr  delivery only (N/A, 2018); laparoscopy (N/A, 10/18/2019); Hemorrhoid surgery (2020and 2021); and ventral hernia repair (N/A, 2021). Social History:  reports that she quit smoking about 8 years ago. Her smoking use included cigarettes. She quit after 2.00 years of use. She has never used smokeless tobacco. She reports that she does not drink alcohol and does not use drugs. Family History: family history includes Cancer in her father.     Allergies: Naproxen     Initial Plan of Care: Initiate Treatment-Testing, Proceed toTreatment Area When Bed Available for ED Attending/MLP to Continue Care      ---END OF FIRST PROVIDER CONTACT ASSESSMENT NOTE---  Electronically signed by Lio Carmichael PA-C   DD: 11/3/21       Lio Carmichael PA-C  11/03/21 [id number]

## 2021-12-16 ENCOUNTER — HOSPITAL ENCOUNTER (EMERGENCY)
Age: 26
Discharge: HOME | End: 2021-12-16
Payer: COMMERCIAL

## 2021-12-16 VITALS
SYSTOLIC BLOOD PRESSURE: 109 MMHG | RESPIRATION RATE: 16 BRPM | HEART RATE: 98 BPM | OXYGEN SATURATION: 98 % | TEMPERATURE: 98.9 F | DIASTOLIC BLOOD PRESSURE: 71 MMHG | WEIGHT: 120 LBS | HEIGHT: 62 IN | BODY MASS INDEX: 22.08 KG/M2

## 2021-12-16 DIAGNOSIS — K08.89 PAIN, DENTAL: Primary | ICD-10-CM

## 2021-12-16 PROCEDURE — 99283 EMERGENCY DEPT VISIT LOW MDM: CPT

## 2021-12-16 RX ORDER — PENICILLIN V POTASSIUM 500 MG/1
500 TABLET ORAL 4 TIMES DAILY
Qty: 40 TABLET | Refills: 0 | Status: SHIPPED | OUTPATIENT
Start: 2021-12-16 | End: 2021-12-26

## 2021-12-16 ASSESSMENT — PAIN SCALES - GENERAL: PAINLEVEL_OUTOF10: 6

## 2021-12-16 ASSESSMENT — PAIN DESCRIPTION - PAIN TYPE: TYPE: ACUTE PAIN

## 2021-12-17 NOTE — ED PROVIDER NOTES
[address] [place] [address]  Department of Emergency Medicine   ED  Encounter Note  Admit Date/RoomTime: 2021  6:14 PM  ED Room: 34/34    NAME: Ronald Sanon  : 1995  MRN: [id number]     Chief Complaint:  Dental Pain (right lower apsect of mouth, broken tooth.  wears braces, pt 18 weeks pregnant)    History of Present Illness        Ronald Sanon is a 32 y.o. old female who presents to the emergency department by private vehicle, for persistent right lower dental pain x2 days. Patient states she broke the tooth a while ago but it did not start hurting until now. Patient wears braces but has not been to the orthodontist in a while due to her orthodontist not taking her insurance. Patient states her symptoms are mild in severity describes as an aching pain. Patient denies anything make it better or worse. Patient is 18 weeks pregnant. Denies fever/chills, headache, vision change, dizziness, chest pain, dyspnea, abdominal pain, NVD, urinary symptoms, hematuria, vaginal bleeding, numbness/weakness. Onset:       Spontaneous:   yes. Following Trauma:   no.     Previous Caries:   no.     Recent Dental Procedure:   no.     ROS   Pertinent positives and negatives are stated within HPI, all other systems reviewed and are negative. Past Medical History:  has a past medical history of Depression, IBS (irritable bowel syndrome), Incisional hernia, and Seizure ([facility] Surgical History:  has a past surgical history that includes Colonoscopy (2012); Upper gastrointestinal endoscopy (2012); Upper gastrointestinal endoscopy; Colonoscopy;  section (1/3/2015); Upper gastrointestinal endoscopy (2017); Dilation and curettage of uterus; Cholecystectomy (2017); pr  delivery only (N/A, 2018); laparoscopy (N/A, 10/18/2019); Hemorrhoid surgery (2020and 2021); and ventral hernia repair (N/A, 2021).     Social History: reports that she quit smoking about 8 years ago. Her smoking use included cigarettes. She quit after 2.00 years of use. She has never used smokeless tobacco. She reports that she does not drink alcohol and does not use drugs. Family History: family history includes Cancer in her father. Allergies: Naproxen    Physical Exam   Oxygen Saturation Interpretation: Normal.        ED Triage Vitals [12/16/21 1834]   BP Temp Temp src Pulse Resp SpO2 Height Weight   109/71 98.9 °F (37.2 °C) -- 98 16 98 % 5' 2\" (1.575 m) 120 lb (54.4 kg)         · Constitutional:  Alert, development consistent with age. · HEENT:  NC/NT. Airway patent. · Neck:  Supple. Normal ROM. · Lips:  upper and lower normal.  · Mouth:  normal tongue and buccal mucosa. · Dental:  ttp of #30 and surrounding gingiva, no tongue lift, airway patent. Trismus: No.         Drooling: No.           Airway stridor: No.  · Facial skin: bilateral no wounds, erythema, or swelling. · Respiratory:  Clear to auscultation and breath sounds equal.    · CV: Regular rate and rhythm, normal heart sounds, without pathological murmurs, ectopy, gallops, or rubs. · Skin:  No rashes, erythema or lesions present, unless noted elsewhere. .  · Lymphatics: No lymphangitis or adenopathy noted. · Neurological:  Oriented. Motor functions intact. Lab / Imaging Results   (All laboratory and radiology results have been personally reviewed by myself)  Labs:  No results found for this visit on 12/16/21. Imaging: All Radiology results interpreted by Radiologist unless otherwise noted. No orders to display     ED Course / Medical Decision Making   Medications - No data to display     Consult(s):   Dental Resident was not consulted to see patient regarding complaint. Procedure(s):   None    MDM: Patient presenting with dental pain. Patient is in no acute distress, afebrile, nontoxic appearance. Patient was started on penicillin and Magic swizzle. Patient to follow-up with dental clinic. Recommend patient return to the ED with new or worsening of symptoms. Plan of Care/Counseling:  VERO Turcios reviewed today's visit with the patient in addition to providing specific details for the plan of care and counseling regarding the diagnosis and prognosis. Questions are answered at this time and are agreeable with the plan. Assessment      1. Pain, dental      Plan   Discharged home. Patient condition is stable    New Medications     Discharge Medication List as of 12/16/2021  6:59 PM      START taking these medications    Details   penicillin v potassium (VEETID) 500 MG tablet Take 1 tablet by mouth 4 times daily for 10 days, Disp-40 tablet, R-0Normal      Magic Mouthwash (MIRACLE MOUTHWASH) Swish and spit 5 mLs 4 times daily as needed for Irritation Preparation: 10cc maalox,  10cc Benadryl, 10cc Viscous Lidocaine, Disp-240 mL, R-0Normal           Electronically signed by VERO Turcios   DD: 12/16/21  **This report was transcribed using voice recognition software. Every effort was made to ensure accuracy; however, inadvertent computerized transcription errors may be present.   END OF ED PROVIDER NOTE     VERO Turcios  12/16/21 [id number]

## 2022-01-01 ENCOUNTER — HOSPITAL ENCOUNTER (EMERGENCY)
Age: 27
Discharge: HOME | End: 2022-01-01
Payer: COMMERCIAL

## 2022-01-01 ENCOUNTER — APPOINTMENT (OUTPATIENT)
Dept: GENERAL RADIOLOGY | Age: 27
End: 2022-01-01
Payer: COMMERCIAL

## 2022-01-01 VITALS
RESPIRATION RATE: 16 BRPM | SYSTOLIC BLOOD PRESSURE: 107 MMHG | WEIGHT: 128 LBS | OXYGEN SATURATION: 100 % | TEMPERATURE: 98.8 F | HEART RATE: 98 BPM | HEIGHT: 63 IN | BODY MASS INDEX: 22.68 KG/M2 | DIASTOLIC BLOOD PRESSURE: 28 MMHG

## 2022-01-01 DIAGNOSIS — R09.81 NASAL CONGESTION: ICD-10-CM

## 2022-01-01 DIAGNOSIS — R07.89 CHEST WALL PAIN: ICD-10-CM

## 2022-01-01 DIAGNOSIS — R05.9 COUGH: ICD-10-CM

## 2022-01-01 DIAGNOSIS — U07.1 COVID-19: Primary | ICD-10-CM

## 2022-01-01 LAB
EKG ATRIAL RATE: 112 BPM
EKG P AXIS: 46 DEGREES
EKG P-R INTERVAL: 150 MS
EKG Q-T INTERVAL: 336 MS
EKG QRS DURATION: 78 MS
EKG QTC CALCULATION (BAZETT): 458 MS
EKG R AXIS: 84 DEGREES
EKG T AXIS: 10 DEGREES
EKG VENTRICULAR RATE: 112 BPM

## 2022-01-01 PROCEDURE — 2580000003 HC RX 258: Performed by: PHYSICIAN ASSISTANT

## 2022-01-01 PROCEDURE — 93005 ELECTROCARDIOGRAM TRACING: CPT | Performed by: EMERGENCY MEDICINE

## 2022-01-01 PROCEDURE — 71046 X-RAY EXAM CHEST 2 VIEWS: CPT

## 2022-01-01 PROCEDURE — 99283 EMERGENCY DEPT VISIT LOW MDM: CPT

## 2022-01-01 PROCEDURE — 6370000000 HC RX 637 (ALT 250 FOR IP): Performed by: PHYSICIAN ASSISTANT

## 2022-01-01 RX ORDER — GUAIFENESIN 600 MG/1
600 TABLET, EXTENDED RELEASE ORAL 2 TIMES DAILY
Qty: 20 TABLET | Refills: 0 | Status: SHIPPED | OUTPATIENT
Start: 2022-01-01 | End: 2022-01-11

## 2022-01-01 RX ORDER — 0.9 % SODIUM CHLORIDE 0.9 %
1000 INTRAVENOUS SOLUTION INTRAVENOUS ONCE
Status: COMPLETED | OUTPATIENT
Start: 2022-01-01 | End: 2022-01-01

## 2022-01-01 RX ORDER — ASCORBIC ACID 500 MG
500 TABLET ORAL 2 TIMES DAILY
Qty: 14 TABLET | Refills: 0 | Status: SHIPPED | OUTPATIENT
Start: 2022-01-01 | End: 2022-02-16 | Stop reason: ALTCHOICE

## 2022-01-01 RX ORDER — ACETAMINOPHEN 500 MG
1000 TABLET ORAL ONCE
Status: COMPLETED | OUTPATIENT
Start: 2022-01-01 | End: 2022-01-01

## 2022-01-01 RX ADMIN — SODIUM CHLORIDE 1000 ML: 9 INJECTION, SOLUTION INTRAVENOUS at 11:43

## 2022-01-01 RX ADMIN — ACETAMINOPHEN 1000 MG: 500 TABLET ORAL at 11:40

## 2022-01-01 ASSESSMENT — PAIN SCALES - GENERAL
PAINLEVEL_OUTOF10: 4
PAINLEVEL_OUTOF10: 3

## 2022-01-01 ASSESSMENT — PAIN DESCRIPTION - PAIN TYPE: TYPE: ACUTE PAIN

## 2022-01-01 ASSESSMENT — PAIN DESCRIPTION - LOCATION: LOCATION: CHEST

## 2022-01-01 NOTE — ED NOTES
Radiology Procedure Waiver   Name: Kari Cha  : 1995  MRN: [id number]    Date:  22    Time: 11:43 AM EST    Benefits of immediately proceeding with Radiology exam(s) without pre-testing outweigh the risks or are not indicated as specified below and therefore the following is/are being waived:    [x] Pregnancy test   [] Patients LMP on-time and regular.   [] Patient had Tubal Ligation or has other Contraception Device. [] Patient  is Menopausal or Premenarcheal.    [] Patient had Full or Partial Hysterectomy. [] Protocol for Iodine allergy    [] MRI Questionnaire     [] BUN/Creatinine   [] Patient age w/no hx of renal dysfunction. [] Patient on Dialysis. [] Recent Normal Labs. Electronically signed by Mady Soria PA-C on 22 at 11:43 AM EST          Patient is pregnant and agreeable to chest x-ray with shielding of belly. 20 weeks pregnant . Obtained of concern     Mady Soria PA-C  22 [id number]

## 2022-01-01 NOTE — ED PROVIDER NOTES
[address] [place] [address]  Department of Emergency Medicine   ED  Encounter Note  Admit Date/RoomTime: 2022 11:12 AM  ED Room: 33/33    NAME: Radha Pickard  : 1995  MRN: [id number]     Chief Complaint:  Positive For Covid-19 (symptoms started , 21 weeks pregnant ) and Chest Pain (tightness )    History of Present Illness       Radha Pickard is a 32 y.o. old female who presents to the emergency department by private vehicle, for nasal congestion, rhinorrhea and cough, which began 2 day(s) prior to arrival.  Since onset the symptoms have been persistent and mild in severity. The symptoms are associated with chest tightness. Patient states \"I was diagnosed with COVID yesterday and on 21 weeks pregnant. I do not have chest pain I just have chest wall pain when I cough too much my actual whole chest hurts. \"  Patient denies any chest pain, shortness of breath, coughing up blood. Patient says she is had no complications with her pregnancy and sees Dr. De Los Santos Early. Patient is a -0-0-2. Patient states she did have an episode of a small subchorionic hemorrhage in the beginning of pregnancy that has been monitored in the house stopped. Patient states she is feeling good movement. Patient states that she \"did not know what to take so came here for further evaluation. \"  Patient states nothing seems to make anything better or worse there has been no decreased appetite, conjunctivitis, watery eyes, productive cough, nausea, vomiting, diarrhea, dizziness, dysuria, urinary frequency, earache, ear pulling, headache, neck stiffness, scratchy throat, swollen glands, wheezing, loss of taste or loss of smell. The patient has been fully vaccinated against COVID-19. Patient states \"the main thing that is bothering me is the facial pressure and congestion as well as a cough and fatigue. \"    ROS   Pertinent positives and negatives are stated within HPI, all other systems reviewed and are negative. Past Medical History:  has a past medical history of Depression, IBS (irritable bowel syndrome), Incisional hernia, and Seizure ([facility] [address] Surgical History:  has a past surgical history that includes Colonoscopy (2012); Upper gastrointestinal endoscopy (2012); Upper gastrointestinal endoscopy; Colonoscopy;  section (1/3/2015); Upper gastrointestinal endoscopy (2017); Dilation and curettage of uterus; Cholecystectomy (2017); pr  delivery only (N/A, 2018); laparoscopy (N/A, 10/18/2019); Hemorrhoid surgery (2020and 2021); and ventral hernia repair (N/A, 2021). Social History:  reports that she quit smoking about 8 years ago. Her smoking use included cigarettes. She quit after 2.00 years of use. She has never used smokeless tobacco. She reports that she does not drink alcohol and does not use drugs. Family History: family history includes Cancer in her father. Allergies: Naproxen    Physical Exam   Oxygen Saturation Interpretation: Normal on room air analysis. ED Triage Vitals   BP Temp Temp Source Pulse Resp SpO2 Height Weight   22 1028 22 1028 22 1028 22 0955 22 1028 22 0955 22 1028 22 1028   118/66 98.6 °F (37 °C) Oral 132 16 100 % 5' 3\" (1.6 m) 128 lb (58.1 kg)         · Constitutional:  Alert, development consistent with age. · Ears:  External Ears: Bilateral normal.               TM's & External Canals: normal TM's and external ear canals bilaterally. · Nose:   There is no discharge, swelling or lesions noted. · Sinuses: no Bilateral maxillary sinus tenderness. no Bilateral frontal sinus tenderness. · Mouth:  normal tongue and buccal mucosa. · Throat: no erythema or exudates noted. Teeth and gums normal..  Airway Patent. · Neck:  Supple. No meningeal signs. There is no   node tenderness. preauricular, submental, parotid, anterior cervical and posterior cervical  · Respiratory:   Breath sounds: Bilateral normal.  Lung sounds: normal.   · CV:  Regular rate and rhythm, normal heart sounds, without pathological murmurs, ectopy, gallops, or rubs. · GI:  Abdomen Soft, nontender, good bowel sounds. No firm or pulsatile mass. · Integument:  Normal turgor. Warm, dry, without visible rash. · Neurological:  Oriented. Motor functions intact. Lab / Imaging Results   (All laboratory and radiology results have been personally reviewed by myself)  Labs:  Results for orders placed or performed during the hospital encounter of 01/01/22   EKG 12 Lead   Result Value Ref Range    Ventricular Rate 112 BPM    Atrial Rate 112 BPM    P-R Interval 150 ms    QRS Duration 78 ms    Q-T Interval 336 ms    QTc Calculation (Bazett) 458 ms    P Axis 46 degrees    R Axis 84 degrees    T Axis 10 degrees       Imaging: All Radiology results interpreted by Radiologist unless otherwise noted. XR CHEST (2 VW)   Final Result   No pneumonia or pleural effusion. ED Course / Medical Decision Making     Medications   0.9 % sodium chloride bolus (1,000 mLs IntraVENous New Bag 1/1/22 1143)   acetaminophen (TYLENOL) tablet 1,000 mg (1,000 mg Oral Given 1/1/22 1140)        Consults:   None    Procedures:   none    Medical Decision Making:   Patient is a 40-year-old female that is presenting to the emergency department positive for Covid. Patient states that she has nasal congestion, cough and chest tightness due to so much coughing due to her COVID. Patient is 20 weeks pregnant and had fetal heart tones which were found to be positive. Patient states she still has good movement. Patient denies any bright red vaginal bleeding or leaking of fluid. Patient denies any complications with her pregnancy. Patient was fully assessed here and denies any actual chest pain.   Patient states she does have chest wall pain due to the fact that she is been coughing so much in the center of her chest.  Patient denies any radiation of any the pain into her arm or into her jaw. Patient denies any actual shortness of breath. Patient states that she was diagnosed with Covid 2 days ago. Patient has been taking Tylenol with minimal relief. Patient is more concerned about the congestion. Patient had a chest x-ray which revealed no pneumonia or pleural effusion. Patient was given fluids and Tylenol for relief. Patient was ReVital to no evidence of hypoxia or tachycardia at this time. Patient will be sent home with guaifenesin, vitamin C as well as an inhaler for symptomatic relief. Patient was advised of worsening signs symptoms of a blood clot or worsening chest pain or acute respiratory distress and when to return back to the emergency department. Patient was offered a pulse oximeter device and declined stating she has one at home. Patient was advised if she falls below 89% to return back to the emergency department. She voiced understanding and agreed. Patient is vitally stable and ready for outpatient follow-up. Patient was explicitly instructed on specific signs and symptoms on which to return to the emergency room for. Patient was instructed to return to the ER for any new or worsening symptoms. Additional discharge instructions were given verbally. All questions were answered. Patient is comfortable and agreeable with discharge plan. Patient in no acute distress and non-toxic in appearance. Assessment     1. COVID-19    2. Nasal congestion    3. Cough    4. Chest wall pain      Plan   Discharged home.   Patient condition is stable    New Medications     New Prescriptions    ASCORBIC ACID (VITAMIN C) 500 MG TABLET    Take 1 tablet by mouth 2 times daily for 7 days    BUDESONIDE (PULMICORT) 180 MCG/ACT AEPB INHALER    Inhale 2 puffs into the lungs 2 times daily    GUAIFENESIN (MUCINEX) 600 MG EXTENDED RELEASE TABLET    Take 1 tablet by mouth 2 times daily for 10 days     Electronically signed by Bentley Harris PA-C   DD: 1/1/22  **This report was transcribed using voice recognition software. Every effort was made to ensure accuracy; however, inadvertent computerized transcription errors may be present.   END OF ED PROVIDER NOTE          Bentley Harris PA-C  01/01/22 1311

## 2022-01-01 NOTE — Clinical Note
Mariano Alonzo was seen and treated in our emergency department on 1/1/2022. COVID19 virus is suspected. Per the CDC guidelines we recommend home isolation until the following conditions are all met:    1. At least 10 days have passed since symptoms first appeared and  2. At least 24 hours have passed since last fever without the use of fever-reducing medications and  3. Symptoms (e.g., cough, shortness of breath) have improved    If you have any questions or concerns, please don't hesitate to call.     She may return to work/school on 01/05/2022        Olamide Nye PA-C

## 2022-01-09 ENCOUNTER — HOSPITAL ENCOUNTER (OUTPATIENT)
Age: 27
Setting detail: OBSERVATION
Discharge: HOME | End: 2022-01-09
Attending: OBSTETRICS & GYNECOLOGY | Admitting: OBSTETRICS & GYNECOLOGY
Payer: COMMERCIAL

## 2022-01-09 VITALS — BODY MASS INDEX: 22.32 KG/M2 | WEIGHT: 126 LBS | HEIGHT: 63 IN

## 2022-01-09 PROBLEM — Z03.71 ENCOUNTER FOR SUSPECTED PROM, WITH RUPTURE OF MEMBRANES NOT FOUND: Status: ACTIVE | Noted: 2022-01-09

## 2022-01-09 LAB
AMNISURE, POC: NEGATIVE
BACTERIA: ABNORMAL /HPF
BILIRUBIN URINE: NEGATIVE
BLOOD, URINE: NEGATIVE
CLARITY: ABNORMAL
COLOR: YELLOW
EPITHELIAL CELLS, UA: ABNORMAL /HPF
GLUCOSE URINE: NEGATIVE MG/DL
KETONES, URINE: NEGATIVE MG/DL
LEUKOCYTE ESTERASE, URINE: ABNORMAL
Lab: NORMAL
NEGATIVE QC PASS/FAIL: NORMAL
NITRITE, URINE: NEGATIVE
PH UA: 6.5 (ref 5–9)
POSITIVE QC PASS/FAIL: NORMAL
PROTEIN UA: NEGATIVE MG/DL
RBC UA: ABNORMAL /HPF (ref 0–2)
SPECIFIC GRAVITY UA: 1.02 (ref 1–1.03)
UROBILINOGEN, URINE: 0.2 E.U./DL
WBC UA: ABNORMAL /HPF (ref 0–5)

## 2022-01-09 PROCEDURE — 87088 URINE BACTERIA CULTURE: CPT

## 2022-01-09 PROCEDURE — G0378 HOSPITAL OBSERVATION PER HR: HCPCS

## 2022-01-09 PROCEDURE — 99202 OFFICE O/P NEW SF 15 MIN: CPT

## 2022-01-09 PROCEDURE — 81001 URINALYSIS AUTO W/SCOPE: CPT

## 2022-01-09 PROCEDURE — 84112 EVAL AMNIOTIC FLUID PROTEIN: CPT

## 2022-01-09 NOTE — H&P
Janice Mack is a 32 y.o. female patient  presents following LOF with standing. Admits to some cramping and backache. Amnisure negative. Urine sample appears concentrated. Denies dysuria. No diagnosis found. Past Medical History:   Diagnosis Date    Depression     IBS (irritable bowel syndrome)     Incisional hernia     Seizure ([facility] [address] 2015    after delivery / not sure was a seizure, no issues since , no medication     OB History        4    Para   2    Term   2       0    AB   1    Living   2       SAB   1    IAB   0    Ectopic   0    Molar   0    Multiple   0    Live Births   2              22w6d  Estimated Date of Delivery: 22  Allergies   Allergen Reactions    Naproxen      Dizziness,disoriented     Active Problems:    Encounter for suspected PROM, with rupture of membranes not found  Resolved Problems:    * No resolved hospital problems. *    unknown if currently breastfeeding.     History  Exam   abd gravid, soft, non-tender     Spec no fluid, min clear-whitish d/c  cx closed by digital exam  Assessment & Plan  Negative Amnisure  R/o UTI    D/W Dr Delena Phalen  U/A, C&S  Annita Gerard MD  2022

## 2022-01-09 NOTE — PROGRESS NOTES
. 22 wk 6 days presents to unit with complaint of leaking of fluid. Denies VB. Reports having some cramping and backache. Reports +FM. Placed on EFM.  House officer aware of arrival.

## 2022-01-11 LAB — URINE CULTURE, ROUTINE: NORMAL

## 2022-01-14 ENCOUNTER — HOSPITAL ENCOUNTER (OUTPATIENT)
Age: 27
Discharge: HOME | End: 2022-01-14
Attending: OBSTETRICS & GYNECOLOGY | Admitting: OBSTETRICS & GYNECOLOGY
Payer: COMMERCIAL

## 2022-01-14 VITALS
DIASTOLIC BLOOD PRESSURE: 72 MMHG | OXYGEN SATURATION: 100 % | HEART RATE: 104 BPM | WEIGHT: 127 LBS | HEIGHT: 63 IN | TEMPERATURE: 98 F | RESPIRATION RATE: 16 BRPM | BODY MASS INDEX: 22.5 KG/M2 | SYSTOLIC BLOOD PRESSURE: 115 MMHG

## 2022-01-14 PROCEDURE — 99213 OFFICE O/P EST LOW 20 MIN: CPT | Performed by: OBSTETRICS & GYNECOLOGY

## 2022-01-14 PROCEDURE — 99211 OFF/OP EST MAY X REQ PHY/QHP: CPT

## 2022-01-14 RX ORDER — BISACODYL 10 MG
10 SUPPOSITORY, RECTAL RECTAL PRN
Qty: 10 SUPPOSITORY | Refills: 1 | Status: SHIPPED | OUTPATIENT
Start: 2022-01-14 | End: 2022-02-13

## 2022-01-14 RX ORDER — LACTULOSE 10 G/15ML
30 SOLUTION ORAL EVERY EVENING
Qty: 500 ML | Refills: 1 | Status: ON HOLD | OUTPATIENT
Start: 2022-01-14 | End: 2022-02-24 | Stop reason: HOSPADM

## 2022-01-14 NOTE — H&P
Department of Obstetrics and Gynecology  Attending Obstetrics History and Physical        CHIEF COMPLAINT:  abdominal pain    HISTORY OF PRESENT ILLNESS:      The patient is a 32 y.o.  4 parity 2 at 23.4 weeks. Patient presents with a chief complaint as above and is being admitted for evaluation of abdominal pain. She had COVID last month and then got a little SOB and went to the ER for evaluation in [place]. During that time, she developed abdominal pain and cramping.   She came here for evaluation      OB History    Para Term  AB Living   4 2 2 0 1 2   SAB IAB Ectopic Molar Multiple Live Births   1 0 0 0 0 2      # Outcome Date GA Lbr [place]/2nd Weight Sex Delivery Anes PTL Lv   4 Current            3 Term 18 39w0d  8 lb 1.1 oz (3.66 kg) F CS-LTranv Spinal N NATALIE   2 Term 01/03/15 39w5d  8 lb 6 oz (3.799 kg) M CS-LTranv Spinal N NATALIE      Complications: Fetal Intolerance   1 SAB              Past Medical History:        Diagnosis Date    Depression     IBS (irritable bowel syndrome)     Incisional hernia     Seizure ([facility] [address] 2015    after delivery / not sure was a seizure, no issues since , no medication     Past Surgical History:        Procedure Laterality Date    ABDOMEN SURGERY       SECTION  1/3/2015    CHOLECYSTECTOMY  2017    ROBOT ASSISTED    COLONOSCOPY  2012    COLONOSCOPY      DILATION AND CURETTAGE OF UTERUS      ablation    HEMORRHOID SURGERY  2020and 2021    LAPAROSCOPY N/A 10/18/2019    DIAGNOSTIC LAPAROSCOPY, OPEN VENTRAL HERNIA REPAIR performed by Delgado Nelson MD at [address] N/A 2018     SECTION performed [facility] Etienne Delgado DO at [facility] L&D    UPPER GASTROINTESTINAL ENDOSCOPY  2012    UPPER GASTROINTESTINAL ENDOSCOPY      UPPER GASTROINTESTINAL ENDOSCOPY  2017    VENTRAL HERNIA REPAIR N/A 2021    OPEN INCISIONAL HERNIA REPAIR performed by Roverto Chopra MD at [address] Social History:    TOBACCO:   reports that she quit smoking about 8 years ago. Her smoking use included cigarettes. She quit after 2.00 years of use. She has never used smokeless tobacco.  ETOH:   reports no history of alcohol use. DRUGS:   reports no history of drug use. Family History:       Problem Relation Age of Onset   Talia Neither Cancer Father         bladder     Medications Prior to Admission:  Medications Prior to Admission: ascorbic acid (VITAMIN C) 500 MG tablet, Take 1 tablet by mouth 2 times daily for 7 days  budesonide (PULMICORT) 180 MCG/ACT AEPB inhaler, Inhale 2 puffs into the lungs 2 times daily  Magic Mouthwash (MIRACLE MOUTHWASH), Swish and spit 5 mLs 4 times daily as needed for Irritation Preparation: 10cc maalox,  10cc Benadryl, 10cc Viscous Lidocaine  metoclopramide (REGLAN) 10 MG tablet, Take 1 tablet by mouth 4 times daily  metoclopramide (REGLAN) 10 MG tablet, Take 1 tablet by mouth 4 times daily as needed (Nausea or vomiting, ensure you take 1 at night before bed)  Prenatal Vit-Fe Fumarate-FA (PRENATAL 1+1 PO), Take 1 Dose by mouth  famotidine (PEPCID) 20 MG tablet, Take 1 tablet by mouth 2 times daily for 7 days  metroNIDAZOLE (METROGEL) 0.75 % gel, Apply topically 2 times daily.  For 10 days  Allergies:  Naproxen    PHYSICAL EXAM:    General appearance:  awake, alert, cooperative, no apparent distress, and appears stated age  Neurologic:  Normal DTRs    Abdomen:  Gravid, soft, non tender  Fetal heart rate:  Baseline Heart Rate 145, accelerations:  present    Cervix:    DILATION:  Closed  EFFACEMENT:   Long  STATION:  -3 cm    Large amount of stool in the rectum    Myofascial pain on exam    Contraction frequency:  0 minutes  Membranes:  Intact    ASSESSMENT AND PLAN:    The patient is a 32 y.o.  4 parity 2 at 23.4 weeks  Constipation  Myofascial pain    Above discussed with Dr Malena Zavala MD  2022

## 2022-01-14 NOTE — PROGRESS NOTES
Patient given verbal and written discharge instructions with standard labor precautions instructed to keep follow up appointment with physician. Patient verbalizes understanding states no questions or concerns.  Patient ambulatory off unit

## 2022-01-21 ENCOUNTER — HOSPITAL ENCOUNTER (OUTPATIENT)
Age: 27
Discharge: HOME | End: 2022-01-21
Attending: OBSTETRICS & GYNECOLOGY | Admitting: OBSTETRICS & GYNECOLOGY
Payer: COMMERCIAL

## 2022-01-21 ENCOUNTER — ANCILLARY PROCEDURE (OUTPATIENT)
Dept: OBGYN CLINIC | Age: 27
End: 2022-01-21
Payer: COMMERCIAL

## 2022-01-21 VITALS
HEART RATE: 101 BPM | RESPIRATION RATE: 16 BRPM | SYSTOLIC BLOOD PRESSURE: 116 MMHG | DIASTOLIC BLOOD PRESSURE: 64 MMHG | TEMPERATURE: 98 F

## 2022-01-21 LAB
AMNISURE, POC: NEGATIVE
AMORPHOUS: ABNORMAL
BACTERIA: ABNORMAL /HPF
BILIRUBIN URINE: NEGATIVE
BLOOD, URINE: NEGATIVE
CLARITY: CLEAR
COLOR: YELLOW
EPITHELIAL CELLS, UA: ABNORMAL /HPF
GLUCOSE URINE: NEGATIVE MG/DL
KETONES, URINE: NEGATIVE MG/DL
LEUKOCYTE ESTERASE, URINE: NEGATIVE
Lab: NORMAL
NEGATIVE QC PASS/FAIL: NORMAL
NITRITE, URINE: NEGATIVE
PH UA: 5.5 (ref 5–9)
POSITIVE QC PASS/FAIL: NORMAL
PROTEIN UA: NORMAL MG/DL
RBC UA: ABNORMAL /HPF (ref 0–2)
SPECIFIC GRAVITY UA: >=1.03 (ref 1–1.03)
UROBILINOGEN, URINE: 0.2 E.U./DL
WBC UA: ABNORMAL /HPF (ref 0–5)

## 2022-01-21 PROCEDURE — 99213 OFFICE O/P EST LOW 20 MIN: CPT | Performed by: ADVANCED PRACTICE MIDWIFE

## 2022-01-21 PROCEDURE — 76819 FETAL BIOPHYS PROFIL W/O NST: CPT | Performed by: OBSTETRICS & GYNECOLOGY

## 2022-01-21 PROCEDURE — 81001 URINALYSIS AUTO W/SCOPE: CPT

## 2022-01-21 PROCEDURE — 99211 OFF/OP EST MAY X REQ PHY/QHP: CPT

## 2022-01-21 PROCEDURE — 84112 EVAL AMNIOTIC FLUID PROTEIN: CPT

## 2022-01-21 PROCEDURE — 76821 MIDDLE CEREBRAL ARTERY ECHO: CPT | Performed by: OBSTETRICS & GYNECOLOGY

## 2022-01-21 PROCEDURE — 76805 OB US >/= 14 WKS SNGL FETUS: CPT | Performed by: OBSTETRICS & GYNECOLOGY

## 2022-01-21 PROCEDURE — 76820 UMBILICAL ARTERY ECHO: CPT | Performed by: OBSTETRICS & GYNECOLOGY

## 2022-01-21 NOTE — PROGRESS NOTES
Dr. Kassie Sandoval called in and orders for discharge, send UA call with abnormal results     Pt given discharge instructions with verbalized understanding and will follow up with already scheduled appointments

## 2022-01-21 NOTE — H&P
Department of Obstetrics and Gynecology  Labor and Delivery  Triage Note      SUBJECTIVE:  Daniel Pantoja is a 32 y.o. female, [id number], No LMP recorded. Patient is pregnant., Estimated Date of Delivery: 5/9/22, 24w4d, here with the complaint of leaking of fluid and feels like heart is faster.      Prenatal course: uneventful    Review of Systems:   Ears, nose, mouth, throat, and face: negative  Respiratory: negative  Cardiovascular: negative  Gastrointestinal: negative  Genitourinary:negative  Integument/breast: negative  Hematologic/lymphatic: negative  Musculoskeletal:negative  Neurological: negative  Behavioral/Psych: negative  Endocrine: negative  Allergic/Immunologic: negative    OBJECTIVE    Vitals:  /65   Pulse 113   Temp 98 °F (36.7 °C) (Oral)   Resp 15     General- alert, NAD  Skin- warm and dry, no rashes seen  Psych- normal mood and affect  Neuro- CN II-XII grossly intact  Abdomen: soft non tender  Cervix closed/long/high    Fetal heart rate:         Baseline Heart Rate:  155        Accelerations:  present       Decelerations:  absent       Variability:  moderate    Contraction frequency: none    ASSESSMENT:    Rule out rom (amniosure neg)    Plan: d/w Dr. Lr Living    Have MFM see her to check fluid

## 2022-01-21 NOTE — PROGRESS NOTES
. 24.4 weeks gestation. Presents to unit with c/o lof that occurred at 1200. Denies vb, ctxs. States positive fm. States she does not see MFM. Placed on efm. VSS. Amnisure negative.

## 2022-02-15 ENCOUNTER — OFFICE VISIT (OUTPATIENT)
Dept: FAMILY MEDICINE CLINIC | Age: 27
End: 2022-02-15
Payer: COMMERCIAL

## 2022-02-15 VITALS
SYSTOLIC BLOOD PRESSURE: 110 MMHG | TEMPERATURE: 96.9 F | HEIGHT: 63 IN | HEART RATE: 117 BPM | DIASTOLIC BLOOD PRESSURE: 66 MMHG | WEIGHT: 138 LBS | OXYGEN SATURATION: 99 % | BODY MASS INDEX: 24.45 KG/M2

## 2022-02-15 DIAGNOSIS — J02.0 ACUTE STREPTOCOCCAL PHARYNGITIS: Primary | ICD-10-CM

## 2022-02-15 DIAGNOSIS — J02.0 ACUTE STREPTOCOCCAL PHARYNGITIS: ICD-10-CM

## 2022-02-15 LAB — S PYO AG THROAT QL: NORMAL

## 2022-02-15 PROCEDURE — G8484 FLU IMMUNIZE NO ADMIN: HCPCS | Performed by: PHYSICIAN ASSISTANT

## 2022-02-15 PROCEDURE — G8420 CALC BMI NORM PARAMETERS: HCPCS | Performed by: PHYSICIAN ASSISTANT

## 2022-02-15 PROCEDURE — 1036F TOBACCO NON-USER: CPT | Performed by: PHYSICIAN ASSISTANT

## 2022-02-15 PROCEDURE — 87880 STREP A ASSAY W/OPTIC: CPT | Performed by: PHYSICIAN ASSISTANT

## 2022-02-15 PROCEDURE — G8428 CUR MEDS NOT DOCUMENT: HCPCS | Performed by: PHYSICIAN ASSISTANT

## 2022-02-15 PROCEDURE — 99203 OFFICE O/P NEW LOW 30 MIN: CPT | Performed by: PHYSICIAN ASSISTANT

## 2022-02-15 RX ORDER — AMOXICILLIN 500 MG/1
500 CAPSULE ORAL 3 TIMES DAILY
Qty: 30 CAPSULE | Refills: 0 | Status: SHIPPED
Start: 2022-02-15 | End: 2022-02-16 | Stop reason: ALTCHOICE

## 2022-02-15 NOTE — PROGRESS NOTES
Chief Complaint       Pharyngitis    History of Present Illness   Source of history provided by:  patient. Sussy Aleman is a 32 y.o. old  female presenting to the walk in clinic for evaluation of sore throat x 12 hours. 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 Reports associated pain with swallowing and chills. Denies any fever, loss of taste/smell, dyspnea, dysphagia, CP, SOB, cough, nausea, vomiting, rash, or lethargy. Denies any known strep exposures. Denies any contact with any individuals with known COVID-19 infection or under investigation for COVID-19 infection. Denies any abdominal pain, vaginal discharge, or vaginal bleeding. ROS    Unless otherwise stated in this report or unable to obtain because of the patient's clinical or mental status as evidenced by the medical record, this patients's positive and negative responses for Review of Systems, constitutional, psych, eyes, ENT, cardiovascular, respiratory, gastrointestinal, neurological, genitourinary, musculoskeletal, integument systems and systems related to the presenting problem are either stated in the preceding or were not pertinent or were negative for the symptoms and/or complaints related to the medical problem. Physical Exam         VS:  /66   Pulse 117   Temp 96.9 °F (36.1 °C) (Temporal)   Ht 5' 3\" (1.6 m)   Wt 138 lb (62.6 kg)   SpO2 99%   BMI 24.45 kg/m²    Oxygen Saturation Interpretation: Normal.    Constitutional:  Alert, development consistent with age. .  Ears:  TMs translucent without perforation, injection, or bulging. External canals clear without swelling or exudate. Canals are dry, there is no posterior swelling, erythema  Throat: Airway patent. Posterior pharynx with mild erythema and mild tonsillar hypertrophy 1+ bilateral. No exudate noted. No evidence of asymmetry. There is no evidence of peritonsillar abscess. No drooling. No trismus.   Neck: Supple with full ROM. There is mild anterior bilateral adenopathy. Lungs:  Clear to auscultation and breath sounds equal.    CV: Regular rate and rhythm, normal heart sounds, without pathological murmurs, ectopy, gallops, or rubs. Skin:  No rashes, erythema present. Lymphatics: No lymphangitis or adenopathy noted other then stated above. Neurological:  Alert and orientated. Motor functions intact. Responds to commands. Lab / Imaging Results   (All laboratory and radiology results have been personally reviewed by myself)  Labs:  Results for orders placed or performed in visit on 02/15/22   POCT rapid strep A   Result Value Ref Range    Strep A Ag None Detected None Detected       Imaging: All Radiology results interpreted by Radiologist unless otherwise noted. Assessment / Plan     Impression(s):  Luanne Rodriguez was seen today for pharyngitis. Diagnoses and all orders for this visit:    Acute streptococcal pharyngitis  -     amoxicillin (AMOXIL) 500 MG capsule; Take 1 capsule by mouth 3 times daily for 10 days  -     POCT rapid strep A  -     Culture, Throat; Future      Disposition:  Disposition: Discharge to home. Rapid strep came back negative in office. Due to pregnancy and rapid strep positive at work patient was treated with antibiotics. Throat culture pending, will call with results. Script written for amoxicillin, side effects discussed. Increase fluids and rest. Tylenol prn pain/fever. F/u PCP in 5-7 days if symptoms persist. ED sooner if symptoms worsen or change. ED immediately with the development of refractory fever, shaking chills, dyspnea, dysphagia, neck stiffness, vomiting, etc. Pt is in agreement with this care plan. All questions answered. JULIA Gu III    **This report was transcribed using voice recognition software. Every effort was made to ensure accuracy; however, inadvertent computerized transcription errors may be present.

## 2022-02-16 ENCOUNTER — HOSPITAL ENCOUNTER (EMERGENCY)
Age: 27
Discharge: HOME | End: 2022-02-16
Payer: COMMERCIAL

## 2022-02-16 VITALS
WEIGHT: 138 LBS | TEMPERATURE: 97.3 F | HEIGHT: 63 IN | SYSTOLIC BLOOD PRESSURE: 118 MMHG | HEART RATE: 96 BPM | DIASTOLIC BLOOD PRESSURE: 76 MMHG | RESPIRATION RATE: 16 BRPM | OXYGEN SATURATION: 100 % | BODY MASS INDEX: 24.45 KG/M2

## 2022-02-16 DIAGNOSIS — S30.1XXA CONTUSION OF ABDOMINAL WALL, INITIAL ENCOUNTER: ICD-10-CM

## 2022-02-16 DIAGNOSIS — R11.2 NAUSEA AND VOMITING, INTRACTABILITY OF VOMITING NOT SPECIFIED, UNSPECIFIED VOMITING TYPE: Primary | ICD-10-CM

## 2022-02-16 DIAGNOSIS — J02.0 STREPTOCOCCAL SORE THROAT: ICD-10-CM

## 2022-02-16 LAB
ANION GAP SERPL CALCULATED.3IONS-SCNC: 11 MMOL/L (ref 7–16)
BACTERIA: NORMAL /HPF
BASOPHILS ABSOLUTE: 0.05 E9/L (ref 0–0.2)
BASOPHILS RELATIVE PERCENT: 0.5 % (ref 0–2)
BILIRUBIN URINE: NEGATIVE
BLOOD, URINE: NEGATIVE
BUN BLDV-MCNC: 7 MG/DL (ref 6–20)
CALCIUM SERPL-MCNC: 8.5 MG/DL (ref 8.6–10.2)
CHLORIDE BLD-SCNC: 106 MMOL/L (ref 98–107)
CLARITY: CLEAR
CO2: 20 MMOL/L (ref 22–29)
COLOR: YELLOW
CREAT SERPL-MCNC: 0.4 MG/DL (ref 0.5–1)
EOSINOPHILS ABSOLUTE: 0.28 E9/L (ref 0.05–0.5)
EOSINOPHILS RELATIVE PERCENT: 2.6 % (ref 0–6)
GFR AFRICAN AMERICAN: >60
GFR NON-AFRICAN AMERICAN: >60 ML/MIN/1.73
GLUCOSE BLD-MCNC: 86 MG/DL (ref 74–99)
GLUCOSE URINE: NEGATIVE MG/DL
HCT VFR BLD CALC: 36 % (ref 34–48)
HEMOGLOBIN: 11.8 G/DL (ref 11.5–15.5)
IMMATURE GRANULOCYTES #: 0.43 E9/L
IMMATURE GRANULOCYTES %: 3.9 % (ref 0–5)
KETONES, URINE: 15 MG/DL
LEUKOCYTE ESTERASE, URINE: ABNORMAL
LYMPHOCYTES ABSOLUTE: 1.74 E9/L (ref 1.5–4)
LYMPHOCYTES RELATIVE PERCENT: 15.9 % (ref 20–42)
MCH RBC QN AUTO: 29.1 PG (ref 26–35)
MCHC RBC AUTO-ENTMCNC: 32.8 % (ref 32–34.5)
MCV RBC AUTO: 88.7 FL (ref 80–99.9)
MONOCYTES ABSOLUTE: 1.23 E9/L (ref 0.1–0.95)
MONOCYTES RELATIVE PERCENT: 11.3 % (ref 2–12)
NEUTROPHILS ABSOLUTE: 7.2 E9/L (ref 1.8–7.3)
NEUTROPHILS RELATIVE PERCENT: 65.8 % (ref 43–80)
NITRITE, URINE: NEGATIVE
PDW BLD-RTO: 13.6 FL (ref 11.5–15)
PH UA: 7 (ref 5–9)
PLATELET # BLD: 195 E9/L (ref 130–450)
PMV BLD AUTO: 11.8 FL (ref 7–12)
POTASSIUM SERPL-SCNC: 4 MMOL/L (ref 3.5–5)
PROTEIN UA: NEGATIVE MG/DL
RBC # BLD: 4.06 E12/L (ref 3.5–5.5)
RBC UA: NORMAL /HPF (ref 0–2)
SODIUM BLD-SCNC: 137 MMOL/L (ref 132–146)
SPECIFIC GRAVITY UA: 1.02 (ref 1–1.03)
UROBILINOGEN, URINE: 0.2 E.U./DL
WBC # BLD: 10.9 E9/L (ref 4.5–11.5)
WBC UA: NORMAL /HPF (ref 0–5)

## 2022-02-16 PROCEDURE — 85025 COMPLETE CBC W/AUTO DIFF WBC: CPT

## 2022-02-16 PROCEDURE — 2580000003 HC RX 258: Performed by: PHYSICIAN ASSISTANT

## 2022-02-16 PROCEDURE — 80048 BASIC METABOLIC PNL TOTAL CA: CPT

## 2022-02-16 PROCEDURE — 99284 EMERGENCY DEPT VISIT MOD MDM: CPT

## 2022-02-16 PROCEDURE — 87088 URINE BACTERIA CULTURE: CPT

## 2022-02-16 PROCEDURE — 81001 URINALYSIS AUTO W/SCOPE: CPT

## 2022-02-16 RX ORDER — 0.9 % SODIUM CHLORIDE 0.9 %
1000 INTRAVENOUS SOLUTION INTRAVENOUS ONCE
Status: COMPLETED | OUTPATIENT
Start: 2022-02-16 | End: 2022-02-16

## 2022-02-16 RX ORDER — METOCLOPRAMIDE HYDROCHLORIDE 5 MG/ML
10 INJECTION INTRAMUSCULAR; INTRAVENOUS ONCE
Status: DISCONTINUED | OUTPATIENT
Start: 2022-02-16 | End: 2022-02-16 | Stop reason: HOSPADM

## 2022-02-16 RX ORDER — DOXYLAMINE SUCCINATE AND PYRIDOXINE HYDROCHLORIDE, DELAYED RELEASE TABLETS 10 MG/10 MG 10; 10 MG/1; MG/1
1 TABLET, DELAYED RELEASE ORAL NIGHTLY
Qty: 60 TABLET | Refills: 1 | Status: ON HOLD | OUTPATIENT
Start: 2022-02-16 | End: 2022-05-02 | Stop reason: HOSPADM

## 2022-02-16 RX ADMIN — SODIUM CHLORIDE 1000 ML: 9 INJECTION, SOLUTION INTRAVENOUS at 16:58

## 2022-02-16 NOTE — ED PROVIDER NOTES
[address] [place] [address]  Department of Emergency Medicine   ED  Encounter Note  Admit Date/RoomTime: 2022  4:11 PM  ED Room: 36/36    NAME: Claire Daniel  : 1995  MRN: [id number]     Chief Complaint:  Emesis (27 weeks pregnent)    History of Present Illness        Claire Daniel is a 32 y.o. old female who presents to the emergency department by private vehicle, with sudden onset of nausea and vomiting of dry heaves or bile which began 1 day(s) prior to arrival.  There has been no similar episodes in the past.  Patient developed sore throat the other day, she had a positive strep screen was prescribed amoxicillin. She is 27 weeks pregnant with her third child with pending  in early May. She is not considered high risk. She denies any obstetrical complaints other than having pain in her abdomen after her dog jumped on her and struck her previous hernia repair in the upper abdomen. OBGYN: Dr. Jorge WHITFIELD   Pertinent positives and negatives are stated within HPI, all other systems reviewed and are negative. Past Medical History:  has a past medical history of Depression, IBS (irritable bowel syndrome), Incisional hernia, and Seizure ([facility] [address] Surgical History:  has a past surgical history that includes Colonoscopy (2012); Upper gastrointestinal endoscopy (2012); Upper gastrointestinal endoscopy; Colonoscopy;  section (1/3/2015); Upper gastrointestinal endoscopy (2017); Dilation and curettage of uterus; Cholecystectomy (2017); pr  delivery only (N/A, 2018); laparoscopy (N/A, 10/18/2019); Hemorrhoid surgery (2020and 2021); ventral hernia repair (N/A, 2021); and Abdomen surgery. Social History:  reports that she quit smoking about 8 years ago. Her smoking use included cigarettes. She quit after 2.00 years of use.  She has never used smokeless tobacco. She reports that she does not drink alcohol and does not use drugs. Family History: family history includes Cancer in her father. Allergies: Naproxen    Physical Exam   Oxygen Saturation Interpretation: Normal on room air analysis. ED Triage Vitals [02/16/22 1608]   BP Temp Temp src Pulse Resp SpO2 Height Weight   117/73 97.3 °F (36.3 °C) -- 114 16 100 % 5' 3\" (1.6 m) 138 lb (62.6 kg)         Constitutional:  Alert, development consistent with age. HEENT:  NC/NT. Airway patent. Neck:  Normal ROM. Supple. Respiratory:  Clear to auscultation and breath sounds equal.  CV:  tachy but Regular rate and rhythm, normal heart sounds, without pathological murmurs, ectopy, gallops, or rubs. GI: Surgical scar upper midline abdomen from previous hernia repair. No palpable hernia. Mild tenderness in the upper abdomen over surgical scar. No skin changes. There is a second trimester gravid uterus. .       Bowel sounds: normal bowel sounds. Tenderness: No abdominal tenderness, guarding, rebound, rigidity or pulsatile mass. .        Liver: non-tender. Spleen:  non-tender. /Rectal: Rectal Examination deferred, N/A or declined by patient  Back: CVA Tenderness: No CVA tenderness. Integument:  Normal turgor. Warm, dry, without visible rash, unless noted elsewhere. Lymphatics: No lymphangitis or adenopathy noted. Neurological:  Oriented. Motor functions intact.     Lab / Imaging Results   (All laboratory and radiology results have been personally reviewed by myself)  Labs:  Results for orders placed or performed during the hospital encounter of 02/16/22   CBC with Auto Differential   Result Value Ref Range    WBC 10.9 4.5 - 11.5 E9/L    RBC 4.06 3.50 - 5.50 E12/L    Hemoglobin 11.8 11.5 - 15.5 g/dL    Hematocrit 36.0 34.0 - 48.0 %    MCV 88.7 80.0 - 99.9 fL    MCH 29.1 26.0 - 35.0 pg    MCHC 32.8 32.0 - 34.5 %    RDW 13.6 11.5 - 15.0 fL    Platelets 938 522 - 064 E9/L    MPV 11.8 7.0 - 12.0 fL    Neutrophils % 65.8 43.0 - 80.0 % Immature Granulocytes % 3.9 0.0 - 5.0 %    Lymphocytes % 15.9 (L) 20.0 - 42.0 %    Monocytes % 11.3 2.0 - 12.0 %    Eosinophils % 2.6 0.0 - 6.0 %    Basophils % 0.5 0.0 - 2.0 %    Neutrophils Absolute 7.20 1.80 - 7.30 E9/L    Immature Granulocytes # 0.43 E9/L    Lymphocytes Absolute 1.74 1.50 - 4.00 E9/L    Monocytes Absolute 1.23 (H) 0.10 - 0.95 E9/L    Eosinophils Absolute 0.28 0.05 - 0.50 E9/L    Basophils Absolute 0.05 0.00 - 0.20 B5/W   Basic Metabolic Panel   Result Value Ref Range    Sodium 137 132 - 146 mmol/L    Potassium 4.0 3.5 - 5.0 mmol/L    Chloride 106 98 - 107 mmol/L    CO2 20 (L) 22 - 29 mmol/L    Anion Gap 11 7 - 16 mmol/L    Glucose 86 74 - 99 mg/dL    BUN 7 6 - 20 mg/dL    CREATININE 0.4 (L) 0.5 - 1.0 mg/dL    GFR Non-African American >60 >=60 mL/min/1.73    GFR African American >60     Calcium 8.5 (L) 8.6 - 10.2 mg/dL   Urinalysis   Result Value Ref Range    Color, UA Yellow Straw/Yellow    Clarity, UA Clear Clear    Glucose, Ur Negative Negative mg/dL    Bilirubin Urine Negative Negative    Ketones, Urine 15 (A) Negative mg/dL    Specific Gravity, UA 1.020 1.005 - 1.030    Blood, Urine Negative Negative    pH, UA 7.0 5.0 - 9.0    Protein, UA Negative Negative mg/dL    Urobilinogen, Urine 0.2 <2.0 E.U./dL    Nitrite, Urine Negative Negative    Leukocyte Esterase, Urine TRACE (A) Negative   Microscopic Urinalysis   Result Value Ref Range    WBC, UA NONE 0 - 5 /HPF    RBC, UA NONE 0 - 2 /HPF    Bacteria, UA NONE SEEN None Seen /HPF     Imaging: All Radiology results interpreted by Radiologist unless otherwise noted. No orders to display     ED Course / Medical Decision Making     Medications   metoclopramide (REGLAN) injection 10 mg (10 mg IntraVENous Not Given 2/16/22 1845)   0.9 % sodium chloride bolus (0 mLs IntraVENous Stopped 2/16/22 1842)      Re-examination:  Patients condition is improving.   Upon my examination, patient is alert and oriented x3 and in no apparent distress at this exam. She states she is feeling much better after fluids and medications. She was able to tolerate PO ginger ale and water. She feels comfortable returning home and will call OB tomorrow      Consultations:             None    Procedure(s):     PROCEDURE NOTE   2/16/22       Time: 9895  PELVIS / OB-GYN BEDSIDE ULTRASOUND   Risks, benefits and alternatives (for applicable procedures below) described. Performed By: JULIA Leslie. Indication:  Abdominal pain and Pregnancy. Informed consent: by patient or legal gardian. Procedure:  Probe type: abdominal.  Findings:  Second/third trimester findings: Fetal presentation: cephalic and positive fetal movement and .       _______________________________________________________________________________________            EMERGENCY MEDICINE SIGN-OUT NOTE  ED Room: 36/36  Radha Pickard  MRN#: [id number]    2/16/22  6:41 PM EST:  Ron Lawton PA-C have turned over care of Radha Pickard to the [place], [place]. I have discussed the patient's initial exam, treatment and plan of care with the on coming provider to resume care of and determine final disposition of Radha Pickard. I have informed the patient of the change in treating provider and answered all questions to this point.  ______________________      Plan of Care/Counseling:  Physician Assistant on duty reviewed today's visit with the patient in addition to providing specific details for the plan of care and counseling regarding the diagnosis and prognosis. Questions are answered at this time and are agreeable with the plan. Patient understands they must follow up with OB/GYN. They were advised to return to the ED if symptoms worsen or if new symptoms develop. Pt remained nontoxic, afebrile, and A&O x4 during this ED visit. They agreed with plan of care, discharge, and importance of follow-up. Pt was in no distress at discharge. Vitals stable. Patient was educated on newly prescribed medications.    At this time the patient is without objective evidence of an acute process requiring hospitalization or inpatient management. They have remained hemodynamically stable throughout their entire ED visit and are stable for discharge with outpatient follow-up. Tatamy diet, drink plenty of fluids     Assessment     1. Nausea and vomiting, intractability of vomiting not specified, unspecified vomiting type    2. Streptococcal sore throat    3. Contusion of abdominal wall, initial encounter      Plan   Discharged home. Patient condition is good    New Medications     New Prescriptions    DOXYLAMINE-PYRIDOXINE (DICLEGIS) 10-10 MG TBEC    Take 1 tablet by mouth nightly     Electronically signed by Aidee Freeman PA-C   DD: 2/16/22    **This report was transcribed using voice recognition software. Every effort was made to ensure accuracy; however, inadvertent computerized transcription errors may be present.     END OF ED PROVIDER NOTE       Aidee Freeman PA-C  02/16/22 1954

## 2022-02-18 ENCOUNTER — APPOINTMENT (OUTPATIENT)
Dept: ULTRASOUND IMAGING | Age: 27
End: 2022-02-18
Payer: COMMERCIAL

## 2022-02-18 ENCOUNTER — HOSPITAL ENCOUNTER (OUTPATIENT)
Age: 27
Discharge: HOME | End: 2022-02-18
Attending: STUDENT IN AN ORGANIZED HEALTH CARE EDUCATION/TRAINING PROGRAM | Admitting: OBSTETRICS & GYNECOLOGY
Payer: COMMERCIAL

## 2022-02-18 VITALS
RESPIRATION RATE: 16 BRPM | HEART RATE: 99 BPM | TEMPERATURE: 98.1 F | DIASTOLIC BLOOD PRESSURE: 65 MMHG | SYSTOLIC BLOOD PRESSURE: 110 MMHG | OXYGEN SATURATION: 99 %

## 2022-02-18 DIAGNOSIS — R10.2 VAGINAL PAIN: ICD-10-CM

## 2022-02-18 DIAGNOSIS — Z3A.27 27 WEEKS GESTATION OF PREGNANCY: ICD-10-CM

## 2022-02-18 DIAGNOSIS — M54.32 SCIATICA OF LEFT SIDE: ICD-10-CM

## 2022-02-18 DIAGNOSIS — M79.605 LEFT LEG PAIN: Primary | ICD-10-CM

## 2022-02-18 PROBLEM — M79.606 LEG PAIN: Status: ACTIVE | Noted: 2022-02-18

## 2022-02-18 LAB
BACTERIA: ABNORMAL /HPF
BILIRUBIN URINE: NEGATIVE
BLOOD, URINE: NEGATIVE
CLARITY: CLEAR
COLOR: YELLOW
EPITHELIAL CELLS, UA: ABNORMAL /HPF
GLUCOSE URINE: NEGATIVE MG/DL
KETONES, URINE: ABNORMAL MG/DL
LEUKOCYTE ESTERASE, URINE: NEGATIVE
NITRITE, URINE: NEGATIVE
PH UA: 7 (ref 5–9)
PROTEIN UA: NEGATIVE MG/DL
RBC UA: ABNORMAL /HPF (ref 0–2)
SPECIFIC GRAVITY UA: 1.02 (ref 1–1.03)
THROAT CULTURE: NORMAL
URINE CULTURE, ROUTINE: NORMAL
UROBILINOGEN, URINE: 0.2 E.U./DL
WBC UA: ABNORMAL /HPF (ref 0–5)

## 2022-02-18 PROCEDURE — 99212 OFFICE O/P EST SF 10 MIN: CPT

## 2022-02-18 PROCEDURE — 81001 URINALYSIS AUTO W/SCOPE: CPT

## 2022-02-18 PROCEDURE — 99282 EMERGENCY DEPT VISIT SF MDM: CPT

## 2022-02-18 PROCEDURE — 93970 EXTREMITY STUDY: CPT

## 2022-02-18 RX ORDER — LIDOCAINE 4 G/G
1 PATCH TOPICAL DAILY
Status: DISCONTINUED | OUTPATIENT
Start: 2022-02-18 | End: 2022-02-19 | Stop reason: HOSPADM

## 2022-02-18 RX ORDER — AMOXICILLIN 500 MG/1
500 CAPSULE ORAL 3 TIMES DAILY
COMMUNITY
End: 2022-04-14

## 2022-02-18 RX ORDER — ACETAMINOPHEN 325 MG/1
650 TABLET ORAL ONCE
Status: DISCONTINUED | OUTPATIENT
Start: 2022-02-18 | End: 2022-02-19 | Stop reason: HOSPADM

## 2022-02-18 RX ORDER — ASPIRIN 81 MG/1
81 TABLET, CHEWABLE ORAL DAILY
Status: ON HOLD | COMMUNITY
End: 2022-05-02 | Stop reason: HOSPADM

## 2022-02-18 ASSESSMENT — ENCOUNTER SYMPTOMS
CONSTIPATION: 1
WHEEZING: 0
VOMITING: 0
NAUSEA: 1
SORE THROAT: 0
DIARRHEA: 0
CHEST TIGHTNESS: 0
COUGH: 0
COLOR CHANGE: 0
BACK PAIN: 1
SHORTNESS OF BREATH: 0
RECTAL PAIN: 0

## 2022-02-18 NOTE — ED PROVIDER NOTES
HPI     Patient is a 32 y.o. female with a PMHx of Depression, IBS, Incisional hernia, and Seizure at 27 weeks gestation,  presents with a chief complaint of vaginal pressure pain x 1 day. Patient complains of vaginal pressure for the last 2 days and started to develop vaginal pain today. States that the pain/pressure is worse with standing. Denies alleviating factors. Patient states that she was on the toilet today and she saw a flesh color bulge protruding from her vagina. Endorses constipation x 1.5 weeks - small, pebble BM, Passing flatus okay. Patient endorses minimal vaginal bleeding today. Denies heavy clots or bleeding through pad. Patient endorses increased vaginal discharge the past 3 days that is white/yellow in color. Denies smell. Patient woke up with left-sided leg pain today. States that it starts from the left side of her back and radiates down her leg. Endorses a 6/10 dull left pain. Denies numbness, tingling, trauma, recent travel. leg swelling, color change. Patient has a history of Covid end of December and has been on aspirin since. Patient endorses good fetal movement. Of note patient was started on amoxicillin for strep throat 2 days ago. States she has had nausea for the past 2 days. Patient was seen in ED yesterday for nausea and was improved with fluids and medication. Currently denies cough, sinus pain, sinus pressure, sore throat. Patient is O positive, denies any sexual intercourse or recent trauma. Immanuel Dumont has a history of  with no complications of previous pregnancies. Patient still has her appendix but no gallbladder. Review of Systems   Constitutional: Negative for appetite change, chills and fever. HENT: Negative for sore throat. Respiratory: Negative for cough, chest tightness, shortness of breath and wheezing. Cardiovascular: Negative for chest pain, palpitations and leg swelling. Gastrointestinal: Positive for constipation and nausea.  Negative Pt presents to the IC with c/o a fever since Saturday. Pt notes nasal congestion, cough, right sided chest sharp pain when coughing. Pt has been taking otc meds for pain control. Pt was on a plane on Saturday when symptoms started. for diarrhea, rectal pain and vomiting. Genitourinary: Positive for vaginal bleeding, vaginal discharge and vaginal pain. Negative for dysuria. Musculoskeletal: Positive for back pain (Left-sided (chronic)). Negative for arthralgias, joint swelling, myalgias, neck pain and neck stiffness. Skin: Negative for color change, rash and wound. Neurological: Negative for dizziness, seizures, syncope, facial asymmetry, speech difficulty, weakness, light-headedness and numbness. Physical Exam  Vitals and nursing note reviewed. Constitutional:       General: She is not in acute distress. Appearance: Normal appearance. She is not toxic-appearing. HENT:      Head: Normocephalic and atraumatic. Nose: No congestion or rhinorrhea. Mouth/Throat:      Mouth: Mucous membranes are moist.      Pharynx: No oropharyngeal exudate or posterior oropharyngeal erythema. Eyes:      Extraocular Movements: Extraocular movements intact. Cardiovascular:      Rate and Rhythm: Normal rate and regular rhythm. Pulses: Normal pulses. Heart sounds: Normal heart sounds. No murmur heard. No gallop. Pulmonary:      Effort: Pulmonary effort is normal. No respiratory distress. Breath sounds: Normal breath sounds. No wheezing or rales. Abdominal:      General: Bowel sounds are normal.      Palpations: Abdomen is soft. Tenderness: There is no abdominal tenderness. There is no right CVA tenderness, left CVA tenderness or guarding. Genitourinary:     Comments: Cervix closed on examination  Musculoskeletal:         General: No swelling or tenderness. Normal range of motion. Cervical back: Normal range of motion and neck supple. Lumbar back: No swelling, edema, deformity, lacerations or bony tenderness. Injury:    Tenderness: Left-sided paraspinal muscle tenderness to palpation. Right lower leg: No edema. Left lower leg: No edema.       Comments: Left-sided paraspinal muscle gestation of pregnancy        Disposition:  Patient's disposition: Discharge to antepartum. Patient's condition is stable. Patient was seen and evaluated by myself and my attending Savannah Treviño MD. Assessment and Plan discussed with attending provider, please see attestation for final plan of care. This note was done using dictation software and there may be some grammatical errors associated with this.     Thomas Etienne MD PGY-1     Sherwin Uriarte MD  Resident  02/18/22 [id number]

## 2022-02-18 NOTE — ED NOTES
Department of Emergency Medicine  FIRST PROVIDER TRIAGE NOTE             Independent MLP           2/18/22  4:36 PM EST    Date of Encounter: 2/18/22   MRN: [id number]      HPI: Janie Guo is a 32 y.o. female who presents to the ED for Leg Pain (left , bulge in groin)   poss vaginal prolapse. Patient 28 weeks pregnant. Denies any OB complaints    ROS: Negative for cp, sob, abd pain, back pain or fever. PE: Gen Appearance/Constitutional: alert  CV: regular rate  Pulm: CTA bilat     Initial Plan of Care: All treatment areas with department are currently occupied. Plan to order/Initiate the following while awaiting opening in ED: labs.   Initiate Treatment-Testing, Proceed toTreatment Area When Bed Available for ED Attending/MLP to Continue Care    Electronically signed by JULIA Georges   DD: 2/18/22         Venice Tripathi [place]  02/18/22 [id number]

## 2022-02-19 NOTE — H&P
Department of Obstetrics and Gynecology  Labor and Delivery  Triage Note      SUBJECTIVE:  Balbina Parr is a 32 y.o. female, [id number], No LMP recorded. Patient is pregnant., Estimated Date of Delivery: 22, 28w4d, here with the complaint of vaginal bulge, pelvic pressure, and one episode of vaginal bleeding. Pt c/o pelvic pressure and pain that radiates down her left leg. Venous doppler of b/l LE was negative in the ED today. She c/o pelvic discomfort and pressure throughout pregnancy, however the pressure has been worse over the last 2 days. Today she saw a small amount of blood in the toilet. When she looked down she noticed a bulge protruding from her vagina. She denies any recent intercourse however does admit recent constipation. Good FM. No LOF or ctx. Prenatal course: COVID during pregnancy, currently being treated for strep throat.      PAST OB HISTORY  OB History          4    Para   2    Term   2       0    AB   1    Living   2         SAB   1    IAB   0    Ectopic   0    Molar   0    Multiple   0    Live Births   2                Past Medical History:        Diagnosis Date    Depression     IBS (irritable bowel syndrome)     Incisional hernia     Seizure ([facility] [address] 2015    after delivery / not sure was a seizure, no issues since , no medication     Past Surgical History:        Procedure Laterality Date    ABDOMEN SURGERY       SECTION  1/3/2015    CHOLECYSTECTOMY  2017    ROBOT ASSISTED    COLONOSCOPY  2012    COLONOSCOPY      DILATION AND CURETTAGE OF UTERUS      ablation    HEMORRHOID SURGERY  2020and 2021    LAPAROSCOPY N/A 10/18/2019    DIAGNOSTIC LAPAROSCOPY, OPEN VENTRAL HERNIA REPAIR performed by David Ramirez MD at [address] [place] [address] N/A 2018     SECTION performed by Mesfin Navarro DO at [facility] L&D    UPPER GASTROINTESTINAL ENDOSCOPY  2012    UPPER GASTROINTESTINAL ENDOSCOPY      UPPER GASTROINTESTINAL ENDOSCOPY  2017    VENTRAL HERNIA REPAIR N/A 2021    OPEN INCISIONAL HERNIA REPAIR performed by Paulino Lazo MD at [facility] OR     Allergies:  Naproxen  Social History:    Social History     Socioeconomic History    Marital status: Single     Spouse name: Not on file    Number of children: Not on file    Years of education: Not on file    Highest education level: Not on file   Occupational History    Not on file   Tobacco Use    Smoking status: Former Smoker     Years: 2.00     Types: Cigarettes     Quit date: 3/31/2013     Years since quittin.8    Smokeless tobacco: Never Used   Vaping Use    Vaping Use: Never used   Substance and Sexual Activity    Alcohol use: No    Drug use: Never    Sexual activity: Yes     Partners: Male   Other Topics Concern    Not on file   Social History Narrative    ** Merged History Encounter **         ** Merged History Encounter **          Social Determinants of Health     Financial Resource Strain:     Difficulty of Paying Living Expenses: Not on file   Food Insecurity:     Worried About [employer] in the Last Year: Not on file    [employer] of Food in the Last Year: Not on file   Transportation Needs:     Lack of Transportation (Medical): Not on file    Lack of Transportation (Non-Medical):  Not on file   Physical Activity:     Days of Exercise per Week: Not on file    Minutes of Exercise per Session: Not on file   Stress:     Feeling of Stress : Not on file   Social Connections:     Frequency of Communication with Friends and Family: Not on file    Frequency of Social Gatherings with Friends and Family: Not on file    Attends Zoroastrian Services: Not on file    Active Member of Clubs or Organizations: Not on file    Attends Club or Organization Meetings: Not on file    Marital Status: Not on file   Intimate Partner Violence:     Fear of Current or Ex-Partner: Not on file    Emotionally Abused: Not on file   [facility] Physically Abused: Not on file    Sexually Abused: Not on file   Housing Stability:     Unable to Pay for Housing in the Last Year: Not on file    Number of [place] in the Last Year: Not on file    Unstable Housing in the Last Year: Not on file     Family History:       Problem Relation Age of Onset    Cancer Father         bladder     Medications Prior to Admission:  Medications Prior to Admission: doxylamine-pyridoxine (DICLEGIS) 10-10 MG TBEC, Take 1 tablet by mouth nightly  lactulose (CHRONULAC) 10 GM/15ML solution, Take 45 mLs by mouth every evening    Review of Systems:   CONSTITUTIONAL:  negative  RESPIRATORY:  negative  CARDIOVASCULAR:  negative  GASTROINTESTINAL:  constipation  ALLERGIC/IMMUNOLOGIC:  negative  NEUROLOGICAL:  negative  BEHAVIOR/PSYCH:  negative    OBJECTIVE    Vitals:  /64   Pulse 115   Temp 98.1 °F (36.7 °C)   Resp 20   SpO2 99%       General appearance:  awake, alert, cooperative, no apparent distress, and appears stated age  Neurologic:  Awake, alert, oriented to name, place and time. Lungs:  No increased work of breathing, good air exchange  Abdomen: Soft, non tender, gravid, consistent with her gestational age  Pelvic Grade 2 rectocele, cervix closed, no blood in vault    Fetal heart rate:         Baseline Heart Rate:  140 bpm        Accelerations:  present       Decelerations:  absent       Variability:  moderate    Contraction frequency:     ASSESSMENT:    31 yo  at 28w4d with pelvic pressure and 1 episode of vaginal bleeding  Grade 2 rectocele    Plan: d/w Dr. Deisy Xie for Dr. Jenn Garcia  Urinalysis  Monitor  Discussed hydration and stool softeners for constipation will help alleviate rectocele symptoms.

## 2022-02-19 NOTE — ED PROVIDER NOTES
ATTENDING PROVIDER ATTESTATION:     Messi Titus presented to the emergency department for evaluation of Leg Pain (left , bulge in groin)   and was initially evaluated by the Medical Resident. See Original ED Note for H&P and ED course above. I have reviewed and discussed the case, including pertinent history (medical, surgical, family and social) and exam findings with the Medical Resident assigned to Messi Titus. I have personally performed and/or participated in the history, exam, medical decision making, and procedures and agree with all pertinent clinical information and any additional changes or corrections are noted below in my assessment and plan. I have discussed this patient in detail with the resident, and provided the instruction and education,     I have reviewed my findings and recommendations with the assigned Medical Resident, Messi Titus and members of family present at the time of disposition. I have performed a history and physical examination of this patient and directly supervised the resident caring for this patient    HPI  This is a 59-year-old female who presents to the emergency department today with low back pain, leg pain and bulge to the vaginal area. Patient is 27 weeks pregnant. She follows with Dr. Maryellen Gilman. She states that she has had chronic back pain throughout the pregnancy. Starting today she started having shooting pain going down the left lower extremity. She denies any trauma, numbness or tingling. No incontinence. She is still able to ambulate but endorses pain with ambulation. She denies any abdominal pain, nausea or vomiting. No chest pain or shortness of breath. The patient does note that she noted some blood in toilet bowl. It was a minimal amount. When she wiped it was no longer present. At that time she decided to look with a mirror at her vaginal area and noticed a bulging. She wanted this evaluated.   Patient has had vaginal pressure throughout pregnancy but feels it worsened today. She is a -0-0-2. She her STDs. She continues to have good fetal movement. ROS  A complete review of systems was performed and pertinent positives and negatives are stated within HPI, all other systems reviewed and are negative.    --------------------------------------------- PAST HISTORY ---------------------------------------------  Past Medical History:  has a past medical history of Depression, IBS (irritable bowel syndrome), Incisional hernia, and [facility] Past Surgical History:  has a past surgical history that includes Colonoscopy (2012); Upper gastrointestinal endoscopy (2012); Upper gastrointestinal endoscopy; Colonoscopy;  section (1/3/2015); Upper gastrointestinal endoscopy (2017); Dilation and curettage of uterus; Cholecystectomy (2017); pr  delivery only (N/A, 2018); laparoscopy (N/A, 10/18/2019); Hemorrhoid surgery (2020and 2021); ventral hernia repair (N/A, 2021); and Abdomen surgery. Social History:  reports that she quit smoking about 8 years ago. Her smoking use included cigarettes. She quit after 2.00 years of use. She has never used smokeless tobacco. She reports that she does not drink alcohol and does not use drugs. Family History: family history includes Cancer in her father. Unless otherwise noted, family history is non contributory    The patients home medications have been reviewed. Allergies: Naproxen    Physical Exam  General: The patient is conversational and lying comfortably in bed. Head: Normocephalic and atraumatic. Eyes: Sclera non-icteric and no conjunctival injection  ENT: Nasal and oral membranes moist  Neck: Trachea midline. No JVD  Respiratory: Lungs clear to auscultation bilaterally. Patient speaking in full sentences. Cardiovascular: Regular rate. No pedal edema.   2+ DP pulses  Gastrointestinal: Has a gravid uterus with palpable fundus. No tenderness. Active bowel sounds. Musculoskeletal: No deformity. No tenderness of the midline spine. No step-offs or deformities. Diffuse paraspinal muscle tenderness in the left and right lumbar region. Positive straight leg raise on the left lower extremity. 5 out of 5 strength of the lower extremities. Patient will flex and extend at the hip and knee. Plantar flexion dorsiflexion of the ankle symmetric. Genitourinary: External vaginal exam reveals no vesicles or lesions. No protuberance or evidence of prolapse. Internal cervical exam with sterile glove reveals closed cervical os. No blood in the vaginal vault  Skin: Skin warm and dry. No rashes. Neurologic: No gross motor deficits. No aphasia. Intact to light touch  Psychiatric: Normal affect. MDM  This is a 32 y.o. female presenting to the ED for back pain, leg pain and genital bulging. On initial presentation, the patient's vital signs are interpreted as normotensive, afebrile and saturating well on room air. Based on history and physical exam, we have a broad differential.  She has chronic low back pain which is likely exacerbated during pregnancy. As she is now experiencing shooting pain down the left lower extremity we have a high suspicion for sciatica. She is distally neurovascularly intact. No history of trauma. I did discuss that imaging during pregnancy would fetus to radiation and she is agreeable to holding off at this time. She will be symptomatically treated with Tylenol and Toradol. She is endorsing vaginal bleeding we will assess for urinary tract infection. OB/GYN will be consulted as she is 27 weeks pregnant and will likely require fetal monitoring. Bedside Doppler of the fetus will be obtained by nursing staff as well as laboratory studies and a type and screen to assess for need of RhoGam.    To the patient's work-up being initiated, OB/GYN request the patient be transferred up to their evaluation.   They do request duplex of the lower extremities which will also be obtained. Darlene Hernandez studies show mild acidosis but no anion gap. No electrolyte disturbances. No leukocytosis or anemia. Urinalysis is still pending as well as fetal Doppler for heart rate. Duplex shows no DVT. This is interpreted by radiology. Patient is transferred up to labor and delivery triage for further assessment. I directly supervised any procedures performed by the resident and was present for the procedure including all critical portions of the procedure    I, Dr. Wenceslao Peoples, am the primary provider of record    Impression  1. Left leg pain    2. Sciatica of left side    3.  Vaginal pain    4. 27 weeks gestation of pregnancy            Wenceslao Peoples MD  02/19/22 [id number]

## 2022-02-23 ENCOUNTER — HOSPITAL ENCOUNTER (OUTPATIENT)
Age: 27
Setting detail: OBSERVATION
Discharge: HOME | End: 2022-02-24
Attending: OBSTETRICS & GYNECOLOGY | Admitting: OBSTETRICS & GYNECOLOGY
Payer: COMMERCIAL

## 2022-02-23 PROBLEM — Z3A.29 29 WEEKS GESTATION OF PREGNANCY: Status: ACTIVE | Noted: 2022-02-23

## 2022-02-23 LAB — FETAL FIBRONECTIN: NEGATIVE

## 2022-02-23 PROCEDURE — 96360 HYDRATION IV INFUSION INIT: CPT

## 2022-02-23 PROCEDURE — 82731 ASSAY OF FETAL FIBRONECTIN: CPT

## 2022-02-23 PROCEDURE — 96361 HYDRATE IV INFUSION ADD-ON: CPT

## 2022-02-23 PROCEDURE — 2580000003 HC RX 258: Performed by: OBSTETRICS & GYNECOLOGY

## 2022-02-23 PROCEDURE — G0378 HOSPITAL OBSERVATION PER HR: HCPCS

## 2022-02-23 PROCEDURE — 99213 OFFICE O/P EST LOW 20 MIN: CPT | Performed by: PHYSICIAN ASSISTANT

## 2022-02-23 RX ORDER — ONDANSETRON 2 MG/ML
4 INJECTION INTRAMUSCULAR; INTRAVENOUS EVERY 6 HOURS PRN
Status: DISCONTINUED | OUTPATIENT
Start: 2022-02-23 | End: 2022-02-24 | Stop reason: HOSPADM

## 2022-02-23 RX ORDER — SODIUM CHLORIDE, SODIUM LACTATE, POTASSIUM CHLORIDE, CALCIUM CHLORIDE 600; 310; 30; 20 MG/100ML; MG/100ML; MG/100ML; MG/100ML
INJECTION, SOLUTION INTRAVENOUS CONTINUOUS
Status: DISCONTINUED | OUTPATIENT
Start: 2022-02-23 | End: 2022-02-24 | Stop reason: HOSPADM

## 2022-02-23 RX ORDER — ACETAMINOPHEN 325 MG/1
650 TABLET ORAL EVERY 4 HOURS PRN
Status: DISCONTINUED | OUTPATIENT
Start: 2022-02-23 | End: 2022-02-24 | Stop reason: HOSPADM

## 2022-02-23 RX ORDER — ONDANSETRON 4 MG/1
4 TABLET, ORALLY DISINTEGRATING ORAL EVERY 8 HOURS PRN
Status: DISCONTINUED | OUTPATIENT
Start: 2022-02-23 | End: 2022-02-24 | Stop reason: HOSPADM

## 2022-02-23 RX ADMIN — SODIUM CHLORIDE, POTASSIUM CHLORIDE, SODIUM LACTATE AND CALCIUM CHLORIDE: 600; 310; 30; 20 INJECTION, SOLUTION INTRAVENOUS at 19:07

## 2022-02-23 ASSESSMENT — PAIN DESCRIPTION - ORIENTATION: ORIENTATION: LOWER

## 2022-02-23 ASSESSMENT — PAIN SCALES - GENERAL: PAINLEVEL_OUTOF10: 6

## 2022-02-23 ASSESSMENT — PAIN DESCRIPTION - PAIN TYPE: TYPE: ACUTE PAIN

## 2022-02-23 ASSESSMENT — PAIN DESCRIPTION - LOCATION: LOCATION: ABDOMEN

## 2022-02-23 NOTE — H&P
Department of Obstetrics and Gynecology  Physician Assistant Obstetrics History and Physical      HISTORY OF PRESENT ILLNESS:      The patient is a 32 y.o.  4 parity 2 at 33 weeks' 2 days' gestation presents to L&D from work unsure if she is gideon. Feeing pain in her low back. No recent intercourse or vaginal exam. Fetal fibronectin was obtained. Denies urinary symptoms. States she is well hydrated. States she checked her HR at work and it was in the 140's, she was also feeling palpitations at that time.  here. Also, c/o intermittent sob. O2 sat= 100% on room air, does not appear sob with conversation. Diagnosed with Covid around 2021 and states sob has occured twice a week since then, not necessarily with activity. CXR 2022 was negative. States she was supposed to have a halter monitor done at beginning of pregnancy due to high heart rate, but never followed through. Denies palpitations or shortness of breath now. Denies chest pain, chest heaviness, or calf tenderness. Seen in ED 5 days ago, (-) for DVT per Duplex ultrasound of BLE. After ED visit 22, she was sent up here for c/o vaginal bulge, pelvic pressure, and constipation. Cervix was closed. She was diagnosed with a grade 2 rectocele and stool softeners and hydration were recommended.      Current obstetric history is significant for:  2 previous  sections  Grade 2 rectocele    Estimated Due Date:  2022  Contractions: Not sure  Leaking of fluid: No  Bleeding:  No  Perceived fetal movement: Good        PAST OB HISTORY:  OB History    Para Term  AB Living   4 2 2 0 1 2   SAB IAB Ectopic Molar Multiple Live Births   1 0 0 0 0 2      # Outcome Date GA Lbr [place]/2nd Weight Sex Delivery Anes PTL Lv   4 Current            3 Term 18 39w0d  8 lb 1.1 oz (3.66 kg) F CS-LTranv Spinal N NATALIE   2 Term 01/03/15 39w5d  8 lb 6 oz (3.799 kg) M CS-LTranv Spinal N NATALIE      Complications: Fetal Intolerance   1 SAB                    Pre-eclampsia:  No      :  Yes x 2      D & C:  Yes       Cerclage:  No      LEEP:  No      Myomectomy:  No       Labor: No    Past Medical History:  Denies hypertension, diabetes, asthma, thyroid disease  Diagnosis Date    Depression     IBS (irritable bowel syndrome)     Incisional hernia     Seizure ([facility] 2015    after delivery / not sure was a seizure, no issues since , no medication        Past Surgical History:    Procedure Laterality Date     SECTION  1/3/2015    CHOLECYSTECTOMY  2017    ROBOT ASSISTED    COLONOSCOPY      DILATION AND CURETTAGE OF UTERUS      ablation    HEMORRHOID SURGERY  2020, & 2021    LAPAROSCOPY N/A 10/18/2019    DIAGNOSTIC LAPAROSCOPY, OPEN VENTRAL HERNIA REPAIR performed by Price Souza MD at [address] N/A 2018     SECTION performed by René Dc [facility] at [facility] L&D    UPPER GASTROINTESTINAL ENDOSCOPY  2012    UPPER GASTROINTESTINAL ENDOSCOPY  2017    VENTRAL HERNIA REPAIR N/A 2021    OPEN INCISIONAL HERNIA REPAIR performed by Jazzy Carter MD at [address] [place] [address] History:    Reports that she quit smoking about 8 years ago. Her smoking use included cigarettes. She quit after 2.00 years of use. She has never used smokeless tobacco. She reports that she does not drink alcohol and does not use drugs.      Medications Prior to Admission:  Medications Prior to Admission: ALBUTEROL IN, Inhale into the lungs- prescribed due to COVID  aspirin 81 MG chewable tablet, Take 81 mg by mouth daily  amoxicillin (AMOXIL) 500 MG capsule, Take 500 mg by mouth 3 times daily For strep throat  doxylamine-pyridoxine (DICLEGIS) 10-10 MG TBEC, Take 1 tablet by mouth nightly  lactulose (CHRONULAC) 10 GM/15ML solution, Take 45 mLs by mouth every evening    Allergies:  Naproxen    ROS:  Const: No fever, chills, night sweats, no recent unexplained weight gain/loss  HEENT: No blurred vision, double vision; no ear problems; no sore throat, congestion; no running nose. Resp: (+) sob; No cough, no sputum, no pleuritic chest pain  Cardio: (+) palpitations; No chest pain, no exertional dyspnea, no PND, no orthopnea,  no leg swelling. GI: No dysphagia, no reflux; no abdominal pain, no n/v; no c/d. No hematochezia    : (+) rectocele. No dysuria, no frequency, hesitancy; no hematuria  MSK: no joint pain, no myalgia, no change in ROM  Neuro: no focal weakness in extremities, no slurred speech, no double vision, no numbness or tingling in extremities  Endo: no heat/cold intolerance, no polyphagia, polydipsia or polyuria  Hem: no increased bleeding, no bruising, no lymphadenopathy  Skin: no skin changes  Psych: no depressed mood, no suicidal ideation  Pertinent +'s and -'s addressed in HPI    PHYSICAL EXAM:  /72   Pulse 116   Temp 97.8 °F (36.6 °C) (Oral)   Resp 16 O2 sat=100 on room air    Repeat HR at 15:20 105, BP WNL    General appearance: Comfortable  Lungs:  CTA bilaterally, good excursion  Heart:  Tachy Rate & Rhythm, no murmur noted  Abdomen:  Soft, non-tender, gravid  Uterus: soft, nontender  Fetal heart rate:  Baseline Heart Rate 140; variability: moderate;  accelerations: absent;  decelerations: absent  Cervix: fFN obtained prior to SVE  DILATION: closed  EFFACEMENT:  thick  STATION:  -3 cm  Contraction frequency:  None  Membranes:  Intact  Back (-) CVA tenderness. Patient points to low back  Extremities: (-) edema       CXR 2022:    FINDINGS:   No airspace opacity or pleural effusion. The heart is normal size. No pneumothorax. No free air beneath the hemidiaphragms. Duplex venous ultrasound BLE 2022     No evidence of DVT in either lower extremity.        ASSESSMENT:   33 yo  A1 IUP at 29 weeks' 2 days' gestation R/o PTL, tachycardia, intermittent sob and palpitations      Cervix: closed/thick/-3  No contractions     Covid infection December 30, 2021    Tachycardia has improved  Palpitations and sob, resolved         Plan: I left a message with Dr. Noemy Guaman staff at 15:00 to have her call antepartum floor, she was in a patient room. [facility]  Outpatient  fFN      Electronically signed by Christelle Parr PA-C on 2/23/2022 at 2:05 PM     Addendum: I spoke with Dr. Jordan Riggs at 15:30, after FFN results are back, if negative, she recommends patient go to ED for c/o intermittent sob, palpitations, and tachycardia.     Christelle Parr PA-C

## 2022-02-24 ENCOUNTER — ANCILLARY PROCEDURE (OUTPATIENT)
Dept: OBGYN CLINIC | Age: 27
End: 2022-02-24
Payer: COMMERCIAL

## 2022-02-24 VITALS
BODY MASS INDEX: 24.27 KG/M2 | SYSTOLIC BLOOD PRESSURE: 106 MMHG | DIASTOLIC BLOOD PRESSURE: 71 MMHG | HEART RATE: 93 BPM | RESPIRATION RATE: 16 BRPM | TEMPERATURE: 98.7 F | OXYGEN SATURATION: 99 % | WEIGHT: 137 LBS | HEIGHT: 63 IN

## 2022-02-24 PROCEDURE — 6370000000 HC RX 637 (ALT 250 FOR IP): Performed by: OBSTETRICS & GYNECOLOGY

## 2022-02-24 PROCEDURE — 2580000003 HC RX 258: Performed by: OBSTETRICS & GYNECOLOGY

## 2022-02-24 PROCEDURE — 96361 HYDRATE IV INFUSION ADD-ON: CPT

## 2022-02-24 PROCEDURE — 76819 FETAL BIOPHYS PROFIL W/O NST: CPT | Performed by: OBSTETRICS & GYNECOLOGY

## 2022-02-24 PROCEDURE — G0378 HOSPITAL OBSERVATION PER HR: HCPCS

## 2022-02-24 PROCEDURE — 76816 OB US FOLLOW-UP PER FETUS: CPT | Performed by: OBSTETRICS & GYNECOLOGY

## 2022-02-24 PROCEDURE — 76820 UMBILICAL ARTERY ECHO: CPT | Performed by: OBSTETRICS & GYNECOLOGY

## 2022-02-24 PROCEDURE — 76821 MIDDLE CEREBRAL ARTERY ECHO: CPT | Performed by: OBSTETRICS & GYNECOLOGY

## 2022-02-24 RX ADMIN — SODIUM CHLORIDE, POTASSIUM CHLORIDE, SODIUM LACTATE AND CALCIUM CHLORIDE: 600; 310; 30; 20 INJECTION, SOLUTION INTRAVENOUS at 00:47

## 2022-02-24 RX ADMIN — SODIUM CHLORIDE, POTASSIUM CHLORIDE, SODIUM LACTATE AND CALCIUM CHLORIDE: 600; 310; 30; 20 INJECTION, SOLUTION INTRAVENOUS at 17:55

## 2022-02-24 RX ADMIN — ACETAMINOPHEN 650 MG: 325 TABLET ORAL at 11:20

## 2022-02-24 RX ADMIN — ACETAMINOPHEN 650 MG: 325 TABLET ORAL at 00:47

## 2022-02-24 ASSESSMENT — PAIN SCALES - GENERAL
PAINLEVEL_OUTOF10: 6
PAINLEVEL_OUTOF10: 4

## 2022-02-24 NOTE — PROGRESS NOTES
Pt sitting in bed, c/o lower back pain 5/10, with tenderness to LUQ/LLQ. She said this pain started during the night,intermittent and burning at times. Denies the need for tylenol. Denies VB or LOF, denies ctx's. +fetal movement.

## 2022-02-24 NOTE — PROGRESS NOTES
Assumed care of patient       Patient denies LOF, bleeding, headache or visual changes.  Complains of occasional cramping and low back pain   VSS  Waiting to be assessed and scanned by RAJESH

## 2022-02-24 NOTE — PROGRESS NOTES
Patient resting in bed c/o lower back pain on both sides. Denies SOB, LOF or vaginal bleeding. +FM. Will continue to monitor.

## 2022-02-24 NOTE — PROGRESS NOTES
Assumed patient care. VSS. Pt denies lof, denies vb, states +FM, denies cramping or contractions. Call light in reach, pt waiting to see MFM.

## 2022-02-24 NOTE — PROGRESS NOTES
Dr Nav Rdz on unit. States he notified Dr Kamille Em of patient, stated that from MFM standpoint patient is okay to be discharged. Will call Dr Kamille Em to notify.

## 2022-02-24 NOTE — PROGRESS NOTES
Updated Dr Key Gray on patient, notified from MFM standpoint patient is okay to be discharged. Notified that fhts 145 bpm moderate variability and accels, no contractions. Orders to discharge.

## 2022-02-25 NOTE — PROGRESS NOTES
Updated dr Carl Madison that from [facility] standpoint patient was okay to go home. Dr Carl Madison also stated that patient is okay to be discharged.

## 2022-03-29 ENCOUNTER — HOSPITAL ENCOUNTER (OUTPATIENT)
Age: 27
Setting detail: OBSERVATION
Discharge: HOME | End: 2022-03-29
Attending: OBSTETRICS & GYNECOLOGY | Admitting: OBSTETRICS & GYNECOLOGY
Payer: COMMERCIAL

## 2022-03-29 VITALS
TEMPERATURE: 97.6 F | HEART RATE: 93 BPM | SYSTOLIC BLOOD PRESSURE: 106 MMHG | DIASTOLIC BLOOD PRESSURE: 63 MMHG | RESPIRATION RATE: 16 BRPM

## 2022-03-29 PROBLEM — Z64.1 MULTIPARITY: Status: ACTIVE | Noted: 2022-03-29

## 2022-03-29 LAB
BACTERIA: ABNORMAL /HPF
BILIRUBIN URINE: NEGATIVE
BLOOD, URINE: NEGATIVE
CLARITY: CLEAR
COLOR: YELLOW
EPITHELIAL CELLS, UA: ABNORMAL /HPF
GLUCOSE URINE: NEGATIVE MG/DL
KETONES, URINE: ABNORMAL MG/DL
LEUKOCYTE ESTERASE, URINE: NEGATIVE
NITRITE, URINE: NEGATIVE
PH UA: 6.5 (ref 5–9)
PROTEIN UA: ABNORMAL MG/DL
RBC UA: ABNORMAL /HPF (ref 0–2)
SPECIFIC GRAVITY UA: 1.02 (ref 1–1.03)
UROBILINOGEN, URINE: 0.2 E.U./DL
WBC UA: ABNORMAL /HPF (ref 0–5)

## 2022-03-29 PROCEDURE — 2580000003 HC RX 258: Performed by: OBSTETRICS & GYNECOLOGY

## 2022-03-29 PROCEDURE — 81001 URINALYSIS AUTO W/SCOPE: CPT

## 2022-03-29 PROCEDURE — 96361 HYDRATE IV INFUSION ADD-ON: CPT

## 2022-03-29 PROCEDURE — 99212 OFFICE O/P EST SF 10 MIN: CPT

## 2022-03-29 PROCEDURE — 96360 HYDRATION IV INFUSION INIT: CPT

## 2022-03-29 PROCEDURE — G0378 HOSPITAL OBSERVATION PER HR: HCPCS

## 2022-03-29 RX ORDER — SODIUM CHLORIDE, SODIUM LACTATE, POTASSIUM CHLORIDE, CALCIUM CHLORIDE 600; 310; 30; 20 MG/100ML; MG/100ML; MG/100ML; MG/100ML
INJECTION, SOLUTION INTRAVENOUS CONTINUOUS
Status: DISCONTINUED | OUTPATIENT
Start: 2022-03-29 | End: 2022-03-29 | Stop reason: HOSPADM

## 2022-03-29 RX ADMIN — SODIUM CHLORIDE, POTASSIUM CHLORIDE, SODIUM LACTATE AND CALCIUM CHLORIDE: 600; 310; 30; 20 INJECTION, SOLUTION INTRAVENOUS at 19:30

## 2022-03-29 NOTE — PROGRESS NOTES
34w1d  Patient states she started feeling contractions around 1300, she thinks about every 15-20 mins apart. Patient feels  them mostly in her back. Patient also feeling pelvic pressure. Patient is a previous C/S. Patient denies LOF and VB. +FM. Patient had Covid during pregnancy.

## 2022-03-29 NOTE — H&P
Department of Obstetrics and Gynecology  Attending Obstetrics History and Physical      HISTORY OF PRESENT ILLNESS:      The patient is a 32 y.o.  4 parity 2 at 34 weeks 1 days. Complaining of ctx's. Two prior c/s's. No dysuria,fever,emesis or diarrhea. Estimated Due Date:  22  Contractions:  Yes  Leaking of fluid: no  nfm  Blleeding:  No    PRENATAL CARE:    Complications: No      Active Problems:    * No active hospital problems. *  Resolved Problems:    * No resolved hospital problems.  *        PAST OB HISTORY    OB History    Para Term  AB Living   4 2 2 0 1 2   SAB IAB Ectopic Molar Multiple Live Births   1 0 0 0 0 2      # Outcome Date GA Lbr [place]/2nd Weight Sex Delivery Anes PTL Lv   4 Current            3 Term 18 39w0d  8 lb 1.1 oz (3.66 kg) F CS-LTranv Spinal N NATALIE   2 Term 01/03/15 39w5d  8 lb 6 oz (3.799 kg) M CS-LTranv Spinal N NATALIE      Complications: Fetal Intolerance   1 SAB                    Pre-eclampsia:  No      :  Yes       D & C:  Yes       Cerclage:  No      LEEP:  No      Myomectomy:  No       Labor: No    Past Medical History:    Past Medical History:   Diagnosis Date    Depression     IBS (irritable bowel syndrome)     Incisional hernia     Seizure ([address] 2015    after delivery / not sure was a seizure, no issues since , no medication        Past Surgical History:    Past Surgical History:   Procedure Laterality Date    ABDOMEN SURGERY       SECTION  1/3/2015    CHOLECYSTECTOMY  2017    ROBOT ASSISTED    COLONOSCOPY  2012    COLONOSCOPY      DILATION AND CURETTAGE OF UTERUS      ablation    HEMORRHOID SURGERY  2020and 2021    LAPAROSCOPY N/A 10/18/2019    DIAGNOSTIC LAPAROSCOPY, OPEN VENTRAL HERNIA REPAIR performed by Aspen Ruvalcaba MD at [address] N/A 2018     SECTION [facility] Jefferson White DO [facility] L&D    UPPER GASTROINTESTINAL ENDOSCOPY 04/30/2012    UPPER GASTROINTESTINAL ENDOSCOPY      UPPER GASTROINTESTINAL ENDOSCOPY  07/14/2017    VENTRAL HERNIA REPAIR N/A 5/4/2021    OPEN INCISIONAL HERNIA REPAIR performed by Komal Garcia MD at [facility] OR        Past Family History:  Family History   Problem Relation Age of Onset   Belinda Erazo Cancer Father         bladder       ROS:  Const: No fever, chills, night sweats, no recent unexplained weight gain/loss  HEENT: No blurred vision, double vision; no ear problems; no sore throat, congestion; no running nose. Resp: No cough, no sputum, no pleuritic chest pain, no sob  Cardio: No chest pain, no exertional dyspnea, no PND, no orthopnea, no palpitation, no leg swelling. GI: No dysphagia, no reflux; no abdominal pain, no n/v; no c/d. No hematochezia    : No dysuria, no frequency, hesitancy; no hematuria  MSK: no joint pain, no myalgia, no change in ROM  Neuro: no focal weakness in extremities, no slurred speech, no double vision, no numbness or tingling in extremities  Endo: no heat/cold intolerance, no polyphagia, polydipsia or polyuria  Hem: no increased bleeding, no bruising, no lymphadenopathy  Skin: no skin changes  Psych: no depressed mood, no suicidal ideation    Social History:     reports that she quit smoking about 9 years ago. Her smoking use included cigarettes. She quit after 2.00 years of use. She has never used smokeless tobacco. She reports that she does not drink alcohol and does not use drugs.      Medications Prior to Admission:  Medications Prior to Admission: ALBUTEROL IN, Inhale into the lungs  aspirin 81 MG chewable tablet, Take 81 mg by mouth daily (Patient not taking: Reported on 3/29/2022)  amoxicillin (AMOXIL) 500 MG capsule, Take 500 mg by mouth 3 times daily For strep throat (Patient not taking: Reported on 3/29/2022)  doxylamine-pyridoxine (DICLEGIS) 10-10 MG TBEC, Take 1 tablet by mouth nightly (Patient not taking: Reported on 3/29/2022)    Allergies:  Naproxen    PHYSICAL EXAM:  BP

## 2022-03-30 NOTE — PROGRESS NOTES
Dr Chan Cardenas updated on patient case  Reviewed UA results. Few bacteria, trace ketones and protein. Bolus full liter of fluids and patient states pain is much more tolerable. Irritability/ ctx Q4-6min.  Soft on palpation   Cervix unchanged     OK to discharge home

## 2022-03-30 NOTE — PROGRESS NOTES
Assumed patient care  VSS    Patient denies LOF, bleeding, headache or visual changes   States occasional cramping in lower back  Abdomen palpated during pain and belly is soft   States +FM   FHT reassuring

## 2022-04-02 ENCOUNTER — HOSPITAL ENCOUNTER (OUTPATIENT)
Age: 27
Discharge: HOME | End: 2022-04-02
Attending: OBSTETRICS & GYNECOLOGY | Admitting: OBSTETRICS & GYNECOLOGY
Payer: COMMERCIAL

## 2022-04-02 VITALS
RESPIRATION RATE: 20 BRPM | SYSTOLIC BLOOD PRESSURE: 117 MMHG | OXYGEN SATURATION: 99 % | HEART RATE: 133 BPM | TEMPERATURE: 98.6 F | DIASTOLIC BLOOD PRESSURE: 63 MMHG

## 2022-04-02 PROBLEM — Z3A.34 34 WEEKS GESTATION OF PREGNANCY: Status: ACTIVE | Noted: 2022-04-02

## 2022-04-02 LAB
AMNISURE, POC: NEGATIVE
Lab: NORMAL
NEGATIVE QC PASS/FAIL: NORMAL
POSITIVE QC PASS/FAIL: NORMAL

## 2022-04-02 PROCEDURE — 99211 OFF/OP EST MAY X REQ PHY/QHP: CPT

## 2022-04-02 PROCEDURE — 99213 OFFICE O/P EST LOW 20 MIN: CPT | Performed by: OBSTETRICS & GYNECOLOGY

## 2022-04-02 NOTE — H&P
included cigarettes. She quit after 2.00 years of use. She has never used smokeless tobacco.  ETOH:   reports no history of alcohol use. DRUGS:   reports no history of drug use.   Family History:       Problem Relation Age of Onset   [de-identified] Cancer Father         bladder     Medications Prior to Admission:  Medications Prior to Admission: ALBUTEROL IN, Inhale into the lungs  aspirin 81 MG chewable tablet, Take 81 mg by mouth daily   amoxicillin (AMOXIL) 500 MG capsule, Take 500 mg by mouth 3 times daily For strep throat (Patient not taking: Reported on 3/29/2022)  doxylamine-pyridoxine (DICLEGIS) 10-10 MG TBEC, Take 1 tablet by mouth nightly (Patient not taking: Reported on 3/29/2022)  Allergies:  Naproxen    PHYSICAL EXAM:    General appearance:  awake, alert, cooperative, no apparent distress, and appears stated age  Neurologic:  Normal DTRs  Abdomen:  Gravid, soft diffusely tender  Fetal heart rate:  Baseline Heart Rate 145, accelerations:  present    Cervix:    DILATION:  Closed  EFFACEMENT:   Long  STATION:  -3 cm    Contraction frequency:  0 minutes  Membranes:  Intact    ASSESSMENT AND PLAN:    The patient is a 32 y.o.  4 parity 2 at 34 weeks  Myofascial pain  No sign of labor    Mehnaz Padilla MD  2022

## 2022-04-02 NOTE — PROGRESS NOTES
Patient presents to L&D  34w5d of Dr Jacob Perez with c/o CTXs and LOF. +FM. Denies any VB. Patient c/o headache, visual changes, epigastric pain and nausea. /63. Patient tachy at 80. O2 98%. All questions answered and call light within reach.

## 2022-04-02 NOTE — PROGRESS NOTES
Discharge instructions given to patient and verbalizes understanding. Patient independently ambulated off unit with significant other.

## 2022-04-12 ENCOUNTER — HOSPITAL ENCOUNTER (OUTPATIENT)
Age: 27
Discharge: HOME | End: 2022-04-12
Attending: ORTHOPAEDIC SURGERY | Admitting: OBSTETRICS & GYNECOLOGY
Payer: COMMERCIAL

## 2022-04-12 ENCOUNTER — OFFICE VISIT (OUTPATIENT)
Dept: FAMILY MEDICINE CLINIC | Age: 27
End: 2022-04-12
Payer: COMMERCIAL

## 2022-04-12 VITALS
SYSTOLIC BLOOD PRESSURE: 119 MMHG | TEMPERATURE: 98.1 F | HEART RATE: 113 BPM | RESPIRATION RATE: 16 BRPM | DIASTOLIC BLOOD PRESSURE: 66 MMHG

## 2022-04-12 VITALS
SYSTOLIC BLOOD PRESSURE: 118 MMHG | DIASTOLIC BLOOD PRESSURE: 68 MMHG | RESPIRATION RATE: 18 BRPM | WEIGHT: 144.6 LBS | HEART RATE: 116 BPM | BODY MASS INDEX: 25.62 KG/M2 | HEIGHT: 63 IN | OXYGEN SATURATION: 100 % | TEMPERATURE: 97.6 F

## 2022-04-12 DIAGNOSIS — B34.9 VIRAL ILLNESS: Primary | ICD-10-CM

## 2022-04-12 DIAGNOSIS — R52 BODY ACHES: ICD-10-CM

## 2022-04-12 PROBLEM — Z3A.36 36 WEEKS GESTATION OF PREGNANCY: Status: ACTIVE | Noted: 2022-04-12

## 2022-04-12 LAB
ALBUMIN SERPL-MCNC: 3.6 G/DL (ref 3.5–5.2)
ALP BLD-CCNC: 129 U/L (ref 35–104)
ALT SERPL-CCNC: 10 U/L (ref 0–32)
ANION GAP SERPL CALCULATED.3IONS-SCNC: 21 MMOL/L (ref 7–16)
AST SERPL-CCNC: 16 U/L (ref 0–31)
BACTERIA: ABNORMAL /HPF
BASOPHILS ABSOLUTE: 0.06 E9/L (ref 0–0.2)
BASOPHILS RELATIVE PERCENT: 0.4 % (ref 0–2)
BILIRUB SERPL-MCNC: 0.4 MG/DL (ref 0–1.2)
BILIRUBIN URINE: NEGATIVE
BLOOD, URINE: NEGATIVE
BUN BLDV-MCNC: 5 MG/DL (ref 6–20)
CALCIUM SERPL-MCNC: 8.6 MG/DL (ref 8.6–10.2)
CHLORIDE BLD-SCNC: 103 MMOL/L (ref 98–107)
CLARITY: CLEAR
CO2: 12 MMOL/L (ref 22–29)
COLOR: YELLOW
CREAT SERPL-MCNC: 0.4 MG/DL (ref 0.5–1)
EOSINOPHILS ABSOLUTE: 0.09 E9/L (ref 0.05–0.5)
EOSINOPHILS RELATIVE PERCENT: 0.6 % (ref 0–6)
EPITHELIAL CELLS, UA: ABNORMAL /HPF
GFR AFRICAN AMERICAN: >60
GFR NON-AFRICAN AMERICAN: >60 ML/MIN/1.73
GLUCOSE BLD-MCNC: 73 MG/DL (ref 74–99)
GLUCOSE URINE: NEGATIVE MG/DL
HCT VFR BLD CALC: 38.4 % (ref 34–48)
HEMOGLOBIN: 11.8 G/DL (ref 11.5–15.5)
IMMATURE GRANULOCYTES #: 0.45 E9/L
IMMATURE GRANULOCYTES %: 3 % (ref 0–5)
INFLUENZA A ANTIBODY: NEGATIVE
INFLUENZA B ANTIBODY: NEGATIVE
KETONES, URINE: >=80 MG/DL
LEUKOCYTE ESTERASE, URINE: ABNORMAL
LYMPHOCYTES ABSOLUTE: 1.78 E9/L (ref 1.5–4)
LYMPHOCYTES RELATIVE PERCENT: 11.8 % (ref 20–42)
MCH RBC QN AUTO: 26.2 PG (ref 26–35)
MCHC RBC AUTO-ENTMCNC: 30.7 % (ref 32–34.5)
MCV RBC AUTO: 85.3 FL (ref 80–99.9)
MONOCYTES ABSOLUTE: 1.26 E9/L (ref 0.1–0.95)
MONOCYTES RELATIVE PERCENT: 8.4 % (ref 2–12)
NEUTROPHILS ABSOLUTE: 11.43 E9/L (ref 1.8–7.3)
NEUTROPHILS RELATIVE PERCENT: 75.8 % (ref 43–80)
NITRITE, URINE: NEGATIVE
PDW BLD-RTO: 15.6 FL (ref 11.5–15)
PH UA: 6 (ref 5–9)
PLATELET # BLD: 191 E9/L (ref 130–450)
PMV BLD AUTO: 12.7 FL (ref 7–12)
POTASSIUM SERPL-SCNC: 3.6 MMOL/L (ref 3.5–5)
PROTEIN UA: ABNORMAL MG/DL
RBC # BLD: 4.5 E12/L (ref 3.5–5.5)
RBC UA: ABNORMAL /HPF (ref 0–2)
S PYO AG THROAT QL: POSITIVE
SODIUM BLD-SCNC: 136 MMOL/L (ref 132–146)
SPECIFIC GRAVITY UA: >=1.03 (ref 1–1.03)
TOTAL PROTEIN: 6.7 G/DL (ref 6.4–8.3)
UROBILINOGEN, URINE: 0.2 E.U./DL
WBC # BLD: 15.1 E9/L (ref 4.5–11.5)
WBC UA: ABNORMAL /HPF (ref 0–5)

## 2022-04-12 PROCEDURE — 96374 THER/PROPH/DIAG INJ IV PUSH: CPT

## 2022-04-12 PROCEDURE — 2580000003 HC RX 258: Performed by: OBSTETRICS & GYNECOLOGY

## 2022-04-12 PROCEDURE — 87804 INFLUENZA ASSAY W/OPTIC: CPT | Performed by: NURSE PRACTITIONER

## 2022-04-12 PROCEDURE — 80053 COMPREHEN METABOLIC PANEL: CPT

## 2022-04-12 PROCEDURE — 59025 FETAL NON-STRESS TEST: CPT

## 2022-04-12 PROCEDURE — 99211 OFF/OP EST MAY X REQ PHY/QHP: CPT

## 2022-04-12 PROCEDURE — 96361 HYDRATE IV INFUSION ADD-ON: CPT

## 2022-04-12 PROCEDURE — 1036F TOBACCO NON-USER: CPT | Performed by: NURSE PRACTITIONER

## 2022-04-12 PROCEDURE — 99215 OFFICE O/P EST HI 40 MIN: CPT | Performed by: NURSE PRACTITIONER

## 2022-04-12 PROCEDURE — 85025 COMPLETE CBC W/AUTO DIFF WBC: CPT

## 2022-04-12 PROCEDURE — 6360000002 HC RX W HCPCS: Performed by: OBSTETRICS & GYNECOLOGY

## 2022-04-12 PROCEDURE — 99213 OFFICE O/P EST LOW 20 MIN: CPT | Performed by: PHYSICIAN ASSISTANT

## 2022-04-12 PROCEDURE — G8419 CALC BMI OUT NRM PARAM NOF/U: HCPCS | Performed by: NURSE PRACTITIONER

## 2022-04-12 PROCEDURE — 36415 COLL VENOUS BLD VENIPUNCTURE: CPT

## 2022-04-12 PROCEDURE — 81001 URINALYSIS AUTO W/SCOPE: CPT

## 2022-04-12 PROCEDURE — 87880 STREP A ASSAY W/OPTIC: CPT | Performed by: NURSE PRACTITIONER

## 2022-04-12 PROCEDURE — G8427 DOCREV CUR MEDS BY ELIG CLIN: HCPCS | Performed by: NURSE PRACTITIONER

## 2022-04-12 RX ORDER — SODIUM CHLORIDE, SODIUM LACTATE, POTASSIUM CHLORIDE, AND CALCIUM CHLORIDE .6; .31; .03; .02 G/100ML; G/100ML; G/100ML; G/100ML
500 INJECTION, SOLUTION INTRAVENOUS ONCE
Status: COMPLETED | OUTPATIENT
Start: 2022-04-12 | End: 2022-04-12

## 2022-04-12 RX ORDER — ONDANSETRON 2 MG/ML
4 INJECTION INTRAMUSCULAR; INTRAVENOUS ONCE
Status: COMPLETED | OUTPATIENT
Start: 2022-04-12 | End: 2022-04-12

## 2022-04-12 RX ORDER — SODIUM CHLORIDE, SODIUM LACTATE, POTASSIUM CHLORIDE, CALCIUM CHLORIDE 600; 310; 30; 20 MG/100ML; MG/100ML; MG/100ML; MG/100ML
INJECTION, SOLUTION INTRAVENOUS CONTINUOUS
Status: DISCONTINUED | OUTPATIENT
Start: 2022-04-12 | End: 2022-04-12 | Stop reason: HOSPADM

## 2022-04-12 RX ADMIN — ONDANSETRON 4 MG: 2 INJECTION INTRAMUSCULAR; INTRAVENOUS at 13:36

## 2022-04-12 RX ADMIN — SODIUM CHLORIDE, POTASSIUM CHLORIDE, SODIUM LACTATE AND CALCIUM CHLORIDE 500 ML: 600; 310; 30; 20 INJECTION, SOLUTION INTRAVENOUS at 13:10

## 2022-04-12 NOTE — PROGRESS NOTES
36w1d presents to unit after being at her family doctor for generalized body aches. Her family doctor instructed her to go to the ED. She presented to the ED but was sent to L&D to be evaluated first. Patient denies any contractions, VB, or LOF. Perceives positive fetal movement. EFM applied, call light within reach.

## 2022-04-12 NOTE — H&P
Department of Obstetrics and Gynecology  Physician Assistant Obstetrics History and Physical      HISTORY OF PRESENT ILLNESS:      The patient is a 32 y.o.  4 parity 2 at 42 weeks' 1 days' gestation presents to L&D Antepartum from her PCP's office with c/o generalized body aches since yesterday and 1 episode of diarrhea at 05:00 am yesterday. . Also reports some dizziness and nausea, tachycardic there  and here . Denies any CP, SOB, abdominal pain, vomiting, or lethargy. Has been taking Tylenol without symptomatic relief. Reports fetal movement, not as active as normal, but still having movement. Has felt 2 movements in 1 hour. Told by her PCP to go to ED, was sent up here. Patient reports influenza A/B and rapid strep were negative earlier today. States she had Covid in January and has been vaccinated for Covid 19.     Current obstetric history is significant for:  2 previous  sections    Estimated Due Date:  2022  Contractions: No  Leaking of fluid: No  Bleeding:  No  Perceived fetal movement: decreased          PAST OB HISTORY:  OB History    Para Term  AB Living   4 2 2 0 1 2   SAB IAB Ectopic Molar Multiple Live Births   1 0 0 0 0 2      # Outcome Date GA Lbr [place]/2nd Weight Sex Delivery Anes PTL Lv   4 Current            3 Term 18 39w0d  8 lb 1.1 oz (3.66 kg) F CS-LTranv Spinal N NATALIE   2 Term 01/03/15 39w5d  8 lb 6 oz (3.799 kg) M CS-LTranv Spinal N NATALIE      Complications: Fetal Intolerance   1 SAB                    Pre-eclampsia:  No      :  Yes x 2      D & C:  Yes      Cerclage:  No      LEEP:  No      Myomectomy:  No       Labor: No    Past Medical History:    Diagnosis Date    Depression     IBS (irritable bowel syndrome)     Incisional hernia     Seizure ([facility] [address] 2015    after delivery / not sure was a seizure, no issues since , no medication        Past Surgical History:    Procedure Laterality Date    ABDOMEN SURGERY       SECTION  1/3/2015    CHOLECYSTECTOMY  2017    ROBOT ASSISTED    COLONOSCOPY  2012    COLONOSCOPY      DILATION AND CURETTAGE OF UTERUS      ablation    HEMORRHOID SURGERY  2020and 2021    LAPAROSCOPY N/A 10/18/2019    DIAGNOSTIC LAPAROSCOPY, OPEN VENTRAL HERNIA REPAIR performed by Nancy Ames MD at [address] N/A 2018     SECTION performed by Dominguez Horn DO at [facility] L&D    UPPER GASTROINTESTINAL ENDOSCOPY  2012    UPPER GASTROINTESTINAL ENDOSCOPY      UPPER GASTROINTESTINAL ENDOSCOPY  2017    VENTRAL HERNIA REPAIR N/A 2021    OPEN INCISIONAL HERNIA REPAIR performed by Rachele Urrutia MD at [address] [place] [address] History:    Reports that she quit smoking about 9 years ago. Her smoking use included cigarettes. She quit after 2.00 years of use. She has never used smokeless tobacco. She reports that she does not drink alcohol and does not use drugs. Medications Prior to Admission:  Medications Prior to Admission: ALBUTEROL IN, Inhale into the lungs (Patient not taking: Reported on 2022)  aspirin 81 MG chewable tablet, Take 81 mg by mouth daily   amoxicillin (AMOXIL) 500 MG capsule, Take 500 mg by mouth 3 times daily For strep throat (Patient not taking: Reported on 3/29/2022)  doxylamine-pyridoxine (DICLEGIS) 10-10 MG TBEC, Take 1 tablet by mouth nightly (Patient not taking: Reported on 3/29/2022)    Allergies:  Naproxen    ROS:  Const: No fever, chills, night sweats, no recent unexplained weight gain/loss  HEENT: No blurred vision, double vision; no ear problems; no sore throat, congestion; no running nose. Resp: No cough, no sputum, no pleuritic chest pain, no sob  Cardio: No chest pain, no exertional dyspnea, no PND, no orthopnea, no palpitation, no leg swelling. GI: No dysphagia, no reflux; no abdominal pain, no n/v; no c/d.  No hematochezia    : No dysuria, no frequency, hesitancy; no hematuria  MSK: no joint pain, no myalgia, no change in ROM  Neuro: no focal weakness in extremities, no slurred speech, no double vision, no numbness or tingling in extremities  Endo: no heat/cold intolerance, no polyphagia, polydipsia or polyuria  Hem: no increased bleeding, no bruising, no lymphadenopathy  Skin: no skin changes  Psych: no depressed mood, no suicidal ideation  Pertinent +'s and -'s addressed in HPI    PHYSICAL EXAM:  /66   Pulse 113   Temp 98.1 °F (36.7 °C) (Oral)   Resp 16     General appearance: Comfortable  Lungs:  CTA bilaterally, good excursion  Heart:  Regular Rate & Rhythm, no murmur noted  Abdomen:  Soft, non-tender, gravid  Uterus: soft, nontender  Fetal heart rate:  Baseline Heart Rate 140; variability: moderate;  accelerations: present;  decelerations: absent  Cervix: deferred  Presentation: deferred  Contraction frequency:  None  Membranes:  Intact  Extremities: (-) edema       ASSESSMENT   33 yo female  A1 IUP at 36 weeks' 1 days' gestation    Decreased fetal movement, nausea, dizziness, generalized body aches         Plan: I discussed above with Dr. Bertin Wiley.  Orders per her:  EFM  Observe  CBC with diff   CMP  U/a  IV hydration with LR, bolus first  NST  Zofran 4 mg IV x 1        Electronically signed by Jhonatan Otto PA-C on 2022 at 11:57 AM

## 2022-04-12 NOTE — PROGRESS NOTES
PA note:  Patient complained to RN that her left lower leg hurt if she flexes and/or extends her foot. Just began 15 minutes ago, she describes pain as a \"tightness with extension and flexion\". No recent trauma. (-) Eldon's. No warmth, erythema, swelling, or cyanosis noted. I discussed with Dr. Edmund Espana, present on floor. Patient cleared obstetrically, disposition to ED for c/o leg pain.     Latisha Montes PA-C

## 2022-04-12 NOTE — PROGRESS NOTES
Chief Complaint   Generalized Body Aches (patient states since yesterday she has had extreme body aches. . no fever) and Diarrhea (patient has had a bout of diarrhea. [facility] hospital )    History of Present Illness   Source of history provided by:  patientMasoud Rodriguez is a 32 y.o. old female who presents to the walk-in with complaints of a generalized body aches and one episode of diarrhea. States symptoms have been present x 1 day. States symptoms have progressed since onset. Denies any CP, SOB, abdominal pain, vomiting, diarrhea, rash, or lethargy. Has been taking Tylenol without symptomatic relief. Denies any hx of asthma, COPD, or tobacco use. Of note she is 36 weeks pregnant. Follows with Dr. Flakita Ch. Reports fetal movement, not as active as normal, but still having movement. No loss of fluids or vaginal bleeding. Review of Systems   Unless otherwise stated in this report or unable to obtain because of the patient's clinical or mental status as evidenced by the medical record, this patients's positive and negative responses for Review of Systems, constitutional, psych, eyes, ENT, cardiovascular, respiratory, gastrointestinal, neurological, genitourinary, musculoskeletal, integument systems and systems related to the presenting problem are either stated in the preceding or were negative for the symptoms and/or complaints related to the medical problem. Past Medical History:  has a past medical history of Depression, IBS (irritable bowel syndrome), Incisional hernia, and Seizure ([facility] [address] Past Surgical History:  has a past surgical history that includes Colonoscopy (2012); Upper gastrointestinal endoscopy (2012); Upper gastrointestinal endoscopy; Colonoscopy;  section (1/3/2015); Upper gastrointestinal endoscopy (2017); Dilation and curettage of uterus; Cholecystectomy (2017); pr  delivery only (N/A, 2018); laparoscopy (N/A, 10/18/2019);  Hemorrhoid surgery (11/2020and 2/2021); ventral hernia repair (N/A, 5/4/2021); and Abdomen surgery. Social History:  reports that she quit smoking about 9 years ago. Her smoking use included cigarettes. She quit after 2.00 years of use. She has never used smokeless tobacco. She reports that she does not drink alcohol and does not use drugs. Family History: family history includes Cancer in her father. Allergies: Naproxen    Physical Exam   Vital Signs:  /68   Pulse 116   Temp 97.6 °F (36.4 °C)   Resp 18   Ht 5' 3\" (1.6 m)   Wt 144 lb 9.6 oz (65.6 kg)   SpO2 100%   BMI 25.61 kg/m²    Oxygen Saturation Interpretation: Normal.    Constitutional:  Alert, development consistent with age. NAD. Head:  NC/NT. Airway patent. Ears: TMs intact bilaterally. Canals without exudate or swelling bilaterally. Nose: Mild congestion of the nasal mucosa with mild drainage. Mouth: Posterior pharynx with mild erythema and clear postnasal drip. No tonsillar hypertrophy or exudate. Neck:  Normal ROM. Supple. No anterior cervical adenopathy noted. Lungs: CTAB without wheezes, rales, or rhonchi. CV:  Regular rate and rhythm, normal heart sounds, without pathological murmurs, ectopy, gallops, or rubs. Skin:  Normal turgor. Warm, dry, without visible rash. Lymphatic: No lymphangitis or adenopathy noted. Neurological:  Oriented. Motor functions intact. Test Results Section   (All laboratory and radiology results have been personally reviewed by myself)  Labs:  Results for orders placed or performed in visit on 04/12/22   POCT rapid strep A   Result Value Ref Range    Strep A Ag Positive (A) None Detected   POCT Influenza A/B   Result Value Ref Range    Influenza A Ab negative     Influenza B Ab negative        Imaging: All Radiology results interpreted by Radiologist unless otherwise noted. No results found.     Medical Decision Making   This is a 32 y.o. female who presents today for generalized body aches, diarrhea, and some shortness of breath for the last 1 day. She is 36 weeks pregnant, follows with Dr. Ortiz Ash, OB/GYN. Rapid flu in office was negative. Rapid Strep was positive, however, line was very faint. Vital signs reviewed and found to be slightly tachycardic at a rate of 116. Upon walking in room, patient is in no acute distress, however, does appear to be slightly short of breath. While in the room, she did get dizzy and nauseated. She was laid back on her left side which did alleviate the dizziness. She was advised that I feel she needs to be evaluated further in the hospital. Dr. Ortiz Ash was called by myself to see if she should be evaluated in Labor and delivery vs. ER, who advised if this is a heart or breathing issue she should be evaluated in the ER first. Patient was advised to go to the ER immediately for further evaluation and treatment. Patient verbalized understanding, she will go to [facility] for further evaluation and treatment with her mother. All questions answered. Report called to [facility]. Assessment /Plan   Impression:  Ezella Meigs was seen today for generalized body aches and diarrhea. Diagnoses and all orders for this visit:    Viral illness    Body aches  -     POCT rapid strep A  -     POCT Influenza A/B      Electronically signed by ERMIAS Ragsdale CNP   DD: 4/12/22    **This report was transcribed using voice recognition software. Every effort was made to ensure accuracy; however, inadvertent computerized transcription errors may be present.

## 2022-04-13 ENCOUNTER — HOSPITAL ENCOUNTER (EMERGENCY)
Age: 27
Discharge: HOME | End: 2022-04-13
Attending: EMERGENCY MEDICINE

## 2022-04-13 ENCOUNTER — TELEPHONE (OUTPATIENT)
Dept: FAMILY MEDICINE CLINIC | Age: 27
End: 2022-04-13

## 2022-04-13 VITALS
HEART RATE: 102 BPM | HEIGHT: 63 IN | RESPIRATION RATE: 14 BRPM | DIASTOLIC BLOOD PRESSURE: 61 MMHG | BODY MASS INDEX: 25.34 KG/M2 | SYSTOLIC BLOOD PRESSURE: 101 MMHG | TEMPERATURE: 96.8 F | WEIGHT: 143 LBS | OXYGEN SATURATION: 96 %

## 2022-04-13 DIAGNOSIS — Z53.21 ELOPED FROM EMERGENCY DEPARTMENT: Primary | ICD-10-CM

## 2022-04-13 NOTE — TELEPHONE ENCOUNTER
Patient states amoxicillin is not at her pharmacy for , please advise. I see this is a historical med and per note sounds like you sent to ED& did not order med.

## 2022-04-14 DIAGNOSIS — J02.0 ACUTE STREPTOCOCCAL PHARYNGITIS: Primary | ICD-10-CM

## 2022-04-14 RX ORDER — AMOXICILLIN 500 MG/1
500 CAPSULE ORAL 2 TIMES DAILY
Qty: 20 CAPSULE | Refills: 0 | Status: SHIPPED | OUTPATIENT
Start: 2022-04-14 | End: 2022-04-24

## 2022-04-14 NOTE — ED NOTES
Department of Emergency Medicine  FIRST PROVIDER ELOPEMENT NOTE                 [facility]          4/13/22  9:58 PM EDT    MRN: [id number]    HPI: Aure Rodriguez is a 32 y.o. female who presents to the ED with the following complaint: Generalized Body Aches (seen here yesterday for same)      (Please refer to [address] [place] [address] for pertinent ROS and PE documentation)  Labs:  No results found for this visit on 04/13/22. Imaging: All Radiology results interpreted by Radiologist unless otherwise noted. No orders to display     ED Course    Medications - No data to display     -------------------------  Disposition    Patient ELOPED from the department prior to completion of evaluation after triage. Results of triage orders that were completed at time of elopement as indicated above were reviewed.     Diagnosis at Time of Elopement: (Based on presenting complaint/available test results):     Electronically signed by Bijal Brown PA-C   DD: 4/13/22       Bijal Brown PA-C  04/13/22 2168

## 2022-04-30 ENCOUNTER — ANESTHESIA (OUTPATIENT)
Dept: LABOR AND DELIVERY | Age: 27
DRG: 540 | End: 2022-04-30
Payer: COMMERCIAL

## 2022-04-30 ENCOUNTER — HOSPITAL ENCOUNTER (INPATIENT)
Age: 27
LOS: 2 days | Discharge: HOME | DRG: 540 | End: 2022-05-02
Attending: OBSTETRICS & GYNECOLOGY | Admitting: OBSTETRICS & GYNECOLOGY
Payer: COMMERCIAL

## 2022-04-30 ENCOUNTER — ANESTHESIA EVENT (OUTPATIENT)
Dept: LABOR AND DELIVERY | Age: 27
DRG: 540 | End: 2022-04-30
Payer: COMMERCIAL

## 2022-04-30 VITALS — DIASTOLIC BLOOD PRESSURE: 56 MMHG | OXYGEN SATURATION: 100 % | SYSTOLIC BLOOD PRESSURE: 110 MMHG

## 2022-04-30 DIAGNOSIS — G89.18 ACUTE POSTOPERATIVE PAIN: Primary | ICD-10-CM

## 2022-04-30 PROBLEM — O34.219 PREVIOUS CESAREAN DELIVERY AFFECTING PREGNANCY: Status: ACTIVE | Noted: 2022-04-30

## 2022-04-30 LAB
ABO/RH: NORMAL
AMPHETAMINE SCREEN, URINE: NOT DETECTED
ANTIBODY SCREEN: NORMAL
BARBITURATE SCREEN URINE: NOT DETECTED
BENZODIAZEPINE SCREEN, URINE: NOT DETECTED
CANNABINOID SCREEN URINE: POSITIVE
COCAINE METABOLITE SCREEN URINE: NOT DETECTED
FENTANYL SCREEN, URINE: NOT DETECTED
HCT VFR BLD CALC: 36.2 % (ref 34–48)
HEMOGLOBIN: 11.3 G/DL (ref 11.5–15.5)
Lab: ABNORMAL
MCH RBC QN AUTO: 25.9 PG (ref 26–35)
MCHC RBC AUTO-ENTMCNC: 31.2 % (ref 32–34.5)
MCV RBC AUTO: 82.8 FL (ref 80–99.9)
METHADONE SCREEN, URINE: NOT DETECTED
OPIATE SCREEN URINE: NOT DETECTED
OXYCODONE URINE: NOT DETECTED
PDW BLD-RTO: 16.3 FL (ref 11.5–15)
PHENCYCLIDINE SCREEN URINE: NOT DETECTED
PLATELET # BLD: 218 E9/L (ref 130–450)
PMV BLD AUTO: 12.2 FL (ref 7–12)
RBC # BLD: 4.37 E12/L (ref 3.5–5.5)
WBC # BLD: 9.2 E9/L (ref 4.5–11.5)

## 2022-04-30 PROCEDURE — 3700000001 HC ADD 15 MINUTES (ANESTHESIA): Performed by: OBSTETRICS & GYNECOLOGY

## 2022-04-30 PROCEDURE — 2580000003 HC RX 258: Performed by: NURSE ANESTHETIST, CERTIFIED REGISTERED

## 2022-04-30 PROCEDURE — 86901 BLOOD TYPING SEROLOGIC RH(D): CPT

## 2022-04-30 PROCEDURE — 87591 N.GONORRHOEAE DNA AMP PROB: CPT

## 2022-04-30 PROCEDURE — 2709999900 HC NON-CHARGEABLE SUPPLY: Performed by: OBSTETRICS & GYNECOLOGY

## 2022-04-30 PROCEDURE — 36415 COLL VENOUS BLD VENIPUNCTURE: CPT

## 2022-04-30 PROCEDURE — 6370000000 HC RX 637 (ALT 250 FOR IP): Performed by: ANESTHESIOLOGY

## 2022-04-30 PROCEDURE — 99222 1ST HOSP IP/OBS MODERATE 55: CPT | Performed by: NURSE PRACTITIONER

## 2022-04-30 PROCEDURE — 86850 RBC ANTIBODY SCREEN: CPT

## 2022-04-30 PROCEDURE — 7100000000 HC PACU RECOVERY - FIRST 15 MIN: Performed by: OBSTETRICS & GYNECOLOGY

## 2022-04-30 PROCEDURE — 7100000001 HC PACU RECOVERY - ADDTL 15 MIN: Performed by: OBSTETRICS & GYNECOLOGY

## 2022-04-30 PROCEDURE — 6360000002 HC RX W HCPCS

## 2022-04-30 PROCEDURE — 3609079900 HC CESAREAN SECTION: Performed by: OBSTETRICS & GYNECOLOGY

## 2022-04-30 PROCEDURE — G0480 DRUG TEST DEF 1-7 CLASSES: HCPCS

## 2022-04-30 PROCEDURE — 80307 DRUG TEST PRSMV CHEM ANLYZR: CPT

## 2022-04-30 PROCEDURE — 2500000003 HC RX 250 WO HCPCS: Performed by: NURSE ANESTHETIST, CERTIFIED REGISTERED

## 2022-04-30 PROCEDURE — 1220000000 HC SEMI PRIVATE OB R&B

## 2022-04-30 PROCEDURE — 2580000003 HC RX 258: Performed by: NURSE PRACTITIONER

## 2022-04-30 PROCEDURE — 6370000000 HC RX 637 (ALT 250 FOR IP)

## 2022-04-30 PROCEDURE — 86900 BLOOD TYPING SEROLOGIC ABO: CPT

## 2022-04-30 PROCEDURE — 3700000000 HC ANESTHESIA ATTENDED CARE: Performed by: OBSTETRICS & GYNECOLOGY

## 2022-04-30 PROCEDURE — 87491 CHLMYD TRACH DNA AMP PROBE: CPT

## 2022-04-30 PROCEDURE — 6360000002 HC RX W HCPCS: Performed by: NURSE ANESTHETIST, CERTIFIED REGISTERED

## 2022-04-30 PROCEDURE — 85027 COMPLETE CBC AUTOMATED: CPT

## 2022-04-30 RX ORDER — PHENYLEPHRINE HCL IN 0.9% NACL 1 MG/10 ML
SYRINGE (ML) INTRAVENOUS PRN
Status: DISCONTINUED | OUTPATIENT
Start: 2022-04-30 | End: 2022-04-30 | Stop reason: SDUPTHER

## 2022-04-30 RX ORDER — SODIUM CHLORIDE 9 MG/ML
INJECTION, SOLUTION INTRAVENOUS PRN
Status: DISCONTINUED | OUTPATIENT
Start: 2022-04-30 | End: 2022-05-02 | Stop reason: HOSPADM

## 2022-04-30 RX ORDER — MODIFIED LANOLIN
OINTMENT (GRAM) TOPICAL
Status: DISCONTINUED | OUTPATIENT
Start: 2022-04-30 | End: 2022-05-02 | Stop reason: HOSPADM

## 2022-04-30 RX ORDER — FERROUS SULFATE 325(65) MG
325 TABLET ORAL 2 TIMES DAILY WITH MEALS
Status: DISCONTINUED | OUTPATIENT
Start: 2022-05-01 | End: 2022-05-02 | Stop reason: HOSPADM

## 2022-04-30 RX ORDER — OXYCODONE HYDROCHLORIDE 5 MG/1
5 TABLET ORAL EVERY 4 HOURS PRN
Status: DISPENSED | OUTPATIENT
Start: 2022-04-30 | End: 2022-05-01

## 2022-04-30 RX ORDER — BISACODYL 10 MG
10 SUPPOSITORY, RECTAL RECTAL DAILY PRN
Status: DISCONTINUED | OUTPATIENT
Start: 2022-04-30 | End: 2022-05-02 | Stop reason: HOSPADM

## 2022-04-30 RX ORDER — ACETAMINOPHEN 325 MG/1
650 TABLET ORAL EVERY 4 HOURS PRN
Status: DISCONTINUED | OUTPATIENT
Start: 2022-04-30 | End: 2022-05-02 | Stop reason: HOSPADM

## 2022-04-30 RX ORDER — DIPHENHYDRAMINE HYDROCHLORIDE 50 MG/ML
25 INJECTION INTRAMUSCULAR; INTRAVENOUS EVERY 6 HOURS PRN
Status: DISCONTINUED | OUTPATIENT
Start: 2022-04-30 | End: 2022-05-02 | Stop reason: HOSPADM

## 2022-04-30 RX ORDER — ONDANSETRON 2 MG/ML
4 INJECTION INTRAMUSCULAR; INTRAVENOUS EVERY 6 HOURS PRN
Status: DISCONTINUED | OUTPATIENT
Start: 2022-04-30 | End: 2022-05-02 | Stop reason: HOSPADM

## 2022-04-30 RX ORDER — SODIUM CHLORIDE 0.9 % (FLUSH) 0.9 %
5-40 SYRINGE (ML) INJECTION PRN
Status: DISCONTINUED | OUTPATIENT
Start: 2022-04-30 | End: 2022-05-02 | Stop reason: HOSPADM

## 2022-04-30 RX ORDER — SODIUM CHLORIDE, SODIUM LACTATE, POTASSIUM CHLORIDE, AND CALCIUM CHLORIDE .6; .31; .03; .02 G/100ML; G/100ML; G/100ML; G/100ML
1000 INJECTION, SOLUTION INTRAVENOUS ONCE
Status: COMPLETED | OUTPATIENT
Start: 2022-04-30 | End: 2022-04-30

## 2022-04-30 RX ORDER — ONDANSETRON 4 MG/1
8 TABLET, ORALLY DISINTEGRATING ORAL EVERY 8 HOURS PRN
Status: DISCONTINUED | OUTPATIENT
Start: 2022-04-30 | End: 2022-05-02 | Stop reason: HOSPADM

## 2022-04-30 RX ORDER — DOCUSATE SODIUM 100 MG/1
100 CAPSULE, LIQUID FILLED ORAL 2 TIMES DAILY
Status: DISCONTINUED | OUTPATIENT
Start: 2022-04-30 | End: 2022-05-02 | Stop reason: HOSPADM

## 2022-04-30 RX ORDER — BUPIVACAINE HYDROCHLORIDE 7.5 MG/ML
INJECTION, SOLUTION INTRASPINAL PRN
Status: DISCONTINUED | OUTPATIENT
Start: 2022-04-30 | End: 2022-04-30 | Stop reason: SDUPTHER

## 2022-04-30 RX ORDER — NALOXONE HYDROCHLORIDE 0.4 MG/ML
INJECTION, SOLUTION INTRAMUSCULAR; INTRAVENOUS; SUBCUTANEOUS PRN
Status: ACTIVE | OUTPATIENT
Start: 2022-04-30 | End: 2022-05-01

## 2022-04-30 RX ORDER — TRISODIUM CITRATE DIHYDRATE AND CITRIC ACID MONOHYDRATE 500; 334 MG/5ML; MG/5ML
30 SOLUTION ORAL ONCE
Status: COMPLETED | OUTPATIENT
Start: 2022-04-30 | End: 2022-04-30

## 2022-04-30 RX ORDER — ONDANSETRON 2 MG/ML
INJECTION INTRAMUSCULAR; INTRAVENOUS PRN
Status: DISCONTINUED | OUTPATIENT
Start: 2022-04-30 | End: 2022-04-30 | Stop reason: SDUPTHER

## 2022-04-30 RX ORDER — MORPHINE SULFATE 1 MG/ML
INJECTION, SOLUTION EPIDURAL; INTRATHECAL; INTRAVENOUS PRN
Status: DISCONTINUED | OUTPATIENT
Start: 2022-04-30 | End: 2022-04-30 | Stop reason: SDUPTHER

## 2022-04-30 RX ORDER — NALOXONE HYDROCHLORIDE 0.4 MG/ML
0.4 INJECTION, SOLUTION INTRAMUSCULAR; INTRAVENOUS; SUBCUTANEOUS PRN
Status: DISCONTINUED | OUTPATIENT
Start: 2022-04-30 | End: 2022-05-02 | Stop reason: HOSPADM

## 2022-04-30 RX ORDER — SODIUM CHLORIDE, SODIUM LACTATE, POTASSIUM CHLORIDE, CALCIUM CHLORIDE 600; 310; 30; 20 MG/100ML; MG/100ML; MG/100ML; MG/100ML
INJECTION, SOLUTION INTRAVENOUS CONTINUOUS
Status: DISCONTINUED | OUTPATIENT
Start: 2022-04-30 | End: 2022-05-02 | Stop reason: HOSPADM

## 2022-04-30 RX ORDER — OXYCODONE HYDROCHLORIDE 5 MG/1
10 TABLET ORAL EVERY 4 HOURS PRN
Status: ACTIVE | OUTPATIENT
Start: 2022-04-30 | End: 2022-05-01

## 2022-04-30 RX ORDER — OXYCODONE HYDROCHLORIDE AND ACETAMINOPHEN 5; 325 MG/1; MG/1
2 TABLET ORAL EVERY 4 HOURS PRN
Status: DISCONTINUED | OUTPATIENT
Start: 2022-04-30 | End: 2022-05-02 | Stop reason: HOSPADM

## 2022-04-30 RX ORDER — SODIUM CHLORIDE, SODIUM LACTATE, POTASSIUM CHLORIDE, CALCIUM CHLORIDE 600; 310; 30; 20 MG/100ML; MG/100ML; MG/100ML; MG/100ML
INJECTION, SOLUTION INTRAVENOUS CONTINUOUS PRN
Status: DISCONTINUED | OUTPATIENT
Start: 2022-04-30 | End: 2022-04-30 | Stop reason: SDUPTHER

## 2022-04-30 RX ORDER — OXYCODONE HYDROCHLORIDE AND ACETAMINOPHEN 5; 325 MG/1; MG/1
1 TABLET ORAL EVERY 4 HOURS PRN
Status: DISCONTINUED | OUTPATIENT
Start: 2022-04-30 | End: 2022-05-02 | Stop reason: HOSPADM

## 2022-04-30 RX ORDER — KETOROLAC TROMETHAMINE 30 MG/ML
30 INJECTION, SOLUTION INTRAMUSCULAR; INTRAVENOUS EVERY 6 HOURS PRN
Status: ACTIVE | OUTPATIENT
Start: 2022-04-30 | End: 2022-05-01

## 2022-04-30 RX ORDER — SIMETHICONE 80 MG
80 TABLET,CHEWABLE ORAL EVERY 6 HOURS PRN
Status: DISCONTINUED | OUTPATIENT
Start: 2022-04-30 | End: 2022-05-02 | Stop reason: HOSPADM

## 2022-04-30 RX ORDER — SODIUM CHLORIDE, SODIUM LACTATE, POTASSIUM CHLORIDE, CALCIUM CHLORIDE 600; 310; 30; 20 MG/100ML; MG/100ML; MG/100ML; MG/100ML
INJECTION, SOLUTION INTRAVENOUS CONTINUOUS
Status: DISCONTINUED | OUTPATIENT
Start: 2022-04-30 | End: 2022-04-30

## 2022-04-30 RX ORDER — SODIUM CHLORIDE 0.9 % (FLUSH) 0.9 %
5-40 SYRINGE (ML) INJECTION EVERY 12 HOURS SCHEDULED
Status: DISCONTINUED | OUTPATIENT
Start: 2022-04-30 | End: 2022-05-02 | Stop reason: HOSPADM

## 2022-04-30 RX ORDER — TRISODIUM CITRATE DIHYDRATE AND CITRIC ACID MONOHYDRATE 500; 334 MG/5ML; MG/5ML
SOLUTION ORAL
Status: COMPLETED
Start: 2022-04-30 | End: 2022-04-30

## 2022-04-30 RX ORDER — PRENATAL WITH FERROUS FUM AND FOLIC ACID 3080; 920; 120; 400; 22; 1.84; 3; 20; 10; 1; 12; 200; 27; 25; 2 [IU]/1; [IU]/1; MG/1; [IU]/1; MG/1; MG/1; MG/1; MG/1; MG/1; MG/1; UG/1; MG/1; MG/1; MG/1; MG/1
1 TABLET ORAL DAILY
Status: DISCONTINUED | OUTPATIENT
Start: 2022-05-01 | End: 2022-05-02 | Stop reason: HOSPADM

## 2022-04-30 RX ADMIN — TRISODIUM CITRATE DIHYDRATE AND CITRIC ACID MONOHYDRATE 30 ML: 500; 334 SOLUTION ORAL at 18:41

## 2022-04-30 RX ADMIN — Medication 200 MCG: at 19:14

## 2022-04-30 RX ADMIN — SODIUM CHLORIDE, POTASSIUM CHLORIDE, SODIUM LACTATE AND CALCIUM CHLORIDE 1000 ML: 600; 310; 30; 20 INJECTION, SOLUTION INTRAVENOUS at 18:23

## 2022-04-30 RX ADMIN — ONDANSETRON 4 MG: 2 INJECTION INTRAMUSCULAR; INTRAVENOUS at 19:13

## 2022-04-30 RX ADMIN — Medication 2000 MG: at 18:42

## 2022-04-30 RX ADMIN — ACETAMINOPHEN 650 MG: 325 TABLET ORAL at 21:02

## 2022-04-30 RX ADMIN — SODIUM CHLORIDE, POTASSIUM CHLORIDE, SODIUM LACTATE AND CALCIUM CHLORIDE: 600; 310; 30; 20 INJECTION, SOLUTION INTRAVENOUS at 18:51

## 2022-04-30 RX ADMIN — Medication 200 MCG: at 19:20

## 2022-04-30 RX ADMIN — Medication 909 ML/HR: at 19:13

## 2022-04-30 RX ADMIN — Medication 200 MCG: at 19:02

## 2022-04-30 RX ADMIN — MORPHINE SULFATE 0.15 MG: 1 INJECTION, SOLUTION EPIDURAL; INTRATHECAL; INTRAVENOUS at 18:56

## 2022-04-30 RX ADMIN — SODIUM CITRATE AND CITRIC ACID MONOHYDRATE 30 ML: 500; 334 SOLUTION ORAL at 18:41

## 2022-04-30 RX ADMIN — Medication 200 MCG: at 19:09

## 2022-04-30 RX ADMIN — BUPIVACAINE HYDROCHLORIDE 12 MG: 7.5 INJECTION, SOLUTION SUBARACHNOID at 18:56

## 2022-04-30 ASSESSMENT — PAIN SCALES - GENERAL
PAINLEVEL_OUTOF10: 4
PAINLEVEL_OUTOF10: 4

## 2022-04-30 ASSESSMENT — PULMONARY FUNCTION TESTS
PIF_VALUE: 0
PIF_VALUE: 1
PIF_VALUE: 0

## 2022-04-30 ASSESSMENT — PAIN DESCRIPTION - DESCRIPTORS
DESCRIPTORS: DISCOMFORT
DESCRIPTORS: CRAMPING;DISCOMFORT;TIGHTNESS

## 2022-04-30 ASSESSMENT — PAIN DESCRIPTION - ONSET: ONSET: GRADUAL

## 2022-04-30 ASSESSMENT — PAIN DESCRIPTION - PAIN TYPE: TYPE: ACUTE PAIN

## 2022-04-30 ASSESSMENT — PAIN DESCRIPTION - LOCATION
LOCATION: ABDOMEN
LOCATION: ABDOMEN

## 2022-04-30 ASSESSMENT — PAIN DESCRIPTION - ORIENTATION
ORIENTATION: LOWER
ORIENTATION: LOWER

## 2022-04-30 ASSESSMENT — ENCOUNTER SYMPTOMS: SHORTNESS OF BREATH: 0

## 2022-04-30 ASSESSMENT — PAIN - FUNCTIONAL ASSESSMENT: PAIN_FUNCTIONAL_ASSESSMENT: ACTIVITIES ARE NOT PREVENTED

## 2022-04-30 ASSESSMENT — PAIN DESCRIPTION - FREQUENCY: FREQUENCY: INTERMITTENT

## 2022-04-30 NOTE — L&D DELIVERY NOTE
PREOPERATIVE DIAGNOSES:     1. 38w5d intrauterine pregnancy. 2.  Labor  3. Previous section x 2      POSTOPERATIVE DIAGNOSES:     Same.      PROCEDURE: repeat low transverse  section        SURGEON: Brayden Go DO       ASSISTANT: Roxy Palacios MD      ESTIMATED BLOOD LOSS:  733ZA        COMPLICATIONS:  None.         ANESTHESIA:   Spinal anesthesia        FINDINGS:  Roscoe Coulter Born  Sex:  Female  Fetal Position:  Cephalic,   Apgars:  1 minute: 8; 5 minute: 9  Weight:  7 pounds, 8 ounces  Tubes, uterus, ovaries:  normal          DETAILS OF PROCEDURE:    The patient was taken to the operating room where Spinal anesthesia was administered and found to be adequate. Abdomen was prepped   and draped in the normal sterile fashion. Estrada catheter had previously been   placed. Pfannenstiel skin incision made with a scalpel, carried down to the fascia with cautery. The fascia was nicked in the midline and extended laterally with   cautery. Muscles were  in the midline. Peritoneum was grasped with   hemostats, tented up, and entered sharply. Peritoneal incision was extended   superiorly and inferiorly with good visualization of bladder. Delee bladder blade was introduced. Bladder flap was created with sharp dissection. Low transverse uterine incision was made with a scalpel and extended bluntly. Membranes ruptured for Clear fluid. A viable female infant was delivered in the cephalic presentation without diffiuclty. Baby was bulb suctioned on the abdomen. Cord was clamped and cut, and handed to waiting R.N. Cord blood was obtained. Placenta was manually extracted with 3 vessel cord. Uterus was exteriorized, cleared of all clot and debris. Uterine incision   was closed with vicryl in a running locked fashion. Good hemostasis was   noted. Uterus, tubes, and ovaries appeared normal. Uterus was returned to   the pelvis. The pelvis was irrigated, cleared of all clot and debris.    Fascia was closed with 0 PDS in a running fashion. Subcutaneous tissue was irrigated, made hemostatic. Skin was closed with absorbable subcutaneous staples. Baby and mom to recovery room in stable condition.  Placenta to pathology: No.    Fatoumata Castillo DO

## 2022-04-30 NOTE — H&P
Department of Obstetrics and Gynecology  Nurse Practitioner Obstetrics History and Physical        CHIEF COMPLAINT:  Contractions    HISTORY OF PRESENT ILLNESS:    The patient is a 32 y.o. female [id number], No LMP recorded. Patient is pregnant. ,  at 38w5d. Pt presents to l&d with complaints of ctx since 0200. Pt denies lof, vb or decreased fm. Previous c/s x2. Pt last ate around 5pm, she had 2 pieces of watermelon.    OB History        4    Para   2    Term   2       0    AB   1    Living   2       SAB   1    IAB   0    Ectopic   0    Molar   0    Multiple   0    Live Births   2                Estimated Due Date: Estimated Date of Delivery: 22    PRENATAL CARE:  uneventful    Complicated by:   Patient Active Problem List   Diagnosis Code    Back pain affecting pregnancy in third trimester O99.891, M54.9    Gastroesophageal reflux disease K21.9    Seizure ([facility] [address] R56.9    Vitamin D deficiency E55.9    Other chest pain R07.89    Palpitations R00.2    Intractable abdominal pain R10.9    Encounter for suspected PROM, with rupture of membranes not found Z03.71    23 weeks gestation of pregnancy Z3A.23    24 weeks gestation of pregnancy Z3A.24    Amniotic fluid leaking O42.90    Leg pain M79.606    29 weeks gestation of pregnancy Z3A.29    Shortness of breath with pregnancy O26.899, R06.02     uterine contractions, antepartum O47.00    Multiparity Z64.1    34 weeks gestation of pregnancy Z3A.34    36 weeks gestation of pregnancy Z3A.36    Decreased fetal movements in third trimester O36.8130    Nausea R11.0    Dizziness R42    Previous  delivery affecting pregnancy O34.219       PAST OB HISTORY  OB History        4    Para   2    Term   2       0    AB   1    Living   2       SAB   1    IAB   0    Ectopic   0    Molar   0    Multiple   0    Live Births   2                Past Medical History:        Diagnosis Date    Depression     IBS (irritable bowel syndrome)     Incisional hernia     Seizure ([facility] [address] 2015    after delivery / not sure was a seizure, no issues since , no medication     Past Surgical History:        Procedure Laterality Date    ABDOMEN SURGERY       SECTION  1/3/2015    CHOLECYSTECTOMY  2017    ROBOT ASSISTED    COLONOSCOPY  2012    COLONOSCOPY      DILATION AND CURETTAGE OF UTERUS      ablation    HEMORRHOID SURGERY  2020and 2021    LAPAROSCOPY N/A 10/18/2019    DIAGNOSTIC LAPAROSCOPY, OPEN VENTRAL HERNIA REPAIR performed by Milind Bermudez MD at [address] N/A 2018     SECTION performed by Peter Quiñones DO at [facility] L&D    UPPER GASTROINTESTINAL ENDOSCOPY  2012    UPPER GASTROINTESTINAL ENDOSCOPY      UPPER GASTROINTESTINAL ENDOSCOPY  2017    VENTRAL HERNIA REPAIR N/A 2021    OPEN INCISIONAL HERNIA REPAIR performed by Alycia Vargas MD at [address] History:    TOBACCO:   reports that she quit smoking about 9 years ago. Her smoking use included cigarettes. She quit after 2.00 years of use. She has never used smokeless tobacco.  ETOH:   reports no history of alcohol use. DRUGS:   reports no history of drug use.   Family History:       Problem Relation Age of Onset    Cancer Father         bladder     Medications Prior to Admission:  Medications Prior to Admission: ALBUTEROL IN, Inhale into the lungs (Patient not taking: Reported on 2022)  aspirin 81 MG chewable tablet, Take 81 mg by mouth daily   doxylamine-pyridoxine (DICLEGIS) 10-10 MG TBEC, Take 1 tablet by mouth nightly (Patient not taking: Reported on 3/29/2022)  Allergies:  Naproxen    Review of Systems:   Ears, nose, mouth, throat, and face: negative  Respiratory: negative  Cardiovascular: negative  Gastrointestinal: negative  Genitourinary:negative  Integument/breast: negative  Hematologic/lymphatic: negative  Musculoskeletal:negative  Neurological: negative  Behavioral/Psych: negative  Endocrine: negative  Allergic/Immunologic: negative  Psychosocial: negative    PHYSICAL EXAM:    General appearance:  awake, alert, cooperative, no apparent distress, and appears stated age  Neurologic:  Awake, alert, oriented to name, place and time. Cranial nerves II-XII are grossly intact.     Lungs:  clear to auscultation bilaterally  Heart:  regular rate and rhythm  Abdomen:   soft, non-distended, non-tender, no masses palpated, gravid  Fetal heart rate:  Baseline Heart Rate 145, accelerations:  present, decels:none  Pelvis:  External Genitalia: no lesions  Cervix:  DILATION:  3-4 cm  EFFACEMENT:   80%  STATION:  -2  CONSISTENCY:  soft    Contraction frequency:  3-1 minutes  Membranes:  Intact    /78   Pulse 112   Temp 98.1 °F (36.7 °C) (Oral)   Resp 18   Ht 5' 3\" (1.6 m)   Wt 146 lb (66.2 kg)   BMI 25.86 kg/m²                 Prenatal Labs  Blood Type/Rh: O pos  Antibody Screen: negative  Prenatal requested      ASSESSMENT AND PLAN:  D/w Dr. Solis sahu  Admit  Routine c/s orders  Prep for repeat c/s        Electronically signed by ERMIAS Mcpherson CNP on 4/30/2022 at 6:03 PM

## 2022-04-30 NOTE — PROGRESS NOTES
38w5d presents to antepartum with complaints of irregular contractions and much vaginal pressure since 2am. Pt also states increase in using restroom and some back pain. Pt denies any bleeding or leaking of fluid and states good fetal movement. Placed on efm. Denies any complications with pregnancy, baby breech on last ultrasound , planning for repeat c/s this Thursday.

## 2022-04-30 NOTE — ANESTHESIA PROCEDURE NOTES
Spinal Block    Patient location during procedure: OB  Start time: 4/30/2022 6:51 PM  End time: 4/30/2022 6:56 PM  Reason for block: primary anesthetic and at surgeon's request  Staffing  Performed: resident/CRNA   Anesthesiologist: Felix Snell MD  Resident/CRNA: ERMIAS Goodwin CRNA  Preanesthetic Checklist  Completed: patient identified, IV checked, site marked, risks and benefits discussed, surgical consent, monitors and equipment checked, pre-op evaluation, timeout performed, anesthesia consent given, oxygen available and patient being monitored  Spinal Block  Patient position: sitting  Prep: Betadine  Patient monitoring: cardiac monitor, continuous pulse ox, continuous capnometry and frequent blood pressure checks  Approach: midline  Location: L3/L4  Provider prep: mask, sterile gloves and sterile gown  Local infiltration: lidocaine  Needle  Needle type: Pencan   Needle gauge: 25 G  Needle length: 3.5 in  Assessment  Sensory level: T4  Swirl obtained: Yes  CSF: clear  Attempts: 1  Hemodynamics: stable

## 2022-04-30 NOTE — ANESTHESIA PRE PROCEDURE
Department of Anesthesiology  Preprocedure Note       Name:  Thor Franco   Age:  32 y.o.  :  1995                                          MRN:  [id number]         Date:  2022      Surgeon: Ofelia Degroot):  Elia Judd DO    Procedure: Repeat   Medications prior to admission:   Prior to Admission medications    Medication Sig Start Date End Date Taking? Authorizing Provider   ALBUTEROL IN Inhale into the lungs  Patient not taking: Reported on 2022    Historical Provider, MD   aspirin 81 MG chewable tablet Take 81 mg by mouth daily     Historical Provider, MD   doxylamine-pyridoxine (DICLEGIS) 10-10 MG TBEC Take 1 tablet by mouth nightly  Patient not taking: Reported on 3/29/2022 2/16/22   Kt Sierra PA-C       Current medications:    Current Facility-Administered Medications   Medication Dose Route Frequency Provider Last Rate Last Admin    lactated ringers infusion   IntraVENous Continuous Silvia Kelp, APRN - CNP        lactated ringers bolus  1,000 mL IntraVENous Once Silvia Kelp, APRN - CNP        citric acid-sodium citrate (BICITRA) solution 30 mL  30 mL Oral Once Silvia Kelp, APRN - CNP        ceFAZolin (ANCEF) 2000 mg in sterile water 20 mL IV syringe  2,000 mg IntraVENous Once Silvia Kelp, APRN - CNP        oxytocin (PITOCIN) 30 units in 500 mL infusion Override Pull             ceFAZolin 2000 mg in 20 mL SWFI IV Syringe IV syringe             citric acid-sodium citrate (BICITRA) 500-334 MG/5ML solution                Allergies:     Allergies   Allergen Reactions    Naproxen      Dizziness,disoriented       Problem List:    Patient Active Problem List   Diagnosis Code    Back pain affecting pregnancy in third trimester O99.891, M54.9    Gastroesophageal reflux disease K21.9    Seizure ([facility] [address] R56.9    Vitamin D deficiency E55.9    Other chest pain R07.89    Palpitations R00.2    Intractable abdominal pain R10.9    Encounter for suspected PROM, with rupture of membranes not found Z03.71    23 weeks gestation of pregnancy Z3A.23    24 weeks gestation of pregnancy Z3A.24    Amniotic fluid leaking O42.90    Leg pain M79.606    29 weeks gestation of pregnancy Z3A.29    Shortness of breath with pregnancy O26.899, R06.02     uterine contractions, antepartum O47.00    Multiparity Z64.1    34 weeks gestation of pregnancy Z3A.34    36 weeks gestation of pregnancy Z3A.36    Decreased fetal movements in third trimester O36.8130    Nausea R11.0    Dizziness R42    Previous  delivery affecting pregnancy O34.219       Past Medical History:        Diagnosis Date    Depression     IBS (irritable bowel syndrome)     Incisional hernia     Seizure ([address] 2015    after delivery / not sure was a seizure, no issues since , no medication       Past Surgical History:        Procedure Laterality Date    ABDOMEN SURGERY       SECTION  1/3/2015    CHOLECYSTECTOMY  2017    ROBOT ASSISTED    COLONOSCOPY  2012    COLONOSCOPY      DILATION AND CURETTAGE OF UTERUS      ablation    HEMORRHOID SURGERY  2020and 2021    LAPAROSCOPY N/A 10/18/2019    DIAGNOSTIC LAPAROSCOPY, OPEN VENTRAL HERNIA REPAIR performed by Ladonna Reyez MD at [address] N/A 2018     SECTION performed by Mabel Quintero DO at [facility] L[facility]D    UPPER GASTROINTESTINAL ENDOSCOPY  2012    UPPER GASTROINTESTINAL ENDOSCOPY      UPPER GASTROINTESTINAL ENDOSCOPY  2017    VENTRAL HERNIA REPAIR N/A 2021    OPEN INCISIONAL HERNIA REPAIR performed by Cammie Cheung MD at [facility] OR       Social History:    Social History     Tobacco Use    Smoking status: Former Smoker     Years: 2.00     Types: Cigarettes     Quit date: 3/31/2013     Years since quittin.0    Smokeless tobacco: Never Used   Substance Use Topics    Alcohol use:  No                                Counseling given: Not Answered      Vital Signs (Current):   Vitals:    04/30/22 1722 04/30/22 1800   BP: 121/78    Pulse: 112    Resp: 18    Temp: 36.7 °C (98.1 °F)    TempSrc: Oral    Weight:  146 lb (66.2 kg)   Height:  5' 3\" (1.6 m)                                              BP Readings from Last 3 Encounters:   04/30/22 121/78   04/13/22 101/61   04/12/22 119/66       NPO Status:                                                                                 BMI:   Wt Readings from Last 3 Encounters:   04/30/22 146 lb (66.2 kg)   04/13/22 143 lb (64.9 kg)   04/12/22 145 lb (65.8 kg)     Body mass index is 25.86 kg/m². CBC:   Lab Results   Component Value Date    WBC 15.1 04/12/2022    RBC 4.50 04/12/2022    HGB 11.8 04/12/2022    HCT 38.4 04/12/2022    MCV 85.3 04/12/2022    RDW 15.6 04/12/2022     04/12/2022       CMP:   Lab Results   Component Value Date     04/12/2022    K 3.6 04/12/2022    K 3.4 10/19/2021     04/12/2022    CO2 12 04/12/2022    BUN 5 04/12/2022    CREATININE 0.4 04/12/2022    GFRAA >60 04/12/2022    LABGLOM >60 04/12/2022    GLUCOSE 73 04/12/2022    PROT 6.7 04/12/2022    CALCIUM 8.6 04/12/2022    BILITOT 0.4 04/12/2022    ALKPHOS 129 04/12/2022    AST 16 04/12/2022    ALT 10 04/12/2022       POC Tests: No results for input(s): POCGLU, POCNA, POCK, POCCL, POCBUN, POCHEMO, POCHCT in the last 72 hours.     Coags:   Lab Results   Component Value Date    PROTIME 11.1 04/01/2018    INR 1.0 04/01/2018    APTT 28.1 04/01/2018       HCG (If Applicable):   Lab Results   Component Value Date    PREGTESTUR negative 03/29/2021    PREGSERUM NEGATIVE 04/30/2012        ABGs: No results found for: PHART, PO2ART, NSO9EUW, JYT3PKC, BEART, Z8NTYWRC     Type & Screen (If Applicable):  No results found for: LABABO, LABRH    Drug/Infectious Status (If Applicable):  No results found for: HIV, HEPCAB    COVID-19 Screening (If Applicable):   Lab Results   Component Value Date    COVID19 Not Detected 04/29/2021 Anesthesia Evaluation  Patient summary reviewed and Nursing notes reviewed no history of anesthetic complications:   Airway: Mallampati: II  TM distance: >3 FB   Neck ROM: full  Mouth opening: > = 3 FB Dental:      Comment: braces    Pulmonary:Negative Pulmonary ROS breath sounds clear to auscultation  (+) pneumonia (Had 2 weeks ago): resolved,      (-) shortness of breath                           Cardiovascular:Negative CV ROS  Exercise tolerance: good (>4 METS),         ECG reviewed  Rhythm: regular  Rate: normal  Echocardiogram reviewed         Beta Blocker:  Not on Beta Blocker         Neuro/Psych:   (+) seizures: well controlled, psychiatric history: stable without treatmentdepression/anxiety             GI/Hepatic/Renal:   (+) GERD: well controlled,          ROS comment: IBS. Endo/Other: Negative Endo/Other ROS                    Abdominal:             Vascular: negative vascular ROS. Other Findings:             Anesthesia Plan      general and spinal     ASA 2           MIPS: Postoperative opioids intended and Prophylactic antiemetics administered. Anesthetic plan and risks discussed with patient and spouse. Use of blood products discussed with patient whom consented to blood products. ERMIAS Carr - CRNA   4/30/2022    DOS STAFF ADDENDUM:    Pt seen and examined, chart reviewed (including anesthesia, drug and allergy history). Anesthetic plan, risks, benefits, alternatives, and personnel involved discussed with patient. Patient verbalized an understanding and agrees to proceed. Plan discussed with care team members and agreed upon.     Sonu Gan MD  Staff Anesthesiologist  6:24 PM

## 2022-05-01 LAB
HCT VFR BLD CALC: 35.9 % (ref 34–48)
HEMOGLOBIN: 11.2 G/DL (ref 11.5–15.5)

## 2022-05-01 PROCEDURE — 2580000003 HC RX 258: Performed by: OBSTETRICS & GYNECOLOGY

## 2022-05-01 PROCEDURE — 6370000000 HC RX 637 (ALT 250 FOR IP): Performed by: ANESTHESIOLOGY

## 2022-05-01 PROCEDURE — 6370000000 HC RX 637 (ALT 250 FOR IP): Performed by: OBSTETRICS & GYNECOLOGY

## 2022-05-01 PROCEDURE — 85018 HEMOGLOBIN: CPT

## 2022-05-01 PROCEDURE — 1220000000 HC SEMI PRIVATE OB R&B

## 2022-05-01 PROCEDURE — 85014 HEMATOCRIT: CPT

## 2022-05-01 PROCEDURE — 36415 COLL VENOUS BLD VENIPUNCTURE: CPT

## 2022-05-01 RX ADMIN — DOCUSATE SODIUM 100 MG: 100 CAPSULE, LIQUID FILLED ORAL at 21:44

## 2022-05-01 RX ADMIN — METFORMIN HYDROCHLORIDE 1 TABLET: 500 TABLET, EXTENDED RELEASE ORAL at 09:48

## 2022-05-01 RX ADMIN — OXYCODONE AND ACETAMINOPHEN 2 TABLET: 5; 325 TABLET ORAL at 19:19

## 2022-05-01 RX ADMIN — SIMETHICONE 80 MG: 80 TABLET, CHEWABLE ORAL at 09:49

## 2022-05-01 RX ADMIN — ACETAMINOPHEN 650 MG: 325 TABLET ORAL at 12:47

## 2022-05-01 RX ADMIN — Medication: at 09:48

## 2022-05-01 RX ADMIN — ACETAMINOPHEN 650 MG: 325 TABLET ORAL at 05:58

## 2022-05-01 RX ADMIN — ACETAMINOPHEN 650 MG: 325 TABLET ORAL at 01:22

## 2022-05-01 RX ADMIN — DOCUSATE SODIUM 100 MG: 100 CAPSULE, LIQUID FILLED ORAL at 09:48

## 2022-05-01 RX ADMIN — OXYCODONE 5 MG: 5 TABLET ORAL at 15:05

## 2022-05-01 RX ADMIN — SODIUM CHLORIDE, PRESERVATIVE FREE 10 ML: 5 INJECTION INTRAVENOUS at 12:47

## 2022-05-01 ASSESSMENT — PAIN DESCRIPTION - LOCATION
LOCATION: INCISION
LOCATION: ABDOMEN;INCISION

## 2022-05-01 ASSESSMENT — PAIN SCALES - GENERAL
PAINLEVEL_OUTOF10: 5
PAINLEVEL_OUTOF10: 9
PAINLEVEL_OUTOF10: 6
PAINLEVEL_OUTOF10: 2
PAINLEVEL_OUTOF10: 2
PAINLEVEL_OUTOF10: 5

## 2022-05-01 ASSESSMENT — PAIN DESCRIPTION - DESCRIPTORS: DESCRIPTORS: BURNING

## 2022-05-01 NOTE — PROGRESS NOTES
Dr. Jacques Jaime called updated on pt requesting tylenol due to unable to take tordal, orders received for 650mg tylenol

## 2022-05-01 NOTE — PROGRESS NOTES
Progress Note    SUBJECTIVE:   Pt comfortable; +void; +flatus; +ambulation    OBJECTIVE:    VITALS:  BP (!) 98/55   Pulse 69   Temp 97.8 °F (36.6 °C) (Oral)   Resp 16   Ht 5' 3\" (1.6 m)   Wt 146 lb (66.2 kg)   SpO2 95%   Breastfeeding Unknown   BMI 25.86 kg/m²   Physical Exam  ABDOMEN:  hypoactive bowel sounds, non-distended, non-tender and incision d/i  Ext nt    DATA:  Hemoglobin/Hematocrit:    Lab Results   Component Value Date    HGB 11.2 2022    HCT 35.9 2022       ASSESSMENT AND PLAN:  S/p ltcs  Active Problems:    Previous  delivery affecting pregnancy  Resolved Problems:    * No resolved hospital problems.  *    POD#1  Plan discharge home in 1-2 days    Electronically signed by Montse Wang DO on 2022 at 2:13 PM

## 2022-05-01 NOTE — ANESTHESIA POSTPROCEDURE EVALUATION
Department of Anesthesiology  Postprocedure Note    Patient: Messi Titus  MRN: [id number]  YOB: 1995  Date of evaluation: 2022  Time:  8:33 PM     Procedure Summary     Date: 22 Room / Location: [facility]  [address] [place] [address]    Anesthesia Start: 8543 Anesthesia Stop:     Procedure:  SECTION (N/A Uterus) Diagnosis: (Previous c/s in labor)    Surgeons: Alfredo Odom DO Responsible Provider: Isaura Roblero MD    Anesthesia Type: general, spinal ASA Status: 2          Anesthesia Type: general, spinal    Viry Phase I:      Viry Phase II:      Last vitals: Reviewed and per EMR flowsheets.        Anesthesia Post Evaluation    Patient location during evaluation: PACU  Patient participation: complete - patient participated  Level of consciousness: awake and alert  Airway patency: patent  Nausea & Vomiting: no nausea and no vomiting  Complications: no  Cardiovascular status: hemodynamically stable  Respiratory status: acceptable  Hydration status: stable

## 2022-05-01 NOTE — LACTATION NOTE
Discussed mom's prior breastfeeding experiences. She nursed her first child for two weeks and second child for two months. Her goal is to provide breast milk for at least six months for this baby. Mom is requesting a double electric breast pump for home use. Baby is sleeping. Encouraged her to call me to observe baby during a breastfeed.

## 2022-05-01 NOTE — FLOWSHEET NOTE
Up and ambulating in room. Encouraged to use smi.c/o gas discomfort at intervals. Instructed not to eat  Spicy,fatty foods and not to drink carbonated beverages(Was drinking pepsi.

## 2022-05-01 NOTE — PROGRESS NOTES
[employer] [facility] screening results were discussed with parent. Questions answered. Brochure given to parent. Advised to monitor developmental milestones and contact physician for any concerns.    Emelyn Gill, AuD

## 2022-05-01 NOTE — PROGRESS NOTES
Pt transferred  And oreinted to unit. paperwork reviewed. education given on safe sleep. Pt denies questions. Call light within reach.  Instructed to call for assistance to bathroom

## 2022-05-02 VITALS
WEIGHT: 146 LBS | BODY MASS INDEX: 25.87 KG/M2 | HEART RATE: 77 BPM | SYSTOLIC BLOOD PRESSURE: 112 MMHG | TEMPERATURE: 98.3 F | RESPIRATION RATE: 16 BRPM | DIASTOLIC BLOOD PRESSURE: 68 MMHG | OXYGEN SATURATION: 96 % | HEIGHT: 63 IN

## 2022-05-02 PROBLEM — G89.18 ACUTE POSTOPERATIVE PAIN: Status: ACTIVE | Noted: 2022-05-02

## 2022-05-02 LAB
INTEGRITY CHECK, CREATININE, URINE: 171.7
INTEGRITY CHECK, OXIDANT, URINE: <40
INTEGRITY CHECK, PH, URINE: 5.6 (ref 4.5–9)
INTEGRITY CHECK, SPECIFIC GRAVITY, URINE: 1.03 (ref 1–1.03)
INTEGRITY CHECK, SPECIMEN INTEGRITY, URINE: NORMAL

## 2022-05-02 PROCEDURE — 6370000000 HC RX 637 (ALT 250 FOR IP): Performed by: OBSTETRICS & GYNECOLOGY

## 2022-05-02 PROCEDURE — 90715 TDAP VACCINE 7 YRS/> IM: CPT | Performed by: OBSTETRICS & GYNECOLOGY

## 2022-05-02 PROCEDURE — 6360000002 HC RX W HCPCS: Performed by: OBSTETRICS & GYNECOLOGY

## 2022-05-02 PROCEDURE — 90471 IMMUNIZATION ADMIN: CPT | Performed by: OBSTETRICS & GYNECOLOGY

## 2022-05-02 RX ORDER — OXYCODONE HYDROCHLORIDE AND ACETAMINOPHEN 5; 325 MG/1; MG/1
1 TABLET ORAL EVERY 4 HOURS PRN
Qty: 10 TABLET | Refills: 0 | Status: SHIPPED | OUTPATIENT
Start: 2022-05-02 | End: 2022-05-05

## 2022-05-02 RX ORDER — PRENATAL WITH FERROUS FUM AND FOLIC ACID 3080; 920; 120; 400; 22; 1.84; 3; 20; 10; 1; 12; 200; 27; 25; 2 [IU]/1; [IU]/1; MG/1; [IU]/1; MG/1; MG/1; MG/1; MG/1; MG/1; MG/1; UG/1; MG/1; MG/1; MG/1; MG/1
1 TABLET ORAL DAILY
Qty: 30 TABLET | Refills: 0 | Status: SHIPPED | OUTPATIENT
Start: 2022-05-03

## 2022-05-02 RX ORDER — FERROUS SULFATE 325(65) MG
325 TABLET ORAL 2 TIMES DAILY WITH MEALS
Qty: 30 TABLET | Refills: 3 | Status: SHIPPED | OUTPATIENT
Start: 2022-05-02

## 2022-05-02 RX ADMIN — OXYCODONE AND ACETAMINOPHEN 2 TABLET: 5; 325 TABLET ORAL at 00:31

## 2022-05-02 RX ADMIN — OXYCODONE AND ACETAMINOPHEN 1 TABLET: 5; 325 TABLET ORAL at 18:10

## 2022-05-02 RX ADMIN — SIMETHICONE 80 MG: 80 TABLET, CHEWABLE ORAL at 09:53

## 2022-05-02 RX ADMIN — TETANUS TOXOID, REDUCED DIPHTHERIA TOXOID AND ACELLULAR PERTUSSIS VACCINE, ADSORBED 0.5 ML: 5; 2.5; 8; 8; 2.5 SUSPENSION INTRAMUSCULAR at 09:54

## 2022-05-02 RX ADMIN — ACETAMINOPHEN 650 MG: 325 TABLET ORAL at 06:23

## 2022-05-02 RX ADMIN — METFORMIN HYDROCHLORIDE 1 TABLET: 500 TABLET, EXTENDED RELEASE ORAL at 09:53

## 2022-05-02 RX ADMIN — DOCUSATE SODIUM 100 MG: 100 CAPSULE, LIQUID FILLED ORAL at 09:53

## 2022-05-02 RX ADMIN — OXYCODONE AND ACETAMINOPHEN 1 TABLET: 5; 325 TABLET ORAL at 12:49

## 2022-05-02 ASSESSMENT — PAIN SCALES - GENERAL
PAINLEVEL_OUTOF10: 7
PAINLEVEL_OUTOF10: 0
PAINLEVEL_OUTOF10: 6
PAINLEVEL_OUTOF10: 6
PAINLEVEL_OUTOF10: 5

## 2022-05-02 ASSESSMENT — PAIN DESCRIPTION - ORIENTATION: ORIENTATION: LOWER

## 2022-05-02 ASSESSMENT — PAIN - FUNCTIONAL ASSESSMENT: PAIN_FUNCTIONAL_ASSESSMENT: ACTIVITIES ARE NOT PREVENTED

## 2022-05-02 ASSESSMENT — PAIN DESCRIPTION - LOCATION: LOCATION: INCISION

## 2022-05-02 ASSESSMENT — PAIN DESCRIPTION - DESCRIPTORS: DESCRIPTORS: SORE

## 2022-05-02 NOTE — PROGRESS NOTES
Subjective:     Postpartum Day 2:  Delivery    The patient feels well. Pain is moderately controlled with current medications. Baby is feeding via bottle - Similac with iron. Urinary output is adequate. The patient is ambulating well. The patient is tolerating a normal diet. Flatus has been passed. Objective:        Vitals:    22 1500   BP: 112/68   Pulse: 77   Resp: 14   Temp: 98.3 °F (36.8 °C)   SpO2:          Intake/Output Summary (Last 24 hours) at 2022 1741  Last data filed at 2022 1500  Gross per 24 hour   Intake --   Output 2 ml   Net -2 ml     Lab Results   Component Value Date    WBC 9.2 2022    HGB 11.2 (L) 2022    HCT 35.9 2022    MCV 82.8 2022     2022       General:    alert, appears stated age and cooperative       Lochia:  appropriate   Uterine    firm   Incision:  dressing intact   DVT Evaluation:  No evidence of DVT seen on physical exam.  Calf/Ankle edema is present. Assessment:     Status post  section. Doing well postoperatively. Plan:     Discharge home with standard precautions and return to clinic in 1 week.

## 2022-05-02 NOTE — CARE COORDINATION
SW Discharge Planning   SW received consult for \" PPD 11\" SW also noted mother and baby with positive UDS for THC at delivery. SW met with Liv Eliud ( [phone]) mother to baby girl Neida Hendrix ( 4/30/22) and introduced self and role. Also present in the room was reported father of the baby, Flower Lacey ( 4/28/94) who Michelle Garcia gave SW permission to speak freely in front of. Michelle Garcia stated that she resides at the address listed in the chart with Ritika Jones and their children, Stu Wesley ( 1/3/15) and Garrett Boss ( 8/6/18). Michelle Garcia reported that she is currently employed at [employer], and will be adding baby to her [employer]. Per Michelle Garcia, prenatal care was with Dr. Peyton Shine, and pediatric care will be with Dr. Re Lyles. Michelle Garcia Reported that she has all needed items including a car seat and pack and play. We discussed safe sleep practices. Michelle Garcia stated that she is already involved with WIC and [facility]. Michelle Garcia  denied any past or current history of children services involvement, legal issues, substance abuse, or domestic violence. Michelle Garcia did report having anxiety and depression, and stated she went off her medication due to her pregnancy however plans to restart it. We discussed awareness of Post Partum Depression and encouraged contact with her OB if any problems arise. Resources were provided. SW did address Yadira and baby's positive UDS for THC; Michelle Garcia stated that she never smoked THC during her pregnancy because she knew \" how bad it could be for my baby\". Michelle Garcia did report using topical CBD oils to help with pain, however denied ever ingesting CBD/THC. Michelle Garcia was tearful but did voice understanding for the need of a [facility] ( [phone]) referral     During assessment, Michelle Garcia was polite, pleasant and easily engaged in conversation.  Michelle Garcia was holding baby and was attentive and affectionate     SW completed [facility] ( [phone]) referral to Betzaida Baker in intake     PLAN    Baby can NOT be discharged home until [facility] ( [phone]) provides disposition  SW to [facility] nursing staff [facility] ( [phone])     Electronically signed by LILLIE Dvae on 5/2/2022 at 10:01 AM

## 2022-05-02 NOTE — CARE COORDINATION
SW Discharge Planning     [facility] ( [phone][facility] supervisor, Maribel Hardy, [facility] ( [phone]) will NOT be involved at this time     PLAN    Baby CAN be discharged home when medically ready, children services will NOT be involved at this time.       Electronically signed by LILLIE Cano on 5/2/2022 at 1:39 PM

## 2022-05-02 NOTE — LACTATION NOTE
Mother has been formula supplementing after breastfeeding as she has been concerned for volume transfer. Supply and demand reviewed and encouraged to observe feeding cues and avoid pacifier use while baby is learning to breastfeed. Mother states baby latching well to right breast with audible swallows. Baby to left breast at this time with discomfort, latch broken, nipple to nose approach taught to insure a deep latch without causing nipple trauma. Mother states much improved comfort and encouraged to use this approach to latching to prevent nipple damage and pain. Encouraged to call for assistance and coaching if she feels she needs further assistance. Awaiting signature on EBP script.

## 2022-05-02 NOTE — FLOWSHEET NOTE
Discharge instructions given along with rx for percocet and verbalizes knowledge of f/u care. Jenny Davis here and notified of ppd score of 11.

## 2022-05-03 LAB
COMMENT: NORMAL
THC NORMALIZED, QUANTITIATIVE, URINE: 449.4
THC-COOH, QUANTITATIVE, URINE: 771.6

## 2022-05-04 LAB
C. TRACHOMATIS DNA ,URINE: NEGATIVE
N. GONORRHOEAE DNA, URINE: NEGATIVE
SOURCE: NORMAL

## 2022-05-24 NOTE — CARE COORDINATION
SW Discharge Planning     SW noted baby's cordstat to be positive for [facility]     SW called [facility] ( [phone]) and reported information to , Kamari Fagan     Electronically signed by LILLIE Sanchez on 5/24/2022 at 11:07 AM

## 2022-08-03 ENCOUNTER — HOSPITAL ENCOUNTER (EMERGENCY)
Age: 27
Discharge: HOME | End: 2022-08-03
Attending: EMERGENCY MEDICINE
Payer: COMMERCIAL

## 2022-08-03 ENCOUNTER — APPOINTMENT (OUTPATIENT)
Dept: CT IMAGING | Age: 27
End: 2022-08-03
Payer: COMMERCIAL

## 2022-08-03 VITALS
DIASTOLIC BLOOD PRESSURE: 66 MMHG | HEART RATE: 65 BPM | SYSTOLIC BLOOD PRESSURE: 104 MMHG | BODY MASS INDEX: 21.97 KG/M2 | HEIGHT: 63 IN | OXYGEN SATURATION: 98 % | TEMPERATURE: 97.2 F | WEIGHT: 124 LBS | RESPIRATION RATE: 15 BRPM

## 2022-08-03 DIAGNOSIS — K56.7 ILEUS (HCC): ICD-10-CM

## 2022-08-03 DIAGNOSIS — R10.33 PERIUMBILICAL ABDOMINAL PAIN: Primary | ICD-10-CM

## 2022-08-03 DIAGNOSIS — K59.00 CONSTIPATION, UNSPECIFIED CONSTIPATION TYPE: ICD-10-CM

## 2022-08-03 DIAGNOSIS — R11.2 NON-INTRACTABLE VOMITING WITH NAUSEA, UNSPECIFIED VOMITING TYPE: ICD-10-CM

## 2022-08-03 DIAGNOSIS — K43.9 VENTRAL HERNIA WITHOUT OBSTRUCTION OR GANGRENE: ICD-10-CM

## 2022-08-03 LAB
ALBUMIN SERPL-MCNC: 4.5 G/DL (ref 3.5–5.2)
ALP BLD-CCNC: 82 U/L (ref 35–104)
ALT SERPL-CCNC: 17 U/L (ref 0–32)
ANION GAP SERPL CALCULATED.3IONS-SCNC: 10 MMOL/L (ref 7–16)
AST SERPL-CCNC: 17 U/L (ref 0–31)
BACTERIA: ABNORMAL /HPF
BASOPHILS ABSOLUTE: 0.04 E9/L (ref 0–0.2)
BASOPHILS RELATIVE PERCENT: 0.8 % (ref 0–2)
BILIRUB SERPL-MCNC: 0.5 MG/DL (ref 0–1.2)
BILIRUBIN URINE: NEGATIVE
BLOOD, URINE: ABNORMAL
BUN BLDV-MCNC: 7 MG/DL (ref 6–20)
CALCIUM SERPL-MCNC: 9.3 MG/DL (ref 8.6–10.2)
CHLORIDE BLD-SCNC: 108 MMOL/L (ref 98–107)
CLARITY: CLEAR
CO2: 22 MMOL/L (ref 22–29)
COLOR: YELLOW
CREAT SERPL-MCNC: 0.6 MG/DL (ref 0.5–1)
EOSINOPHILS ABSOLUTE: 0.09 E9/L (ref 0.05–0.5)
EOSINOPHILS RELATIVE PERCENT: 1.7 % (ref 0–6)
EPITHELIAL CELLS, UA: ABNORMAL /HPF
GFR AFRICAN AMERICAN: >60
GFR NON-AFRICAN AMERICAN: >60 ML/MIN/1.73
GLUCOSE BLD-MCNC: 82 MG/DL (ref 74–99)
GLUCOSE URINE: NEGATIVE MG/DL
HCG, URINE, POC: NEGATIVE
HCT VFR BLD CALC: 41.7 % (ref 34–48)
HEMOGLOBIN: 13.5 G/DL (ref 11.5–15.5)
IMMATURE GRANULOCYTES #: 0.01 E9/L
IMMATURE GRANULOCYTES %: 0.2 % (ref 0–5)
KETONES, URINE: NEGATIVE MG/DL
LACTIC ACID: 0.7 MMOL/L (ref 0.5–2.2)
LEUKOCYTE ESTERASE, URINE: NEGATIVE
LIPASE: 25 U/L (ref 13–60)
LYMPHOCYTES ABSOLUTE: 2.35 E9/L (ref 1.5–4)
LYMPHOCYTES RELATIVE PERCENT: 44.3 % (ref 20–42)
Lab: NORMAL
MCH RBC QN AUTO: 28.6 PG (ref 26–35)
MCHC RBC AUTO-ENTMCNC: 32.4 % (ref 32–34.5)
MCV RBC AUTO: 88.3 FL (ref 80–99.9)
MONOCYTES ABSOLUTE: 0.46 E9/L (ref 0.1–0.95)
MONOCYTES RELATIVE PERCENT: 8.7 % (ref 2–12)
NEGATIVE QC PASS/FAIL: NORMAL
NEUTROPHILS ABSOLUTE: 2.36 E9/L (ref 1.8–7.3)
NEUTROPHILS RELATIVE PERCENT: 44.3 % (ref 43–80)
NITRITE, URINE: NEGATIVE
PDW BLD-RTO: 16.3 FL (ref 11.5–15)
PH UA: 6.5 (ref 5–9)
PLATELET # BLD: 257 E9/L (ref 130–450)
PMV BLD AUTO: 11.4 FL (ref 7–12)
POSITIVE QC PASS/FAIL: NORMAL
POTASSIUM REFLEX MAGNESIUM: 3.8 MMOL/L (ref 3.5–5)
PROTEIN UA: NEGATIVE MG/DL
RBC # BLD: 4.72 E12/L (ref 3.5–5.5)
RBC UA: ABNORMAL /HPF (ref 0–2)
SODIUM BLD-SCNC: 140 MMOL/L (ref 132–146)
SPECIFIC GRAVITY UA: 1.02 (ref 1–1.03)
TOTAL PROTEIN: 7.3 G/DL (ref 6.4–8.3)
UROBILINOGEN, URINE: 0.2 E.U./DL
WBC # BLD: 5.3 E9/L (ref 4.5–11.5)
WBC UA: ABNORMAL /HPF (ref 0–5)

## 2022-08-03 PROCEDURE — 81001 URINALYSIS AUTO W/SCOPE: CPT

## 2022-08-03 PROCEDURE — 6360000002 HC RX W HCPCS: Performed by: PHYSICIAN ASSISTANT

## 2022-08-03 PROCEDURE — 87077 CULTURE AEROBIC IDENTIFY: CPT

## 2022-08-03 PROCEDURE — 96375 TX/PRO/DX INJ NEW DRUG ADDON: CPT

## 2022-08-03 PROCEDURE — 96376 TX/PRO/DX INJ SAME DRUG ADON: CPT

## 2022-08-03 PROCEDURE — 99285 EMERGENCY DEPT VISIT HI MDM: CPT

## 2022-08-03 PROCEDURE — 96374 THER/PROPH/DIAG INJ IV PUSH: CPT

## 2022-08-03 PROCEDURE — 6360000004 HC RX CONTRAST MEDICATION: Performed by: RADIOLOGY

## 2022-08-03 PROCEDURE — 85025 COMPLETE CBC W/AUTO DIFF WBC: CPT

## 2022-08-03 PROCEDURE — 2580000003 HC RX 258: Performed by: RADIOLOGY

## 2022-08-03 PROCEDURE — 83690 ASSAY OF LIPASE: CPT

## 2022-08-03 PROCEDURE — 74177 CT ABD & PELVIS W/CONTRAST: CPT

## 2022-08-03 PROCEDURE — 80053 COMPREHEN METABOLIC PANEL: CPT

## 2022-08-03 PROCEDURE — 87088 URINE BACTERIA CULTURE: CPT

## 2022-08-03 PROCEDURE — 83605 ASSAY OF LACTIC ACID: CPT

## 2022-08-03 PROCEDURE — 2580000003 HC RX 258: Performed by: PHYSICIAN ASSISTANT

## 2022-08-03 PROCEDURE — 87186 SC STD MICRODIL/AGAR DIL: CPT

## 2022-08-03 RX ORDER — 0.9 % SODIUM CHLORIDE 0.9 %
1000 INTRAVENOUS SOLUTION INTRAVENOUS ONCE
Status: COMPLETED | OUTPATIENT
Start: 2022-08-03 | End: 2022-08-03

## 2022-08-03 RX ORDER — MORPHINE SULFATE 2 MG/ML
2 INJECTION, SOLUTION INTRAMUSCULAR; INTRAVENOUS ONCE
Status: DISCONTINUED | OUTPATIENT
Start: 2022-08-03 | End: 2022-08-03

## 2022-08-03 RX ORDER — DOCUSATE SODIUM 100 MG/1
100 CAPSULE, LIQUID FILLED ORAL 2 TIMES DAILY
Qty: 10 CAPSULE | Refills: 0 | Status: SHIPPED | OUTPATIENT
Start: 2022-08-03 | End: 2022-08-08

## 2022-08-03 RX ORDER — OXYCODONE HYDROCHLORIDE AND ACETAMINOPHEN 5; 325 MG/1; MG/1
1 TABLET ORAL EVERY 8 HOURS PRN
Qty: 9 TABLET | Refills: 0 | Status: SHIPPED | OUTPATIENT
Start: 2022-08-03 | End: 2022-08-06

## 2022-08-03 RX ORDER — FENTANYL CITRATE 50 UG/ML
25 INJECTION, SOLUTION INTRAMUSCULAR; INTRAVENOUS ONCE
Status: COMPLETED | OUTPATIENT
Start: 2022-08-03 | End: 2022-08-03

## 2022-08-03 RX ORDER — ONDANSETRON 2 MG/ML
4 INJECTION INTRAMUSCULAR; INTRAVENOUS ONCE
Status: COMPLETED | OUTPATIENT
Start: 2022-08-03 | End: 2022-08-03

## 2022-08-03 RX ORDER — ONDANSETRON 4 MG/1
4 TABLET, ORALLY DISINTEGRATING ORAL EVERY 8 HOURS PRN
Qty: 10 TABLET | Refills: 0 | Status: SHIPPED | OUTPATIENT
Start: 2022-08-03

## 2022-08-03 RX ORDER — SODIUM CHLORIDE 0.9 % (FLUSH) 0.9 %
10 SYRINGE (ML) INJECTION PRN
Status: DISCONTINUED | OUTPATIENT
Start: 2022-08-03 | End: 2022-08-03 | Stop reason: HOSPADM

## 2022-08-03 RX ADMIN — ONDANSETRON 4 MG: 2 INJECTION INTRAMUSCULAR; INTRAVENOUS at 13:04

## 2022-08-03 RX ADMIN — FENTANYL CITRATE 25 MCG: 50 INJECTION, SOLUTION INTRAMUSCULAR; INTRAVENOUS at 14:21

## 2022-08-03 RX ADMIN — SODIUM CHLORIDE 1000 ML: 9 INJECTION, SOLUTION INTRAVENOUS at 13:05

## 2022-08-03 RX ADMIN — FENTANYL CITRATE 25 MCG: 50 INJECTION, SOLUTION INTRAMUSCULAR; INTRAVENOUS at 13:14

## 2022-08-03 RX ADMIN — IOPAMIDOL 50 ML: 755 INJECTION, SOLUTION INTRAVENOUS at 14:34

## 2022-08-03 RX ADMIN — SODIUM CHLORIDE, PRESERVATIVE FREE 10 ML: 5 INJECTION INTRAVENOUS at 14:36

## 2022-08-03 RX ADMIN — SODIUM CHLORIDE 1000 ML: 9 INJECTION, SOLUTION INTRAVENOUS at 14:21

## 2022-08-03 ASSESSMENT — PAIN SCALES - GENERAL
PAINLEVEL_OUTOF10: 9
PAINLEVEL_OUTOF10: 9
PAINLEVEL_OUTOF10: 3

## 2022-08-03 NOTE — ED PROVIDER NOTES
ED Attending shared visit  CC: Cheryl       [facility]  Department of Emergency Medicine   ED  Encounter Note  Admit Date/RoomTime: 8/3/2022 12:43 PM  ED Room: /    NAME: Peggy Pederson  : 1995  MRN: [id number]     Chief Complaint:  Hernia (Due to have surgery in 2 weeks with dr Jaime Neves in [place], emesis today and increased pain ) and Emesis    History of Present Illness       Peggy Pederson is a 32 y.o. old female who presents to the emergency department by private vehicle, for gradual onset sharp pain in the periumbilical area without radiation which began 2 day(s) prior to arrival. There has been similar episodes in the past with known hernia. Patient notes that she had a hernia repair last May in  by Dr. Pamela Barrow. She states that right after she got pregnant which caused the hernia to return. She states that she is scheduled to get the hernia repaired in 2 weeks. She states that her nephew hugged her 2 days ago and since then she has been having significant pain to the area where the hernia is. She states that today she had some nausea and vomiting associated with it. She states that she has not had a bowel movement in 2 days and has not been passing gas but does not feel like she has to pass any gas. Since onset the symptoms have been gradually worsening. The pain is associated with vomiting and nausea. The pain is aggravated by palpation and relieved by nothing. There has been NO back pain, chest pain, shortness of breath, fever, chills, diarrhea, dark/black stools, blood in stool, urinary frequency, dysuria, hematuria, urinary urgency, vaginal discharge, or vaginal bleeding. Last Menstrual  Cycle or OB history not available or N/A.  GYN history non-contributory or N/A. She notes her BP can run in the low 100s. ROS   Pertinent positives and negatives are stated within HPI, all other systems reviewed and are negative.     Past Medical History:  has a past medical history of Depression, IBS (irritable bowel syndrome), Incisional hernia, and Seizure ([facility] [address] Surgical History has a past surgical history that includes Colonoscopy (2012); Upper gastrointestinal endoscopy (2012); Upper gastrointestinal endoscopy; Colonoscopy;  section (1/3/2015); Upper gastrointestinal endoscopy (2017); Dilation and curettage of uterus; Cholecystectomy (2017); pr  delivery only (N/A, 2018); laparoscopy (N/A, 10/18/2019); Hemorrhoid surgery (2020and 2021); ventral hernia repair (N/A, 2021); Abdomen surgery; and  section (N/A, 2022). Social History:  reports that she quit smoking about 9 years ago. Her smoking use included cigarettes. She has never used smokeless tobacco. She reports that she does not drink alcohol and does not use drugs. Family History: family history includes Cancer in her father. Allergies: Naproxen    Physical Exam   Oxygen Saturation Interpretation: Normal on room air analysis. ED Triage Vitals [22 1242]   BP Temp Temp src Heart Rate Resp SpO2 Height Weight   82/69 97.2 °F (36.2 °C) -- 60 14 100 % 5' 3\" (1.6 m) 124 lb (56.2 kg)        Vitals:    22 1246 22 1325 22 1412 22 1544   BP: 119/78 (S) 98/62 (S) 98/64 104/66   Pulse: 63 68 69 65   Resp: 15 16 16 15   Temp:       SpO2: 99% 98% 99% 98%   Weight:       Height:           Physical Exam  General Appearance/Constitutional:  Alert, development consistent with age. HEENT:  NC/NT. PERRLA. Airway patent. Neck:  Supple. No lymphadenopathy. Respiratory:  No retractions. Lungs Clear to auscultation and breath sounds equal.  CV:  Regular rate and rhythm. GI:  normal appearing, non-distended with no visible hernias. Bowel sounds: normal bowel sounds. Tenderness: There is moderate tenderness present - located in the periumbilical area. No palpable mass or hernia      Liver: non-tender and non-palpable. Spleen:  non-tender and non-palpable. Back: CVA Tenderness: No CVA tenderness. : /Pelvic examination deferred / declined. Integument:  Normal turgor. Warm, dry, without visible rash, unless noted elsewhere. Lymphatics: No edema, cap.refill <3sec. Neurological:  Orientation age-appropriate. Motor functions intact.     Lab / Imaging Results   (All laboratory and radiology results have been personally reviewed by myself)  Labs:  Results for orders placed or performed during the hospital encounter of 08/03/22   CBC with Auto Differential   Result Value Ref Range    WBC 5.3 4.5 - 11.5 E9/L    RBC 4.72 3.50 - 5.50 E12/L    Hemoglobin 13.5 11.5 - 15.5 g/dL    Hematocrit 41.7 34.0 - 48.0 %    MCV 88.3 80.0 - 99.9 fL    MCH 28.6 26.0 - 35.0 pg    MCHC 32.4 32.0 - 34.5 %    RDW 16.3 (H) 11.5 - 15.0 fL    Platelets 373 143 - 032 E9/L    MPV 11.4 7.0 - 12.0 fL    Neutrophils % 44.3 43.0 - 80.0 %    Immature Granulocytes % 0.2 0.0 - 5.0 %    Lymphocytes % 44.3 (H) 20.0 - 42.0 %    Monocytes % 8.7 2.0 - 12.0 %    Eosinophils % 1.7 0.0 - 6.0 %    Basophils % 0.8 0.0 - 2.0 %    Neutrophils Absolute 2.36 1.80 - 7.30 E9/L    Immature Granulocytes # 0.01 E9/L    Lymphocytes Absolute 2.35 1.50 - 4.00 E9/L    Monocytes Absolute 0.46 0.10 - 0.95 E9/L    Eosinophils Absolute 0.09 0.05 - 0.50 E9/L    Basophils Absolute 0.04 0.00 - 0.20 E9/L   Comprehensive Metabolic Panel w/ Reflex to MG   Result Value Ref Range    Sodium 140 132 - 146 mmol/L    Potassium reflex Magnesium 3.8 3.5 - 5.0 mmol/L    Chloride 108 (H) 98 - 107 mmol/L    CO2 22 22 - 29 mmol/L    Anion Gap 10 7 - 16 mmol/L    Glucose 82 74 - 99 mg/dL    BUN 7 6 - 20 mg/dL    Creatinine 0.6 0.5 - 1.0 mg/dL    GFR Non-African American >60 >=60 mL/min/1.73    GFR African American >60     Calcium 9.3 8.6 - 10.2 mg/dL    Total Protein 7.3 6.4 - 8.3 g/dL    Albumin 4.5 3.5 - 5.2 g/dL    Total Bilirubin 0.5 0.0 - 1.2 mg/dL    Alkaline Phosphatase 82 35 - 104 U/L    ALT 17 0 - 32 U/L    AST 17 0 - 31 U/L   Lipase   Result Value Ref Range    Lipase 25 13 - 60 U/L   Lactic Acid   Result Value Ref Range    Lactic Acid 0.7 0.5 - 2.2 mmol/L   Urinalysis with Microscopic   Result Value Ref Range    Color, UA Yellow Straw/Yellow    Clarity, UA Clear Clear    Glucose, Ur Negative Negative mg/dL    Bilirubin Urine Negative Negative    Ketones, Urine Negative Negative mg/dL    Specific Gravity, UA 1.020 1.005 - 1.030    Blood, Urine TRACE-INTACT Negative    pH, UA 6.5 5.0 - 9.0    Protein, UA Negative Negative mg/dL    Urobilinogen, Urine 0.2 <2.0 E.U./dL    Nitrite, Urine Negative Negative    Leukocyte Esterase, Urine Negative Negative    WBC, UA 0-1 0 - 5 /HPF    RBC, UA 0-1 0 - 2 /HPF    Epithelial Cells, UA RARE /HPF    Bacteria, UA RARE (A) None Seen /HPF   POC Pregnancy Urine Qual   Result Value Ref Range    HCG, Urine, POC Negative Negative    Lot Number [id number]     Positive QC Pass/Fail Pass     Negative QC Pass/Fail Pass      Imaging: All Radiology results interpreted by Radiologist unless otherwise noted. CT ABDOMEN PELVIS W IV CONTRAST Additional Contrast? None   Final Result   Mild ileus but no obstruction. Minimal midline anterior abdominal wall fat containing hernia as described. Minimal intrahepatic duct dilatation probably due to reservoir effect status   post cholecystectomy.            ED Course / Medical Decision Making     Medications   sodium chloride flush 0.9 % injection 10 mL (10 mLs IntraVENous Given 8/3/22 1436)   ondansetron (ZOFRAN) injection 4 mg (4 mg IntraVENous Given 8/3/22 1304)   0.9 % sodium chloride bolus (0 mLs IntraVENous Stopped 8/3/22 1410)   fentaNYL (SUBLIMAZE) injection 25 mcg (25 mcg IntraVENous Given 8/3/22 1314)   fentaNYL (SUBLIMAZE) injection 25 mcg (25 mcg IntraVENous Given 8/3/22 1421)   0.9 % sodium chloride bolus (0 mLs IntraVENous Stopped 8/3/22 1521)   iopamidol (ISOVUE-370) 76 % injection 50 mL (50 mLs IntraVENous Given 8/3/22 1434)     Re-Evaluations:  8/3/22      Time: 1630    Patients condition is improving. Tolerating p.o. fluids. Pain improving in the ER. Consultations:             None  Time: 1300  I spoke with Dr. Michele Calvillo about the pt. Procedures:   none    MDM: Patient presents for periumbilical pain. Has a known hernia and is supposed to get it repaired in 2 weeks. Worsening pain over the last 2 days with vomiting. Labs and imaging ordered and reviewed above. Fat-containing umbilical hernia seen. Ileus but no obstruction. Patient told about all lab and imaging findings. Had improvement in the pain and nausea while in the ER. Tolerating p.o. fluids. We will send her home with pain medication, antiemetic, and stool softener. We will have her follow-up with her surgeon. Patient educated on not driving or operating heavy machinery while on pain medication and she verbalizes understanding. All questions answered. Patient agreeable with the plan. Patient is in no acute distress, non-toxic, and appropriate for outpatient management. Pt educated on reasons to return to the ER, including need to return for new or worsening symptoms. Patient re-evaluated prior to discharge. Non-surgical abdomen upon re-examination. No guarding, rigidity, or rebound tenderness. Plan of Care/Counseling:  CARLYN THOMAS PA-C and EM Attending Physician reviewed today's visit with the patient in addition to providing specific details for the plan of care and counseling regarding the diagnosis and prognosis. Questions are answered at this time and are agreeable with the plan. Assessment      1. Periumbilical abdominal pain    2. Non-intractable vomiting with nausea, unspecified vomiting type    3. Ileus ([address]    4. Constipation, unspecified constipation type    5.  Ventral hernia without obstruction or gangrene      This patient's ED course included: a personal history and physicial examination, re-evaluation prior to disposition, and multiple bedside re-evaluations  This patient has remained hemodynamically stable and improved during their ED course. Plan   Discharged home  Patient condition is stable. New Medications     Discharge Medication List as of 8/3/2022  4:29 PM        START taking these medications    Details   ondansetron (ZOFRAN ODT) 4 MG disintegrating tablet Take 1 tablet by mouth every 8 hours as needed for Nausea or Vomiting, Disp-10 tablet, R-0Print      docusate sodium (COLACE) 100 MG capsule Take 1 capsule by mouth in the morning and 1 capsule before bedtime. Do all this for 5 days. , Disp-10 capsule, R-0Print      oxyCODONE-acetaminophen (PERCOCET) 5-325 MG per tablet Take 1 tablet by mouth every 8 hours as needed for Pain for up to 3 days. Intended supply: 3 days. Take lowest dose possible to manage pain, Disp-9 tablet, R-0Print           Electronically signed by Lay Adams PA-C   DD: 8/3/22  **This report was transcribed using voice recognition software. Every effort was made to ensure accuracy; however, inadvertent computerized transcription errors may be present.   END OF PROVIDER NOTE       José Anderson PA-C  08/03/22 1099

## 2022-08-05 LAB
ORGANISM: ABNORMAL
URINE CULTURE, ROUTINE: ABNORMAL

## 2022-10-21 ENCOUNTER — APPOINTMENT (OUTPATIENT)
Dept: GENERAL RADIOLOGY | Age: 27
End: 2022-10-21
Payer: COMMERCIAL

## 2022-10-21 ENCOUNTER — HOSPITAL ENCOUNTER (EMERGENCY)
Age: 27
Discharge: HOME | End: 2022-10-21
Payer: COMMERCIAL

## 2022-10-21 VITALS
HEIGHT: 62 IN | RESPIRATION RATE: 18 BRPM | HEART RATE: 90 BPM | BODY MASS INDEX: 21.9 KG/M2 | WEIGHT: 119 LBS | DIASTOLIC BLOOD PRESSURE: 62 MMHG | OXYGEN SATURATION: 98 % | SYSTOLIC BLOOD PRESSURE: 107 MMHG | TEMPERATURE: 97.6 F

## 2022-10-21 DIAGNOSIS — S50.01XA CONTUSION OF RIGHT ELBOW, INITIAL ENCOUNTER: ICD-10-CM
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
## 2022-10-21 DIAGNOSIS — M79.601 RIGHT ARM PAIN: Primary | ICD-10-CM

## 2022-10-21 DIAGNOSIS — S63.501A SPRAIN OF RIGHT WRIST, INITIAL ENCOUNTER: ICD-10-CM

## 2022-10-21 PROCEDURE — 73080 X-RAY EXAM OF ELBOW: CPT

## 2022-10-21 PROCEDURE — 99283 EMERGENCY DEPT VISIT LOW MDM: CPT

## 2022-10-21 PROCEDURE — 73110 X-RAY EXAM OF WRIST: CPT

## 2022-10-21 PROCEDURE — 73060 X-RAY EXAM OF HUMERUS: CPT

## 2022-10-21 PROCEDURE — 73090 X-RAY EXAM OF FOREARM: CPT

## 2022-10-21 RX ORDER — IBUPROFEN 800 MG/1
800 TABLET ORAL EVERY 8 HOURS PRN
Qty: 30 TABLET | Refills: 0 | Status: SHIPPED | OUTPATIENT
Start: 2022-10-21

## 2022-10-21 ASSESSMENT — PAIN - FUNCTIONAL ASSESSMENT: PAIN_FUNCTIONAL_ASSESSMENT: 0-10

## 2022-10-21 ASSESSMENT — PAIN DESCRIPTION - LOCATION: LOCATION: ARM

## 2022-10-21 ASSESSMENT — PAIN DESCRIPTION - FREQUENCY: FREQUENCY: CONTINUOUS

## 2022-10-21 ASSESSMENT — PAIN SCALES - GENERAL: PAINLEVEL_OUTOF10: 7

## 2022-10-21 ASSESSMENT — PAIN DESCRIPTION - ORIENTATION: ORIENTATION: RIGHT

## 2022-10-21 ASSESSMENT — PAIN DESCRIPTION - DESCRIPTORS: DESCRIPTORS: ACHING

## 2022-10-21 ASSESSMENT — PAIN DESCRIPTION - PAIN TYPE: TYPE: ACUTE PAIN

## 2022-10-22 NOTE — ED PROVIDER NOTES
[facility]     Department of Emergency Medicine   ED  Provider Note  Admit Date/RoomTime: 10/21/2022 10:15 PM  ED Room: 35/35    Chief Complaint:   Arm Injury (Fall on right arm)    History of Present Illness      Estefani Valle is a 32 y.o. old female presents to the emergency department for right arm pain. Patient states she was at work at the [employer] when she tripped over a floor board and fell onto her right arm. She states most of her pain is located to her right wrist and right elbow. She denies hitting her head or having any loss of consciousness. Patient has limited range of motion of right arm due to pain. She denies any numbness/tingling or sensation changes. Patient has no headache, dizziness, neck pain, back pain, chest pain, shortness of breath, pain with breathing, or difficulty with ambulation or balance. She denies any pain or legs. She is alert and oriented x3 and in no apparent distress at this exam.  Patient is nontoxic-appearing. ROS   Pertinent positives and negatives are stated within HPI, all other systems reviewed and are negative.     Past Medical History:   Past Medical History:   Diagnosis Date    Depression     IBS (irritable bowel syndrome)     Incisional hernia     Seizure ([facility] [address] 2015    after delivery / not sure was a seizure, no issues since , no medication     Surgical History:   Past Surgical History:   Procedure Laterality Date    ABDOMEN SURGERY       SECTION  1/3/2015     SECTION N/A 2022     SECTION performed by Edi Keene DO at [facility] L&D OR    CHOLECYSTECTOMY  2017    ROBOT ASSISTED    COLONOSCOPY  2012    COLONOSCOPY      DILATION AND CURETTAGE OF UTERUS      ablation    HEMORRHOID SURGERY  2020and 2021    LAPAROSCOPY N/A 10/18/2019    DIAGNOSTIC LAPAROSCOPY, OPEN VENTRAL HERNIA REPAIR performed by Ron Villalpando MD at [address] [place] [address] N/A 2018     SECTION performed by Antoine Paredes DO at [facility] L[facility]    UPPER GASTROINTESTINAL ENDOSCOPY  04/30/2012    UPPER GASTROINTESTINAL ENDOSCOPY      UPPER GASTROINTESTINAL ENDOSCOPY  07/14/2017    VENTRAL HERNIA REPAIR N/A 5/4/2021    OPEN INCISIONAL HERNIA REPAIR performed by Debra Valencia MD at [address] History:  reports that she quit smoking about 9 years ago. Her smoking use included cigarettes. She has never used smokeless tobacco. She reports that she does not drink alcohol and does not use drugs. Family History: family history includes Cancer in her father. Allergies: Naproxen and Morphine    Physical Exam     Vitals:    10/21/22 2154   BP: 107/62   Pulse: 90   Resp: 18   Temp: 97.6 °F (36.4 °C)   TempSrc: Temporal   SpO2: 98%   Weight: 119 lb (54 kg)   Height: 5' 2\" (1.575 m)     Oxygen Saturation Interpretation: Normal.    Constitutional:  Alert and oriented x3, development consistent with age, NAD  HEENT:  NC/NT. Airway patent. Neck:  Normal ROM. Supple. Non-tender   Extremity(s):  Right: arm. Tenderness:  moderate to right elbow and right wrist, mild tenderness to right shoulder              Swelling: None. Deformity: No.                ROM: diminished range with pain. Skin:  no erythema, rash or wounds noted. Compartments soft and compressible    Neurovascular: Motor deficit: none. Sensory deficit: none. Intact distally                Pulse deficit: none. Strong radial              Capillary refill: normal.  Neurological: GCS 15 Motor functions intact. Lab / Imaging Results   (All laboratory and radiology results have been personally reviewed by myself)  Labs:  No results found for this visit on 10/21/22. Imaging: All Radiology results interpreted by Radiologist unless otherwise noted. XR HUMERUS RIGHT (MIN 2 VIEWS)   Final Result   No acute osseous abnormality.          XR RADIUS ULNA RIGHT (2 VIEWS)   Final Result   No acute osseous abnormality. XR WRIST RIGHT (MIN 3 VIEWS)   Final Result   No acute osseous abnormality. XR ELBOW RIGHT (MIN 3 VIEWS)   Final Result   No acute osseous abnormality. ED Course / Medical Decision Making   Medications - No data to display     Consult:  None    Procedure(s):  none    MDM:         Films were obtained based on low suspicion for bony injury as per history/physical findings. Plan is subsequently for symptom control, limited use and  immobilization with appropriate outpatient follow-up. Counseling: The emergency provider has spoken with the patient/caregiver and discussed todays results, in addition to providing specific details for the plan of care and counseling regarding the diagnosis and prognosis. Questions are answered at this time and they are agreeable with the plan. Patient understands that they must follow-up with PCP and/or orthopedics. They're educated on signs and symptoms that would require emergent return to the ED. RICE discussed. They were educated on newly prescribed medications and instructed on sling use. Vitals stable and patient in no distress at discharge. Patient was able to ambulate out of department with no difficulty    Assessment      1. Right arm pain    2. Fall on same level from slipping, tripping or stumbling, initial encounter    3. Contusion of right elbow, initial encounter    4. Sprain of right wrist, initial encounter       Plan   Discharge to home  Patient condition is good    New Medications     Discharge Medication List as of 10/21/2022 11:18 PM        START taking these medications    Details   ibuprofen (IBU) 800 MG tablet Take 1 tablet by mouth every 8 hours as needed for Pain, Disp-30 tablet, R-0Print             Electronically signed by Tello Gray PA-C   DD: 10/21/22    **This report was transcribed using voice recognition software.  Every effort was made to ensure accuracy; however, inadvertent computerized transcription errors may be present.     END OF ED PROVIDER NOTE        Marilyn Fisher PA-C  10/22/22 0040

## 2022-10-25 ENCOUNTER — HOSPITAL ENCOUNTER (EMERGENCY)
Age: 27
Discharge: LEFT BEFORE DISCHARGE | End: 2022-10-25

## 2022-10-26 ENCOUNTER — HOSPITAL ENCOUNTER (EMERGENCY)
Age: 27
Discharge: HOME | End: 2022-10-26
Attending: EMERGENCY MEDICINE
Payer: COMMERCIAL

## 2022-10-26 ENCOUNTER — APPOINTMENT (OUTPATIENT)
Dept: CT IMAGING | Age: 27
End: 2022-10-26
Payer: COMMERCIAL

## 2022-10-26 VITALS
RESPIRATION RATE: 16 BRPM | BODY MASS INDEX: 19.49 KG/M2 | OXYGEN SATURATION: 100 % | TEMPERATURE: 98.1 F | HEART RATE: 120 BPM | HEIGHT: 63 IN | DIASTOLIC BLOOD PRESSURE: 72 MMHG | WEIGHT: 110 LBS | SYSTOLIC BLOOD PRESSURE: 107 MMHG

## 2022-10-26 DIAGNOSIS — N10 ACUTE PYELONEPHRITIS: Primary | ICD-10-CM

## 2022-10-26 LAB
ANION GAP SERPL CALCULATED.3IONS-SCNC: 7 MMOL/L (ref 7–16)
BACTERIA: ABNORMAL /HPF
BASOPHILS ABSOLUTE: 0.04 E9/L (ref 0–0.2)
BASOPHILS RELATIVE PERCENT: 0.5 % (ref 0–2)
BILIRUBIN URINE: NEGATIVE
BLOOD, URINE: ABNORMAL
BUN BLDV-MCNC: 9 MG/DL (ref 6–20)
CALCIUM SERPL-MCNC: 9.6 MG/DL (ref 8.6–10.2)
CHLORIDE BLD-SCNC: 104 MMOL/L (ref 98–107)
CLARITY: ABNORMAL
CO2: 25 MMOL/L (ref 22–29)
COLOR: YELLOW
CREAT SERPL-MCNC: 0.7 MG/DL (ref 0.5–1)
EOSINOPHILS ABSOLUTE: 0.06 E9/L (ref 0.05–0.5)
EOSINOPHILS RELATIVE PERCENT: 0.7 % (ref 0–6)
EPITHELIAL CELLS, UA: ABNORMAL /HPF
GFR SERPL CREATININE-BSD FRML MDRD: >60 ML/MIN/1.73
GLUCOSE BLD-MCNC: 80 MG/DL (ref 74–99)
GLUCOSE URINE: NEGATIVE MG/DL
HCG, URINE, POC: NEGATIVE
HCT VFR BLD CALC: 41.2 % (ref 34–48)
HEMOGLOBIN: 13.3 G/DL (ref 11.5–15.5)
IMMATURE GRANULOCYTES #: 0.02 E9/L
IMMATURE GRANULOCYTES %: 0.2 % (ref 0–5)
KETONES, URINE: ABNORMAL MG/DL
LEUKOCYTE ESTERASE, URINE: ABNORMAL
LYMPHOCYTES ABSOLUTE: 1.85 E9/L (ref 1.5–4)
LYMPHOCYTES RELATIVE PERCENT: 22.3 % (ref 20–42)
Lab: NORMAL
MCH RBC QN AUTO: 29 PG (ref 26–35)
MCHC RBC AUTO-ENTMCNC: 32.3 % (ref 32–34.5)
MCV RBC AUTO: 89.8 FL (ref 80–99.9)
MONOCYTES ABSOLUTE: 0.62 E9/L (ref 0.1–0.95)
MONOCYTES RELATIVE PERCENT: 7.5 % (ref 2–12)
MUCUS: PRESENT /LPF
NEGATIVE QC PASS/FAIL: NORMAL
NEUTROPHILS ABSOLUTE: 5.72 E9/L (ref 1.8–7.3)
NEUTROPHILS RELATIVE PERCENT: 68.8 % (ref 43–80)
NITRITE, URINE: POSITIVE
PDW BLD-RTO: 13.3 FL (ref 11.5–15)
PH UA: 6 (ref 5–9)
PLATELET # BLD: 271 E9/L (ref 130–450)
PMV BLD AUTO: 11.4 FL (ref 7–12)
POSITIVE QC PASS/FAIL: NORMAL
POTASSIUM REFLEX MAGNESIUM: 4 MMOL/L (ref 3.5–5)
PROTEIN UA: ABNORMAL MG/DL
RBC # BLD: 4.59 E12/L (ref 3.5–5.5)
RBC UA: ABNORMAL /HPF (ref 0–2)
SODIUM BLD-SCNC: 136 MMOL/L (ref 132–146)
SPECIFIC GRAVITY UA: >=1.03 (ref 1–1.03)
UROBILINOGEN, URINE: 0.2 E.U./DL
WBC # BLD: 8.3 E9/L (ref 4.5–11.5)
WBC UA: ABNORMAL /HPF (ref 0–5)

## 2022-10-26 PROCEDURE — 81001 URINALYSIS AUTO W/SCOPE: CPT

## 2022-10-26 PROCEDURE — 36415 COLL VENOUS BLD VENIPUNCTURE: CPT

## 2022-10-26 PROCEDURE — 87077 CULTURE AEROBIC IDENTIFY: CPT

## 2022-10-26 PROCEDURE — 85025 COMPLETE CBC W/AUTO DIFF WBC: CPT

## 2022-10-26 PROCEDURE — 87186 SC STD MICRODIL/AGAR DIL: CPT

## 2022-10-26 PROCEDURE — 87088 URINE BACTERIA CULTURE: CPT

## 2022-10-26 PROCEDURE — 6360000002 HC RX W HCPCS: Performed by: EMERGENCY MEDICINE

## 2022-10-26 PROCEDURE — 2580000003 HC RX 258: Performed by: EMERGENCY MEDICINE

## 2022-10-26 PROCEDURE — 2500000003 HC RX 250 WO HCPCS: Performed by: EMERGENCY MEDICINE

## 2022-10-26 PROCEDURE — 99284 EMERGENCY DEPT VISIT MOD MDM: CPT

## 2022-10-26 PROCEDURE — 74176 CT ABD & PELVIS W/O CONTRAST: CPT

## 2022-10-26 PROCEDURE — 80048 BASIC METABOLIC PNL TOTAL CA: CPT

## 2022-10-26 PROCEDURE — 96374 THER/PROPH/DIAG INJ IV PUSH: CPT

## 2022-10-26 PROCEDURE — 96375 TX/PRO/DX INJ NEW DRUG ADDON: CPT

## 2022-10-26 RX ORDER — ONDANSETRON 2 MG/ML
4 INJECTION INTRAMUSCULAR; INTRAVENOUS ONCE
Status: COMPLETED | OUTPATIENT
Start: 2022-10-26 | End: 2022-10-26

## 2022-10-26 RX ORDER — CEFDINIR 300 MG/1
300 CAPSULE ORAL 2 TIMES DAILY
Qty: 20 CAPSULE | Refills: 0 | Status: SHIPPED | OUTPATIENT
Start: 2022-10-26 | End: 2022-11-05

## 2022-10-26 RX ORDER — FENTANYL CITRATE 50 UG/ML
50 INJECTION, SOLUTION INTRAMUSCULAR; INTRAVENOUS ONCE
Status: COMPLETED | OUTPATIENT
Start: 2022-10-26 | End: 2022-10-26

## 2022-10-26 RX ORDER — OXYCODONE HYDROCHLORIDE AND ACETAMINOPHEN 5; 325 MG/1; MG/1
1 TABLET ORAL EVERY 6 HOURS PRN
Qty: 12 TABLET | Refills: 0 | Status: SHIPPED | OUTPATIENT
Start: 2022-10-26 | End: 2022-10-29

## 2022-10-26 RX ORDER — KETAMINE HYDROCHLORIDE 10 MG/ML
20 INJECTION, SOLUTION INTRAMUSCULAR; INTRAVENOUS ONCE
Status: COMPLETED | OUTPATIENT
Start: 2022-10-26 | End: 2022-10-26

## 2022-10-26 RX ORDER — 0.9 % SODIUM CHLORIDE 0.9 %
1000 INTRAVENOUS SOLUTION INTRAVENOUS ONCE
Status: COMPLETED | OUTPATIENT
Start: 2022-10-26 | End: 2022-10-26

## 2022-10-26 RX ADMIN — SODIUM CHLORIDE 1000 ML: 9 INJECTION, SOLUTION INTRAVENOUS at 11:38

## 2022-10-26 RX ADMIN — ONDANSETRON 4 MG: 2 INJECTION INTRAMUSCULAR; INTRAVENOUS at 11:40

## 2022-10-26 RX ADMIN — CEFTRIAXONE SODIUM 1000 MG: 1 INJECTION, POWDER, FOR SOLUTION INTRAMUSCULAR; INTRAVENOUS at 14:13

## 2022-10-26 RX ADMIN — KETAMINE HYDROCHLORIDE 20 MG: 10 INJECTION, SOLUTION INTRAMUSCULAR; INTRAVENOUS at 14:08

## 2022-10-26 RX ADMIN — FENTANYL CITRATE 50 MCG: 50 INJECTION, SOLUTION INTRAMUSCULAR; INTRAVENOUS at 11:41

## 2022-10-26 ASSESSMENT — PAIN SCALES - GENERAL
PAINLEVEL_OUTOF10: 7
PAINLEVEL_OUTOF10: 5
PAINLEVEL_OUTOF10: 8

## 2022-10-26 ASSESSMENT — PAIN DESCRIPTION - LOCATION
LOCATION: FLANK
LOCATION: FLANK

## 2022-10-26 ASSESSMENT — PAIN DESCRIPTION - DESCRIPTORS: DESCRIPTORS: DISCOMFORT

## 2022-10-26 ASSESSMENT — PAIN - FUNCTIONAL ASSESSMENT: PAIN_FUNCTIONAL_ASSESSMENT: 0-10

## 2022-10-26 ASSESSMENT — PAIN DESCRIPTION - ONSET: ONSET: ON-GOING

## 2022-10-26 ASSESSMENT — PAIN DESCRIPTION - FREQUENCY: FREQUENCY: CONTINUOUS

## 2022-10-26 ASSESSMENT — PAIN DESCRIPTION - ORIENTATION
ORIENTATION: LEFT
ORIENTATION: LEFT

## 2022-10-26 ASSESSMENT — PAIN DESCRIPTION - PAIN TYPE: TYPE: ACUTE PAIN

## 2022-10-26 NOTE — ED PROVIDER NOTES
HPI:  10/26/22,   Time: 11:15 AM EDT         Cem Biggs is a 32 y.o. female presenting to the ED for right flank pain discolored urine pain in her suprapubic region recent diagnosis at [facility] of a urinary tract infection without filling her antibiotic and an episode at work where she became very weak, beginning 1 day ago. The complaint has been persistent, moderate in severity, and worsened by nothing. Complains of severe left flank pain she states she is nauseated she states she has not vomited she denies any diarrhea she states her urine is darker than normal she denies melena hematemesis hematochezia    ROS:   Pertinent positives and negatives are stated within HPI, all other systems reviewed and are negative.  --------------------------------------------- PAST HISTORY ---------------------------------------------  Past Medical History:  has a past medical history of Depression, IBS (irritable bowel syndrome), Incisional hernia, and Seizure ([facility] [address] Past Surgical History:  has a past surgical history that includes Colonoscopy (2012); Upper gastrointestinal endoscopy (2012); Upper gastrointestinal endoscopy; Colonoscopy;  section (1/3/2015); Upper gastrointestinal endoscopy (2017); Dilation and curettage of uterus; Cholecystectomy (2017); pr  delivery only (N/A, 2018); laparoscopy (N/A, 10/18/2019); Hemorrhoid surgery (2020and 2021); ventral hernia repair (N/A, 2021); Abdomen surgery; and  section (N/A, 2022). Social History:  reports that she quit smoking about 9 years ago. Her smoking use included cigarettes. She has never used smokeless tobacco. She reports that she does not drink alcohol and does not use drugs. Family History: family history includes Cancer in her father. The patients home medications have been reviewed.     Allergies: Naproxen and Morphine    -------------------------------------------------- RESULTS -------------------------------------------------  All laboratory and radiology results have been personally reviewed by myself   LABS:  Results for orders placed or performed during the hospital encounter of 10/26/22   Urinalysis with Microscopic   Result Value Ref Range    Color, UA Yellow Straw/Yellow    Clarity, UA SL CLOUDY Clear    Glucose, Ur Negative Negative mg/dL    Bilirubin Urine Negative Negative    Ketones, Urine TRACE (A) Negative mg/dL    Specific Gravity, UA >=1.030 1.005 - 1.030    Blood, Urine LARGE (A) Negative    pH, UA 6.0 5.0 - 9.0    Protein, UA TRACE Negative mg/dL    Urobilinogen, Urine 0.2 <2.0 E.U./dL    Nitrite, Urine POSITIVE (A) Negative    Leukocyte Esterase, Urine TRACE (A) Negative    Mucus, UA Present (A) None Seen /LPF    WBC, UA 5-10 (A) 0 - 5 /HPF    RBC, UA 10-20 (A) 0 - 2 /HPF    Epithelial Cells, UA FEW /HPF    Bacteria, UA MANY (A) None Seen /HPF   CBC with Auto Differential   Result Value Ref Range    WBC 8.3 4.5 - 11.5 E9/L    RBC 4.59 3.50 - 5.50 E12/L    Hemoglobin 13.3 11.5 - 15.5 g/dL    Hematocrit 41.2 34.0 - 48.0 %    MCV 89.8 80.0 - 99.9 fL    MCH 29.0 26.0 - 35.0 pg    MCHC 32.3 32.0 - 34.5 %    RDW 13.3 11.5 - 15.0 fL    Platelets 803 190 - 551 E9/L    MPV 11.4 7.0 - 12.0 fL    Neutrophils % 68.8 43.0 - 80.0 %    Immature Granulocytes % 0.2 0.0 - 5.0 %    Lymphocytes % 22.3 20.0 - 42.0 %    Monocytes % 7.5 2.0 - 12.0 %    Eosinophils % 0.7 0.0 - 6.0 %    Basophils % 0.5 0.0 - 2.0 %    Neutrophils Absolute 5.72 1.80 - 7.30 E9/L    Immature Granulocytes # 0.02 E9/L    Lymphocytes Absolute 1.85 1.50 - 4.00 E9/L    Monocytes Absolute 0.62 0.10 - 0.95 E9/L    Eosinophils Absolute 0.06 0.05 - 0.50 E9/L    Basophils Absolute 0.04 0.00 - 0.20 P9/Y   Basic Metabolic Panel w/ Reflex to MG   Result Value Ref Range    Sodium 136 132 - 146 mmol/L    Potassium reflex Magnesium 4.0 3.5 - 5.0 mmol/L    Chloride 104 98 - 107 mmol/L    CO2 25 22 - 29 mmol/L    Anion Gap 7 7 - 16 mmol/L    Glucose 80 74 - 99 mg/dL    BUN 9 6 - 20 mg/dL    Creatinine 0.7 0.5 - 1.0 mg/dL    Est, Glom Filt Rate >60 >=60 mL/min/1.73    Calcium 9.6 8.6 - 10.2 mg/dL   POC Pregnancy Urine   Result Value Ref Range    HCG, Urine, POC Negative Negative    Lot Number [id number]     Positive QC Pass/Fail Pass     Negative QC Pass/Fail Pass        RADIOLOGY:  Interpreted by Radiologist.  CT ABDOMEN PELVIS WO CONTRAST Additional Contrast? None   Final Result   3 mm nonobstructing right renal calculus. Dilated fluid-filled small bowel loops in the mid abdomen suggestive of an   adynamic ileus. There is no definite obstruction.             ------------------------- NURSING NOTES AND VITALS REVIEWED ---------------------------   The nursing notes within the ED encounter and vital signs as below have been reviewed. /72   Pulse (!) 120   Temp 98.1 °F (36.7 °C)   Resp 16   Ht 5' 3\" (1.6 m)   Wt 110 lb (49.9 kg)   SpO2 100%   BMI 19.49 kg/m²   Oxygen Saturation Interpretation: Normal      ---------------------------------------------------PHYSICAL EXAM--------------------------------------      Constitutional/General: Alert and oriented x3, well appearing, non toxic in NAD  Head: NC/AT  Eyes: PERRL, EOMI  Mouth: Oropharynx clear, handling secretions, no trismus  Neck: Supple, full ROM, no meningeal signs  Pulmonary: Lungs clear to auscultation bilaterally, no wheezes, rales, or rhonchi. Not in respiratory distress  Cardiovascular:  Regular tachycardiac rate and rhythm, no murmurs, gallops, or rubs. 2+ distal pulses  Abdomen: Soft, non tender, non distended, tender left flank tender suprapubic region  Extremities: Moves all extremities x 4.  Warm and well perfused  Skin: warm and dry without rash  Neurologic: GCS 15,  Psych: Normal Affect      ------------------------------ ED COURSE/MEDICAL DECISION MAKING----------------------  Medications   0.9 % sodium chloride bolus (0 mLs IntraVENous Stopped 10/26/22 1238)   ondansetron (ZOFRAN) injection 4 mg (4 mg IntraVENous Given 10/26/22 1140)   fentaNYL (SUBLIMAZE) injection 50 mcg (50 mcg IntraVENous Given 10/26/22 1141)   ketamine (KETALAR) injection 20 mg (20 mg IntraVENous Given 10/26/22 1408)   cefTRIAXone (ROCEPHIN) 1,000 mg in sterile water 10 mL IV syringe (1,000 mg IntraVENous Given 10/26/22 1413)         Medical Decision Making:    Was given IV fluid bolus she was given pain medication and had some mild improvement she was given ketamine with relief of her pain. She was then given ceftriaxone intravenously. She felt substantially better she was discharged with a prescription for Percocet and Omnicef after review of her OARRS report. Patient was told to return if she has increased pain fever vomiting. She was told to follow-up with her PCP    Counseling: The emergency provider has spoken with the patient and discussed todays results, in addition to providing specific details for the plan of care and counseling regarding the diagnosis and prognosis. Questions are answered at this time and they are agreeable with the plan.      --------------------------------- IMPRESSION AND DISPOSITION ---------------------------------    IMPRESSION  1.  Acute pyelonephritis        DISPOSITION  Disposition: Discharge to home  Patient condition is stable                  Emilia Bryan MD  10/26/22 0568

## 2022-10-26 NOTE — ED NOTES
Discharged with a FOLLOWUP TO [facility].  RETURN HER EIF WORSE OR CONCERNS     Jenelle Osei RN  10/26/22 2757

## 2022-10-28 LAB
ORGANISM: ABNORMAL
URINE CULTURE, ROUTINE: ABNORMAL

## 2022-12-05 VITALS
RESPIRATION RATE: 20 BRPM | SYSTOLIC BLOOD PRESSURE: 126 MMHG | HEART RATE: 81 BPM | HEIGHT: 62 IN | OXYGEN SATURATION: 100 % | BODY MASS INDEX: 20.24 KG/M2 | DIASTOLIC BLOOD PRESSURE: 108 MMHG | TEMPERATURE: 97.9 F | WEIGHT: 110 LBS

## 2022-12-05 ASSESSMENT — PAIN SCALES - GENERAL
PAINLEVEL_OUTOF10: 7
PAINLEVEL_OUTOF10: 6

## 2022-12-05 ASSESSMENT — PAIN DESCRIPTION - LOCATION
LOCATION: ABDOMEN
LOCATION: ABDOMEN

## 2022-12-05 ASSESSMENT — PAIN - FUNCTIONAL ASSESSMENT
PAIN_FUNCTIONAL_ASSESSMENT: 0-10
PAIN_FUNCTIONAL_ASSESSMENT: 0-10

## 2022-12-05 ASSESSMENT — PAIN DESCRIPTION - ORIENTATION: ORIENTATION: MID

## 2022-12-05 NOTE — ED TRIAGE NOTES
FIRST PROVIDER CONTACT ASSESSMENT NOTE       Department of Emergency Medicine                 First Provider Note            22  5:37 PM EST    Date of Encounter: No admission date for patient encounter. Patient Name: Filiberto Fernando  : 1995  MRN: [id number]    Chief Complaint: Rectal Bleeding, Other (Rectal bleeding since last Thursday, abdominal pain, believes hernia mesh has moved. ), and Abdominal Pain      History of Present Illness:   Filiberto Fernando is a 32 y.o. female who presents to the ED for abdominal pain and rectal bleeding. Hx hernia surgery with mesh. Denies vomiting or diarrhea. No fever. Focused Physical Exam:  VS:    ED Triage Vitals   BP Temp Temp src Heart Rate Resp SpO2 Height Weight   22 1731 22 1731 -- 22 1600 22 1731 22 1600 22 1601 22 1601   (!) 126/108 97.9 °F (36.6 °C)  91 20 96 % 5' 2\" (1.575 m) 110 lb (49.9 kg)        Physical Ex: Constitutional: Alert and non-toxic. Medical History:  has a past medical history of Depression, IBS (irritable bowel syndrome), Incisional hernia, and Seizure ([facility] [address] Surgical History:  has a past surgical history that includes Colonoscopy (2012); Upper gastrointestinal endoscopy (2012); Upper gastrointestinal endoscopy; Colonoscopy;  section (1/3/2015); Upper gastrointestinal endoscopy (2017); Dilation and curettage of uterus; Cholecystectomy (2017); pr  delivery only (N/A, 2018); laparoscopy (N/A, 10/18/2019); Hemorrhoid surgery (2020and 2021); ventral hernia repair (N/A, 2021); Abdomen surgery; and  section (N/A, 2022). Social History:  reports that she quit smoking about 9 years ago. Her smoking use included cigarettes. She has never used smokeless tobacco. She reports that she does not drink alcohol and does not use drugs. Family History: family history includes Cancer in her father.     Allergies: Naproxen and Morphine     Initial Plan of Care: Initiate Treatment-Testing, Proceed toTreatment Area When Bed Available for ED Attending/MLP to Continue Care      ---END OF FIRST PROVIDER CONTACT ASSESSMENT NOTE---  Electronically signed by Wing Félix PA-C   DD: 12/5/22

## 2022-12-06 ENCOUNTER — HOSPITAL ENCOUNTER (EMERGENCY)
Age: 27
Discharge: LEFT BEFORE DISCHARGE | End: 2022-12-06

## 2022-12-06 ENCOUNTER — APPOINTMENT (OUTPATIENT)
Dept: CT IMAGING | Age: 27
End: 2022-12-06
Payer: COMMERCIAL

## 2022-12-06 ENCOUNTER — HOSPITAL ENCOUNTER (EMERGENCY)
Age: 27
Discharge: HOME | End: 2022-12-07
Attending: EMERGENCY MEDICINE
Payer: COMMERCIAL

## 2022-12-06 VITALS
RESPIRATION RATE: 16 BRPM | HEIGHT: 62 IN | DIASTOLIC BLOOD PRESSURE: 64 MMHG | OXYGEN SATURATION: 100 % | WEIGHT: 110 LBS | HEART RATE: 64 BPM | TEMPERATURE: 98.6 F | SYSTOLIC BLOOD PRESSURE: 103 MMHG | BODY MASS INDEX: 20.24 KG/M2

## 2022-12-06 DIAGNOSIS — K62.5 RECTAL BLEEDING: ICD-10-CM

## 2022-12-06 DIAGNOSIS — R10.33 PERIUMBILICAL ABDOMINAL PAIN: Primary | ICD-10-CM

## 2022-12-06 LAB
ALBUMIN SERPL-MCNC: 4.4 G/DL (ref 3.5–5.2)
ALP BLD-CCNC: 83 U/L (ref 35–104)
ALT SERPL-CCNC: 14 U/L (ref 0–32)
ANION GAP SERPL CALCULATED.3IONS-SCNC: 8 MMOL/L (ref 7–16)
AST SERPL-CCNC: 14 U/L (ref 0–31)
BACTERIA: ABNORMAL /HPF
BASOPHILS ABSOLUTE: 0.05 E9/L (ref 0–0.2)
BASOPHILS RELATIVE PERCENT: 0.8 % (ref 0–2)
BILIRUB SERPL-MCNC: 0.2 MG/DL (ref 0–1.2)
BILIRUBIN URINE: NEGATIVE
BLOOD, URINE: NEGATIVE
BUN BLDV-MCNC: 8 MG/DL (ref 6–20)
CALCIUM SERPL-MCNC: 9.2 MG/DL (ref 8.6–10.2)
CHLORIDE BLD-SCNC: 106 MMOL/L (ref 98–107)
CHP ED QC CHECK: NORMAL
CLARITY: CLEAR
CO2: 24 MMOL/L (ref 22–29)
COLOR: YELLOW
CREAT SERPL-MCNC: 0.6 MG/DL (ref 0.5–1)
EOSINOPHILS ABSOLUTE: 0.13 E9/L (ref 0.05–0.5)
EOSINOPHILS RELATIVE PERCENT: 2 % (ref 0–6)
EPITHELIAL CELLS, UA: ABNORMAL /HPF
GFR SERPL CREATININE-BSD FRML MDRD: >60 ML/MIN/1.73
GLUCOSE BLD-MCNC: 89 MG/DL (ref 74–99)
GLUCOSE URINE: NEGATIVE MG/DL
HCG, URINE, POC: NEGATIVE
HCT VFR BLD CALC: 41.9 % (ref 34–48)
HEMOCCULT STL QL: NEGATIVE
HEMOGLOBIN: 13.7 G/DL (ref 11.5–15.5)
IMMATURE GRANULOCYTES #: 0.01 E9/L
IMMATURE GRANULOCYTES %: 0.2 % (ref 0–5)
KETONES, URINE: NEGATIVE MG/DL
LACTIC ACID: 0.6 MMOL/L (ref 0.5–2.2)
LEUKOCYTE ESTERASE, URINE: ABNORMAL
LIPASE: 33 U/L (ref 13–60)
LYMPHOCYTES ABSOLUTE: 2.24 E9/L (ref 1.5–4)
LYMPHOCYTES RELATIVE PERCENT: 34.1 % (ref 20–42)
Lab: NORMAL
MCH RBC QN AUTO: 29.2 PG (ref 26–35)
MCHC RBC AUTO-ENTMCNC: 32.7 % (ref 32–34.5)
MCV RBC AUTO: 89.3 FL (ref 80–99.9)
MONOCYTES ABSOLUTE: 0.84 E9/L (ref 0.1–0.95)
MONOCYTES RELATIVE PERCENT: 12.8 % (ref 2–12)
NEGATIVE QC PASS/FAIL: NORMAL
NEUTROPHILS ABSOLUTE: 3.29 E9/L (ref 1.8–7.3)
NEUTROPHILS RELATIVE PERCENT: 50.1 % (ref 43–80)
NITRITE, URINE: NEGATIVE
PDW BLD-RTO: 13.6 FL (ref 11.5–15)
PH UA: 7.5 (ref 5–9)
PLATELET # BLD: 240 E9/L (ref 130–450)
PMV BLD AUTO: 11.1 FL (ref 7–12)
POSITIVE QC PASS/FAIL: NORMAL
POTASSIUM SERPL-SCNC: 3.8 MMOL/L (ref 3.5–5)
PROTEIN UA: NEGATIVE MG/DL
RBC # BLD: 4.69 E12/L (ref 3.5–5.5)
RBC UA: ABNORMAL /HPF (ref 0–2)
SODIUM BLD-SCNC: 138 MMOL/L (ref 132–146)
SPECIFIC GRAVITY UA: 1.01 (ref 1–1.03)
TOTAL PROTEIN: 7.2 G/DL (ref 6.4–8.3)
UROBILINOGEN, URINE: 0.2 E.U./DL
WBC # BLD: 6.6 E9/L (ref 4.5–11.5)
WBC UA: ABNORMAL /HPF (ref 0–5)

## 2022-12-06 PROCEDURE — 85025 COMPLETE CBC W/AUTO DIFF WBC: CPT

## 2022-12-06 PROCEDURE — 6360000002 HC RX W HCPCS: Performed by: STUDENT IN AN ORGANIZED HEALTH CARE EDUCATION/TRAINING PROGRAM

## 2022-12-06 PROCEDURE — 83690 ASSAY OF LIPASE: CPT

## 2022-12-06 PROCEDURE — 99285 EMERGENCY DEPT VISIT HI MDM: CPT

## 2022-12-06 PROCEDURE — 74177 CT ABD & PELVIS W/CONTRAST: CPT

## 2022-12-06 PROCEDURE — 6360000004 HC RX CONTRAST MEDICATION: Performed by: EMERGENCY MEDICINE

## 2022-12-06 PROCEDURE — 96374 THER/PROPH/DIAG INJ IV PUSH: CPT

## 2022-12-06 PROCEDURE — 81001 URINALYSIS AUTO W/SCOPE: CPT

## 2022-12-06 PROCEDURE — 80053 COMPREHEN METABOLIC PANEL: CPT

## 2022-12-06 PROCEDURE — 83605 ASSAY OF LACTIC ACID: CPT

## 2022-12-06 RX ORDER — FENTANYL CITRATE 50 UG/ML
25 INJECTION, SOLUTION INTRAMUSCULAR; INTRAVENOUS ONCE
Status: COMPLETED | OUTPATIENT
Start: 2022-12-06 | End: 2022-12-06

## 2022-12-06 RX ADMIN — FENTANYL CITRATE 25 MCG: 50 INJECTION INTRAMUSCULAR; INTRAVENOUS at 22:46

## 2022-12-06 RX ADMIN — IOPAMIDOL 75 ML: 755 INJECTION, SOLUTION INTRAVENOUS at 22:18

## 2022-12-06 ASSESSMENT — PAIN - FUNCTIONAL ASSESSMENT
PAIN_FUNCTIONAL_ASSESSMENT: 0-10
PAIN_FUNCTIONAL_ASSESSMENT: 0-10

## 2022-12-06 ASSESSMENT — PAIN SCALES - GENERAL
PAINLEVEL_OUTOF10: 7
PAINLEVEL_OUTOF10: 7
PAINLEVEL_OUTOF10: 5

## 2022-12-06 ASSESSMENT — PAIN DESCRIPTION - LOCATION
LOCATION: ABDOMEN

## 2022-12-06 NOTE — ED NOTES
Department of Emergency Medicine  FIRST PROVIDER TRIAGE NOTE             Independent MLP           12/6/22  5:42 PM EST    Date of Encounter: 12/6/22   MRN: [id number]      HPI: Randy Hutchinson is a 32 y.o. female who presents to the ED for Abdominal Pain (Hernia mesh has \"moved\" per pt, burning) and Rectal Bleeding (X5 days)     Patient is a 26-year-old concerned that her mesh is moved. Patient states the bulge is back and she is now having rectal bleeding    ROS: Negative for cp, sob, urinary complaints, rash, or headache. PE: Gen Appearance/Constitutional: alert  HEENT: NC/NT. PERRLA,  Airway patent. Neck: supple     Initial Plan of Care: All treatment areas with department are currently occupied. Plan to order/Initiate the following while awaiting opening in ED: labs and imaging studies.   Initiate Treatment-Testing, Proceed toTreatment Area When Bed Available for ED Attending/MLP to Continue Care    Electronically signed by Froy Alexander PA-C   DD: 12/6/22       Froy Alexander PA-C  12/06/22 [id number]

## 2022-12-07 NOTE — DISCHARGE INSTRUCTIONS
Follow-up with your general surgeon for continued symptoms  Return to the emergency department for new or worsening symptoms

## 2022-12-07 NOTE — ED PROVIDER NOTES
Patient is a 80-year-old female with a history of umbilical hernia status postrepair with mesh, IBS who presents to the emergency department with abdominal pain and rectal bleeding. Patient states a 1 week history of minimal rectal bleeding, bright red blood when wiping and sometimes in the toilet bowl. Patient is fearful that her mesh has become dislodged. Patient states that she does have small children at home and has been lifting them, she is unsure if this may have move the mesh and caused her abdominal pain and bleeding patient describes pain as periumbilical, nonradiating, nothing makes it better, nothing makes it worse, moderate in intensity, intermittent. Patient denies any nausea or vomiting, chest pain or shortness of breath. Patient denies any urinary or other GI symptoms. Patient denies any trauma. Review of Systems   Constitutional:  Negative for chills and fever. HENT:  Negative for congestion and rhinorrhea. Eyes:  Negative for visual disturbance. Respiratory:  Negative for shortness of breath. Cardiovascular:  Negative for chest pain. Gastrointestinal:  Positive for abdominal pain and blood in stool. Negative for abdominal distention, constipation, diarrhea, nausea and vomiting. Endocrine: Negative for polyuria. Genitourinary:  Negative for dysuria and hematuria. Musculoskeletal:  Negative for arthralgias and myalgias. Skin:  Negative for rash and wound. Allergic/Immunologic: Negative for immunocompromised state. Neurological:  Negative for dizziness, syncope, weakness, light-headedness, numbness and headaches. Psychiatric/Behavioral:  Negative for confusion. The patient is not nervous/anxious. Physical Exam  Vitals and nursing note reviewed. Exam conducted with a chaperone present. Constitutional:       General: She is awake. She is not in acute distress. Appearance: She is normal weight. She is not ill-appearing.    HENT:      Head: Normocephalic and atraumatic. Mouth/Throat:      Mouth: Mucous membranes are moist.      Pharynx: Oropharynx is clear. Eyes:      Extraocular Movements: Extraocular movements intact. Pupils: Pupils are equal, round, and reactive to light. Cardiovascular:      Rate and Rhythm: Normal rate and regular rhythm. Pulses: Normal pulses. Heart sounds: Normal heart sounds. Pulmonary:      Effort: Pulmonary effort is normal.      Breath sounds: Normal breath sounds. Abdominal:      General: Abdomen is flat. There is no distension. Palpations: Abdomen is soft. Tenderness: There is generalized abdominal tenderness and tenderness in the epigastric area. There is no guarding. Negative signs include Martínez's sign and McBurney's sign. Genitourinary:     Rectum: Guaiac result negative. External hemorrhoid present. Comments: Soft brown stool noted to the fingertip on rectal exam.  No blood. Musculoskeletal:         General: Normal range of motion. Cervical back: Normal range of motion. Skin:     General: Skin is warm and dry. Capillary Refill: Capillary refill takes less than 2 seconds. Neurological:      General: No focal deficit present. Mental Status: She is alert and oriented to person, place, and time. Psychiatric:         Behavior: Behavior is cooperative. MDM  Number of Diagnoses or Management Options  Periumbilical abdominal pain  Rectal bleeding  Diagnosis management comments: Patient is a 57-year-old female with a history of umbilical hernia and mesh repair who presents to the emergency department with rectal bleeding. Presents awake, alert, following all commands, speaking in full sentences patient in no distress. Patient mildly uncomfortable. Vital signs are noted. Physical exam shows flat, soft abdomen, nonsurgical.   exam shows large external hemorrhoid. Patient guaiac is negative. Patient was treated with analgesia.   Work-up including labs are reassuring, no significant findings. Pregnancy is negative. CT of the abdomen shows no acute pathology cup was discussed with patient, she is feeling considerably improved. I believe patient with mild rectal bleeding, this may be from hemorrhoids versus her IBS. Patient is hemodynamically stable but continued rectal bleeding. Patient is able to be discharged, she is okay with this plan. Patient will follow up with primary care and her surgeon as needed return instructions were given for the emergency department. All questions were answered. Amount and/or Complexity of Data Reviewed  Clinical lab tests: ordered and reviewed  Tests in the radiology section of CPT®: ordered and reviewed       --------------------------------------------- PAST HISTORY ---------------------------------------------  Past Medical History:  has a past medical history of Depression, IBS (irritable bowel syndrome), Incisional hernia, and Seizure ([facility] [address] Past Surgical History:  has a past surgical history that includes Colonoscopy (2012); Upper gastrointestinal endoscopy (2012); Upper gastrointestinal endoscopy; Colonoscopy;  section (1/3/2015); Upper gastrointestinal endoscopy (2017); Dilation and curettage of uterus; Cholecystectomy (2017); pr  delivery only (N/A, 2018); laparoscopy (N/A, 10/18/2019); Hemorrhoid surgery (2020and 2021); ventral hernia repair (N/A, 2021); Abdomen surgery; and  section (N/A, 2022). Social History:  reports that she quit smoking about 9 years ago. Her smoking use included cigarettes. She has never used smokeless tobacco. She reports that she does not drink alcohol and does not use drugs. Family History: family history includes Cancer in her father. The patients home medications have been reviewed.     Allergies: Naproxen and Morphine    -------------------------------------------------- RESULTS -------------------------------------------------  Labs:  Results for orders placed or performed during the hospital encounter of 12/06/22   CBC with Auto Differential   Result Value Ref Range    WBC 6.6 4.5 - 11.5 E9/L    RBC 4.69 3.50 - 5.50 E12/L    Hemoglobin 13.7 11.5 - 15.5 g/dL    Hematocrit 41.9 34.0 - 48.0 %    MCV 89.3 80.0 - 99.9 fL    MCH 29.2 26.0 - 35.0 pg    MCHC 32.7 32.0 - 34.5 %    RDW 13.6 11.5 - 15.0 fL    Platelets 945 623 - 988 E9/L    MPV 11.1 7.0 - 12.0 fL    Neutrophils % 50.1 43.0 - 80.0 %    Immature Granulocytes % 0.2 0.0 - 5.0 %    Lymphocytes % 34.1 20.0 - 42.0 %    Monocytes % 12.8 (H) 2.0 - 12.0 %    Eosinophils % 2.0 0.0 - 6.0 %    Basophils % 0.8 0.0 - 2.0 %    Neutrophils Absolute 3.29 1.80 - 7.30 E9/L    Immature Granulocytes # 0.01 E9/L    Lymphocytes Absolute 2.24 1.50 - 4.00 E9/L    Monocytes Absolute 0.84 0.10 - 0.95 E9/L    Eosinophils Absolute 0.13 0.05 - 0.50 E9/L    Basophils Absolute 0.05 0.00 - 0.20 E9/L   Comprehensive Metabolic Panel   Result Value Ref Range    Sodium 138 132 - 146 mmol/L    Potassium 3.8 3.5 - 5.0 mmol/L    Chloride 106 98 - 107 mmol/L    CO2 24 22 - 29 mmol/L    Anion Gap 8 7 - 16 mmol/L    Glucose 89 74 - 99 mg/dL    BUN 8 6 - 20 mg/dL    Creatinine 0.6 0.5 - 1.0 mg/dL    Est, Glom Filt Rate >60 >=60 mL/min/1.73    Calcium 9.2 8.6 - 10.2 mg/dL    Total Protein 7.2 6.4 - 8.3 g/dL    Albumin 4.4 3.5 - 5.2 g/dL    Total Bilirubin 0.2 0.0 - 1.2 mg/dL    Alkaline Phosphatase 83 35 - 104 U/L    ALT 14 0 - 32 U/L    AST 14 0 - 31 U/L   Lactic Acid   Result Value Ref Range    Lactic Acid 0.6 0.5 - 2.2 mmol/L   Lipase   Result Value Ref Range    Lipase 33 13 - 60 U/L   Urinalysis with Microscopic   Result Value Ref Range    Color, UA Yellow Straw/Yellow    Clarity, UA Clear Clear    Glucose, Ur Negative Negative mg/dL    Bilirubin Urine Negative Negative    Ketones, Urine Negative Negative mg/dL    Specific Gravity, UA 1.015 1.005 - 1.030    Blood, Urine Negative Negative    pH, UA 7.5 5.0 - 9.0    Protein, UA Negative Negative mg/dL    Urobilinogen, Urine 0.2 <2.0 E.U./dL    Nitrite, Urine Negative Negative    Leukocyte Esterase, Urine SMALL (A) Negative    WBC, UA 1-3 0 - 5 /HPF    RBC, UA NONE 0 - 2 /HPF    Epithelial Cells, UA FEW /HPF    Bacteria, UA NONE SEEN None Seen /HPF   POC Pregnancy Urine Qual   Result Value Ref Range    HCG, Urine, POC Negative Negative    Lot Number [id number]     Positive QC Pass/Fail Pass     Negative QC Pass/Fail Pass    POCT occult blood stool   Result Value Ref Range    Occult Blood Fecal negative     QC OK? ok        Radiology:  CT ABDOMEN PELVIS W IV CONTRAST Additional Contrast? None   Final Result   No acute abdominopelvic abnormality.             ------------------------- NURSING NOTES AND VITALS REVIEWED ---------------------------  Date / Time Roomed:  12/6/2022  9:36 PM  ED Bed Assignment:  08/08    The nursing notes within the ED encounter and vital signs as below have been reviewed. /64   Pulse 64   Temp 98.6 °F (37 °C) (Oral)   Resp 16   Ht 5' 2\" (1.575 m)   Wt 110 lb (49.9 kg)   LMP 11/28/2022 (Approximate)   SpO2 100%   BMI 20.12 kg/m²   Oxygen Saturation Interpretation: Normal      ------------------------------------------ PROGRESS NOTES ------------------------------------------  7:23 AM EST  I have spoken with the patient and discussed todays results, in addition to providing specific details for the plan of care and counseling regarding the diagnosis and prognosis. Their questions are answered at this time and they are agreeable with the plan. I discussed at length with them reasons for immediate return here for re evaluation. They will followup with their primary care physician by calling their office tomorrow.     --------------------------------- ADDITIONAL PROVIDER NOTES ---------------------------------  At this time the patient is without objective evidence of an acute process requiring hospitalization or inpatient management. They have remained hemodynamically stable throughout their entire ED visit and are stable for discharge with outpatient follow-up. The plan has been discussed in detail and they are aware of the specific conditions for emergent return, as well as the importance of follow-up. Discharge Medication List as of 12/7/2022 12:03 AM        Diagnosis:  1. Periumbilical abdominal pain    2. Rectal bleeding      Disposition:  Patient's disposition: Discharge to home  Patient's condition is stable. 12/9/22, 7:23 AM EST.     This note is prepared by Juan Martin MD -PGY- 3           Juan Martin MD  Resident  12/09/22 [id number]

## 2022-12-09 ASSESSMENT — ENCOUNTER SYMPTOMS
CONSTIPATION: 0
ABDOMINAL DISTENTION: 0
RHINORRHEA: 0
NAUSEA: 0
VOMITING: 0
BLOOD IN STOOL: 1
DIARRHEA: 0
SHORTNESS OF BREATH: 0
ABDOMINAL PAIN: 1

## 2023-01-31 ENCOUNTER — OFFICE VISIT (OUTPATIENT)
Dept: FAMILY MEDICINE CLINIC | Age: 28
End: 2023-01-31
Payer: COMMERCIAL

## 2023-01-31 VITALS
WEIGHT: 110 LBS | OXYGEN SATURATION: 98 % | TEMPERATURE: 96.8 F | SYSTOLIC BLOOD PRESSURE: 110 MMHG | DIASTOLIC BLOOD PRESSURE: 68 MMHG | RESPIRATION RATE: 18 BRPM | HEART RATE: 78 BPM | HEIGHT: 62 IN | BODY MASS INDEX: 20.24 KG/M2

## 2023-01-31 DIAGNOSIS — L03.011 PARONYCHIA OF RIGHT THUMB: Primary | ICD-10-CM

## 2023-01-31 PROCEDURE — 1036F TOBACCO NON-USER: CPT | Performed by: PHYSICIAN ASSISTANT

## 2023-01-31 PROCEDURE — G8427 DOCREV CUR MEDS BY ELIG CLIN: HCPCS | Performed by: PHYSICIAN ASSISTANT

## 2023-01-31 PROCEDURE — G8420 CALC BMI NORM PARAMETERS: HCPCS | Performed by: PHYSICIAN ASSISTANT

## 2023-01-31 PROCEDURE — 99213 OFFICE O/P EST LOW 20 MIN: CPT | Performed by: PHYSICIAN ASSISTANT

## 2023-01-31 PROCEDURE — G8484 FLU IMMUNIZE NO ADMIN: HCPCS | Performed by: PHYSICIAN ASSISTANT

## 2023-01-31 RX ORDER — OXYCODONE HYDROCHLORIDE AND ACETAMINOPHEN 5; 325 MG/1; MG/1
TABLET ORAL
COMMUNITY
Start: 2023-01-16

## 2023-01-31 RX ORDER — MIRTAZAPINE 30 MG/1
30 TABLET, FILM COATED ORAL NIGHTLY
COMMUNITY

## 2023-01-31 RX ORDER — DOXYCYCLINE HYCLATE 100 MG
100 TABLET ORAL 2 TIMES DAILY
Qty: 20 TABLET | Refills: 0 | Status: SHIPPED | OUTPATIENT
Start: 2023-01-31 | End: 2023-02-10

## 2023-01-31 NOTE — PROGRESS NOTES
Chief Complaint       Finger Pain (R thumb)      History of Present Illness   Source of history provided by: patient. Taryn Patel is a 32 y.o. old female presenting to the walk in clinic for evaluation of swelling and redness to the right thumb for the past few days. Denies any known injury to the site. Redness and pain is most pronounced over the distal aspect of the thumb, at the left lateral nail fold. Patient reports associated yellow drainage at the site. Denies any hx of nail biting or ingrown nails. Denies any fever, chills, abrasions, paresthesias, N/V/D, or lethargy. Patient is not currently pregnant or breast-feeding. ROS    Unless otherwise stated in this report or unable to obtain because of the patient's clinical or mental status as evidenced by the medical record, this patients's positive and negative responses for Review of Systems, constitutional, psych, eyes, ENT, cardiovascular, respiratory, gastrointestinal, neurological, genitourinary, musculoskeletal, integument systems and systems related to the presenting problem are either stated in the preceding or were not pertinent or were negative for the symptoms and/or complaints related to the medical problem. Past Medical History:  has a past medical history of Depression, IBS (irritable bowel syndrome), Incisional hernia, and Seizure ([facility] [address] Past Surgical History:  has a past surgical history that includes Colonoscopy (2012); Upper gastrointestinal endoscopy (2012); Upper gastrointestinal endoscopy; Colonoscopy;  section (1/3/2015); Upper gastrointestinal endoscopy (2017); Dilation and curettage of uterus; Cholecystectomy (2017); pr  delivery only (N/A, 2018); laparoscopy (N/A, 10/18/2019); Hemorrhoid surgery (2020and 2021); ventral hernia repair (N/A, 2021); Abdomen surgery; and  section (N/A, 2022).   Social History:  reports that she quit smoking about 9 years ago. Her smoking use included cigarettes. She has never used smokeless tobacco. She reports that she does not drink alcohol and does not use drugs. Family History: family history includes Cancer in her father. Allergies: Naproxen and Morphine    Physical Exam         VS:  /68   Pulse 78   Temp 96.8 °F (36 °C)   Resp 18   Ht 5' 2\" (1.575 m)   Wt 110 lb (49.9 kg)   SpO2 98%   BMI 20.12 kg/m²    Oxygen Saturation Interpretation: Normal.    Constitutional:  Alert, development consistent with age. Neck:  Normal ROM. Supple. Chest: Heart RRR without pathologic murmurs or gallops. Lungs CTAB without W/R/R. Extremity: Right thumb. Tenderness: Mild to moderate TTP over the right thumb, most pronounced over the left lateral nail fold. Swelling: Mild edema noted. Deformity: No obvious deformity. ROM: ROM physiologic. Skin:  Erythema and warmth noted over the area without rashes, bullae, pustules, or papules. No bleeding or drainage noted. Minimal fluctuance noted. Neurovascular:              Sensory deficit: Sensation intact proximal and distal to the affected site. Pulse deficit: Pulses 2+ and bounding. Capillary refill: Less then 2 sec throughout. Gait: Slightly antalgic gait noted. Lymphatics: No lymphangitis or adenopathy noted. Neurological:  Alert and oriented. Motor functions intact. Lab / Imaging Results   (All laboratory and radiology results have been personally reviewed by myself)  Labs:  No results found for this visit on 01/31/23. Imaging: All Radiology results interpreted by Radiologist unless otherwise noted. Assessment / Plan     Impression(s):  Kenneth Rivera was seen today for finger pain. Diagnoses and all orders for this visit:    Paronychia of right thumb  -     doxycycline hyclate (VIBRA-TABS) 100 MG tablet;  Take 1 tablet by mouth 2 times daily for 10 days    Disposition:  Disposition: Discharge to home.    Script written for doxycycline, side effects discussed. Advise warm water soaks 3-4 times/day to promote drainage. F/u with podiatry in 1 week if symptoms persist. ED sooner if symptoms worsen or change. ED immediately with the development of fever, body aches, shaking chills, lethargy, lymphangitic streaking, spreading erythema, paresthesias, CP, or SOB. Pt states understanding and is in agreement with this care plan. All questions answered.    Chanelle Mendenhall PA-C    **This report was transcribed using voice recognition software. Every effort was made to ensure accuracy; however, inadvertent computerized transcription errors may be present.

## 2023-02-19 ENCOUNTER — HOSPITAL ENCOUNTER (EMERGENCY)
Age: 28
Discharge: HOME | End: 2023-02-20
Payer: COMMERCIAL

## 2023-02-19 ENCOUNTER — APPOINTMENT (OUTPATIENT)
Dept: ULTRASOUND IMAGING | Age: 28
End: 2023-02-19
Payer: COMMERCIAL

## 2023-02-19 VITALS
WEIGHT: 110 LBS | HEART RATE: 87 BPM | TEMPERATURE: 98.2 F | RESPIRATION RATE: 16 BRPM | OXYGEN SATURATION: 100 % | BODY MASS INDEX: 19.49 KG/M2 | SYSTOLIC BLOOD PRESSURE: 109 MMHG | HEIGHT: 63 IN | DIASTOLIC BLOOD PRESSURE: 70 MMHG

## 2023-02-19 DIAGNOSIS — R10.31 ABDOMINAL PAIN, RIGHT LOWER QUADRANT: Primary | ICD-10-CM

## 2023-02-19 LAB
ALBUMIN SERPL-MCNC: 4.1 G/DL (ref 3.5–5.2)
ALP BLD-CCNC: 80 U/L (ref 35–104)
ALT SERPL-CCNC: 10 U/L (ref 0–32)
ANION GAP SERPL CALCULATED.3IONS-SCNC: 9 MMOL/L (ref 7–16)
AST SERPL-CCNC: 14 U/L (ref 0–31)
BACTERIA: ABNORMAL /HPF
BASOPHILS ABSOLUTE: 0.05 E9/L (ref 0–0.2)
BASOPHILS RELATIVE PERCENT: 0.6 % (ref 0–2)
BILIRUB SERPL-MCNC: 0.2 MG/DL (ref 0–1.2)
BILIRUBIN URINE: NEGATIVE
BLOOD, URINE: NEGATIVE
BUN BLDV-MCNC: 8 MG/DL (ref 6–20)
CALCIUM SERPL-MCNC: 9.1 MG/DL (ref 8.6–10.2)
CHLORIDE BLD-SCNC: 103 MMOL/L (ref 98–107)
CLARITY: CLEAR
CO2: 26 MMOL/L (ref 22–29)
COLOR: YELLOW
CREAT SERPL-MCNC: 0.6 MG/DL (ref 0.5–1)
EOSINOPHILS ABSOLUTE: 0.13 E9/L (ref 0.05–0.5)
EOSINOPHILS RELATIVE PERCENT: 1.5 % (ref 0–6)
EPITHELIAL CELLS, UA: ABNORMAL /HPF
GFR SERPL CREATININE-BSD FRML MDRD: >60 ML/MIN/1.73
GLUCOSE BLD-MCNC: 88 MG/DL (ref 74–99)
GLUCOSE URINE: NEGATIVE MG/DL
HCG, URINE, POC: NEGATIVE
HCT VFR BLD CALC: 39.6 % (ref 34–48)
HEMOGLOBIN: 12.6 G/DL (ref 11.5–15.5)
IMMATURE GRANULOCYTES #: 0.03 E9/L
IMMATURE GRANULOCYTES %: 0.3 % (ref 0–5)
KETONES, URINE: NEGATIVE MG/DL
LACTIC ACID: 1 MMOL/L (ref 0.5–2.2)
LEUKOCYTE ESTERASE, URINE: NEGATIVE
LIPASE: 26 U/L (ref 13–60)
LYMPHOCYTES ABSOLUTE: 2.85 E9/L (ref 1.5–4)
LYMPHOCYTES RELATIVE PERCENT: 32.4 % (ref 20–42)
Lab: NORMAL
MCH RBC QN AUTO: 28.6 PG (ref 26–35)
MCHC RBC AUTO-ENTMCNC: 31.8 % (ref 32–34.5)
MCV RBC AUTO: 90 FL (ref 80–99.9)
MONOCYTES ABSOLUTE: 0.76 E9/L (ref 0.1–0.95)
MONOCYTES RELATIVE PERCENT: 8.6 % (ref 2–12)
MUCUS: PRESENT /LPF
NEGATIVE QC PASS/FAIL: NORMAL
NEUTROPHILS ABSOLUTE: 4.97 E9/L (ref 1.8–7.3)
NEUTROPHILS RELATIVE PERCENT: 56.6 % (ref 43–80)
NITRITE, URINE: NEGATIVE
PDW BLD-RTO: 13.8 FL (ref 11.5–15)
PH UA: 6.5 (ref 5–9)
PLATELET # BLD: 257 E9/L (ref 130–450)
PMV BLD AUTO: 11.3 FL (ref 7–12)
POSITIVE QC PASS/FAIL: NORMAL
POTASSIUM SERPL-SCNC: 3.8 MMOL/L (ref 3.5–5)
PROTEIN UA: ABNORMAL MG/DL
RBC # BLD: 4.4 E12/L (ref 3.5–5.5)
RBC UA: ABNORMAL /HPF (ref 0–2)
SODIUM BLD-SCNC: 138 MMOL/L (ref 132–146)
SPECIFIC GRAVITY UA: 1.02 (ref 1–1.03)
TOTAL PROTEIN: 6.9 G/DL (ref 6.4–8.3)
UROBILINOGEN, URINE: 0.2 E.U./DL
WBC # BLD: 8.8 E9/L (ref 4.5–11.5)
WBC UA: ABNORMAL /HPF (ref 0–5)

## 2023-02-19 PROCEDURE — 80053 COMPREHEN METABOLIC PANEL: CPT

## 2023-02-19 PROCEDURE — 81001 URINALYSIS AUTO W/SCOPE: CPT

## 2023-02-19 PROCEDURE — 2580000003 HC RX 258: Performed by: PHYSICIAN ASSISTANT

## 2023-02-19 PROCEDURE — 96374 THER/PROPH/DIAG INJ IV PUSH: CPT

## 2023-02-19 PROCEDURE — 83605 ASSAY OF LACTIC ACID: CPT

## 2023-02-19 PROCEDURE — 96361 HYDRATE IV INFUSION ADD-ON: CPT

## 2023-02-19 PROCEDURE — 76830 TRANSVAGINAL US NON-OB: CPT

## 2023-02-19 PROCEDURE — 6360000002 HC RX W HCPCS: Performed by: PHYSICIAN ASSISTANT

## 2023-02-19 PROCEDURE — 83690 ASSAY OF LIPASE: CPT

## 2023-02-19 PROCEDURE — 85025 COMPLETE CBC W/AUTO DIFF WBC: CPT

## 2023-02-19 PROCEDURE — 99284 EMERGENCY DEPT VISIT MOD MDM: CPT

## 2023-02-19 PROCEDURE — 96375 TX/PRO/DX INJ NEW DRUG ADDON: CPT

## 2023-02-19 PROCEDURE — 36415 COLL VENOUS BLD VENIPUNCTURE: CPT

## 2023-02-19 RX ORDER — ONDANSETRON 2 MG/ML
4 INJECTION INTRAMUSCULAR; INTRAVENOUS ONCE
Status: COMPLETED | OUTPATIENT
Start: 2023-02-19 | End: 2023-02-19

## 2023-02-19 RX ORDER — KETOROLAC TROMETHAMINE 30 MG/ML
15 INJECTION, SOLUTION INTRAMUSCULAR; INTRAVENOUS ONCE
Status: COMPLETED | OUTPATIENT
Start: 2023-02-19 | End: 2023-02-19

## 2023-02-19 RX ORDER — 0.9 % SODIUM CHLORIDE 0.9 %
1000 INTRAVENOUS SOLUTION INTRAVENOUS ONCE
Status: COMPLETED | OUTPATIENT
Start: 2023-02-19 | End: 2023-02-19

## 2023-02-19 RX ADMIN — SODIUM CHLORIDE 1000 ML: 9 INJECTION, SOLUTION INTRAVENOUS at 19:30

## 2023-02-19 RX ADMIN — ONDANSETRON 4 MG: 2 INJECTION INTRAMUSCULAR; INTRAVENOUS at 19:29

## 2023-02-19 RX ADMIN — KETOROLAC TROMETHAMINE 15 MG: 30 INJECTION, SOLUTION INTRAMUSCULAR; INTRAVENOUS at 19:30

## 2023-02-19 ASSESSMENT — PAIN DESCRIPTION - FREQUENCY: FREQUENCY: CONTINUOUS

## 2023-02-19 ASSESSMENT — PAIN DESCRIPTION - DESCRIPTORS: DESCRIPTORS: ACHING

## 2023-02-19 ASSESSMENT — PAIN DESCRIPTION - LOCATION: LOCATION: ABDOMEN

## 2023-02-19 ASSESSMENT — PAIN SCALES - GENERAL
PAINLEVEL_OUTOF10: 8
PAINLEVEL_OUTOF10: 7

## 2023-02-19 ASSESSMENT — PAIN - FUNCTIONAL ASSESSMENT: PAIN_FUNCTIONAL_ASSESSMENT: 0-10

## 2023-02-19 ASSESSMENT — PAIN DESCRIPTION - PAIN TYPE: TYPE: ACUTE PAIN

## 2023-02-20 NOTE — ED PROVIDER NOTES
Independent LISET Visit. [facility] of Emergency Medicine   ED  Encounter Note  Admit Date/RoomTime: 2023  6:40 PM  ED Room: SHANI/SHANI    NAME: Nagi King  : 1995  MRN: [id number]     Chief Complaint:  Abdominal Pain (RLQ x4 days), Nausea, and Diarrhea    History of Present Illness       Nagi King is a 32 y.o. old female who presents to the emergency department by private vehicle, for gradual onset, persistent sharp, shooting pain in the RLQ with radiation to the right back which began 2 day(s) prior to arrival.   There has been similar episodes in the past (pt reports history of kidney infections and ovarian cysts, she is currently on antibiotics for dental infection - chart review shows augmentin). Since onset the symptoms have been persistent. The pain is associated with nausea and diarrhea, pt reports she has IBS and diarrhea is her usual.   The pain is aggravated by standing and relieved by sitting and lying down. There has been NO chest pain, shortness of breath, fever, chills, vomiting, dark/black stools, blood in stool, dysuria, or hematuria. LMP 23  No prior history of sexually transmitted disease, Birth Control: None. , and regular menstrual cycles. .  ROS   Pertinent positives and negatives are stated within HPI, all other systems reviewed and are negative. Past Medical History:  has a past medical history of Depression, IBS (irritable bowel syndrome), Incisional hernia, and Seizure ([facility] [address] Surgical History has a past surgical history that includes Colonoscopy (2012); Upper gastrointestinal endoscopy (2012); Upper gastrointestinal endoscopy; Colonoscopy;  section (1/3/2015); Upper gastrointestinal endoscopy (2017); Dilation and curettage of uterus; Cholecystectomy (2017); pr  delivery only (N/A, 2018); laparoscopy (N/A, 10/18/2019);  Hemorrhoid surgery (2020and 2021); ventral hernia repair (N/A, 2021); Abdomen surgery; and  section (N/A, 2022). Social History:  reports that she quit smoking about 9 years ago. Her smoking use included cigarettes. She has never used smokeless tobacco. She reports that she does not drink alcohol and does not use drugs. Family History: family history includes Cancer in her father. Allergies: Naproxen and Morphine    Physical Exam   Oxygen Saturation Interpretation: Normal on room air analysis. ED Triage Vitals [23 1734]   BP Temp Temp Source Heart Rate Resp SpO2 Height Weight   109/70 98.2 °F (36.8 °C) Oral 87 16 100 % 5' 3\" (1.6 m) 110 lb (49.9 kg)        Physical Exam  General Appearance/Constitutional:  Alert, development consistent with age. HEENT:  NC/NT. PERRLA. Airway patent. Neck:  Supple. No lymphadenopathy. Respiratory:  No retractions. Lungs Clear to auscultation and breath sounds equal.  CV:  Regular rate and rhythm. GI:  normal appearing, non-distended with no visible hernias. Bowel sounds: normal bowel sounds. Tenderness: There is moderate tenderness present - located in the RLQ., There is no rebound tenderness. , There is no guarding. , There is no distension. , There is no pulsatile mass. .        Liver: non-tender. Spleen:  non-tender. Back: CVA Tenderness: No CVA tenderness. : /Pelvic examination deferred / declined. Integument:  Normal turgor. Warm, dry, without visible rash, unless noted elsewhere. Lymphatics: No edema, cap.refill <3sec. Neurological:  Orientation age-appropriate. Motor functions intact.     Lab / Imaging Results   (All laboratory and radiology results have been personally reviewed by myself)  Labs:  Results for orders placed or performed during the hospital encounter of 23   Urinalysis with Microscopic   Result Value Ref Range    Color, UA Yellow Straw/Yellow    Clarity, UA Clear Clear    Glucose, Ur Negative Negative mg/dL    Bilirubin Urine Negative Negative    Ketones, Urine Negative Negative mg/dL    Specific Gravity, UA 1.025 1.005 - 1.030    Blood, Urine Negative Negative    pH, UA 6.5 5.0 - 9.0    Protein, UA TRACE Negative mg/dL    Urobilinogen, Urine 0.2 <2.0 E.U./dL    Nitrite, Urine Negative Negative    Leukocyte Esterase, Urine Negative Negative    Mucus, UA Present (A) None Seen /LPF    WBC, UA 0-1 0 - 5 /HPF    RBC, UA 0-1 0 - 2 /HPF    Epithelial Cells, UA MODERATE /HPF    Bacteria, UA FEW (A) None Seen /HPF   CBC with Auto Differential   Result Value Ref Range    WBC 8.8 4.5 - 11.5 E9/L    RBC 4.40 3.50 - 5.50 E12/L    Hemoglobin 12.6 11.5 - 15.5 g/dL    Hematocrit 39.6 34.0 - 48.0 %    MCV 90.0 80.0 - 99.9 fL    MCH 28.6 26.0 - 35.0 pg    MCHC 31.8 (L) 32.0 - 34.5 %    RDW 13.8 11.5 - 15.0 fL    Platelets 149 930 - 620 E9/L    MPV 11.3 7.0 - 12.0 fL    Neutrophils % 56.6 43.0 - 80.0 %    Immature Granulocytes % 0.3 0.0 - 5.0 %    Lymphocytes % 32.4 20.0 - 42.0 %    Monocytes % 8.6 2.0 - 12.0 %    Eosinophils % 1.5 0.0 - 6.0 %    Basophils % 0.6 0.0 - 2.0 %    Neutrophils Absolute 4.97 1.80 - 7.30 E9/L    Immature Granulocytes # 0.03 E9/L    Lymphocytes Absolute 2.85 1.50 - 4.00 E9/L    Monocytes Absolute 0.76 0.10 - 0.95 E9/L    Eosinophils Absolute 0.13 0.05 - 0.50 E9/L    Basophils Absolute 0.05 0.00 - 0.20 E9/L   Comprehensive Metabolic Panel   Result Value Ref Range    Sodium 138 132 - 146 mmol/L    Potassium 3.8 3.5 - 5.0 mmol/L    Chloride 103 98 - 107 mmol/L    CO2 26 22 - 29 mmol/L    Anion Gap 9 7 - 16 mmol/L    Glucose 88 74 - 99 mg/dL    BUN 8 6 - 20 mg/dL    Creatinine 0.6 0.5 - 1.0 mg/dL    Est, Glom Filt Rate >60 >=60 mL/min/1.73    Calcium 9.1 8.6 - 10.2 mg/dL    Total Protein 6.9 6.4 - 8.3 g/dL    Albumin 4.1 3.5 - 5.2 g/dL    Total Bilirubin 0.2 0.0 - 1.2 mg/dL    Alkaline Phosphatase 80 35 - 104 U/L    ALT 10 0 - 32 U/L    AST 14 0 - 31 U/L   Lipase   Result Value Ref Range    Lipase 26 13 - 60 U/L   Lactic Acid   Result Value Ref Range    Lactic Acid 1.0 0.5 - 2.2 mmol/L   POC Pregnancy Urine Qual   Result Value Ref Range    HCG, Urine, POC Negative Negative    Lot Number [id number]     Positive QC Pass/Fail Pass     Negative QC Pass/Fail Pass      Imaging:  All Radiology results interpreted by Radiologist unless otherwise noted.  US NON OB TRANSVAGINAL   Final Result   Unremarkable pelvic ultrasound.      Normal Doppler flow within the ovaries.      RECOMMENDATIONS:   Unavailable           ED Course / Medical Decision Making     Medications   0.9 % sodium chloride bolus (0 mLs IntraVENous Stopped 2/19/23 2212)   ketorolac (TORADOL) injection 15 mg (15 mg IntraVENous Given 2/19/23 1930)   ondansetron (ZOFRAN) injection 4 mg (4 mg IntraVENous Given 2/19/23 1929)       Re-Evaluations:  2/19/23      Time: 2330 Pt reports the toradol helped, pain is still present but better..  discussed ultrasound results and discussed CT. Pt reports she is feeling better and wants to wait to see how she feels in couple days, she reports she will return if worsening. Pt reports she has had upper and lower scopes which were normal.  She has a dental abscess which keeps recurring but no dentists in the area take her insurance.    Consultations:             None    Procedures:   none    MDM:  DDX: ovarian cyst, torsion, appendicitis, IBS, antibiotic induced diarrhea, uti, pyelonephritis, colitis.    Pt presents to ED for gradual onset, persistent sharp, shooting pain in the RLQ with radiation to the right back which began 2 day(s) prior to arrival.   There has been similar episodes in the past (pt reports history of kidney infections and ovarian cysts, she is currently on antibiotics for dental infection - chart review shows augmentin).  Since onset the symptoms have been persistent.  The pain is associated with nausea and diarrhea, pt reports she has IBS and diarrhea is her usual.   The pain is aggravated by standing and relieved  by sitting and lying down. There has been NO chest pain, shortness of breath, fever, chills, vomiting, dark/black stools, blood in stool, dysuria, or hematuria. LMP 2/4/23  No prior history of sexually transmitted disease, Birth Control: None. , and regular menstrual cycles. PT blood work and ua normal. Pelvic ultrasound normal. Pt received toradol for pain in ED which helped. We discussed CT ab but with shared decision making decided to wait a couple more days, if worsening will return to ED. Motrin or tylenol at home for pain. Dental clinic referral provided. Plan of Care/Counseling:  Tee Bernard PA-C reviewed today's visit with the patient in addition to providing specific details for the plan of care and counseling regarding the diagnosis and prognosis. Questions are answered at this time and are agreeable with the plan. Assessment      1. Abdominal pain, right lower quadrant      This patient's ED course included: a personal history and physicial examination, re-evaluation prior to disposition, and IV medications  This patient has remained hemodynamically stable and improved during their ED course. Plan   Discharged home  Patient condition is good. New Medications     Discharge Medication List as of 2/19/2023 11:53 PM        Electronically signed by Tee Bernard PA-C   DD: 2/19/23  **This report was transcribed using voice recognition software. Every effort was made to ensure accuracy; however, inadvertent computerized transcription errors may be present.   [address] Shari Valdes PA-C  02/20/23 8431

## 2023-02-24 ENCOUNTER — HOSPITAL ENCOUNTER (EMERGENCY)
Age: 28
Discharge: HOME | End: 2023-02-24
Payer: COMMERCIAL

## 2023-02-24 ENCOUNTER — APPOINTMENT (OUTPATIENT)
Dept: CT IMAGING | Age: 28
End: 2023-02-24
Payer: COMMERCIAL

## 2023-02-24 VITALS
SYSTOLIC BLOOD PRESSURE: 119 MMHG | BODY MASS INDEX: 21.09 KG/M2 | OXYGEN SATURATION: 97 % | TEMPERATURE: 97.3 F | RESPIRATION RATE: 16 BRPM | HEART RATE: 77 BPM | HEIGHT: 63 IN | DIASTOLIC BLOOD PRESSURE: 80 MMHG | WEIGHT: 119 LBS

## 2023-02-24 DIAGNOSIS — R10.84 GENERALIZED ABDOMINAL PAIN: Primary | ICD-10-CM

## 2023-02-24 DIAGNOSIS — K59.00 CONSTIPATION, UNSPECIFIED CONSTIPATION TYPE: ICD-10-CM

## 2023-02-24 DIAGNOSIS — Z87.19 HISTORY OF IBS: ICD-10-CM

## 2023-02-24 LAB
ALBUMIN SERPL-MCNC: 4.3 G/DL (ref 3.5–5.2)
ALP BLD-CCNC: 85 U/L (ref 35–104)
ALT SERPL-CCNC: 16 U/L (ref 0–32)
ANION GAP SERPL CALCULATED.3IONS-SCNC: 9 MMOL/L (ref 7–16)
AST SERPL-CCNC: 19 U/L (ref 0–31)
BACTERIA: ABNORMAL /HPF
BASOPHILS ABSOLUTE: 0.05 E9/L (ref 0–0.2)
BASOPHILS RELATIVE PERCENT: 0.5 % (ref 0–2)
BILIRUB SERPL-MCNC: 0.2 MG/DL (ref 0–1.2)
BILIRUBIN URINE: NEGATIVE
BLOOD, URINE: NEGATIVE
BUN BLDV-MCNC: 9 MG/DL (ref 6–20)
CALCIUM SERPL-MCNC: 9.2 MG/DL (ref 8.6–10.2)
CHLORIDE BLD-SCNC: 105 MMOL/L (ref 98–107)
CLARITY: CLEAR
CO2: 25 MMOL/L (ref 22–29)
COLOR: YELLOW
CREAT SERPL-MCNC: 0.6 MG/DL (ref 0.5–1)
EOSINOPHILS ABSOLUTE: 0.14 E9/L (ref 0.05–0.5)
EOSINOPHILS RELATIVE PERCENT: 1.5 % (ref 0–6)
EPITHELIAL CELLS, UA: ABNORMAL /HPF
GFR SERPL CREATININE-BSD FRML MDRD: >60 ML/MIN/1.73
GLUCOSE BLD-MCNC: 81 MG/DL (ref 74–99)
GLUCOSE URINE: NEGATIVE MG/DL
HCG, URINE, POC: NEGATIVE
HCT VFR BLD CALC: 43.3 % (ref 34–48)
HEMOGLOBIN: 13.8 G/DL (ref 11.5–15.5)
IMMATURE GRANULOCYTES #: 0.03 E9/L
IMMATURE GRANULOCYTES %: 0.3 % (ref 0–5)
KETONES, URINE: NEGATIVE MG/DL
LEUKOCYTE ESTERASE, URINE: ABNORMAL
LIPASE: 25 U/L (ref 13–60)
LYMPHOCYTES ABSOLUTE: 2.39 E9/L (ref 1.5–4)
LYMPHOCYTES RELATIVE PERCENT: 26 % (ref 20–42)
Lab: NORMAL
MCH RBC QN AUTO: 28.9 PG (ref 26–35)
MCHC RBC AUTO-ENTMCNC: 31.9 % (ref 32–34.5)
MCV RBC AUTO: 90.6 FL (ref 80–99.9)
MONOCYTES ABSOLUTE: 0.91 E9/L (ref 0.1–0.95)
MONOCYTES RELATIVE PERCENT: 9.9 % (ref 2–12)
NEGATIVE QC PASS/FAIL: NORMAL
NEUTROPHILS ABSOLUTE: 5.68 E9/L (ref 1.8–7.3)
NEUTROPHILS RELATIVE PERCENT: 61.8 % (ref 43–80)
NITRITE, URINE: NEGATIVE
PDW BLD-RTO: 13.6 FL (ref 11.5–15)
PH UA: 6 (ref 5–9)
PLATELET # BLD: 254 E9/L (ref 130–450)
PMV BLD AUTO: 11.2 FL (ref 7–12)
POSITIVE QC PASS/FAIL: NORMAL
POTASSIUM REFLEX MAGNESIUM: 3.8 MMOL/L (ref 3.5–5)
PROTEIN UA: NEGATIVE MG/DL
RBC # BLD: 4.78 E12/L (ref 3.5–5.5)
RBC UA: ABNORMAL /HPF (ref 0–2)
SODIUM BLD-SCNC: 139 MMOL/L (ref 132–146)
SPECIFIC GRAVITY UA: 1.02 (ref 1–1.03)
TOTAL PROTEIN: 7.5 G/DL (ref 6.4–8.3)
UROBILINOGEN, URINE: 0.2 E.U./DL
WBC # BLD: 9.2 E9/L (ref 4.5–11.5)
WBC UA: ABNORMAL /HPF (ref 0–5)

## 2023-02-24 PROCEDURE — 87088 URINE BACTERIA CULTURE: CPT

## 2023-02-24 PROCEDURE — 83690 ASSAY OF LIPASE: CPT

## 2023-02-24 PROCEDURE — 6360000004 HC RX CONTRAST MEDICATION: Performed by: RADIOLOGY

## 2023-02-24 PROCEDURE — 80053 COMPREHEN METABOLIC PANEL: CPT

## 2023-02-24 PROCEDURE — 81001 URINALYSIS AUTO W/SCOPE: CPT

## 2023-02-24 PROCEDURE — 99285 EMERGENCY DEPT VISIT HI MDM: CPT

## 2023-02-24 PROCEDURE — 85025 COMPLETE CBC W/AUTO DIFF WBC: CPT

## 2023-02-24 PROCEDURE — 6360000002 HC RX W HCPCS: Performed by: PHYSICIAN ASSISTANT

## 2023-02-24 PROCEDURE — A4216 STERILE WATER/SALINE, 10 ML: HCPCS | Performed by: PHYSICIAN ASSISTANT

## 2023-02-24 PROCEDURE — 2500000003 HC RX 250 WO HCPCS: Performed by: PHYSICIAN ASSISTANT

## 2023-02-24 PROCEDURE — 96375 TX/PRO/DX INJ NEW DRUG ADDON: CPT

## 2023-02-24 PROCEDURE — 2580000003 HC RX 258: Performed by: PHYSICIAN ASSISTANT

## 2023-02-24 PROCEDURE — 6370000000 HC RX 637 (ALT 250 FOR IP): Performed by: PHYSICIAN ASSISTANT

## 2023-02-24 PROCEDURE — 96374 THER/PROPH/DIAG INJ IV PUSH: CPT

## 2023-02-24 PROCEDURE — 74177 CT ABD & PELVIS W/CONTRAST: CPT

## 2023-02-24 RX ORDER — 0.9 % SODIUM CHLORIDE 0.9 %
1000 INTRAVENOUS SOLUTION INTRAVENOUS ONCE
Status: COMPLETED | OUTPATIENT
Start: 2023-02-24 | End: 2023-02-24

## 2023-02-24 RX ORDER — SENNA AND DOCUSATE SODIUM 50; 8.6 MG/1; MG/1
1 TABLET, FILM COATED ORAL DAILY
Qty: 30 TABLET | Refills: 0 | Status: SHIPPED | OUTPATIENT
Start: 2023-02-24

## 2023-02-24 RX ORDER — ONDANSETRON 2 MG/ML
4 INJECTION INTRAMUSCULAR; INTRAVENOUS ONCE
Status: COMPLETED | OUTPATIENT
Start: 2023-02-24 | End: 2023-02-24

## 2023-02-24 RX ORDER — ONDANSETRON 4 MG/1
4 TABLET, ORALLY DISINTEGRATING ORAL EVERY 8 HOURS PRN
Qty: 30 TABLET | Refills: 0 | Status: SHIPPED | OUTPATIENT
Start: 2023-02-24

## 2023-02-24 RX ORDER — DICYCLOMINE HCL 20 MG
20 TABLET ORAL 3 TIMES DAILY PRN
Qty: 60 TABLET | Refills: 0 | Status: SHIPPED | OUTPATIENT
Start: 2023-02-24

## 2023-02-24 RX ORDER — DICYCLOMINE HYDROCHLORIDE 10 MG/1
20 CAPSULE ORAL ONCE
Status: COMPLETED | OUTPATIENT
Start: 2023-02-24 | End: 2023-02-24

## 2023-02-24 RX ADMIN — SODIUM CHLORIDE 1000 ML: 9 INJECTION, SOLUTION INTRAVENOUS at 16:41

## 2023-02-24 RX ADMIN — DICYCLOMINE HYDROCHLORIDE 20 MG: 10 CAPSULE ORAL at 16:39

## 2023-02-24 RX ADMIN — ONDANSETRON 4 MG: 2 INJECTION INTRAMUSCULAR; INTRAVENOUS at 16:39

## 2023-02-24 RX ADMIN — FAMOTIDINE 20 MG: 10 INJECTION, SOLUTION INTRAVENOUS at 16:40

## 2023-02-24 RX ADMIN — IOPAMIDOL 75 ML: 755 INJECTION, SOLUTION INTRAVENOUS at 17:30

## 2023-02-24 ASSESSMENT — PAIN DESCRIPTION - DESCRIPTORS: DESCRIPTORS: PRESSURE

## 2023-02-24 ASSESSMENT — PAIN DESCRIPTION - LOCATION: LOCATION: ABDOMEN;FLANK

## 2023-02-24 ASSESSMENT — PAIN DESCRIPTION - ORIENTATION: ORIENTATION: RIGHT;LOWER

## 2023-02-24 ASSESSMENT — PAIN - FUNCTIONAL ASSESSMENT: PAIN_FUNCTIONAL_ASSESSMENT: 0-10

## 2023-02-24 ASSESSMENT — PAIN SCALES - GENERAL: PAINLEVEL_OUTOF10: 8

## 2023-02-24 NOTE — ED PROVIDER NOTES
Independent LISET Visit. Department of Emergency Medicine   ED  Provider Note  Admit Date/RoomTime: 2023  4:03 PM  ED Room: 33/    Chief Complaint       Abdominal Pain (RLQ pain, onset ), Constipation (Last BM 2 days ago, unable to pass gas per pt), and Flank Pain (L&R side)    History of Present Illness      Deborrsydni Dixon is a 32 y.o. old female who presents to the ED with abdominal pain that began . Patient reports a history of IBS and has frequent constipation and diarrhea. She also reports bilateral flank pain as she states she feels bloated. She has not contacted her gastroenterologist.  She states she was seen at the emergency department on Monday but had to leave due to having children at home. She had blood work and ultrasound done at that time. Ultrasound was negative for any pelvic abnormality. Patient denies any urinary complaints, chest pain, shortness of breath, pain with breathing, fever/chills, headache, dizziness, or abnormal vaginal bleeding/discharge or irritation. Patient is alert and oriented x3 and in no apparent distress at this exam.  She is nontoxic-appearing. PCP: Cassia Muro, ERMIAS - CNP  Gastroenterology: Carol WHITFIELD   Pertinent positives and negatives are stated within HPI, all other systems reviewed and are negative. Past Medical History:  has a past medical history of Depression, IBS (irritable bowel syndrome), Incisional hernia, and Seizure ([facility] [address] Past Surgical History:  has a past surgical history that includes Colonoscopy (2012); Upper gastrointestinal endoscopy (2012); Upper gastrointestinal endoscopy; Colonoscopy;  section (1/3/2015); Upper gastrointestinal endoscopy (2017); Dilation and curettage of uterus; Cholecystectomy (2017); pr  delivery only (N/A, 2018); laparoscopy (N/A, 10/18/2019);  Hemorrhoid surgery (2020and 2021); ventral hernia repair (N/A, 2021); Abdomen surgery; and  section (N/A, 2022). Social History:  reports that she quit smoking about 9 years ago. Her smoking use included cigarettes. She has never used smokeless tobacco. She reports that she does not drink alcohol and does not use drugs. Family History: family history includes Cancer in her father. The patients home medications have been reviewed. Allergies: Naproxen and Morphine  Allergies have been reviewed with patient. Physical Exam   VS:  /80   Pulse 77   Temp 97.3 °F (36.3 °C) (Tympanic)   Resp 16   Ht 5' 3\" (1.6 m)   Wt 119 lb (54 kg)   SpO2 97%   BMI 21.08 kg/m²      Oxygen Saturation Interpretation: Normal.     General Appearance/Constitutional:  Alert and oriented x4, development consistent with age, NAD. HEENT:  NC/NT, EOMI, Airway patent. Neck:  Supple. Non-tender with no meningeal signs, No lymphadenopathy  Respiratory: Clear and equal bilaterally, good air flow, no distress    CV:  Regular rate and rhythm. GI: Soft, diffuse tenderness, mild distention, no guarding or rigidity, negative Martínez sign, no McBurney's point tenderness  Back: No CVA Tenderness. No midline tenderness   Integument:  Normal turgor. Warm, dry, without visible rash, unless noted elsewhere. Extremities: No tenderness or edema bilaterally   Neurological:  CN II-XII grossly intact, Motor functions intact.     Lab / Imaging Results   (All laboratory and radiology results have been personally reviewed by myself)  Labs:  Results for orders placed or performed during the hospital encounter of 23   CBC with Auto Differential   Result Value Ref Range    WBC 9.2 4.5 - 11.5 E9/L    RBC 4.78 3.50 - 5.50 E12/L    Hemoglobin 13.8 11.5 - 15.5 g/dL    Hematocrit 43.3 34.0 - 48.0 %    MCV 90.6 80.0 - 99.9 fL    MCH 28.9 26.0 - 35.0 pg    MCHC 31.9 (L) 32.0 - 34.5 %    RDW 13.6 11.5 - 15.0 fL    Platelets 165 444 - 954 E9/L    MPV 11.2 7.0 - 12.0 fL    Neutrophils % 61.8 43.0 - 80.0 %    Immature Granulocytes % 0.3 0.0 - 5.0 %    Lymphocytes % 26.0 20.0 - 42.0 %    Monocytes % 9.9 2.0 - 12.0 %    Eosinophils % 1.5 0.0 - 6.0 %    Basophils % 0.5 0.0 - 2.0 %    Neutrophils Absolute 5.68 1.80 - 7.30 E9/L    Immature Granulocytes # 0.03 E9/L    Lymphocytes Absolute 2.39 1.50 - 4.00 E9/L    Monocytes Absolute 0.91 0.10 - 0.95 E9/L    Eosinophils Absolute 0.14 0.05 - 0.50 E9/L    Basophils Absolute 0.05 0.00 - 0.20 E9/L   Comprehensive Metabolic Panel w/ Reflex to MG   Result Value Ref Range    Sodium 139 132 - 146 mmol/L    Potassium reflex Magnesium 3.8 3.5 - 5.0 mmol/L    Chloride 105 98 - 107 mmol/L    CO2 25 22 - 29 mmol/L    Anion Gap 9 7 - 16 mmol/L    Glucose 81 74 - 99 mg/dL    BUN 9 6 - 20 mg/dL    Creatinine 0.6 0.5 - 1.0 mg/dL    Est, Glom Filt Rate >60 >=60 mL/min/1.73    Calcium 9.2 8.6 - 10.2 mg/dL    Total Protein 7.5 6.4 - 8.3 g/dL    Albumin 4.3 3.5 - 5.2 g/dL    Total Bilirubin 0.2 0.0 - 1.2 mg/dL    Alkaline Phosphatase 85 35 - 104 U/L    ALT 16 0 - 32 U/L    AST 19 0 - 31 U/L   Lipase   Result Value Ref Range    Lipase 25 13 - 60 U/L   Urinalysis   Result Value Ref Range    Color, UA Yellow Straw/Yellow    Clarity, UA Clear Clear    Glucose, Ur Negative Negative mg/dL    Bilirubin Urine Negative Negative    Ketones, Urine Negative Negative mg/dL    Specific Gravity, UA 1.020 1.005 - 1.030    Blood, Urine Negative Negative    pH, UA 6.0 5.0 - 9.0    Protein, UA Negative Negative mg/dL    Urobilinogen, Urine 0.2 <2.0 E.U./dL    Nitrite, Urine Negative Negative    Leukocyte Esterase, Urine SMALL (A) Negative   Microscopic Urinalysis   Result Value Ref Range    WBC, UA 0-1 0 - 5 /HPF    RBC, UA NONE 0 - 2 /HPF    Epithelial Cells, UA FEW /HPF    Bacteria, UA FEW (A) None Seen /HPF   POC Pregnancy Urine Qual   Result Value Ref Range    HCG, Urine, POC Negative Negative    Lot Number [id number]     Positive QC Pass/Fail Pass     Negative QC Pass/Fail Pass      Imaging:   All Radiology results interpreted by Radiologist unless otherwise noted. CT ABDOMEN PELVIS W IV CONTRAST Additional Contrast? None   Final Result   1. 5.4 cm left ovarian cyst.  Recommend follow-up pelvic ultrasound in 3-6   months   2. Possible constipation           -- MEDICAL DECISION MAKING --   History From: History from : Patient  Limitations to history : None    Record Review:  Outpatient Notes reviewed  Other Records Reviewed : None    CC/HPI Summary, DDx, ED Course, and Reassessment:   Patient presents to the ED for Abdominal Pain (RLQ pain, onset Sunday), Constipation (Last BM 2 days ago, unable to pass gas per pt), and Flank Pain (L&R side)    This is a 72-year-old female who presents with generalized abdominal pain, constipation, constipation, and bilateral flank pain that has been ongoing for the past several days. Patient has a history of IBS. CT scan reveals constipation, left ovarian cyst. Labs unremarkable. Patient will be sent home with Bentyl, Zofran, and  Senna. She was advised to call her gastroenterologist to make a follow-up appointment. Low foodmap diet. Drink plenty of fluids. Patient was given the following medications:  Medications   0.9 % sodium chloride bolus (1,000 mLs IntraVENous New Bag 2/24/23 1641)   ondansetron (ZOFRAN) injection 4 mg (4 mg IntraVENous Given 2/24/23 1639)   famotidine (PEPCID) 20 mg in sodium chloride (PF) 0.9 % 10 mL injection (20 mg IntraVENous Given 2/24/23 1640)   dicyclomine (BENTYL) capsule 20 mg (20 mg Oral Given 2/24/23 1639)   iopamidol (ISOVUE-370) 76 % injection 75 mL (75 mLs IntraVENous Given 2/24/23 1730)      Differential diagnoses included but not limited to SBO, IBS flare, gastroenterology, constipation     Reassessment:  Patient was given Pepcid, Bentyl, Zofran, and IV fluids for their symptoms with mild improvement  Patient continues to be non-toxic on re-evaluation.      Chronic Conditions:   Past Medical History:   Diagnosis Date Depression     IBS (irritable bowel syndrome)     Incisional hernia     Seizure ([facility] [address] 08/24/2015    after delivery / not sure was a seizure, no issues since 2015, no medication     ED COURSE:      Any labs and imaging were reviewed by myself. Consultations:  None  Discussion with Other Profesionals : None    COUNSELING:   I have spoken with the patient/caregiver and discussed todays results, in addition to providing specific details for the plan of care and counseling regarding the diagnosis and prognosis and are agreeable with the plan. All results reviewed with pt and all questions answered. I discussed the differential, results and discharge plan with the patient and/or family/friend/caregiver if present. I emphasized the importance of follow-up with the physician I referred them to in the timeframe recommended. I explained reasons for the patient to return to the Emergency Department. Additional verbal discharge instructions were also given and discussed with the patient to supplement those generated by the EMR. We also discussed medications that were prescribed (if any) including common side effects and interactions. All questions were addressed. They understand return precautions and discharge instructions. The patient and/or family/friend/caregiver expressed understanding. Vitals were stable and they were in no distress at discharge. Findings were discussed with the patient and reasons to immediately return to the ED were articulated to them.      Patient will follow-up with their PMD and/or gastroenterology    DISPOSITION CONSIDERATIONS:    Disposition Considerations:  (include Tests not done, Shared Decision Making, Pt Expectation of Test or Tx.):   Appropriate for outpatient management        Social Determinants:  Social Determinants : None    MEDICATIONS:   DISCHARGE MEDICATIONS:  New Prescriptions    DICYCLOMINE (BENTYL) 20 MG TABLET    Take 1 tablet by mouth 3 times daily as needed (abdominal cramping)    ONDANSETRON (ZOFRAN-ODT) 4 MG DISINTEGRATING TABLET    Take 1 tablet by mouth every 8 hours as needed for Nausea or Vomiting    SENNOSIDES-DOCUSATE SODIUM (SENOKOT-S) 8.6-50 MG TABLET    Take 1 tablet by mouth daily     DISCONTINUED MEDICATIONS:  Discontinued Medications    No medications on file     DIAGNOSIS:     1. Generalized abdominal pain    2. History of IBS    3. Constipation, unspecified constipation type      This patient's ED course included: a personal history and physicial examination  This patient has remained hemodynamically stable during their ED course. DISPOSITION:   Discharge to home. Patient condition is good. Discharge Instructions:   Patient referred to  Jignesh Forte, [address] [place] [address]  [phone]    Call in 1 day  for follow-up on ED visit    [facility] Gastroenterology    Call in 1 day  for follow-up on ED visit    Electronically signed by Beryle Fail, PA-C   DD: 2/24/23  I am the Primary Clinician of Record. **This report was transcribed using voice recognition software. Every effort was made to ensure accuracy; however, inadvertent computerized transcription errors may be present.     END OF PROVIDER NOTE        Beryle Fail, PA-C  02/24/23 3337

## 2023-02-25 NOTE — ED NOTES
[facility] Radiology regarding delay in reading CT read.  CT was \"\" and will be sent back to radiologist to read      Jaime Barnes PA-C  23

## 2023-02-26 LAB — URINE CULTURE, ROUTINE: NORMAL

## 2023-02-27 ENCOUNTER — TELEPHONE (OUTPATIENT)
Dept: OBGYN | Age: 28
End: 2023-02-27

## 2023-02-27 LAB — URINE CULTURE, ROUTINE: NORMAL

## 2023-02-27 NOTE — TELEPHONE ENCOUNTER
Abdoul Steven has an appt scheduled in [place] on 3/23. She has a cyst on her ovary. She said she had an US and a CT scan (reports in chart) and that it is growing. Should she be seen sooner than 3/23?

## 2023-03-23 ENCOUNTER — OFFICE VISIT (OUTPATIENT)
Dept: OBGYN CLINIC | Age: 28
End: 2023-03-23
Payer: COMMERCIAL

## 2023-03-23 VITALS
SYSTOLIC BLOOD PRESSURE: 107 MMHG | HEIGHT: 62 IN | BODY MASS INDEX: 21.16 KG/M2 | HEART RATE: 97 BPM | DIASTOLIC BLOOD PRESSURE: 71 MMHG | WEIGHT: 115 LBS

## 2023-03-23 DIAGNOSIS — M62.89 PELVIC FLOOR DYSFUNCTION IN FEMALE: ICD-10-CM

## 2023-03-23 DIAGNOSIS — N83.202 LEFT OVARIAN CYST: ICD-10-CM

## 2023-03-23 DIAGNOSIS — Z09 FOLLOW-UP EXAM: Primary | ICD-10-CM

## 2023-03-23 PROBLEM — Z64.1 MULTIPARITY: Status: RESOLVED | Noted: 2022-03-29 | Resolved: 2023-03-23

## 2023-03-23 PROBLEM — Z03.71 ENCOUNTER FOR SUSPECTED PROM, WITH RUPTURE OF MEMBRANES NOT FOUND: Status: RESOLVED | Noted: 2022-01-09 | Resolved: 2023-03-23

## 2023-03-23 PROBLEM — Z3A.36 36 WEEKS GESTATION OF PREGNANCY: Status: RESOLVED | Noted: 2022-04-12 | Resolved: 2023-03-23

## 2023-03-23 PROBLEM — Z3A.29 29 WEEKS GESTATION OF PREGNANCY: Status: RESOLVED | Noted: 2022-02-23 | Resolved: 2023-03-23

## 2023-03-23 PROBLEM — Z3A.34 34 WEEKS GESTATION OF PREGNANCY: Status: RESOLVED | Noted: 2022-04-02 | Resolved: 2023-03-23

## 2023-03-23 PROCEDURE — 99203 OFFICE O/P NEW LOW 30 MIN: CPT | Performed by: OBSTETRICS & GYNECOLOGY

## 2023-03-23 RX ORDER — DROSPIRENONE AND ETHINYL ESTRADIOL 0.02-3(28)
1 KIT ORAL DAILY
Qty: 1 PACKET | Refills: 11 | Status: SHIPPED | OUTPATIENT
Start: 2023-03-23

## 2023-03-23 RX ORDER — DIAZEPAM 10 MG/1
TABLET ORAL
Qty: 30 TABLET | Refills: 5 | Status: SHIPPED | OUTPATIENT
Start: 2023-03-23 | End: 2023-09-23

## 2023-03-23 NOTE — PROGRESS NOTES
Np/follow up. Was in er 2/19 and was dx with ovarian cyst left side. 2nd er visit 2/24 revealed the cyst has grown in size. Pt still having pain and bloating. Last period was irregular. Pt denies pain and discomfort with intercourse.    Pt voiced not time for annual.
UPPER GASTROINTESTINAL ENDOSCOPY  04/30/2012    UPPER GASTROINTESTINAL ENDOSCOPY      UPPER GASTROINTESTINAL ENDOSCOPY  07/14/2017    VENTRAL HERNIA REPAIR N/A 05/04/2021    OPEN INCISIONAL HERNIA REPAIR performed by Merlin Ann MD at [facility] OR        Family History   Problem Relation Age of Onset    Cancer Father         bladder        Social History       Tobacco History       Smoking Status  Former Quit Date  3/31/2013 Smoking Tobacco Type  Cigarettes quit in 3/31/2013      Smokeless Tobacco Use  Never              Alcohol History       Alcohol Use Status  No              Drug Use       Drug Use Status  Never              Sexual Activity       Sexually Active  Yes Partners  Male                      Current Outpatient Medications:     drospirenone-ethinyl estradiol (GIORGI) 3-0.02 MG per tablet, Take 1 tablet by mouth daily, Disp: 1 packet, Rfl: 11    diazePAM (VALIUM) 10 MG tablet, Insert 1 tablet vaginally nightly x 6 months, Disp: 30 tablet, Rfl: 5    sennosides-docusate sodium (SENOKOT-S) 8.6-50 MG tablet, Take 1 tablet by mouth daily, Disp: 30 tablet, Rfl: 0    dicyclomine (BENTYL) 20 MG tablet, Take 1 tablet by mouth 3 times daily as needed (abdominal cramping), Disp: 60 tablet, Rfl: 0    mirtazapine (REMERON) 30 MG tablet, Take 30 mg by mouth nightly, Disp: , Rfl:      Allergies   Allergen Reactions    Naproxen      Dizziness,disoriented    Morphine Other (See Comments)     Flushing        Vitals:    03/23/23 1121   BP: 107/71   Pulse: 97        Physical Exam:  General: Alert and oriented in no acute distress sitting and resting comfortably on the table. Her abdomen is soft there is no guarding or rebound there is diffuse tenderness to deep palpation. Vulva is normal appearing  Vagina is normal  Mild vestibular tenderness  Tenderness to bilateral pelvic floor palpation iliococcygeus and pubococcygeus. There is tightness left greater than right.   No CMT no uterine tenderness no adnexal tenderness

## 2023-04-07 ENCOUNTER — HOSPITAL ENCOUNTER (OUTPATIENT)
Dept: ULTRASOUND IMAGING | Age: 28
Discharge: HOME | End: 2023-04-07
Payer: COMMERCIAL

## 2023-04-07 DIAGNOSIS — N83.202 LEFT OVARIAN CYST: ICD-10-CM

## 2023-04-07 PROCEDURE — 76856 US EXAM PELVIC COMPLETE: CPT

## 2023-04-07 PROCEDURE — 76830 TRANSVAGINAL US NON-OB: CPT

## 2023-05-10 ENCOUNTER — APPOINTMENT (OUTPATIENT)
Dept: CT IMAGING | Age: 28
End: 2023-05-10
Payer: COMMERCIAL

## 2023-05-10 ENCOUNTER — HOSPITAL ENCOUNTER (EMERGENCY)
Age: 28
Discharge: HOME | End: 2023-05-10
Attending: EMERGENCY MEDICINE
Payer: COMMERCIAL

## 2023-05-10 VITALS
DIASTOLIC BLOOD PRESSURE: 65 MMHG | BODY MASS INDEX: 21.16 KG/M2 | HEART RATE: 89 BPM | OXYGEN SATURATION: 100 % | RESPIRATION RATE: 16 BRPM | SYSTOLIC BLOOD PRESSURE: 108 MMHG | WEIGHT: 115 LBS | TEMPERATURE: 98.6 F | HEIGHT: 62 IN

## 2023-05-10 DIAGNOSIS — R19.7 DIARRHEA, UNSPECIFIED TYPE: Primary | ICD-10-CM

## 2023-05-10 DIAGNOSIS — K58.0 IRRITABLE BOWEL SYNDROME WITH DIARRHEA: ICD-10-CM

## 2023-05-10 LAB
ALBUMIN SERPL-MCNC: 4.7 G/DL (ref 3.5–5.2)
ALP SERPL-CCNC: 83 U/L (ref 35–104)
ALT SERPL-CCNC: 12 U/L (ref 0–32)
ANION GAP SERPL CALCULATED.3IONS-SCNC: 8 MMOL/L (ref 7–16)
AST SERPL-CCNC: 15 U/L (ref 0–31)
BASOPHILS # BLD: 0.05 E9/L (ref 0–0.2)
BASOPHILS NFR BLD: 0.6 % (ref 0–2)
BILIRUB SERPL-MCNC: 0.4 MG/DL (ref 0–1.2)
BILIRUB UR QL STRIP: NEGATIVE
BUN SERPL-MCNC: 8 MG/DL (ref 6–20)
CALCIUM SERPL-MCNC: 9.5 MG/DL (ref 8.6–10.2)
CHLORIDE SERPL-SCNC: 109 MMOL/L (ref 98–107)
CLARITY UR: CLEAR
CO2 SERPL-SCNC: 24 MMOL/L (ref 22–29)
COLOR UR: YELLOW
CREAT SERPL-MCNC: 0.6 MG/DL (ref 0.5–1)
EOSINOPHIL # BLD: 0.12 E9/L (ref 0.05–0.5)
EOSINOPHIL NFR BLD: 1.5 % (ref 0–6)
ERYTHROCYTE [DISTWIDTH] IN BLOOD BY AUTOMATED COUNT: 13.4 FL (ref 11.5–15)
GLUCOSE SERPL-MCNC: 88 MG/DL (ref 74–99)
GLUCOSE UR STRIP-MCNC: NEGATIVE MG/DL
HCG, URINE, POC: NEGATIVE
HCT VFR BLD AUTO: 40.7 % (ref 34–48)
HGB BLD-MCNC: 12.8 G/DL (ref 11.5–15.5)
HGB UR QL STRIP: NEGATIVE
IMM GRANULOCYTES # BLD: 0.01 E9/L
IMM GRANULOCYTES NFR BLD: 0.1 % (ref 0–5)
KETONES UR STRIP-MCNC: NEGATIVE MG/DL
LACTATE BLDV-SCNC: 0.7 MMOL/L (ref 0.5–1.9)
LEUKOCYTE ESTERASE UR QL STRIP: NEGATIVE
LIPASE: 28 U/L (ref 13–60)
LYMPHOCYTES # BLD: 2.79 E9/L (ref 1.5–4)
LYMPHOCYTES NFR BLD: 34.9 % (ref 20–42)
Lab: NORMAL
MCH RBC QN AUTO: 27.8 PG (ref 26–35)
MCHC RBC AUTO-ENTMCNC: 31.4 % (ref 32–34.5)
MCV RBC AUTO: 88.3 FL (ref 80–99.9)
MONOCYTES # BLD: 0.61 E9/L (ref 0.1–0.95)
MONOCYTES NFR BLD: 7.6 % (ref 2–12)
NEGATIVE QC PASS/FAIL: NORMAL
NEUTROPHILS # BLD: 4.41 E9/L (ref 1.8–7.3)
NEUTS SEG NFR BLD: 55.3 % (ref 43–80)
NITRITE UR QL STRIP: NEGATIVE
PH UR STRIP: 6.5 [PH] (ref 5–9)
PLATELET # BLD AUTO: 287 E9/L (ref 130–450)
PMV BLD AUTO: 11.1 FL (ref 7–12)
POSITIVE QC PASS/FAIL: NORMAL
POTASSIUM SERPL-SCNC: 3.9 MMOL/L (ref 3.5–5)
PROT SERPL-MCNC: 7.4 G/DL (ref 6.4–8.3)
PROT UR STRIP-MCNC: NEGATIVE MG/DL
RBC # BLD AUTO: 4.61 E12/L (ref 3.5–5.5)
SODIUM SERPL-SCNC: 141 MMOL/L (ref 132–146)
SP GR UR STRIP: 1.02 (ref 1–1.03)
UROBILINOGEN UR STRIP-ACNC: 0.2 E.U./DL
WBC # BLD: 8 E9/L (ref 4.5–11.5)

## 2023-05-10 PROCEDURE — 74177 CT ABD & PELVIS W/CONTRAST: CPT

## 2023-05-10 PROCEDURE — 85025 COMPLETE CBC W/AUTO DIFF WBC: CPT

## 2023-05-10 PROCEDURE — 6370000000 HC RX 637 (ALT 250 FOR IP): Performed by: EMERGENCY MEDICINE

## 2023-05-10 PROCEDURE — 83605 ASSAY OF LACTIC ACID: CPT

## 2023-05-10 PROCEDURE — 2580000003 HC RX 258: Performed by: EMERGENCY MEDICINE

## 2023-05-10 PROCEDURE — 80053 COMPREHEN METABOLIC PANEL: CPT

## 2023-05-10 PROCEDURE — 6360000004 HC RX CONTRAST MEDICATION: Performed by: RADIOLOGY

## 2023-05-10 PROCEDURE — 83690 ASSAY OF LIPASE: CPT

## 2023-05-10 PROCEDURE — 99285 EMERGENCY DEPT VISIT HI MDM: CPT

## 2023-05-10 PROCEDURE — 81003 URINALYSIS AUTO W/O SCOPE: CPT

## 2023-05-10 RX ORDER — ACETAMINOPHEN 500 MG
1000 TABLET ORAL ONCE
Status: COMPLETED | OUTPATIENT
Start: 2023-05-10 | End: 2023-05-10

## 2023-05-10 RX ORDER — 0.9 % SODIUM CHLORIDE 0.9 %
1000 INTRAVENOUS SOLUTION INTRAVENOUS ONCE
Status: COMPLETED | OUTPATIENT
Start: 2023-05-10 | End: 2023-05-10

## 2023-05-10 RX ADMIN — IOPAMIDOL 75 ML: 755 INJECTION, SOLUTION INTRAVENOUS at 18:55

## 2023-05-10 RX ADMIN — SODIUM CHLORIDE 1000 ML: 9 INJECTION, SOLUTION INTRAVENOUS at 18:06

## 2023-05-10 RX ADMIN — ACETAMINOPHEN 1000 MG: 500 TABLET ORAL at 18:07

## 2023-05-10 ASSESSMENT — PAIN SCALES - GENERAL: PAINLEVEL_OUTOF10: 5

## 2023-05-10 ASSESSMENT — PAIN DESCRIPTION - ORIENTATION: ORIENTATION: LOWER

## 2023-05-10 ASSESSMENT — PAIN DESCRIPTION - LOCATION: LOCATION: BACK;ABDOMEN

## 2023-05-10 ASSESSMENT — PAIN DESCRIPTION - DESCRIPTORS: DESCRIPTORS: STABBING;PRESSURE

## 2023-05-10 NOTE — ED PROVIDER NOTES
[address]        Pt Name: Elnora Peabody  MRN: [id number]  [place] 1995  Date of evaluation: 5/10/2023  Provider: Bobbi Dodson MD  PCP: ERMIAS Claudio CNP  Note Started: 5:51 PM EDT 5/10/23    CHIEF COMPLAINT       Chief Complaint   Patient presents with    Back Pain     bilat. lower back pain with post void burning, ongoing 3 days. Dysuria       HISTORY OF PRESENT ILLNESS: 1 or more Elements   History From:  patient    Limitations to history : None    Elnora Peabody is a 32 y.o. female who presents presents to the emergency department with 2 days of watery diarrhea. Patient states has a history of IBS and is not unusual for her to have this. She has not had any nausea or vomiting. States she had about 6-8 bouts of diarrhea yesterday and today no blood in her stool. She follows with [facility] doctors for her IBS. Patient states that she had some mild dysuria yesterday. No urgency or frequency no blood in her urine. No vaginal discharge or vaginal bleeding. She also reports bilateral dull low flank pain she was concerned about kidney infection she came in for further evaluation. No fevers or chills. She is eating and drinking normally. Nursing Notes were all reviewed and agreed with or any disagreements were addressed in the HPI. REVIEW OF EXTERNAL NOTE :       none    REVIEW OF SYSTEMS :           Positives and Pertinent negatives as per HPI.      SURGICAL HISTORY     Past Surgical History:   Procedure Laterality Date    ABDOMEN SURGERY       SECTION  2015     SECTION N/A 2022     SECTION performed by Bertin Bob DO at [facility] L&D OR    CHOLECYSTECTOMY  2017    ROBOT ASSISTED    COLONOSCOPY  2012    DILATION AND CURETTAGE OF UTERUS      ablation    HEMORRHOID SURGERY  2020and 2021    LAPAROSCOPY N/A 10/18/2019

## 2023-05-10 NOTE — ED TRIAGE NOTES
FIRST PROVIDER CONTACT ASSESSMENT NOTE       Department of Emergency Medicine                 First Provider Note            5/10/23  4:01 PM EDT    Date of Encounter: No admission date for patient encounter. Patient Name: Raysa Forte  : 1995  MRN: [id number]    Chief Complaint: Back Pain (bilat. lower back pain with post void burning, ongoing 3 days.) and Dysuria      History of Present Illness:   Raysa Forte is a 32 y.o. female who presents to the ED for Patient having back pain, dysuria and suprapubic pain. Previous UTI/kidney infections. Focused Physical Exam:  VS:    ED Triage Vitals [05/10/23 1558]   BP Temp Temp Source Pulse Respirations SpO2 Height Weight - Scale   108/65 98.6 °F (37 °C) Oral 89 16 100 % 5' 2\" (1.575 m) 115 lb (52.2 kg)        Physical Ex: Constitutional: Alert and non-toxic. Medical History:  has a past medical history of Depression, IBS (irritable bowel syndrome), Incisional hernia, and Seizure ([facility] [address] Surgical History:  has a past surgical history that includes Colonoscopy (2012); Upper gastrointestinal endoscopy (2012); Upper gastrointestinal endoscopy;  section (2015); Upper gastrointestinal endoscopy (2017); Dilation and curettage of uterus; Cholecystectomy (2017); pr  delivery only (N/A, 2018); laparoscopy (N/A, 10/18/2019); Hemorrhoid surgery (2020and 2021); ventral hernia repair (N/A, 2021); Abdomen surgery; and  section (N/A, 2022). Social History:  reports that she quit smoking about 10 years ago. Her smoking use included cigarettes. She has never used smokeless tobacco. She reports that she does not drink alcohol and does not use drugs. Family History: family history includes Cancer in her father.     Allergies: Naproxen and Morphine     Initial Plan of Care: Initiate Treatment-Testing, Proceed toTreatment Area When Bed Available for ED Attending/MLP to Continue

## 2023-05-30 ENCOUNTER — TELEPHONE (OUTPATIENT)
Dept: OBGYN | Age: 28
End: 2023-05-30

## 2023-05-31 ENCOUNTER — APPOINTMENT (OUTPATIENT)
Dept: ULTRASOUND IMAGING | Age: 28
End: 2023-05-31
Payer: COMMERCIAL

## 2023-05-31 ENCOUNTER — HOSPITAL ENCOUNTER (EMERGENCY)
Age: 28
Discharge: HOME | End: 2023-05-31
Payer: COMMERCIAL

## 2023-05-31 VITALS
HEIGHT: 62 IN | OXYGEN SATURATION: 100 % | DIASTOLIC BLOOD PRESSURE: 70 MMHG | BODY MASS INDEX: 21.16 KG/M2 | HEART RATE: 86 BPM | RESPIRATION RATE: 16 BRPM | SYSTOLIC BLOOD PRESSURE: 107 MMHG | TEMPERATURE: 97.8 F | WEIGHT: 115 LBS

## 2023-05-31 DIAGNOSIS — N93.9 VAGINAL BLEEDING: Primary | ICD-10-CM

## 2023-05-31 DIAGNOSIS — R74.8 ELEVATED LIVER ENZYMES: ICD-10-CM

## 2023-05-31 LAB
ALBUMIN SERPL-MCNC: 4.6 G/DL (ref 3.5–5.2)
ALP SERPL-CCNC: 101 U/L (ref 35–104)
ALT SERPL-CCNC: 79 U/L (ref 0–32)
ANION GAP SERPL CALCULATED.3IONS-SCNC: 8 MMOL/L (ref 7–16)
AST SERPL-CCNC: 57 U/L (ref 0–31)
BACTERIA URNS QL MICRO: NORMAL /HPF
BASOPHILS # BLD: 0.06 E9/L (ref 0–0.2)
BASOPHILS NFR BLD: 0.7 % (ref 0–2)
BILIRUB SERPL-MCNC: 0.3 MG/DL (ref 0–1.2)
BILIRUB UR QL STRIP: NEGATIVE
BUN SERPL-MCNC: 8 MG/DL (ref 6–20)
CALCIUM SERPL-MCNC: 9.5 MG/DL (ref 8.6–10.2)
CHLORIDE SERPL-SCNC: 109 MMOL/L (ref 98–107)
CLARITY UR: CLEAR
CO2 SERPL-SCNC: 25 MMOL/L (ref 22–29)
COLOR UR: YELLOW
CREAT SERPL-MCNC: 0.5 MG/DL (ref 0.5–1)
EOSINOPHIL # BLD: 0.17 E9/L (ref 0.05–0.5)
EOSINOPHIL NFR BLD: 1.9 % (ref 0–6)
ERYTHROCYTE [DISTWIDTH] IN BLOOD BY AUTOMATED COUNT: 13.8 FL (ref 11.5–15)
GLUCOSE SERPL-MCNC: 100 MG/DL (ref 74–99)
GLUCOSE UR STRIP-MCNC: NEGATIVE MG/DL
HCG, URINE, POC: NEGATIVE
HCT VFR BLD AUTO: 42.4 % (ref 34–48)
HGB BLD-MCNC: 13.6 G/DL (ref 11.5–15.5)
HGB UR QL STRIP: ABNORMAL
IMM GRANULOCYTES # BLD: 0.03 E9/L
IMM GRANULOCYTES NFR BLD: 0.3 % (ref 0–5)
KETONES UR STRIP-MCNC: NEGATIVE MG/DL
LEUKOCYTE ESTERASE UR QL STRIP: NEGATIVE
LYMPHOCYTES # BLD: 2.47 E9/L (ref 1.5–4)
LYMPHOCYTES NFR BLD: 27 % (ref 20–42)
Lab: NORMAL
MCH RBC QN AUTO: 28.8 PG (ref 26–35)
MCHC RBC AUTO-ENTMCNC: 32.1 % (ref 32–34.5)
MCV RBC AUTO: 89.6 FL (ref 80–99.9)
MONOCYTES # BLD: 0.71 E9/L (ref 0.1–0.95)
MONOCYTES NFR BLD: 7.8 % (ref 2–12)
NEGATIVE QC PASS/FAIL: NORMAL
NEUTROPHILS # BLD: 5.71 E9/L (ref 1.8–7.3)
NEUTS SEG NFR BLD: 62.3 % (ref 43–80)
NITRITE UR QL STRIP: NEGATIVE
PH UR STRIP: 6.5 [PH] (ref 5–9)
PLATELET # BLD AUTO: 257 E9/L (ref 130–450)
PMV BLD AUTO: 11.2 FL (ref 7–12)
POSITIVE QC PASS/FAIL: NORMAL
POTASSIUM SERPL-SCNC: 4.3 MMOL/L (ref 3.5–5)
PROT SERPL-MCNC: 7.6 G/DL (ref 6.4–8.3)
PROT UR STRIP-MCNC: NEGATIVE MG/DL
RBC # BLD AUTO: 4.73 E12/L (ref 3.5–5.5)
RBC #/AREA URNS HPF: NORMAL /HPF (ref 0–2)
SODIUM SERPL-SCNC: 142 MMOL/L (ref 132–146)
SP GR UR STRIP: 1.01 (ref 1–1.03)
UROBILINOGEN UR STRIP-ACNC: 0.2 E.U./DL
WBC # BLD: 9.2 E9/L (ref 4.5–11.5)
WBC #/AREA URNS HPF: NORMAL /HPF (ref 0–5)

## 2023-05-31 PROCEDURE — 76830 TRANSVAGINAL US NON-OB: CPT

## 2023-05-31 PROCEDURE — 2580000003 HC RX 258: Performed by: NURSE PRACTITIONER

## 2023-05-31 PROCEDURE — 80053 COMPREHEN METABOLIC PANEL: CPT

## 2023-05-31 PROCEDURE — 99284 EMERGENCY DEPT VISIT MOD MDM: CPT

## 2023-05-31 PROCEDURE — 36415 COLL VENOUS BLD VENIPUNCTURE: CPT

## 2023-05-31 PROCEDURE — 81001 URINALYSIS AUTO W/SCOPE: CPT

## 2023-05-31 PROCEDURE — 85025 COMPLETE CBC W/AUTO DIFF WBC: CPT

## 2023-05-31 RX ORDER — 0.9 % SODIUM CHLORIDE 0.9 %
1000 INTRAVENOUS SOLUTION INTRAVENOUS ONCE
Status: COMPLETED | OUTPATIENT
Start: 2023-05-31 | End: 2023-05-31

## 2023-05-31 RX ADMIN — SODIUM CHLORIDE 1000 ML: 9 INJECTION, SOLUTION INTRAVENOUS at 17:25

## 2023-05-31 ASSESSMENT — PAIN - FUNCTIONAL ASSESSMENT: PAIN_FUNCTIONAL_ASSESSMENT: NONE - DENIES PAIN

## 2023-05-31 NOTE — ED PROVIDER NOTES
Independent LISET Visit. [facility]  Department of Emergency Medicine   ED  Encounter Note  Admit Date/RoomTime: 2023  4:52 PM  ED Room:     NAME: Bebo Yancey  : 1995  MRN: [id number]     Chief Complaint:  Vaginal Bleeding (Started yesterday, LMP  not sure if this is her cycle or not- passed tissue) and Abdominal Cramping (intermittent)    History of Present Illness       Bebo Yancey is a 32 y.o. old female who presents to the emergency department by private vehicle for abnormal bleeding, which occured 1 day(s) prior to arrival.  Since onset the symptoms have been gradually improving and mild in severity. Symptoms are associated with bilateral lower abdominal cramps and denies any fevers, chills, back pain, dysuria, urine frequency, hematuria, diarrhea, constipation, nausea, vomiting. She takes no blood thinning agents. No LMP recorded. . [id number]. GYN/STD Hx: . Patient had a history of a miscarriage. She states her last menstrual cycle was the end of April. She states he started having vaginal yesterday and that was over the menstrual cycle however had a large blood clot that she felt could be a passage of tissue and feels that she may have a miscarriage and she did not take a urine pregnancy test.  She has a blood type being O+    ROS   Pertinent positives and negatives are stated within HPI, all other systems reviewed and are negative. Past Medical History:  has a past medical history of Depression, IBS (irritable bowel syndrome), Incisional hernia, and Seizure ([facility] [address] Surgical History:  has a past surgical history that includes Colonoscopy (2012); Upper gastrointestinal endoscopy (2012); Upper gastrointestinal endoscopy;  section (2015); Upper gastrointestinal endoscopy (2017); Dilation and curettage of uterus;  Cholecystectomy (2017); pr  delivery only (N/A, 2018); laparoscopy (N/A,

## 2023-08-04 ENCOUNTER — TRANSCRIBE ORDERS (OUTPATIENT)
Dept: ADMINISTRATIVE | Age: 28
End: 2023-08-04

## 2023-08-04 DIAGNOSIS — H47.011 ANTERIOR ISCHEMIC OPTIC NEUROPATHY OF RIGHT EYE: Primary | ICD-10-CM

## 2023-08-06 ENCOUNTER — HOSPITAL ENCOUNTER (EMERGENCY)
Age: 28
Discharge: LEFT BEFORE DISCHARGE | End: 2023-08-06
Attending: STUDENT IN AN ORGANIZED HEALTH CARE EDUCATION/TRAINING PROGRAM
Payer: COMMERCIAL

## 2023-08-06 ENCOUNTER — APPOINTMENT (OUTPATIENT)
Dept: CT IMAGING | Age: 28
End: 2023-08-06
Payer: COMMERCIAL

## 2023-08-06 VITALS
DIASTOLIC BLOOD PRESSURE: 62 MMHG | SYSTOLIC BLOOD PRESSURE: 103 MMHG | HEIGHT: 63 IN | OXYGEN SATURATION: 99 % | BODY MASS INDEX: 20.38 KG/M2 | WEIGHT: 115 LBS | RESPIRATION RATE: 12 BRPM | TEMPERATURE: 98 F | HEART RATE: 77 BPM

## 2023-08-06 DIAGNOSIS — R53.1 GENERAL WEAKNESS: Primary | ICD-10-CM

## 2023-08-06 DIAGNOSIS — H53.9 VISION CHANGES: ICD-10-CM

## 2023-08-06 LAB
ALBUMIN SERPL-MCNC: 4.5 G/DL (ref 3.5–5.2)
ALP SERPL-CCNC: 68 U/L (ref 35–104)
ALT SERPL-CCNC: 10 U/L (ref 0–32)
ANION GAP SERPL CALCULATED.3IONS-SCNC: 8 MMOL/L (ref 7–16)
AST SERPL-CCNC: 13 U/L (ref 0–31)
BACTERIA URNS QL MICRO: ABNORMAL
BASOPHILS # BLD: 0.05 K/UL (ref 0–0.2)
BASOPHILS NFR BLD: 1 % (ref 0–2)
BILIRUB SERPL-MCNC: 0.3 MG/DL (ref 0–1.2)
BILIRUB UR QL STRIP: NEGATIVE
BUN SERPL-MCNC: 11 MG/DL (ref 6–20)
CALCIUM SERPL-MCNC: 8.9 MG/DL (ref 8.6–10.2)
CHLORIDE SERPL-SCNC: 108 MMOL/L (ref 98–107)
CLARITY UR: CLEAR
CO2 SERPL-SCNC: 23 MMOL/L (ref 22–29)
COLOR UR: YELLOW
CREAT SERPL-MCNC: 0.7 MG/DL (ref 0.5–1)
EOSINOPHIL # BLD: 0.07 K/UL (ref 0.05–0.5)
EOSINOPHILS RELATIVE PERCENT: 1 % (ref 0–6)
ERYTHROCYTE [DISTWIDTH] IN BLOOD BY AUTOMATED COUNT: 13.6 % (ref 11.5–15)
GFR SERPL CREATININE-BSD FRML MDRD: >60 ML/MIN/1.73M2
GLUCOSE SERPL-MCNC: 77 MG/DL (ref 74–99)
GLUCOSE UR STRIP-MCNC: NEGATIVE MG/DL
HCG, URINE, POC: NEGATIVE
HCT VFR BLD AUTO: 41.2 % (ref 34–48)
HGB BLD-MCNC: 13.2 G/DL (ref 11.5–15.5)
HGB UR QL STRIP.AUTO: ABNORMAL
IMM GRANULOCYTES # BLD AUTO: <0.03 K/UL (ref 0–0.58)
IMM GRANULOCYTES NFR BLD: 0 % (ref 0–5)
KETONES UR STRIP-MCNC: NEGATIVE MG/DL
LEUKOCYTE ESTERASE UR QL STRIP: NEGATIVE
LYMPHOCYTES NFR BLD: 2.35 K/UL (ref 1.5–4)
LYMPHOCYTES RELATIVE PERCENT: 32 % (ref 20–42)
Lab: NORMAL
MCH RBC QN AUTO: 28.6 PG (ref 26–35)
MCHC RBC AUTO-ENTMCNC: 32 G/DL (ref 32–34.5)
MCV RBC AUTO: 89.4 FL (ref 80–99.9)
MONOCYTES NFR BLD: 0.58 K/UL (ref 0.1–0.95)
MONOCYTES NFR BLD: 8 % (ref 2–12)
NEGATIVE QC PASS/FAIL: NORMAL
NEUTROPHILS NFR BLD: 58 % (ref 43–80)
NEUTS SEG NFR BLD: 4.27 K/UL (ref 1.8–7.3)
NITRITE UR QL STRIP: NEGATIVE
PH UR STRIP: 6 [PH] (ref 5–9)
PLATELET # BLD AUTO: 236 K/UL (ref 130–450)
PMV BLD AUTO: 11.4 FL (ref 7–12)
POSITIVE QC PASS/FAIL: NORMAL
POTASSIUM SERPL-SCNC: 3.7 MMOL/L (ref 3.5–5)
PROT SERPL-MCNC: 7.1 G/DL (ref 6.4–8.3)
PROT UR STRIP-MCNC: NEGATIVE MG/DL
RBC # BLD AUTO: 4.61 M/UL (ref 3.5–5.5)
RBC #/AREA URNS HPF: ABNORMAL /HPF
SODIUM SERPL-SCNC: 139 MMOL/L (ref 132–146)
SP GR UR STRIP: >1.03 (ref 1–1.03)
TROPONIN I SERPL HS-MCNC: <6 NG/L (ref 0–9)
UROBILINOGEN UR STRIP-ACNC: 0.2 EU/DL (ref 0–1)
WBC #/AREA URNS HPF: ABNORMAL /HPF
WBC OTHER # BLD: 7.3 K/UL (ref 4.5–11.5)

## 2023-08-06 PROCEDURE — 6360000002 HC RX W HCPCS: Performed by: STUDENT IN AN ORGANIZED HEALTH CARE EDUCATION/TRAINING PROGRAM

## 2023-08-06 PROCEDURE — 85025 COMPLETE CBC W/AUTO DIFF WBC: CPT

## 2023-08-06 PROCEDURE — 84484 ASSAY OF TROPONIN QUANT: CPT

## 2023-08-06 PROCEDURE — 99285 EMERGENCY DEPT VISIT HI MDM: CPT

## 2023-08-06 PROCEDURE — 6360000004 HC RX CONTRAST MEDICATION: Performed by: RADIOLOGY

## 2023-08-06 PROCEDURE — 80053 COMPREHEN METABOLIC PANEL: CPT

## 2023-08-06 PROCEDURE — 81001 URINALYSIS AUTO W/SCOPE: CPT

## 2023-08-06 PROCEDURE — 2580000003 HC RX 258: Performed by: STUDENT IN AN ORGANIZED HEALTH CARE EDUCATION/TRAINING PROGRAM

## 2023-08-06 PROCEDURE — 96374 THER/PROPH/DIAG INJ IV PUSH: CPT

## 2023-08-06 PROCEDURE — 70496 CT ANGIOGRAPHY HEAD: CPT

## 2023-08-06 PROCEDURE — 70450 CT HEAD/BRAIN W/O DYE: CPT

## 2023-08-06 PROCEDURE — 96375 TX/PRO/DX INJ NEW DRUG ADDON: CPT

## 2023-08-06 RX ORDER — DIPHENHYDRAMINE HYDROCHLORIDE 50 MG/ML
25 INJECTION INTRAMUSCULAR; INTRAVENOUS ONCE
Status: COMPLETED | OUTPATIENT
Start: 2023-08-06 | End: 2023-08-06

## 2023-08-06 RX ORDER — METOCLOPRAMIDE HYDROCHLORIDE 5 MG/ML
10 INJECTION INTRAMUSCULAR; INTRAVENOUS ONCE
Status: COMPLETED | OUTPATIENT
Start: 2023-08-06 | End: 2023-08-06

## 2023-08-06 RX ORDER — 0.9 % SODIUM CHLORIDE 0.9 %
1000 INTRAVENOUS SOLUTION INTRAVENOUS ONCE
Status: COMPLETED | OUTPATIENT
Start: 2023-08-06 | End: 2023-08-06

## 2023-08-06 RX ADMIN — METOCLOPRAMIDE 10 MG: 5 INJECTION, SOLUTION INTRAMUSCULAR; INTRAVENOUS at 17:30

## 2023-08-06 RX ADMIN — IOPAMIDOL 75 ML: 755 INJECTION, SOLUTION INTRAVENOUS at 19:38

## 2023-08-06 RX ADMIN — DIPHENHYDRAMINE HYDROCHLORIDE 25 MG: 50 INJECTION, SOLUTION INTRAMUSCULAR; INTRAVENOUS at 17:29

## 2023-08-06 RX ADMIN — SODIUM CHLORIDE 1000 ML: 9 INJECTION, SOLUTION INTRAVENOUS at 17:28

## 2023-08-06 ASSESSMENT — PAIN - FUNCTIONAL ASSESSMENT: PAIN_FUNCTIONAL_ASSESSMENT: NONE - DENIES PAIN

## 2023-08-06 ASSESSMENT — LIFESTYLE VARIABLES
HOW MANY STANDARD DRINKS CONTAINING ALCOHOL DO YOU HAVE ON A TYPICAL DAY: PATIENT DOES NOT DRINK
HOW OFTEN DO YOU HAVE A DRINK CONTAINING ALCOHOL: NEVER

## 2023-08-07 ENCOUNTER — HOSPITAL ENCOUNTER (OUTPATIENT)
Age: 28
Setting detail: OBSERVATION
Discharge: HOME | End: 2023-08-10
Attending: EMERGENCY MEDICINE | Admitting: INTERNAL MEDICINE
Payer: COMMERCIAL

## 2023-08-07 ENCOUNTER — APPOINTMENT (OUTPATIENT)
Dept: MRI IMAGING | Age: 28
End: 2023-08-07
Payer: COMMERCIAL

## 2023-08-07 DIAGNOSIS — R20.2 PARESTHESIA OF RIGHT LOWER EXTREMITY: ICD-10-CM

## 2023-08-07 DIAGNOSIS — H53.10 SUBJECTIVE VISUAL DISTURBANCE OF RIGHT EYE: ICD-10-CM

## 2023-08-07 DIAGNOSIS — R20.2 PARESTHESIA OF RIGHT UPPER EXTREMITY: Primary | ICD-10-CM

## 2023-08-07 PROBLEM — R53.1 RIGHT SIDED WEAKNESS: Status: ACTIVE | Noted: 2023-08-07

## 2023-08-07 PROBLEM — R29.898 WEAKNESS OF RIGHT ARM: Status: ACTIVE | Noted: 2023-08-07

## 2023-08-07 LAB
ANION GAP SERPL CALCULATED.3IONS-SCNC: 9 MMOL/L (ref 7–16)
BASOPHILS # BLD: 0.05 K/UL (ref 0–0.2)
BASOPHILS NFR BLD: 1 % (ref 0–2)
BUN SERPL-MCNC: 7 MG/DL (ref 6–20)
CALCIUM SERPL-MCNC: 9 MG/DL (ref 8.6–10.2)
CHLORIDE SERPL-SCNC: 108 MMOL/L (ref 98–107)
CO2 SERPL-SCNC: 22 MMOL/L (ref 22–29)
CREAT SERPL-MCNC: 0.7 MG/DL (ref 0.5–1)
EOSINOPHIL # BLD: 0.07 K/UL (ref 0.05–0.5)
EOSINOPHILS RELATIVE PERCENT: 1 % (ref 0–6)
ERYTHROCYTE [DISTWIDTH] IN BLOOD BY AUTOMATED COUNT: 13.6 % (ref 11.5–15)
GFR SERPL CREATININE-BSD FRML MDRD: >60 ML/MIN/1.73M2
GLUCOSE SERPL-MCNC: 80 MG/DL (ref 74–99)
HCG, URINE, POC: NEGATIVE
HCT VFR BLD AUTO: 42.7 % (ref 34–48)
HGB BLD-MCNC: 13.2 G/DL (ref 11.5–15.5)
IMM GRANULOCYTES # BLD AUTO: <0.03 K/UL (ref 0–0.58)
IMM GRANULOCYTES NFR BLD: 0 % (ref 0–5)
LYMPHOCYTES NFR BLD: 2.12 K/UL (ref 1.5–4)
LYMPHOCYTES RELATIVE PERCENT: 32 % (ref 20–42)
Lab: NORMAL
MCH RBC QN AUTO: 28.7 PG (ref 26–35)
MCHC RBC AUTO-ENTMCNC: 30.9 G/DL (ref 32–34.5)
MCV RBC AUTO: 92.8 FL (ref 80–99.9)
MONOCYTES NFR BLD: 0.67 K/UL (ref 0.1–0.95)
MONOCYTES NFR BLD: 10 % (ref 2–12)
NEGATIVE QC PASS/FAIL: NORMAL
NEUTROPHILS NFR BLD: 57 % (ref 43–80)
NEUTS SEG NFR BLD: 3.78 K/UL (ref 1.8–7.3)
PLATELET # BLD AUTO: 230 K/UL (ref 130–450)
PMV BLD AUTO: 11.5 FL (ref 7–12)
POSITIVE QC PASS/FAIL: NORMAL
POTASSIUM SERPL-SCNC: 4 MMOL/L (ref 3.5–5)
RBC # BLD AUTO: 4.6 M/UL (ref 3.5–5.5)
SODIUM SERPL-SCNC: 139 MMOL/L (ref 132–146)
WBC OTHER # BLD: 6.7 K/UL (ref 4.5–11.5)

## 2023-08-07 PROCEDURE — G0378 HOSPITAL OBSERVATION PER HR: HCPCS

## 2023-08-07 PROCEDURE — 2580000003 HC RX 258: Performed by: STUDENT IN AN ORGANIZED HEALTH CARE EDUCATION/TRAINING PROGRAM

## 2023-08-07 PROCEDURE — 6360000002 HC RX W HCPCS: Performed by: STUDENT IN AN ORGANIZED HEALTH CARE EDUCATION/TRAINING PROGRAM

## 2023-08-07 PROCEDURE — 85025 COMPLETE CBC W/AUTO DIFF WBC: CPT

## 2023-08-07 PROCEDURE — 6370000000 HC RX 637 (ALT 250 FOR IP): Performed by: STUDENT IN AN ORGANIZED HEALTH CARE EDUCATION/TRAINING PROGRAM

## 2023-08-07 PROCEDURE — 99223 1ST HOSP IP/OBS HIGH 75: CPT | Performed by: INTERNAL MEDICINE

## 2023-08-07 PROCEDURE — 96372 THER/PROPH/DIAG INJ SC/IM: CPT

## 2023-08-07 PROCEDURE — 70553 MRI BRAIN STEM W/O & W/DYE: CPT

## 2023-08-07 PROCEDURE — 80048 BASIC METABOLIC PNL TOTAL CA: CPT

## 2023-08-07 PROCEDURE — 99285 EMERGENCY DEPT VISIT HI MDM: CPT

## 2023-08-07 PROCEDURE — A9577 INJ MULTIHANCE: HCPCS | Performed by: RADIOLOGY

## 2023-08-07 PROCEDURE — 6360000004 HC RX CONTRAST MEDICATION: Performed by: RADIOLOGY

## 2023-08-07 RX ORDER — ENOXAPARIN SODIUM 100 MG/ML
40 INJECTION SUBCUTANEOUS DAILY
Status: DISCONTINUED | OUTPATIENT
Start: 2023-08-07 | End: 2023-08-10 | Stop reason: HOSPADM

## 2023-08-07 RX ORDER — SODIUM CHLORIDE 0.9 % (FLUSH) 0.9 %
5-40 SYRINGE (ML) INJECTION PRN
Status: DISCONTINUED | OUTPATIENT
Start: 2023-08-07 | End: 2023-08-10 | Stop reason: HOSPADM

## 2023-08-07 RX ORDER — SODIUM CHLORIDE 0.9 % (FLUSH) 0.9 %
5-40 SYRINGE (ML) INJECTION EVERY 12 HOURS SCHEDULED
Status: DISCONTINUED | OUTPATIENT
Start: 2023-08-07 | End: 2023-08-10 | Stop reason: HOSPADM

## 2023-08-07 RX ORDER — DIAZEPAM 5 MG/1
5 TABLET ORAL
Status: COMPLETED | OUTPATIENT
Start: 2023-08-07 | End: 2023-08-07

## 2023-08-07 RX ORDER — ACETAMINOPHEN 650 MG/1
650 SUPPOSITORY RECTAL EVERY 6 HOURS PRN
Status: DISCONTINUED | OUTPATIENT
Start: 2023-08-07 | End: 2023-08-10 | Stop reason: HOSPADM

## 2023-08-07 RX ORDER — POLYETHYLENE GLYCOL 3350 17 G/17G
17 POWDER, FOR SOLUTION ORAL DAILY PRN
Status: DISCONTINUED | OUTPATIENT
Start: 2023-08-07 | End: 2023-08-10 | Stop reason: HOSPADM

## 2023-08-07 RX ORDER — ACETAMINOPHEN 325 MG/1
650 TABLET ORAL EVERY 6 HOURS PRN
Status: DISCONTINUED | OUTPATIENT
Start: 2023-08-07 | End: 2023-08-10 | Stop reason: HOSPADM

## 2023-08-07 RX ORDER — SODIUM CHLORIDE 9 MG/ML
INJECTION, SOLUTION INTRAVENOUS PRN
Status: DISCONTINUED | OUTPATIENT
Start: 2023-08-07 | End: 2023-08-10 | Stop reason: HOSPADM

## 2023-08-07 RX ORDER — MIRTAZAPINE 15 MG/1
30 TABLET, FILM COATED ORAL NIGHTLY
Status: DISCONTINUED | OUTPATIENT
Start: 2023-08-07 | End: 2023-08-10 | Stop reason: HOSPADM

## 2023-08-07 RX ORDER — ONDANSETRON 4 MG/1
4 TABLET, ORALLY DISINTEGRATING ORAL EVERY 8 HOURS PRN
Status: DISCONTINUED | OUTPATIENT
Start: 2023-08-07 | End: 2023-08-10 | Stop reason: HOSPADM

## 2023-08-07 RX ORDER — ONDANSETRON 2 MG/ML
4 INJECTION INTRAMUSCULAR; INTRAVENOUS EVERY 6 HOURS PRN
Status: DISCONTINUED | OUTPATIENT
Start: 2023-08-07 | End: 2023-08-10 | Stop reason: HOSPADM

## 2023-08-07 RX ADMIN — ENOXAPARIN SODIUM 40 MG: 100 INJECTION SUBCUTANEOUS at 19:34

## 2023-08-07 RX ADMIN — MIRTAZAPINE 30 MG: 15 TABLET, FILM COATED ORAL at 20:09

## 2023-08-07 RX ADMIN — DIAZEPAM 5 MG: 5 TABLET ORAL at 15:24

## 2023-08-07 RX ADMIN — GADOBENATE DIMEGLUMINE 10 ML: 529 INJECTION, SOLUTION INTRAVENOUS at 16:59

## 2023-08-07 RX ADMIN — SODIUM CHLORIDE, PRESERVATIVE FREE 10 ML: 5 INJECTION INTRAVENOUS at 20:10

## 2023-08-07 ASSESSMENT — PAIN SCALES - GENERAL
PAINLEVEL_OUTOF10: 0
PAINLEVEL_OUTOF10: 5

## 2023-08-07 ASSESSMENT — PATIENT HEALTH QUESTIONNAIRE - PHQ9: SUM OF ALL RESPONSES TO PHQ QUESTIONS 1-9: 11

## 2023-08-07 ASSESSMENT — ENCOUNTER SYMPTOMS: RESPIRATORY NEGATIVE: 1

## 2023-08-07 ASSESSMENT — PAIN DESCRIPTION - ORIENTATION: ORIENTATION: RIGHT

## 2023-08-07 ASSESSMENT — PAIN - FUNCTIONAL ASSESSMENT: PAIN_FUNCTIONAL_ASSESSMENT: 0-10

## 2023-08-07 ASSESSMENT — LIFESTYLE VARIABLES
HOW OFTEN DO YOU HAVE A DRINK CONTAINING ALCOHOL: NEVER
HOW MANY STANDARD DRINKS CONTAINING ALCOHOL DO YOU HAVE ON A TYPICAL DAY: PATIENT DOES NOT DRINK

## 2023-08-07 ASSESSMENT — PAIN DESCRIPTION - LOCATION: LOCATION: ARM

## 2023-08-07 ASSESSMENT — PAIN DESCRIPTION - DESCRIPTORS: DESCRIPTORS: BURNING

## 2023-08-07 NOTE — H&P
[address] [place] [address]  Internal Medicine Residency Program  History and Physical    Patient:  Omar Khoury 32 y.o. female   MRN: [id number]       Date of Service: 2023          Chief complaint: had concerns including Extremity Weakness (Right sided weakness and blurry vision on right side and seen in renetta-left AMA. Pt states she was going to be sent here for MRI to r/o MS. Mujica. ). History of Present Illness   The patient is a 32 y.o. female , with past medical history of Seizure, Depression, Irritable bowel syndrome, Pelvic floor muscle spasm presented to the ED with complaints of right sided weakness for 3 days, associated with right arm weakness which started 3 months ago, right leg weakness started for 3 days, and since yesterday she have blurring of vision on right eye, she also describes the weakness as heaviness on the right arm and leg, 3 months ago when she had right arm weakness, she went to see her PCP, and she was diagnosed with optic neuritis, never got treated for that, and was scheduled for MRI on 2023, as her symptoms got worsen yesterday, she presented to the ED for further evaluation. ED Course: In the ED, her vitals were stable, BMP, Troponin, LFTs, CBC, UA, CT scan of the head was ordered. Labs were unremarkable, CT scan of head with or without contrast shows no acute abnormality.    ED Meds: Patient was not given any medications at ED  ED Fluids: Patient was given not given any fluids        Past Medical History:      Diagnosis Date    Depression     IBS (irritable bowel syndrome)     Incisional hernia     Seizure ([address]) 2015    after delivery / not sure was a seizure, no issues since , no medication       Past Surgical History:        Procedure Laterality Date    ABDOMEN SURGERY       SECTION  2015     SECTION N/A 2022     SECTION performed by Brooklynn Durbin DO at [facility] L&D OR    CHOLECYSTECTOMY  2017

## 2023-08-07 NOTE — ED NOTES
Patient denies any headache: Patient states no change since admission in her right sided weakness/heaviness and right visual disturbance. Patient is choosing to be discharged tonight but will follow up with MRI and neurologist as discussed with Dr. Sb Desai.       Maddie Johnson RN  08/06/23 1842

## 2023-08-07 NOTE — ED NOTES
MRI screener completed.  Electronically signed by Marissa Parker RN on 8/7/23 at 3:18 PM EDT       Marissa Parker RN  08/07/23 3257

## 2023-08-07 NOTE — PROGRESS NOTES
History & Physicial  23  Primary Care:  Lars Ingram, APRN - [address] / [place] [facility] [address]        Chief Complaint   Patient presents with    Extremity Weakness     Right sided weakness and blurry vision on right side and seen in renetta-left AMA. Pt states she was going to be sent here for MRI to r/o MS. Muj[place]. HPI:    75-year-old female presents the emergency room complaining of right arm weakness some right-sided weakness and loss of vision on the right eye. Patient states for the last few weeks she has had some difficulty with her right hand. It is gotten worse. She notes that to be weak and to have at times some tingling. We will place her in a observation status. MRI of the brain with and without contrast subsequently ordered. Prior to Visit Medications    Medication Sig Taking?  Authorizing Provider   drospirenone-ethinyl estradiol (GIORGI) 3-0.02 MG per tablet Take 1 tablet by mouth daily  Maria Victoria Araiza DO   diazePAM (VALIUM) 10 MG tablet Insert 1 tablet vaginally nightly x 6 months  Maria Victoria Araiza DO   sennosides-docusate sodium (SENOKOT-S) 8.6-50 MG tablet Take 1 tablet by mouth daily  Clarice Costello PA-C   dicyclomine (BENTYL) 20 MG tablet Take 1 tablet by mouth 3 times daily as needed (abdominal cramping)  Pauline Hylton PA-C   mirtazapine (REMERON) 30 MG tablet Take 30 mg by mouth nightly  Historical Provider, MD     Social History     Tobacco Use    Smoking status: Former     Years: 2.00     Types: Cigarettes     Quit date: 3/31/2013     Years since quitting: 10.3    Smokeless tobacco: Never   Vaping Use    Vaping Use: Never used   Substance Use Topics    Alcohol use: No    Drug use: Never     Family History   Problem Relation Age of Onset    Cancer Father         bladder     Past Surgical History:   Procedure Laterality Date    ABDOMEN SURGERY       SECTION  2015     SECTION N/A 2022     SECTION performed by Cassidy Martinez

## 2023-08-07 NOTE — ED PROVIDER NOTES
Department of Emergency Medicine     Written by: Juan Cee MD  Patient Name: Leatha Nichols Date: 2023 10:56 AM  MRN: [id number]                   : 1995      HPI  Chief Complaint   Patient presents with    Extremity Weakness     Right sided weakness and blurry vision on right side and seen in renetta-left AMA. Pt states she was going to be sent here for MRI to r/o MS. [place]. Rachid Smallwood is a 32 y.o. female that presents to the ED with complaints of right-sided upper extremity and lower extremity weakness, right-sided visual disturbance and concerns for multiple sclerosis. The patient says that over the past 2 months he has had right-sided visual disturbance and has had her glasses prescription changed due to worsening vision in the right eye. She is over the past week she has had right-sided weakness in the right upper and lower extremity. The patient says that she was aborted yesterday for similar symptoms however she left AMA as transfers taking too long, they want to send her over here for MRI. She denies fevers, chills, diaphoresis. Review of systems:    Pertinent positives and negatives mentioned in the HPI/MDM.    ------------------------- PAST HISTORY -------------------------    I personally reviewed the following history:    Past Medical History:  has a past medical history of Depression, IBS (irritable bowel syndrome), Incisional hernia, and Seizure ([address] Past Surgical History:  has a past surgical history that includes Colonoscopy (2012); Upper gastrointestinal endoscopy (2012); Upper gastrointestinal endoscopy;  section (2015); Upper gastrointestinal endoscopy (2017); Dilation and curettage of uterus; Cholecystectomy (2017); pr  delivery only (N/A, 2018); laparoscopy (N/A, 10/18/2019); Hemorrhoid surgery (2020and 2021); ventral hernia repair (N/A, 2021);  Abdomen surgery; and  patient for patient's subacute onset of moderate right upper extremity paresthesias, weakness, visual disturbance    The patient here in the ED is not hypoxic and hemodynamically stable, and does not appear to be in acute distress. They are calm, alert, oriented, and pleasant to speak with. The patient has clear lungs to auscultation, no abnormal heart murmurs are heard. On abdominal palpation, the patient has no significant abdominal tenderness to any quadrant. The patient has no cranial nerve dysfunction and has full motor and sensory function. NIH stroke scale is 0. Hints exam is negative. Patient has good pulses and capillary refill bilaterally. MRI of brain will be ordered to evaluate for any periventricular hyperenhancement is suggesting multiple sclerosis versus other pathologies. Remainder of MDM will be in the ED course below. ED Course as of 08/07/23 1620   Mon Aug 07, 2023   1619 Patient labs negative for acute process pathology. Patient is given 5 mg Valium for claustrophobia for MRI. Patient will obtain MRI and will be admitted for neurology evaluation. Spoke to admitting team, accepted the patient for admission. [AH]      ED Course User Index  [AH] Tonya Li MD       No EKG obtained during this patient's visit.     Chart review: Patient was seen by Dr. Stanford Pinon of OB on 03/23/923 for left ovarian cyst    Social determinants: Patient has signs concerning for multiple sclerosis, was not able to stay in the hospital last night due to having family and kids at home, therefore she presents to the ER today for reevaluation    Acute or chronic illness limiting or affecting care: Concerns for multiple sclerosis given history of optic neuritis and gastroparesis recently diagnosed, requiring MRI of the brain and admission for neurology consultation    ------------------------------ RESULTS -----------------------------    Labs reviewed and interpreted by me:  Results for orders placed

## 2023-08-08 PROBLEM — R20.2 PARESTHESIA OF RIGHT UPPER EXTREMITY: Status: ACTIVE | Noted: 2023-08-08

## 2023-08-08 LAB
25(OH)D3 SERPL-MCNC: 20.4 NG/ML (ref 30–100)
ANION GAP SERPL CALCULATED.3IONS-SCNC: 13 MMOL/L (ref 7–16)
BASOPHILS # BLD: 0.04 K/UL (ref 0–0.2)
BASOPHILS NFR BLD: 1 % (ref 0–2)
BUN SERPL-MCNC: 9 MG/DL (ref 6–20)
CALCIUM SERPL-MCNC: 9.2 MG/DL (ref 8.6–10.2)
CHLORIDE SERPL-SCNC: 111 MMOL/L (ref 98–107)
CO2 SERPL-SCNC: 20 MMOL/L (ref 22–29)
CREAT SERPL-MCNC: 0.7 MG/DL (ref 0.5–1)
EOSINOPHIL # BLD: 0.09 K/UL (ref 0.05–0.5)
EOSINOPHILS RELATIVE PERCENT: 1 % (ref 0–6)
ERYTHROCYTE [DISTWIDTH] IN BLOOD BY AUTOMATED COUNT: 13.7 % (ref 11.5–15)
GFR SERPL CREATININE-BSD FRML MDRD: >60 ML/MIN/1.73M2
GLUCOSE SERPL-MCNC: 87 MG/DL (ref 74–99)
HCT VFR BLD AUTO: 41.2 % (ref 34–48)
HGB BLD-MCNC: 13.3 G/DL (ref 11.5–15.5)
IMM GRANULOCYTES # BLD AUTO: 0.03 K/UL (ref 0–0.58)
IMM GRANULOCYTES NFR BLD: 1 % (ref 0–5)
LYMPHOCYTES NFR BLD: 2.98 K/UL (ref 1.5–4)
LYMPHOCYTES RELATIVE PERCENT: 46 % (ref 20–42)
MAGNESIUM SERPL-MCNC: 1.8 MG/DL (ref 1.6–2.6)
MCH RBC QN AUTO: 28.7 PG (ref 26–35)
MCHC RBC AUTO-ENTMCNC: 32.3 G/DL (ref 32–34.5)
MCV RBC AUTO: 89 FL (ref 80–99.9)
MONOCYTES NFR BLD: 0.63 K/UL (ref 0.1–0.95)
MONOCYTES NFR BLD: 10 % (ref 2–12)
NEUTROPHILS NFR BLD: 42 % (ref 43–80)
NEUTS SEG NFR BLD: 2.71 K/UL (ref 1.8–7.3)
PLATELET # BLD AUTO: 219 K/UL (ref 130–450)
PMV BLD AUTO: 11.6 FL (ref 7–12)
POTASSIUM SERPL-SCNC: 3.8 MMOL/L (ref 3.5–5)
RBC # BLD AUTO: 4.63 M/UL (ref 3.5–5.5)
RHEUMATOID FACT SER NEPH-ACNC: <10 IU/ML (ref 0–13)
SEND OUT REPORT: NORMAL
SODIUM SERPL-SCNC: 144 MMOL/L (ref 132–146)
TEST NAME: NORMAL
WBC OTHER # BLD: 6.5 K/UL (ref 4.5–11.5)

## 2023-08-08 PROCEDURE — 85025 COMPLETE CBC W/AUTO DIFF WBC: CPT

## 2023-08-08 PROCEDURE — 99222 1ST HOSP IP/OBS MODERATE 55: CPT | Performed by: INTERNAL MEDICINE

## 2023-08-08 PROCEDURE — 6370000000 HC RX 637 (ALT 250 FOR IP): Performed by: STUDENT IN AN ORGANIZED HEALTH CARE EDUCATION/TRAINING PROGRAM

## 2023-08-08 PROCEDURE — 2580000003 HC RX 258: Performed by: STUDENT IN AN ORGANIZED HEALTH CARE EDUCATION/TRAINING PROGRAM

## 2023-08-08 PROCEDURE — 6370000000 HC RX 637 (ALT 250 FOR IP)

## 2023-08-08 PROCEDURE — 36415 COLL VENOUS BLD VENIPUNCTURE: CPT

## 2023-08-08 PROCEDURE — 99222 1ST HOSP IP/OBS MODERATE 55: CPT | Performed by: PSYCHIATRY & NEUROLOGY

## 2023-08-08 PROCEDURE — G0378 HOSPITAL OBSERVATION PER HR: HCPCS

## 2023-08-08 PROCEDURE — 86431 RHEUMATOID FACTOR QUANT: CPT

## 2023-08-08 PROCEDURE — 80048 BASIC METABOLIC PNL TOTAL CA: CPT

## 2023-08-08 PROCEDURE — 96372 THER/PROPH/DIAG INJ SC/IM: CPT

## 2023-08-08 PROCEDURE — 86038 ANTINUCLEAR ANTIBODIES: CPT

## 2023-08-08 PROCEDURE — 83735 ASSAY OF MAGNESIUM: CPT

## 2023-08-08 PROCEDURE — 86618 LYME DISEASE ANTIBODY: CPT

## 2023-08-08 PROCEDURE — 82164 ANGIOTENSIN I ENZYME TEST: CPT

## 2023-08-08 PROCEDURE — 6360000002 HC RX W HCPCS: Performed by: STUDENT IN AN ORGANIZED HEALTH CARE EDUCATION/TRAINING PROGRAM

## 2023-08-08 PROCEDURE — 86039 ANTINUCLEAR ANTIBODIES (ANA): CPT

## 2023-08-08 PROCEDURE — 82306 VITAMIN D 25 HYDROXY: CPT

## 2023-08-08 RX ORDER — VITAMIN B COMPLEX
1000 TABLET ORAL DAILY
Status: DISCONTINUED | OUTPATIENT
Start: 2023-08-08 | End: 2023-08-10 | Stop reason: HOSPADM

## 2023-08-08 RX ADMIN — SODIUM CHLORIDE, PRESERVATIVE FREE 10 ML: 5 INJECTION INTRAVENOUS at 20:54

## 2023-08-08 RX ADMIN — SODIUM CHLORIDE, PRESERVATIVE FREE 10 ML: 5 INJECTION INTRAVENOUS at 09:51

## 2023-08-08 RX ADMIN — MIRTAZAPINE 30 MG: 15 TABLET, FILM COATED ORAL at 20:53

## 2023-08-08 RX ADMIN — ENOXAPARIN SODIUM 40 MG: 100 INJECTION SUBCUTANEOUS at 09:51

## 2023-08-08 RX ADMIN — Medication 1000 UNITS: at 20:54

## 2023-08-08 RX ADMIN — ACETAMINOPHEN 650 MG: 325 TABLET ORAL at 13:03

## 2023-08-08 ASSESSMENT — ENCOUNTER SYMPTOMS
CHEST TIGHTNESS: 0
ABDOMINAL PAIN: 0
DIARRHEA: 0
COUGH: 0
ABDOMINAL DISTENTION: 0
SHORTNESS OF BREATH: 0
VOMITING: 0
NAUSEA: 0
PHOTOPHOBIA: 0

## 2023-08-08 ASSESSMENT — PAIN SCALES - GENERAL
PAINLEVEL_OUTOF10: 4
PAINLEVEL_OUTOF10: 8
PAINLEVEL_OUTOF10: 0

## 2023-08-08 ASSESSMENT — PAIN DESCRIPTION - ONSET: ONSET: GRADUAL

## 2023-08-08 ASSESSMENT — PAIN DESCRIPTION - PAIN TYPE: TYPE: ACUTE PAIN

## 2023-08-08 ASSESSMENT — PAIN DESCRIPTION - FREQUENCY: FREQUENCY: CONTINUOUS

## 2023-08-08 ASSESSMENT — PAIN DESCRIPTION - DESCRIPTORS: DESCRIPTORS: ACHING;THROBBING

## 2023-08-08 ASSESSMENT — PAIN DESCRIPTION - LOCATION: LOCATION: HEAD

## 2023-08-08 ASSESSMENT — PAIN - FUNCTIONAL ASSESSMENT: PAIN_FUNCTIONAL_ASSESSMENT: ACTIVITIES ARE NOT PREVENTED

## 2023-08-08 ASSESSMENT — PAIN DESCRIPTION - ORIENTATION: ORIENTATION: ANTERIOR

## 2023-08-08 NOTE — CARE COORDINATION
8/8/2023social work transition of care planning  Pt is from home with family and had been independent. . Pt pcp is Dr. Isma Osei and pharmacy is [facility] in [place]. Explained sw role in transition of care planning. Pt plan is home,family will transport. Sw will follow.   Electronically signed by LILLIE Andres on 8/8/2023 at 11:55 AM

## 2023-08-08 NOTE — PROGRESS NOTES
4 Eyes Skin Assessment     NAME:  Raymundo Duran  YOB: 1995  MEDICAL RECORD NUMBER:  [id number]    The patient is being assessed for  Admission    I agree that at least one RN has performed a thorough Head to Toe Skin Assessment on the patient. ALL assessment sites listed below have been assessed. Areas assessed by both nurses:    Head, Face, Ears, Shoulders, Back, Chest, Arms, Elbows, Hands, Sacrum. Buttock, Coccyx, Ischium, and Legs. Feet and Heels        Does the Patient have a Wound?  No noted wound(s)       Eliel Prevention initiated by RN: Yes  Wound Care Orders initiated by RN: No  NA    Pressure Injury (Stage 3,4, Unstageable, DTI, NWPT, and Complex wounds) if present, place Wound referral order by RN under : No    New Ostomies, if present place, Ostomy referral order under : No     Nurse 1 eSignature: Electronically signed by Renee Esqueda RN on 8/7/23 at 9:17 PM EDT    **SHARE this note so that the co-signing nurse can place an eSignature**    Nurse 2 eSignature: Electronically signed by Khalida Banerjee RN on 8/8/23 at 1:29 AM EDT

## 2023-08-08 NOTE — DISCHARGE INSTRUCTIONS
Internal medicine    Follow ups  Please follow up with your primary care physician ( Wes Aguilar) within 10 days of discharge from hospital. Please call as soon as possible to make an appointment. Please contact the internal medicine clinic for an appointment if you are unable to get an appointment with your PCP. Changes in healthcare   Please take all medications as indicated  Diet: regular diet   Activity: activity as tolerated  New Medications started during this hospital stay  Vitamin D 2000 IU once a day    Should you have further questions in regards to this visit, you can review your clinical note and after visit summary document on your [employer] account. Other questions can be directed to our nurse line at [phone]. Other than any new prescriptions given to you today, the list of home medications on this After Visit Summary are based on information provided to us from you and your healthcare providers. This information, including the list, dose, and frequency of medications is only as accurate as the information you provided. If you have any questions or concerns about your home medications, please contact your Primary Care Physician for further clarification.

## 2023-08-09 ENCOUNTER — APPOINTMENT (OUTPATIENT)
Dept: MRI IMAGING | Age: 28
End: 2023-08-09
Payer: COMMERCIAL

## 2023-08-09 LAB
ANA SER QL IA: NEGATIVE
INR PPP: 1.2
PROTHROMBIN TIME: 12.8 SEC (ref 9.3–12.4)

## 2023-08-09 PROCEDURE — 6370000000 HC RX 637 (ALT 250 FOR IP): Performed by: STUDENT IN AN ORGANIZED HEALTH CARE EDUCATION/TRAINING PROGRAM

## 2023-08-09 PROCEDURE — 96374 THER/PROPH/DIAG INJ IV PUSH: CPT

## 2023-08-09 PROCEDURE — 2580000003 HC RX 258: Performed by: STUDENT IN AN ORGANIZED HEALTH CARE EDUCATION/TRAINING PROGRAM

## 2023-08-09 PROCEDURE — 6370000000 HC RX 637 (ALT 250 FOR IP)

## 2023-08-09 PROCEDURE — 6360000002 HC RX W HCPCS: Performed by: STUDENT IN AN ORGANIZED HEALTH CARE EDUCATION/TRAINING PROGRAM

## 2023-08-09 PROCEDURE — 70544 MR ANGIOGRAPHY HEAD W/O DYE: CPT

## 2023-08-09 PROCEDURE — 99232 SBSQ HOSP IP/OBS MODERATE 35: CPT | Performed by: NURSE PRACTITIONER

## 2023-08-09 PROCEDURE — G0378 HOSPITAL OBSERVATION PER HR: HCPCS

## 2023-08-09 PROCEDURE — 85610 PROTHROMBIN TIME: CPT

## 2023-08-09 PROCEDURE — 72156 MRI NECK SPINE W/O & W/DYE: CPT

## 2023-08-09 PROCEDURE — A9577 INJ MULTIHANCE: HCPCS | Performed by: RADIOLOGY

## 2023-08-09 PROCEDURE — 72157 MRI CHEST SPINE W/O & W/DYE: CPT

## 2023-08-09 PROCEDURE — 36415 COLL VENOUS BLD VENIPUNCTURE: CPT

## 2023-08-09 PROCEDURE — 96372 THER/PROPH/DIAG INJ SC/IM: CPT

## 2023-08-09 PROCEDURE — 6360000004 HC RX CONTRAST MEDICATION: Performed by: RADIOLOGY

## 2023-08-09 RX ORDER — CHOLECALCIFEROL (VITAMIN D3) 50 MCG
2000 TABLET ORAL DAILY
Qty: 30 TABLET | Refills: 0 | Status: SHIPPED | OUTPATIENT
Start: 2023-08-09

## 2023-08-09 RX ORDER — LORAZEPAM 2 MG/ML
1 INJECTION INTRAMUSCULAR SEE ADMIN INSTRUCTIONS
Status: DISCONTINUED | OUTPATIENT
Start: 2023-08-09 | End: 2023-08-09

## 2023-08-09 RX ORDER — LORAZEPAM 2 MG/ML
1 INJECTION INTRAMUSCULAR
Status: COMPLETED | OUTPATIENT
Start: 2023-08-09 | End: 2023-08-09

## 2023-08-09 RX ORDER — LORAZEPAM 2 MG/ML
1 INJECTION INTRAMUSCULAR
Status: DISCONTINUED | OUTPATIENT
Start: 2023-08-09 | End: 2023-08-09

## 2023-08-09 RX ORDER — BUTALBITAL, ACETAMINOPHEN AND CAFFEINE 50; 325; 40 MG/1; MG/1; MG/1
1 TABLET ORAL ONCE
Status: COMPLETED | OUTPATIENT
Start: 2023-08-09 | End: 2023-08-09

## 2023-08-09 RX ADMIN — ENOXAPARIN SODIUM 40 MG: 100 INJECTION SUBCUTANEOUS at 08:08

## 2023-08-09 RX ADMIN — GADOBENATE DIMEGLUMINE 11 ML: 529 INJECTION, SOLUTION INTRAVENOUS at 18:39

## 2023-08-09 RX ADMIN — Medication 1000 UNITS: at 08:09

## 2023-08-09 RX ADMIN — MIRTAZAPINE 30 MG: 15 TABLET, FILM COATED ORAL at 20:17

## 2023-08-09 RX ADMIN — SODIUM CHLORIDE, PRESERVATIVE FREE 10 ML: 5 INJECTION INTRAVENOUS at 08:09

## 2023-08-09 RX ADMIN — ACETAMINOPHEN 650 MG: 325 TABLET ORAL at 20:17

## 2023-08-09 RX ADMIN — BUTALBITAL, ACETAMINOPHEN, AND CAFFEINE 1 TABLET: 50; 325; 40 TABLET ORAL at 11:28

## 2023-08-09 RX ADMIN — LORAZEPAM 1 MG: 2 INJECTION INTRAMUSCULAR; INTRAVENOUS at 16:23

## 2023-08-09 RX ADMIN — SODIUM CHLORIDE, PRESERVATIVE FREE 10 ML: 5 INJECTION INTRAVENOUS at 20:18

## 2023-08-09 ASSESSMENT — PAIN DESCRIPTION - FREQUENCY: FREQUENCY: CONTINUOUS

## 2023-08-09 ASSESSMENT — PAIN DESCRIPTION - DESCRIPTORS
DESCRIPTORS: ACHING;DISCOMFORT;THROBBING
DESCRIPTORS: ACHING;DISCOMFORT;SORE

## 2023-08-09 ASSESSMENT — PAIN SCALES - GENERAL
PAINLEVEL_OUTOF10: 10
PAINLEVEL_OUTOF10: 5
PAINLEVEL_OUTOF10: 2
PAINLEVEL_OUTOF10: 3

## 2023-08-09 ASSESSMENT — PAIN DESCRIPTION - ORIENTATION: ORIENTATION: RIGHT;LEFT

## 2023-08-09 ASSESSMENT — PAIN DESCRIPTION - LOCATION
LOCATION: HEAD
LOCATION: BACK

## 2023-08-09 ASSESSMENT — PAIN - FUNCTIONAL ASSESSMENT
PAIN_FUNCTIONAL_ASSESSMENT: ACTIVITIES ARE NOT PREVENTED
PAIN_FUNCTIONAL_ASSESSMENT: ACTIVITIES ARE NOT PREVENTED

## 2023-08-09 ASSESSMENT — PAIN DESCRIPTION - PAIN TYPE: TYPE: ACUTE PAIN

## 2023-08-09 ASSESSMENT — PAIN DESCRIPTION - ONSET: ONSET: GRADUAL

## 2023-08-09 NOTE — CARE COORDINATION
8/9/2023social work transition of care planning  Pt plan is to return home,once medically stable.   Electronically signed by LILLIE Chilel on 8/9/2023 at 8:28 AM

## 2023-08-09 NOTE — PROGRESS NOTES
Regarding the [facility] gatric emptying study on 7.24, faxed the ORIN paperW x 2 and phoned the provider's office x 2. Left ph messages on the office ph line. Gave our [place] ph call back # . We are hopeful of the faxed report or some other communication to come in to our gaxiola on 8. 10. Reviewed sai Dang the protocol work up  / rule out testing. Supportive fam members in later in afterN earlier today. Updated them during bedside H off. Had a headache late in am / part of the afterN . Stated the tyl helped more than the ice bag but we provided both.

## 2023-08-10 VITALS
HEART RATE: 113 BPM | DIASTOLIC BLOOD PRESSURE: 65 MMHG | BODY MASS INDEX: 21.52 KG/M2 | SYSTOLIC BLOOD PRESSURE: 108 MMHG | WEIGHT: 121.44 LBS | OXYGEN SATURATION: 96 % | HEIGHT: 63 IN | RESPIRATION RATE: 18 BRPM | TEMPERATURE: 97.2 F

## 2023-08-10 DIAGNOSIS — R20.2 PARESTHESIAS: Primary | ICD-10-CM

## 2023-08-10 DIAGNOSIS — H57.9 EYE EXAM ABNORMAL: ICD-10-CM

## 2023-08-10 LAB — ACE SERPL-CCNC: 19 U/L (ref 8–52)

## 2023-08-10 PROCEDURE — 6370000000 HC RX 637 (ALT 250 FOR IP)

## 2023-08-10 PROCEDURE — 99238 HOSP IP/OBS DSCHRG MGMT 30/<: CPT | Performed by: INTERNAL MEDICINE

## 2023-08-10 PROCEDURE — G0378 HOSPITAL OBSERVATION PER HR: HCPCS

## 2023-08-10 RX ADMIN — Medication 1000 UNITS: at 08:23

## 2023-08-10 NOTE — PLAN OF CARE
Problem: Discharge Planning  Goal: Discharge to home or other facility with appropriate resources  8/9/2023 2349 by Alejandro De La Cruz RN  Outcome: Progressing     Problem: Pain  Goal: Verbalizes/displays adequate comfort level or baseline comfort level  8/9/2023 2349 by Alejandro De La Cruz RN  Outcome: Progressing     Problem: Safety - Adult  Goal: Free from fall injury  8/9/2023 2349 by Alejandro De La Cruz RN  Outcome: Progressing

## 2023-08-10 NOTE — PLAN OF CARE
MRI thoracis and cervical spine, and MRV head: No acute abnormalities. Patient requested for discharge on 8/10/23 in the early morning as the reports came back renae after 9:15 pm. Discharge order is in at 07:00 AM on 8/10/23.

## 2023-08-10 NOTE — CARE COORDINATION
8/10/2023social work transition of care planning  Pt plan is to return home today. Pt will call for a ride.   Electronically signed by LILLIE Sterling on 8/10/2023 at 8:09 AM

## 2023-08-10 NOTE — DISCHARGE SUMMARY
[facility]  Discharge Summary    PCP: ERMIAS Ron CNP    Admit Date:8/7/2023  Discharge Date: 8/10/2023    Admission Diagnosis:   Right sided weakness specially on right arm and right leg    Discharge Diagnosis:  Right sided weakness on right arm and right leg    Hospital Course:   Ms Syl Avery, is a 33 yo F with past medical history of seizure, depression, irritable bowel syndrome, pelvic floor muscle spasm presented to the ED with complaints of right-sided weakness for 3 days, associated with right arm weakness which started 3 months ago, right leg weakness started for 3 days and blurring of vision on right eye. She also describes the weakness as heaviness, she was also diagnosed with optic neuritis in her last visit with PCP, never got treated for that. In the ED her vitals were stable BMP, troponin, LFTs, CBC, UA, CT scan of the head was ordered, labs were unremarkable CT scan of the head with or without contrast shows no acute abnormality. Patient was admitted to the internal medicine for further workup and management. MRI brain with or without contrast was ordered to rule out multiple sclerosis. Reportedly was unremarkable pre and postcontrast enhanced MRI of the brain, no evidence of demyelinating disease. Neurology was consulted and MRI cervical spine and thoracic spine with or without contrast, MRV without contrast was ordered. Patient complains of same weakness on the right side the arm weakness was better than the first day but did mention about tingling on the fingers. Next day, patient's all the MRI report came unremarkable and was negative for any demyelinating disease. As per neurology she was recommended to do LP as outpatient. Subjective: Today patient was seen and examined at bedside. She stated her right arm weakness is resolving, blurry vision of the eye is also resolving.   Otherwise she is doing good today. Physical Exam  Constitutional:       Appearance: Normal appearance. She is normal weight. HENT:      Head: Normocephalic and atraumatic. Eyes:      Conjunctiva/sclera: Conjunctivae normal.   Cardiovascular:      Rate and Rhythm: Normal rate and regular rhythm. Pulses: Normal pulses. Heart sounds: Normal heart sounds. Pulmonary:      Effort: Pulmonary effort is normal.      Breath sounds: Normal breath sounds. Musculoskeletal:         General: Normal range of motion. Cervical back: Normal range of motion. Skin:     General: Skin is warm. Neurological:      Mental Status: She is alert. Motor: Weakness (right arm and leg motor strenght is 3/5, left arm and leg motor strenght is 5/5) present. Pending test results:   none    Consults:  Neurology    Procedures:  None    Condition at discharge: Stable    Disposition: home    Outpatient Recommendations:  Please follow up with neurology for an outpatient Lumbar puncture. Please follow up with neurology in next 6 months for next MRI to rule out Multiple sclerosis.     Discharge Medications:     Medication List        START taking these medications      vitamin D 50 MCG (2000 UT) Tabs tablet  Commonly known as: CHOLECALCIFEROL  Take 1 tablet by mouth daily            CONTINUE taking these medications      diazePAM 10 MG tablet  Commonly known as: Valium  Insert 1 tablet vaginally nightly x 6 months     dicyclomine 20 MG tablet  Commonly known as: BENTYL  Take 1 tablet by mouth 3 times daily as needed (abdominal cramping)     mirtazapine 30 MG tablet  Commonly known as: REMERON            STOP taking these medications      drospirenone-ethinyl estradiol 3-0.02 MG per tablet  Commonly known as: GIORGI     sennosides-docusate sodium 8.6-50 MG tablet  Commonly known as: SENOKOT-S               Where to Get Your Medications        These medications were sent to [address], [address]

## 2023-08-10 NOTE — PROGRESS NOTES
Visual evoked potentials were ordered on 8/8/23. The order was sent through Neurology department; however, Audiology Department is the provider for this procedure. Audiology was notified of order this morning, Transportation was requested, but was cancelled due to nursing states patient is being discharged. Will notify ERMIAS Hull, who ordered test and will contact patient to perform test as outpatient.      Ramon Ramsey M.A., [address] [facility] [id number]  Electronically signed by Kelly Santiago on 8/10/2023 at 10:08 AM

## 2023-08-13 LAB — B BURGDOR AB SER IA-ACNC: 0.52 IV

## 2023-08-23 LAB
SEND OUT REPORT: NORMAL
TEST NAME: NORMAL

## 2023-08-25 ENCOUNTER — FOLLOWUP TELEPHONE ENCOUNTER (OUTPATIENT)
Dept: AUDIOLOGY | Age: 28
End: 2023-08-25

## 2023-08-25 NOTE — PROGRESS NOTES
Called pt re missed appt. And to try to reschedule. No answer. LVM to return call to reschedule.  Electronically signed by Ham Gillespie on 8/25/2023 at 1:52 PM

## 2023-10-26 ENCOUNTER — OFFICE VISIT (OUTPATIENT)
Age: 28
End: 2023-10-26
Payer: COMMERCIAL

## 2023-10-26 VITALS
BODY MASS INDEX: 20.02 KG/M2 | SYSTOLIC BLOOD PRESSURE: 106 MMHG | WEIGHT: 113 LBS | HEART RATE: 71 BPM | DIASTOLIC BLOOD PRESSURE: 73 MMHG

## 2023-10-26 DIAGNOSIS — Z71.1 PERSON WITH FEARED COMPLAINT, NO DIAGNOSIS MADE: ICD-10-CM

## 2023-10-26 DIAGNOSIS — M62.89 PELVIC FLOOR DYSFUNCTION IN FEMALE: Primary | ICD-10-CM

## 2023-10-26 PROCEDURE — G8427 DOCREV CUR MEDS BY ELIG CLIN: HCPCS | Performed by: OBSTETRICS & GYNECOLOGY

## 2023-10-26 PROCEDURE — 1036F TOBACCO NON-USER: CPT | Performed by: OBSTETRICS & GYNECOLOGY

## 2023-10-26 PROCEDURE — G8484 FLU IMMUNIZE NO ADMIN: HCPCS | Performed by: OBSTETRICS & GYNECOLOGY

## 2023-10-26 PROCEDURE — G8420 CALC BMI NORM PARAMETERS: HCPCS | Performed by: OBSTETRICS & GYNECOLOGY

## 2023-10-26 PROCEDURE — 99213 OFFICE O/P EST LOW 20 MIN: CPT | Performed by: OBSTETRICS & GYNECOLOGY

## 2023-10-26 SDOH — ECONOMIC STABILITY: FOOD INSECURITY: WITHIN THE PAST 12 MONTHS, YOU WORRIED THAT YOUR FOOD WOULD RUN OUT BEFORE YOU GOT MONEY TO BUY MORE.: NEVER TRUE

## 2023-10-26 SDOH — ECONOMIC STABILITY: INCOME INSECURITY: HOW HARD IS IT FOR YOU TO PAY FOR THE VERY BASICS LIKE FOOD, HOUSING, MEDICAL CARE, AND HEATING?: HARD

## 2023-10-26 SDOH — ECONOMIC STABILITY: FOOD INSECURITY: WITHIN THE PAST 12 MONTHS, THE FOOD YOU BOUGHT JUST DIDN'T LAST AND YOU DIDN'T HAVE MONEY TO GET MORE.: NEVER TRUE

## 2023-10-26 SDOH — ECONOMIC STABILITY: INCOME INSECURITY: HOW HARD IS IT FOR YOU TO PAY FOR THE VERY BASICS LIKE FOOD, HOUSING, MEDICAL CARE, AND HEATING?: NOT HARD AT ALL

## 2023-10-26 SDOH — ECONOMIC STABILITY: HOUSING INSECURITY
IN THE LAST 12 MONTHS, WAS THERE A TIME WHEN YOU DID NOT HAVE A STEADY PLACE TO SLEEP OR SLEPT IN A SHELTER (INCLUDING NOW)?: NO

## 2023-10-26 SDOH — ECONOMIC STABILITY: TRANSPORTATION INSECURITY
IN THE PAST 12 MONTHS, HAS LACK OF TRANSPORTATION KEPT YOU FROM MEETINGS, WORK, OR FROM GETTING THINGS NEEDED FOR DAILY LIVING?: NO

## 2023-10-26 ASSESSMENT — PATIENT HEALTH QUESTIONNAIRE - PHQ9
SUM OF ALL RESPONSES TO PHQ QUESTIONS 1-9: 1
1. LITTLE INTEREST OR PLEASURE IN DOING THINGS: 0
SUM OF ALL RESPONSES TO PHQ QUESTIONS 1-9: 1
SUM OF ALL RESPONSES TO PHQ9 QUESTIONS 1 & 2: 1
SUM OF ALL RESPONSES TO PHQ QUESTIONS 1-9: 1
2. FEELING DOWN, DEPRESSED OR HOPELESS: 1
SUM OF ALL RESPONSES TO PHQ QUESTIONS 1-9: 1

## 2023-10-26 NOTE — PROGRESS NOTES
Enedina Ashby is a 51-year-old G2, P4 female who initially presented with pelvic floor dysfunction in 3  she was treated with vaginal Valium with good results and had chosen not to use pelvic floor physical therapy. The only time she is bothered by the symptoms now is if she is standing on her feet for a long time. No longer reports dyspareunia. she presents today with complaint of a left vulvar lesion that has been present for a long time she is just wondering why it has not resolved.   No pain no discharge        Past Medical History:   Diagnosis Date    Depression     IBS (irritable bowel syndrome)     Incisional hernia     Seizure ([address]) 2015    after delivery / not sure was a seizure, no issues since , no medication        Past Surgical History:   Procedure Laterality Date    ABDOMEN SURGERY       SECTION  2015     SECTION N/A 2022     SECTION performed by Babar Kirby DO at [facility] L&D OR    CHOLECYSTECTOMY  2017    ROBOT ASSISTED    COLONOSCOPY  2012    DILATION AND CURETTAGE OF UTERUS      ablation    HEMORRHOID SURGERY  2020and 2021    LAPAROSCOPY N/A 10/18/2019    DIAGNOSTIC LAPAROSCOPY, OPEN VENTRAL HERNIA REPAIR performed by Ad Osborne MD at [address] N/A 2018     SECTION performed by Arelis Horne DO at [facility] L&D    UPPER GASTROINTESTINAL ENDOSCOPY  2012    UPPER GASTROINTESTINAL ENDOSCOPY      UPPER GASTROINTESTINAL ENDOSCOPY  2017    VENTRAL HERNIA REPAIR N/A 2021    OPEN INCISIONAL HERNIA REPAIR performed by Kathy Carbajal MD at [facility] OR        Family History   Problem Relation Age of Onset    Cancer Father         bladder        Social History       Tobacco History       Smoking Status  Former Quit Date  3/31/2013 Smoking Tobacco Type  Cigarettes quit in 3/31/2013      Smokeless Tobacco Use  Never              Alcohol History       Alcohol Use Status  No

## 2023-12-01 ENCOUNTER — APPOINTMENT (OUTPATIENT)
Dept: ULTRASOUND IMAGING | Age: 28
End: 2023-12-01
Payer: COMMERCIAL

## 2023-12-01 ENCOUNTER — HOSPITAL ENCOUNTER (EMERGENCY)
Age: 28
Discharge: HOME | End: 2023-12-01
Payer: COMMERCIAL

## 2023-12-01 VITALS
RESPIRATION RATE: 16 BRPM | WEIGHT: 115 LBS | TEMPERATURE: 98.6 F | SYSTOLIC BLOOD PRESSURE: 109 MMHG | OXYGEN SATURATION: 99 % | BODY MASS INDEX: 20.37 KG/M2 | DIASTOLIC BLOOD PRESSURE: 70 MMHG | HEART RATE: 86 BPM

## 2023-12-01 DIAGNOSIS — O20.0 THREATENED MISCARRIAGE: ICD-10-CM

## 2023-12-01 DIAGNOSIS — N30.00 ACUTE CYSTITIS WITHOUT HEMATURIA: ICD-10-CM

## 2023-12-01 DIAGNOSIS — O46.90 VAGINAL BLEEDING IN PREGNANCY: Primary | ICD-10-CM

## 2023-12-01 LAB
ALBUMIN SERPL-MCNC: 4.3 G/DL (ref 3.5–5.2)
ALP SERPL-CCNC: 70 U/L (ref 35–104)
ALT SERPL-CCNC: 20 U/L (ref 0–32)
ANION GAP SERPL CALCULATED.3IONS-SCNC: 9 MMOL/L (ref 7–16)
AST SERPL-CCNC: 18 U/L (ref 0–31)
B-HCG SERPL EIA 3RD IS-ACNC: 1519 MIU/ML (ref 0–7)
BACTERIA URNS QL MICRO: ABNORMAL
BASOPHILS # BLD: 0.04 K/UL (ref 0–0.2)
BASOPHILS NFR BLD: 1 % (ref 0–2)
BILIRUB SERPL-MCNC: 0.3 MG/DL (ref 0–1.2)
BILIRUB UR QL STRIP: NEGATIVE
BUN SERPL-MCNC: 9 MG/DL (ref 6–20)
CALCIUM SERPL-MCNC: 9 MG/DL (ref 8.6–10.2)
CHLORIDE SERPL-SCNC: 104 MMOL/L (ref 98–107)
CLARITY UR: ABNORMAL
CO2 SERPL-SCNC: 23 MMOL/L (ref 22–29)
COLOR UR: YELLOW
CREAT SERPL-MCNC: 0.6 MG/DL (ref 0.5–1)
EOSINOPHIL # BLD: 0.11 K/UL (ref 0.05–0.5)
EOSINOPHILS RELATIVE PERCENT: 1 % (ref 0–6)
EPI CELLS #/AREA URNS HPF: ABNORMAL /HPF
ERYTHROCYTE [DISTWIDTH] IN BLOOD BY AUTOMATED COUNT: 13.5 % (ref 11.5–15)
GFR SERPL CREATININE-BSD FRML MDRD: >60 ML/MIN/1.73M2
GLUCOSE SERPL-MCNC: 87 MG/DL (ref 74–99)
GLUCOSE UR STRIP-MCNC: NEGATIVE MG/DL
HCG, URINE, POC: POSITIVE
HCT VFR BLD AUTO: 39.6 % (ref 34–48)
HGB BLD-MCNC: 13.1 G/DL (ref 11.5–15.5)
HGB UR QL STRIP.AUTO: NEGATIVE
IMM GRANULOCYTES # BLD AUTO: 0.03 K/UL (ref 0–0.58)
IMM GRANULOCYTES NFR BLD: 0 % (ref 0–5)
KETONES UR STRIP-MCNC: NEGATIVE MG/DL
LEUKOCYTE ESTERASE UR QL STRIP: ABNORMAL
LYMPHOCYTES NFR BLD: 2.69 K/UL (ref 1.5–4)
LYMPHOCYTES RELATIVE PERCENT: 32 % (ref 20–42)
Lab: NORMAL
MAGNESIUM SERPL-MCNC: 1.9 MG/DL (ref 1.6–2.6)
MCH RBC QN AUTO: 29.2 PG (ref 26–35)
MCHC RBC AUTO-ENTMCNC: 33.1 G/DL (ref 32–34.5)
MCV RBC AUTO: 88.4 FL (ref 80–99.9)
MONOCYTES NFR BLD: 0.75 K/UL (ref 0.1–0.95)
MONOCYTES NFR BLD: 9 % (ref 2–12)
NEGATIVE QC PASS/FAIL: NORMAL
NEUTROPHILS NFR BLD: 56 % (ref 43–80)
NEUTS SEG NFR BLD: 4.69 K/UL (ref 1.8–7.3)
NITRITE UR QL STRIP: NEGATIVE
PH UR STRIP: 7.5 [PH] (ref 5–9)
PLATELET # BLD AUTO: 247 K/UL (ref 130–450)
PMV BLD AUTO: 10.8 FL (ref 7–12)
POSITIVE QC PASS/FAIL: NORMAL
POTASSIUM SERPL-SCNC: 3.5 MMOL/L (ref 3.5–5)
PROT SERPL-MCNC: 7.3 G/DL (ref 6.4–8.3)
PROT UR STRIP-MCNC: NEGATIVE MG/DL
RBC # BLD AUTO: 4.48 M/UL (ref 3.5–5.5)
RBC #/AREA URNS HPF: ABNORMAL /HPF
SODIUM SERPL-SCNC: 136 MMOL/L (ref 132–146)
SP GR UR STRIP: 1.02 (ref 1–1.03)
UROBILINOGEN UR STRIP-ACNC: 0.2 EU/DL (ref 0–1)
WBC #/AREA URNS HPF: ABNORMAL /HPF
WBC OTHER # BLD: 8.3 K/UL (ref 4.5–11.5)

## 2023-12-01 PROCEDURE — 76817 TRANSVAGINAL US OBSTETRIC: CPT

## 2023-12-01 PROCEDURE — 84702 CHORIONIC GONADOTROPIN TEST: CPT

## 2023-12-01 PROCEDURE — 2580000003 HC RX 258: Performed by: PHYSICIAN ASSISTANT

## 2023-12-01 PROCEDURE — 80053 COMPREHEN METABOLIC PANEL: CPT

## 2023-12-01 PROCEDURE — 85025 COMPLETE CBC W/AUTO DIFF WBC: CPT

## 2023-12-01 PROCEDURE — 76801 OB US < 14 WKS SINGLE FETUS: CPT

## 2023-12-01 PROCEDURE — 83735 ASSAY OF MAGNESIUM: CPT

## 2023-12-01 PROCEDURE — 99284 EMERGENCY DEPT VISIT MOD MDM: CPT

## 2023-12-01 PROCEDURE — 81001 URINALYSIS AUTO W/SCOPE: CPT

## 2023-12-01 RX ORDER — CEPHALEXIN 500 MG/1
500 CAPSULE ORAL 4 TIMES DAILY
Qty: 21 CAPSULE | Refills: 0 | Status: SHIPPED | OUTPATIENT
Start: 2023-12-01 | End: 2023-12-08

## 2023-12-01 RX ORDER — 0.9 % SODIUM CHLORIDE 0.9 %
1000 INTRAVENOUS SOLUTION INTRAVENOUS ONCE
Status: COMPLETED | OUTPATIENT
Start: 2023-12-01 | End: 2023-12-01

## 2023-12-01 RX ADMIN — SODIUM CHLORIDE 1000 ML: 9 INJECTION, SOLUTION INTRAVENOUS at 19:37

## 2023-12-01 NOTE — ED NOTES
Department of Emergency Medicine  FIRST PROVIDER TRIAGE NOTE             Independent MLP           12/1/23  4:09 PM EST    Date of Encounter: 12/1/23   MRN: [id number]      HPI: Greg Iyer is a 29 y.o. female who presents to the ED for Vaginal Bleeding (Onset 3 days ago, unsure of LMP, PPT on tuesday) and Abdominal Cramping     5TH PREGNANCY, THREE LIVING KIDS AT HOME.     ROS: Negative for cp or sob. PE: Gen Appearance/Constitutional: alert  HEENT: NC/NT. PERRLA,  Airway patent. Initial Plan of Care: All treatment areas with department are currently occupied. Plan to order/Initiate the following while awaiting opening in ED.   Initiate Treatment-Testing, Proceed toTreatment Area When Bed Available for ED Attending/MLP to Continue Care    Electronically signed by ERMIAS Bartholomew CNP   DD: 12/1/23      ERMIAS Bartholomew CNP  12/01/23 1611

## 2023-12-04 ENCOUNTER — TELEPHONE (OUTPATIENT)
Dept: OBGYN | Age: 28
End: 2023-12-04

## 2023-12-04 DIAGNOSIS — O20.9 BLEEDING IN EARLY PREGNANCY: Primary | ICD-10-CM

## 2023-12-04 NOTE — TELEPHONE ENCOUNTER
Nasrin Patrick was in ER on 12/1 for bleeding and cramping. She was told to have HCG done. Can one of the providers order these. She is scheduled with Dr. Lianet Perkins for 1st visit on 1/11/24.

## 2023-12-12 ENCOUNTER — HOSPITAL ENCOUNTER (OUTPATIENT)
Age: 28
Discharge: HOME | End: 2023-12-12
Payer: COMMERCIAL

## 2023-12-12 DIAGNOSIS — O20.9 BLEEDING IN EARLY PREGNANCY: ICD-10-CM

## 2023-12-12 LAB — B-HCG SERPL EIA 3RD IS-ACNC: ABNORMAL MIU/ML (ref 0–7)

## 2023-12-12 PROCEDURE — 84702 CHORIONIC GONADOTROPIN TEST: CPT

## 2023-12-12 PROCEDURE — 36415 COLL VENOUS BLD VENIPUNCTURE: CPT

## 2023-12-13 DIAGNOSIS — O20.9 FIRST TRIMESTER BLEEDING: Primary | ICD-10-CM

## 2023-12-14 DIAGNOSIS — O20.9 FIRST TRIMESTER BLEEDING: Primary | ICD-10-CM

## 2023-12-14 NOTE — PROGRESS NOTES
MFM has no openings she had an ultrasound performed in the ER which revealed a gestational sac.   No yolk sac or fetal pole hCG was 1519 on 12-1 and 12-12 it was 40,716 will repeat and order repeat US

## 2024-01-11 ENCOUNTER — INITIAL PRENATAL (OUTPATIENT)
Age: 29
End: 2024-01-11

## 2024-01-11 ENCOUNTER — HOSPITAL ENCOUNTER (OUTPATIENT)
Age: 29
Discharge: HOME | End: 2024-01-11
Payer: COMMERCIAL

## 2024-01-11 VITALS
HEART RATE: 105 BPM | SYSTOLIC BLOOD PRESSURE: 113 MMHG | BODY MASS INDEX: 19.84 KG/M2 | DIASTOLIC BLOOD PRESSURE: 78 MMHG | WEIGHT: 112 LBS

## 2024-01-11 DIAGNOSIS — Z34.81 ENCOUNTER FOR SUPERVISION OF OTHER NORMAL PREGNANCY IN FIRST TRIMESTER: Primary | ICD-10-CM

## 2024-01-11 DIAGNOSIS — Z34.81 ENCOUNTER FOR SUPERVISION OF OTHER NORMAL PREGNANCY IN FIRST TRIMESTER: ICD-10-CM

## 2024-01-11 LAB
ABO + RH BLD: NORMAL
AMPHET UR QL SCN: NEGATIVE
BARBITURATES UR QL SCN: NEGATIVE
BENZODIAZ UR QL: NEGATIVE
BUPRENORPHINE UR QL: NEGATIVE
CANNABINOIDS UR QL SCN: POSITIVE
COCAINE UR QL SCN: NEGATIVE
ERYTHROCYTE [DISTWIDTH] IN BLOOD BY AUTOMATED COUNT: 13.3 % (ref 11.5–15)
FENTANYL UR QL: NEGATIVE
FERRITIN SERPL-MCNC: 54 NG/ML
GLUCOSE URINE, POC: NORMAL
HCT VFR BLD AUTO: 40.6 % (ref 34–48)
HGB BLD-MCNC: 13.3 G/DL (ref 11.5–15.5)
MCH RBC QN AUTO: 28.7 PG (ref 26–35)
MCHC RBC AUTO-ENTMCNC: 32.8 G/DL (ref 32–34.5)
MCV RBC AUTO: 87.5 FL (ref 80–99.9)
METHADONE UR QL: NEGATIVE
OPIATES UR QL SCN: NEGATIVE
OXYCODONE UR QL SCN: NEGATIVE
PCP UR QL SCN: NEGATIVE
PLATELET # BLD AUTO: 241 K/UL (ref 130–450)
PMV BLD AUTO: 11.4 FL (ref 7–12)
PROTEIN UA: NEGATIVE
RBC # BLD AUTO: 4.64 M/UL (ref 3.5–5.5)
TEST INFORMATION: ABNORMAL
WBC OTHER # BLD: 10.8 K/UL (ref 4.5–11.5)

## 2024-01-11 PROCEDURE — 80307 DRUG TEST PRSMV CHEM ANLYZR: CPT

## 2024-01-11 PROCEDURE — 86762 RUBELLA ANTIBODY: CPT

## 2024-01-11 PROCEDURE — 86592 SYPHILIS TEST NON-TREP QUAL: CPT

## 2024-01-11 PROCEDURE — 86803 HEPATITIS C AB TEST: CPT

## 2024-01-11 PROCEDURE — 36415 COLL VENOUS BLD VENIPUNCTURE: CPT

## 2024-01-11 PROCEDURE — 86900 BLOOD TYPING SEROLOGIC ABO: CPT

## 2024-01-11 PROCEDURE — 86901 BLOOD TYPING SEROLOGIC RH(D): CPT

## 2024-01-11 PROCEDURE — 82728 ASSAY OF FERRITIN: CPT

## 2024-01-11 PROCEDURE — 87086 URINE CULTURE/COLONY COUNT: CPT

## 2024-01-11 PROCEDURE — 85027 COMPLETE CBC AUTOMATED: CPT

## 2024-01-11 PROCEDURE — 87389 HIV-1 AG W/HIV-1&-2 AB AG IA: CPT

## 2024-01-11 RX ORDER — AMITRIPTYLINE HYDROCHLORIDE 10 MG/1
10 TABLET, FILM COATED ORAL NIGHTLY
COMMUNITY
Start: 2023-11-09

## 2024-01-11 RX ORDER — PROPRANOLOL HYDROCHLORIDE 10 MG/1
10 TABLET ORAL 2 TIMES DAILY
COMMUNITY
Start: 2023-11-02

## 2024-01-11 NOTE — PROGRESS NOTES
Patient is here today for ob new visit. Patient states when she goes to urinate it takes awhile to start and feels like she has to go 5 mins later but no burning. Denies any vaginal bleeding, or leakage of fluids.  Slight cramping.

## 2024-01-11 NOTE — PROGRESS NOTES
First prenatal visit with Chanelle she is a 28-year-old G5, P3 female at 10 weeks 2 days based on 7-week ultrasound.  No significant PMH.  Multiple previous abdominal surgeries including 3 C-sections.    Reviewed first trimester changes.  Packet on mental health resources, OTC meds Tdap flu shot foods.  Recent influenza infection.  Did not get her flu shot this year for the first time.  Pap and cultures obtained.  Initial prenatal labs with panorama and Horizon ordered.  Will schedule 20-week ultrasound after her next visit.

## 2024-01-12 LAB
CHLAMYDIA BY THIN PREP: NORMAL
HCV AB SERPL QL IA: NONREACTIVE
HIV 1+2 AB+HIV1 P24 AG SERPL QL IA: NONREACTIVE
HPV SAMPLE: NORMAL
HPV SOURCE: NORMAL
HPV, GENOTYPE 16: NORMAL
HPV, GENOTYPE 18: NORMAL
HPV, HIGH RISK OTHER: NORMAL
HPV, INTERPRETATION: NORMAL
N. GONORRHOEAE DNA, THIN PREP: NORMAL
RPR SER QL: NONREACTIVE
RUBV IGG SERPL QL IA: ABNORMAL IU/ML

## 2024-01-13 LAB
MICROORGANISM SPEC CULT: NO GROWTH
SPECIMEN DESCRIPTION: NORMAL

## 2024-01-16 LAB
GYNECOLOGY CYTOLOGY REPORT: NORMAL
HPV SAMPLE: ABNORMAL
HPV SOURCE: ABNORMAL
HPV, GENOTYPE 16: NOT DETECTED
HPV, GENOTYPE 18: NOT DETECTED
HPV, HIGH RISK OTHER: DETECTED
HPV, INTERPRETATION: ABNORMAL

## 2024-01-18 LAB
CHLAMYDIA BY THIN PREP: NEGATIVE
Lab: NEGATIVE
Lab: NORMAL
N. GONORRHOEAE DNA, THIN PREP: NEGATIVE
NTRA ALPHA-THALASSEMIA: NEGATIVE
NTRA BETA-HEMOGLOBINOPATHIES: NEGATIVE
NTRA CANAVAN DISEASE: NEGATIVE
NTRA CYSTIC FIBROSIS: NEGATIVE
NTRA DUCHENNE/BECKER MUSCULAR DYSTROPHY: NEGATIVE
NTRA FAMILIAL DYSAUTONOMIA: NEGATIVE
NTRA FETAL FRACTION: NORMAL
NTRA FRAGILE X SYNDROME: NEGATIVE
NTRA GALACTOSEMIA: NEGATIVE
NTRA GAUCHER DISEASE: NEGATIVE
NTRA GENDER OF FETUS: NORMAL
NTRA MEDIUM CHAIN ACYL-COA DEHYDROGENASE DEFICIENCY: NEGATIVE
NTRA MONOSOMY X AGE-BASED RISK TEXT: NORMAL
NTRA MONOSOMY X RESULT TEXT: NORMAL
NTRA MONOSOMY X RISK SCORE TEXT: NORMAL
NTRA POLYCYSTIC KIDNEY DISEASE, AUTOSOMAL RECESSIVE: NEGATIVE
NTRA SMITH-LEMLI-OPITZ SYNDROME: NEGATIVE
NTRA SPINAL MUSCULAR ATROPHY: NEGATIVE
NTRA TAY-SACHS DISEASE: NEGATIVE
NTRA TRIPLOIDY RESULT TEXT: NORMAL
NTRA TRISOMY 13 AGE-BASED RISK TEXT: NORMAL
NTRA TRISOMY 13 RESULT TEXT: NORMAL
NTRA TRISOMY 13 RISK SCORE TEXT: NORMAL
NTRA TRISOMY 18 AGE-BASED RISK TEXT: NORMAL
NTRA TRISOMY 18 RESULT TEXT: NORMAL
NTRA TRISOMY 18 RISK SCORE TEXT: NORMAL
NTRA TRISOMY 21 AGE-BASED RISK TEXT: NORMAL
NTRA TRISOMY 21 RESULT TEXT: NORMAL
NTRA TRISOMY 21 RISK SCORE TEXT: NORMAL

## 2024-01-19 ENCOUNTER — TELEPHONE (OUTPATIENT)
Dept: OBGYN | Age: 29
End: 2024-01-19

## 2024-01-19 NOTE — TELEPHONE ENCOUNTER
----- Message from Maria Victoria Araiza DO sent at 1/18/2024 10:08 AM EST -----  Please let her know her NIPT showed low risk for Down syndrome chromosome 18 and 13 abnormalities and if she wants to know the sex it is a male

## 2024-02-05 ENCOUNTER — PATIENT MESSAGE (OUTPATIENT)
Age: 29
End: 2024-02-05

## 2024-02-06 ENCOUNTER — APPOINTMENT (OUTPATIENT)
Dept: ULTRASOUND IMAGING | Age: 29
End: 2024-02-06
Payer: COMMERCIAL

## 2024-02-06 ENCOUNTER — HOSPITAL ENCOUNTER (EMERGENCY)
Age: 29
Discharge: HOME | End: 2024-02-06
Payer: COMMERCIAL

## 2024-02-06 VITALS
TEMPERATURE: 98.2 F | WEIGHT: 118 LBS | HEIGHT: 62 IN | RESPIRATION RATE: 16 BRPM | OXYGEN SATURATION: 99 % | SYSTOLIC BLOOD PRESSURE: 105 MMHG | BODY MASS INDEX: 21.71 KG/M2 | DIASTOLIC BLOOD PRESSURE: 58 MMHG | HEART RATE: 99 BPM

## 2024-02-06 DIAGNOSIS — R82.81 PYURIA: ICD-10-CM

## 2024-02-06 DIAGNOSIS — R14.1 GAS PAIN: ICD-10-CM

## 2024-02-06 DIAGNOSIS — R10.13 ABDOMINAL PAIN, EPIGASTRIC: Primary | ICD-10-CM

## 2024-02-06 LAB
ALBUMIN SERPL-MCNC: 3.9 G/DL (ref 3.5–5.2)
ALP SERPL-CCNC: 75 U/L (ref 35–104)
ALT SERPL-CCNC: 14 U/L (ref 0–32)
ANION GAP SERPL CALCULATED.3IONS-SCNC: 12 MMOL/L (ref 7–16)
AST SERPL-CCNC: 12 U/L (ref 0–31)
B-HCG SERPL EIA 3RD IS-ACNC: ABNORMAL MIU/ML (ref 0–7)
BACTERIA URNS QL MICRO: ABNORMAL
BASOPHILS # BLD: 0.04 K/UL (ref 0–0.2)
BASOPHILS NFR BLD: 0 % (ref 0–2)
BILIRUB SERPL-MCNC: 0.3 MG/DL (ref 0–1.2)
BILIRUB UR QL STRIP: NEGATIVE
BUN SERPL-MCNC: 6 MG/DL (ref 6–20)
CALCIUM SERPL-MCNC: 9.1 MG/DL (ref 8.6–10.2)
CHLORIDE SERPL-SCNC: 102 MMOL/L (ref 98–107)
CLARITY UR: CLEAR
CO2 SERPL-SCNC: 22 MMOL/L (ref 22–29)
COLOR UR: YELLOW
CREAT SERPL-MCNC: 0.5 MG/DL (ref 0.5–1)
EOSINOPHIL # BLD: 0.09 K/UL (ref 0.05–0.5)
EOSINOPHILS RELATIVE PERCENT: 1 % (ref 0–6)
EPI CELLS #/AREA URNS HPF: ABNORMAL /HPF
ERYTHROCYTE [DISTWIDTH] IN BLOOD BY AUTOMATED COUNT: 14.4 % (ref 11.5–15)
GFR SERPL CREATININE-BSD FRML MDRD: >60 ML/MIN/1.73M2
GLUCOSE SERPL-MCNC: 108 MG/DL (ref 74–99)
GLUCOSE UR STRIP-MCNC: NEGATIVE MG/DL
HCT VFR BLD AUTO: 38 % (ref 34–48)
HGB BLD-MCNC: 12.5 G/DL (ref 11.5–15.5)
HGB UR QL STRIP.AUTO: ABNORMAL
IMM GRANULOCYTES # BLD AUTO: 0.24 K/UL (ref 0–0.58)
IMM GRANULOCYTES NFR BLD: 2 % (ref 0–5)
KETONES UR STRIP-MCNC: 40 MG/DL
LEUKOCYTE ESTERASE UR QL STRIP: ABNORMAL
LIPASE SERPL-CCNC: 20 U/L (ref 13–60)
LYMPHOCYTES NFR BLD: 1.87 K/UL (ref 1.5–4)
LYMPHOCYTES RELATIVE PERCENT: 12 % (ref 20–42)
MAGNESIUM SERPL-MCNC: 1.7 MG/DL (ref 1.6–2.6)
MCH RBC QN AUTO: 29.6 PG (ref 26–35)
MCHC RBC AUTO-ENTMCNC: 32.9 G/DL (ref 32–34.5)
MCV RBC AUTO: 89.8 FL (ref 80–99.9)
MONOCYTES NFR BLD: 1.01 K/UL (ref 0.1–0.95)
MONOCYTES NFR BLD: 7 % (ref 2–12)
NEUTROPHILS NFR BLD: 79 % (ref 43–80)
NEUTS SEG NFR BLD: 12.24 K/UL (ref 1.8–7.3)
NITRITE UR QL STRIP: NEGATIVE
PH UR STRIP: 6 [PH] (ref 5–9)
PLATELET # BLD AUTO: 213 K/UL (ref 130–450)
PMV BLD AUTO: 11.1 FL (ref 7–12)
POTASSIUM SERPL-SCNC: 3.5 MMOL/L (ref 3.5–5)
PROT SERPL-MCNC: 7.2 G/DL (ref 6.4–8.3)
PROT UR STRIP-MCNC: NEGATIVE MG/DL
RBC # BLD AUTO: 4.23 M/UL (ref 3.5–5.5)
RBC #/AREA URNS HPF: ABNORMAL /HPF
SODIUM SERPL-SCNC: 136 MMOL/L (ref 132–146)
SP GR UR STRIP: >1.03 (ref 1–1.03)
UROBILINOGEN UR STRIP-ACNC: 0.2 EU/DL (ref 0–1)
WBC #/AREA URNS HPF: ABNORMAL /HPF
WBC OTHER # BLD: 15.5 K/UL (ref 4.5–11.5)

## 2024-02-06 PROCEDURE — 80053 COMPREHEN METABOLIC PANEL: CPT

## 2024-02-06 PROCEDURE — 87086 URINE CULTURE/COLONY COUNT: CPT

## 2024-02-06 PROCEDURE — 81001 URINALYSIS AUTO W/SCOPE: CPT

## 2024-02-06 PROCEDURE — 96374 THER/PROPH/DIAG INJ IV PUSH: CPT

## 2024-02-06 PROCEDURE — 84702 CHORIONIC GONADOTROPIN TEST: CPT

## 2024-02-06 PROCEDURE — 96361 HYDRATE IV INFUSION ADD-ON: CPT

## 2024-02-06 PROCEDURE — 83690 ASSAY OF LIPASE: CPT

## 2024-02-06 PROCEDURE — 76700 US EXAM ABDOM COMPLETE: CPT

## 2024-02-06 PROCEDURE — 76805 OB US >/= 14 WKS SNGL FETUS: CPT

## 2024-02-06 PROCEDURE — 85025 COMPLETE CBC W/AUTO DIFF WBC: CPT

## 2024-02-06 PROCEDURE — 6360000002 HC RX W HCPCS: Performed by: PHYSICIAN ASSISTANT

## 2024-02-06 PROCEDURE — 99284 EMERGENCY DEPT VISIT MOD MDM: CPT

## 2024-02-06 PROCEDURE — 83735 ASSAY OF MAGNESIUM: CPT

## 2024-02-06 PROCEDURE — 6370000000 HC RX 637 (ALT 250 FOR IP): Performed by: PHYSICIAN ASSISTANT

## 2024-02-06 PROCEDURE — 2580000003 HC RX 258: Performed by: PHYSICIAN ASSISTANT

## 2024-02-06 RX ORDER — 0.9 % SODIUM CHLORIDE 0.9 %
1000 INTRAVENOUS SOLUTION INTRAVENOUS ONCE
Status: COMPLETED | OUTPATIENT
Start: 2024-02-06 | End: 2024-02-06

## 2024-02-06 RX ORDER — ONDANSETRON 2 MG/ML
4 INJECTION INTRAMUSCULAR; INTRAVENOUS ONCE
Status: COMPLETED | OUTPATIENT
Start: 2024-02-06 | End: 2024-02-06

## 2024-02-06 RX ORDER — CEFDINIR 300 MG/1
300 CAPSULE ORAL 2 TIMES DAILY
Qty: 20 CAPSULE | Refills: 0 | Status: SHIPPED | OUTPATIENT
Start: 2024-02-06 | End: 2024-02-16

## 2024-02-06 RX ORDER — PANTOPRAZOLE SODIUM 40 MG/1
40 TABLET, DELAYED RELEASE ORAL
Qty: 30 TABLET | Refills: 0 | Status: SHIPPED | OUTPATIENT
Start: 2024-02-06

## 2024-02-06 RX ORDER — ONDANSETRON 4 MG/1
4 TABLET, ORALLY DISINTEGRATING ORAL 3 TIMES DAILY PRN
Qty: 21 TABLET | Refills: 0 | Status: SHIPPED | OUTPATIENT
Start: 2024-02-06

## 2024-02-06 RX ADMIN — ALUMINUM HYDROXIDE, MAGNESIUM HYDROXIDE, AND SIMETHICONE: 200; 200; 20 SUSPENSION ORAL at 14:35

## 2024-02-06 RX ADMIN — ALUMINUM HYDROXIDE, MAGNESIUM HYDROXIDE, AND SIMETHICONE: 200; 200; 20 SUSPENSION ORAL at 17:00

## 2024-02-06 RX ADMIN — SODIUM CHLORIDE 1000 ML: 9 INJECTION, SOLUTION INTRAVENOUS at 14:32

## 2024-02-06 RX ADMIN — ONDANSETRON 4 MG: 2 INJECTION INTRAMUSCULAR; INTRAVENOUS at 14:33

## 2024-02-06 ASSESSMENT — PAIN DESCRIPTION - LOCATION: LOCATION: ABDOMEN

## 2024-02-06 ASSESSMENT — PAIN SCALES - GENERAL: PAINLEVEL_OUTOF10: 7

## 2024-02-06 NOTE — ED NOTES
Department of Emergency Medicine  FIRST PROVIDER TRIAGE NOTE             Independent MLP           2/6/24  11:54 AM EST    Date of Encounter: 2/6/24   MRN: [id number]      HPI: Yadira Duran is a 28 y.o. female who presents to the ED for Abdominal Pain (14 wks preg. Upper abdominal aching burning pain ) and Emesis (Started this am)     DENIES VAGINAL BLEEDING OR ANY UNUSUAL DISCHARGE.     ROS: Negative for fever or cough.    PE: Gen Appearance/Constitutional: alert  HEENT: NC/NT. PERRLA,  Airway patent.     Initial Plan of Care: All treatment areas with department are currently occupied. Plan to order/Initiate the following while awaiting opening in ED: labs.  Initiate Treatment-Testing, Proceed toTreatment Area When Bed Available for ED Attending/MLP to Continue Care    Electronically signed by ERMIAS Edmond CNP   DD: 2/6/24      Roberto Ross APRN - CNP  02/06/24 120

## 2024-02-06 NOTE — TELEPHONE ENCOUNTER
Pt left message on clinical line with reference to previous my chart message sent the day prior. Pt stating she was in a great amount of pain with nausea and vomiting, due to pain level this nurse recommended ER for evaluation. Pt verbalized understanding.

## 2024-02-06 NOTE — ED PROVIDER NOTES
Lipase 20 13 - 60 U/L   HCG, Quantitative, Pregnancy   Result Value Ref Range    hCG Quant 31,205.0 (H) 0 - 7 mIU/mL   Urinalysis with Microscopic   Result Value Ref Range    Color, UA Yellow Yellow    Turbidity UA Clear Clear    Glucose, Ur NEGATIVE NEGATIVE mg/dL    Bilirubin Urine NEGATIVE NEGATIVE    Ketones, Urine 40 (A) NEGATIVE mg/dL    Specific Gravity, UA >1.030 (H) 1.005 - 1.030    Urine Hgb TRACE (A) NEGATIVE    pH, UA 6.0 5.0 - 9.0    Protein, UA NEGATIVE NEGATIVE mg/dL    Urobilinogen, Urine 0.2 0.0 - 1.0 EU/dL    Nitrite, Urine NEGATIVE NEGATIVE    Leukocyte Esterase, Urine SMALL (A) NEGATIVE    WBC, UA 6 TO 9 (A) 0 TO 5 /HPF    RBC, UA 0 TO 2 0 TO 2 /HPF    Epithelial Cells UA 10 TO 20 /HPF    Bacteria, UA 1+ (A) None       RADIOLOGY:  Interpreted by Radiologist.  US ABDOMEN COMPLETE   Final Result   Unremarkable abdominal ultrasound.         US OB 14 PLUS WEEKS SINGLE OR FIRST GESTATION   Final Result   1. Single live intrauterine pregnancy with cardiac activity detected at 157   beats per minute.   2. Fetal age by ultrasound measurement is approximately 14 weeks and 4 days.   3. Grossly normal fetal anatomy as visualized on this emergent examination.   Ultrasound dedicated to fetal anatomy recommended as clinically indicated.             ------------------------- NURSING NOTES AND VITALS REVIEWED ---------------------------   The nursing notes within the ED encounter and vital signs as below have been reviewed.   BP (!) 105/58   Pulse 99   Temp 98.2 °F (36.8 °C) (Oral)   Resp 16   Ht 1.575 m (5' 2\")   Wt 53.5 kg (118 lb)   LMP  (LMP Unknown)   SpO2 99%   BMI 21.58 kg/m²   Oxygen Saturation Interpretation: Normal      ---------------------------------------------------PHYSICAL EXAM--------------------------------------    Constitutional/General: Alert and oriented x3, pale appearing, non toxic in NAD  Head: NC/AT  Eyes: PERRL, EOMI  Mouth: Oropharynx clear, handling secretions, no

## 2024-02-08 ENCOUNTER — ROUTINE PRENATAL (OUTPATIENT)
Age: 29
End: 2024-02-08
Payer: COMMERCIAL

## 2024-02-08 VITALS
HEART RATE: 87 BPM | WEIGHT: 116.1 LBS | BODY MASS INDEX: 21.23 KG/M2 | DIASTOLIC BLOOD PRESSURE: 67 MMHG | SYSTOLIC BLOOD PRESSURE: 105 MMHG

## 2024-02-08 DIAGNOSIS — Z3A.14 14 WEEKS GESTATION OF PREGNANCY: Primary | ICD-10-CM

## 2024-02-08 LAB
GLUCOSE URINE, POC: NEGATIVE
MICROORGANISM SPEC CULT: ABNORMAL
MICROORGANISM SPEC CULT: ABNORMAL
PROTEIN UA: ABNORMAL
SPECIMEN DESCRIPTION: ABNORMAL

## 2024-02-08 PROCEDURE — 81002 URINALYSIS NONAUTO W/O SCOPE: CPT | Performed by: OBSTETRICS & GYNECOLOGY

## 2024-02-08 PROCEDURE — G8427 DOCREV CUR MEDS BY ELIG CLIN: HCPCS | Performed by: OBSTETRICS & GYNECOLOGY

## 2024-02-08 PROCEDURE — G8420 CALC BMI NORM PARAMETERS: HCPCS | Performed by: OBSTETRICS & GYNECOLOGY

## 2024-02-08 PROCEDURE — G8484 FLU IMMUNIZE NO ADMIN: HCPCS | Performed by: OBSTETRICS & GYNECOLOGY

## 2024-02-08 PROCEDURE — 1036F TOBACCO NON-USER: CPT | Performed by: OBSTETRICS & GYNECOLOGY

## 2024-02-08 PROCEDURE — 99213 OFFICE O/P EST LOW 20 MIN: CPT | Performed by: OBSTETRICS & GYNECOLOGY

## 2024-02-08 ASSESSMENT — PATIENT HEALTH QUESTIONNAIRE - PHQ9
SUM OF ALL RESPONSES TO PHQ QUESTIONS 1-9: 0
2. FEELING DOWN, DEPRESSED OR HOPELESS: 0
SUM OF ALL RESPONSES TO PHQ QUESTIONS 1-9: 0
SUM OF ALL RESPONSES TO PHQ9 QUESTIONS 1 & 2: 0
1. LITTLE INTEREST OR PLEASURE IN DOING THINGS: 0

## 2024-02-08 NOTE — PROGRESS NOTES
Pt here today for routine prenatal visit. Denies cramping, bleeding or leaking of fluids. Reports \"a lot of stretching\". Pt reports being seen in [facility] ED for hernia. No other concerns at this time.

## 2024-02-08 NOTE — PROGRESS NOTES
Reviewed hernia.  Rubella nonimmune and LGSIL positive high risk other will need evaluation postpartum.  Quad screen ordered and anatomy scan.

## 2024-02-26 ENCOUNTER — APPOINTMENT (OUTPATIENT)
Dept: ULTRASOUND IMAGING | Age: 29
End: 2024-02-26
Payer: COMMERCIAL

## 2024-02-26 ENCOUNTER — TELEPHONE (OUTPATIENT)
Dept: OBGYN | Age: 29
End: 2024-02-26

## 2024-02-26 ENCOUNTER — APPOINTMENT (OUTPATIENT)
Dept: CT IMAGING | Age: 29
End: 2024-02-26
Payer: COMMERCIAL

## 2024-02-26 ENCOUNTER — HOSPITAL ENCOUNTER (EMERGENCY)
Age: 29
Discharge: LEFT BEFORE DISCHARGE | End: 2024-02-26
Attending: EMERGENCY MEDICINE
Payer: COMMERCIAL

## 2024-02-26 VITALS
HEART RATE: 114 BPM | WEIGHT: 120 LBS | BODY MASS INDEX: 22.08 KG/M2 | TEMPERATURE: 97.7 F | HEIGHT: 62 IN | RESPIRATION RATE: 12 BRPM | DIASTOLIC BLOOD PRESSURE: 57 MMHG | SYSTOLIC BLOOD PRESSURE: 103 MMHG | OXYGEN SATURATION: 99 %

## 2024-02-26 DIAGNOSIS — Z3A.17 17 WEEKS GESTATION OF PREGNANCY: ICD-10-CM

## 2024-02-26 DIAGNOSIS — Z53.29 LEFT AGAINST MEDICAL ADVICE: ICD-10-CM

## 2024-02-26 DIAGNOSIS — R10.9 ABDOMINAL CRAMPING: ICD-10-CM

## 2024-02-26 DIAGNOSIS — R51.9 NONINTRACTABLE HEADACHE, UNSPECIFIED CHRONICITY PATTERN, UNSPECIFIED HEADACHE TYPE: Primary | ICD-10-CM

## 2024-02-26 LAB
ALBUMIN SERPL-MCNC: 3.7 G/DL (ref 3.5–5.2)
ALP SERPL-CCNC: 64 U/L (ref 35–104)
ALT SERPL-CCNC: 9 U/L (ref 0–32)
ANION GAP SERPL CALCULATED.3IONS-SCNC: 8 MMOL/L (ref 7–16)
AST SERPL-CCNC: 10 U/L (ref 0–31)
BASOPHILS # BLD: 0.07 K/UL (ref 0–0.2)
BASOPHILS NFR BLD: 1 % (ref 0–2)
BILIRUB SERPL-MCNC: 0.2 MG/DL (ref 0–1.2)
BILIRUB UR QL STRIP: NEGATIVE
BUN SERPL-MCNC: 8 MG/DL (ref 6–20)
CALCIUM SERPL-MCNC: 9.1 MG/DL (ref 8.6–10.2)
CHLORIDE SERPL-SCNC: 102 MMOL/L (ref 98–107)
CLARITY UR: CLEAR
CO2 SERPL-SCNC: 22 MMOL/L (ref 22–29)
COLOR UR: YELLOW
CREAT SERPL-MCNC: 0.5 MG/DL (ref 0.5–1)
EOSINOPHIL # BLD: 0.13 K/UL (ref 0.05–0.5)
EOSINOPHILS RELATIVE PERCENT: 1 % (ref 0–6)
ERYTHROCYTE [DISTWIDTH] IN BLOOD BY AUTOMATED COUNT: 14.3 % (ref 11.5–15)
GFR SERPL CREATININE-BSD FRML MDRD: >60 ML/MIN/1.73M2
GLUCOSE SERPL-MCNC: 91 MG/DL (ref 74–99)
GLUCOSE UR STRIP-MCNC: NEGATIVE MG/DL
HCT VFR BLD AUTO: 39.4 % (ref 34–48)
HGB BLD-MCNC: 12.8 G/DL (ref 11.5–15.5)
HGB UR QL STRIP.AUTO: ABNORMAL
IMM GRANULOCYTES # BLD AUTO: 0.22 K/UL (ref 0–0.58)
IMM GRANULOCYTES NFR BLD: 2 % (ref 0–5)
INFLUENZA A BY PCR: NOT DETECTED
INFLUENZA B BY PCR: NOT DETECTED
KETONES UR STRIP-MCNC: NEGATIVE MG/DL
LEUKOCYTE ESTERASE UR QL STRIP: ABNORMAL
LIPASE SERPL-CCNC: 30 U/L (ref 13–60)
LYMPHOCYTES NFR BLD: 2.31 K/UL (ref 1.5–4)
LYMPHOCYTES RELATIVE PERCENT: 24 % (ref 20–42)
MCH RBC QN AUTO: 29.2 PG (ref 26–35)
MCHC RBC AUTO-ENTMCNC: 32.5 G/DL (ref 32–34.5)
MCV RBC AUTO: 90 FL (ref 80–99.9)
MONOCYTES NFR BLD: 0.86 K/UL (ref 0.1–0.95)
MONOCYTES NFR BLD: 9 % (ref 2–12)
NEUTROPHILS NFR BLD: 62 % (ref 43–80)
NEUTS SEG NFR BLD: 5.96 K/UL (ref 1.8–7.3)
NITRITE UR QL STRIP: NEGATIVE
PH UR STRIP: 5.5 [PH] (ref 5–9)
PLATELET # BLD AUTO: 244 K/UL (ref 130–450)
PMV BLD AUTO: 11.4 FL (ref 7–12)
POTASSIUM SERPL-SCNC: 3.3 MMOL/L (ref 3.5–5)
PROT SERPL-MCNC: 7.2 G/DL (ref 6.4–8.3)
PROT UR STRIP-MCNC: NEGATIVE MG/DL
RBC # BLD AUTO: 4.38 M/UL (ref 3.5–5.5)
RBC #/AREA URNS HPF: ABNORMAL /HPF
SARS-COV-2 RDRP RESP QL NAA+PROBE: NOT DETECTED
SODIUM SERPL-SCNC: 132 MMOL/L (ref 132–146)
SP GR UR STRIP: >1.03 (ref 1–1.03)
SPECIMEN DESCRIPTION: NORMAL
UROBILINOGEN UR STRIP-ACNC: 0.2 EU/DL (ref 0–1)
WBC #/AREA URNS HPF: ABNORMAL /HPF
WBC OTHER # BLD: 9.6 K/UL (ref 4.5–11.5)

## 2024-02-26 PROCEDURE — 99284 EMERGENCY DEPT VISIT MOD MDM: CPT

## 2024-02-26 PROCEDURE — 83690 ASSAY OF LIPASE: CPT

## 2024-02-26 PROCEDURE — 85025 COMPLETE CBC W/AUTO DIFF WBC: CPT

## 2024-02-26 PROCEDURE — 81001 URINALYSIS AUTO W/SCOPE: CPT

## 2024-02-26 PROCEDURE — 80053 COMPREHEN METABOLIC PANEL: CPT

## 2024-02-26 PROCEDURE — 96374 THER/PROPH/DIAG INJ IV PUSH: CPT

## 2024-02-26 PROCEDURE — 2580000003 HC RX 258: Performed by: EMERGENCY MEDICINE

## 2024-02-26 PROCEDURE — 87635 SARS-COV-2 COVID-19 AMP PRB: CPT

## 2024-02-26 PROCEDURE — 96375 TX/PRO/DX INJ NEW DRUG ADDON: CPT

## 2024-02-26 PROCEDURE — 96361 HYDRATE IV INFUSION ADD-ON: CPT

## 2024-02-26 PROCEDURE — 6360000002 HC RX W HCPCS: Performed by: EMERGENCY MEDICINE

## 2024-02-26 PROCEDURE — 87502 INFLUENZA DNA AMP PROBE: CPT

## 2024-02-26 RX ORDER — DIPHENHYDRAMINE HYDROCHLORIDE 50 MG/ML
25 INJECTION INTRAMUSCULAR; INTRAVENOUS ONCE
Status: COMPLETED | OUTPATIENT
Start: 2024-02-26 | End: 2024-02-26

## 2024-02-26 RX ORDER — METOCLOPRAMIDE HYDROCHLORIDE 5 MG/ML
10 INJECTION INTRAMUSCULAR; INTRAVENOUS ONCE
Status: COMPLETED | OUTPATIENT
Start: 2024-02-26 | End: 2024-02-26

## 2024-02-26 RX ORDER — 0.9 % SODIUM CHLORIDE 0.9 %
1000 INTRAVENOUS SOLUTION INTRAVENOUS ONCE
Status: COMPLETED | OUTPATIENT
Start: 2024-02-26 | End: 2024-02-26

## 2024-02-26 RX ADMIN — SODIUM CHLORIDE 1000 ML: 9 INJECTION, SOLUTION INTRAVENOUS at 16:55

## 2024-02-26 RX ADMIN — DIPHENHYDRAMINE HYDROCHLORIDE 25 MG: 50 INJECTION INTRAMUSCULAR; INTRAVENOUS at 16:55

## 2024-02-26 RX ADMIN — METOCLOPRAMIDE 10 MG: 5 INJECTION, SOLUTION INTRAMUSCULAR; INTRAVENOUS at 16:56

## 2024-02-26 ASSESSMENT — ENCOUNTER SYMPTOMS
BACK PAIN: 0
VOMITING: 0
EYE REDNESS: 0
SINUS PRESSURE: 0
EYE PAIN: 0
ABDOMINAL PAIN: 1
COUGH: 0
DIARRHEA: 0
ABDOMINAL DISTENTION: 0
EYE DISCHARGE: 0
NAUSEA: 0
SHORTNESS OF BREATH: 0
SORE THROAT: 0
WHEEZING: 0

## 2024-02-26 ASSESSMENT — PAIN SCALES - GENERAL: PAINLEVEL_OUTOF10: 1

## 2024-02-26 ASSESSMENT — PAIN - FUNCTIONAL ASSESSMENT: PAIN_FUNCTIONAL_ASSESSMENT: 0-10

## 2024-02-26 NOTE — ED PROVIDER NOTES
28-year-old female who presents to the emergency department for abdominal cramping and headache.  Patient had the symptoms intermittently for 3 days.  She was seen by her OB/GYN who evaluated her and sent her to the emergency department for evaluation as her heart rate was up.  The patient states she has had previous history of early  labor and states she feels she is having contractions every 3 to 4 minutes.  Patient also reports a history of MS and takes propranolol.  Patient reports the headache is been gone for 3 days and she reports that Tylenol and her home propranolol have not helped her headaches.  She reports no fevers cough congestion sore throat urinary symptoms nausea vomiting or other complaints at this time    The history is provided by the patient.   Headache  Pain location:  Generalized  Quality:  Sharp  Severity currently:  3/10  Severity at highest:  8/10  Onset quality:  Gradual  Duration:  3 days  Timing:  Intermittent  Progression:  Waxing and waning  Chronicity:  New  Associated symptoms: abdominal pain    Associated symptoms: no back pain, no cough, no diarrhea, no ear pain, no eye pain, no fever, no nausea, no sinus pressure, no sore throat, no vomiting and no weakness         Review of Systems   Constitutional:  Negative for chills and fever.   HENT:  Negative for ear pain, sinus pressure and sore throat.    Eyes:  Negative for pain, discharge and redness.   Respiratory:  Negative for cough, shortness of breath and wheezing.    Cardiovascular:  Negative for chest pain.   Gastrointestinal:  Positive for abdominal pain. Negative for abdominal distention, diarrhea, nausea and vomiting.   Genitourinary:  Negative for dysuria and frequency.   Musculoskeletal:  Negative for arthralgias and back pain.   Skin:  Negative for rash and wound.   Neurological:  Positive for headaches. Negative for weakness.   Hematological:  Negative for adenopathy.   All other systems reviewed and are

## 2024-02-26 NOTE — ED TRIAGE NOTES
Department of Emergency Medicine  FIRST PROVIDER TRIAGE NOTE             Independent MLP           24  4:25 PM EST    Date of Encounter: 24   MRN: [id number]      HPI: Yadira Duran is a 28 y.o. female who presents to the ED for Abdominal Cramping (Pt 17 weeks preg, lower abd cramping that radiates into the back. Every 3-4 mins per pt. Denies vaginal bleeding . 4th pregnancy. Onset today ) and Headache (X3 days )   Pt has hx of  labor.  Notes contractions every 3 to 4 minutes.  No vaginal bleeding or leakage of fluid.  Has not felt baby move yet this pregnancy.  She also has a horrible headache.  Was sent in for evaluation.  Sees Dr. Araiza.    ROS: Negative for cp or sob.    PE: Gen Appearance/Constitutional: alert  GI: gravid uterus     Initial Plan of Care: All treatment areas with department are currently occupied. Plan to order/Initiate the following while awaiting opening in ED: imaging studies.  Initiate Treatment-Testing, Proceed toTreatment Area When Bed Available for ED Attending/MLP to Continue Care    I spoke with Dr. Serrano who states to call antepartum to get a monitor for the baby.  Patient was placed in a room. Antepartum, Lindsay, states will send monitor/nurse down.    Electronically signed by CARLYN THOMAS PA-C   DD: 24

## 2024-02-26 NOTE — TELEPHONE ENCOUNTER
Left vm because she has had headaches since Friday that are not going away with tylenol and propanolol. Increasing with movement. Also c/o of light contractions that do not hurt.   Returned call. Advised that light contractions are normal. Advised she should have her BP checked and call us back if it is elevated and that I would pass the message along to provide. Instructed to be assessed at emergent care if continues.

## 2024-02-26 NOTE — ED NOTES
Patient states she has a family emergency at this time and needs to leave.  aware and spoke with patient. Patient aware of risks of leaving and AMA form was signed and placed in chart.

## 2024-02-28 RX ORDER — PROPRANOLOL HYDROCHLORIDE 10 MG/1
20 TABLET ORAL 3 TIMES DAILY
Qty: 90 TABLET | Refills: 2 | Status: SHIPPED | OUTPATIENT
Start: 2024-02-28

## 2024-02-28 RX ORDER — SUMATRIPTAN 100 MG/1
TABLET, FILM COATED ORAL
Qty: 9 TABLET | Refills: 3 | Status: SHIPPED | OUTPATIENT
Start: 2024-02-28

## 2024-02-28 NOTE — TELEPHONE ENCOUNTER
Spoke with patient. She said you did not discuss it because she was not having issues at the time. She only has migranes in pregnancy. She is interested in that and the imitrex. Anything that will help

## 2024-02-28 NOTE — PROGRESS NOTES
Called with difficulty functioning with her migraines propranolol 20 mg 3 times a day and Imitrex as needed.

## 2024-03-07 ENCOUNTER — ROUTINE PRENATAL (OUTPATIENT)
Age: 29
End: 2024-03-07
Payer: COMMERCIAL

## 2024-03-07 VITALS
WEIGHT: 125.9 LBS | SYSTOLIC BLOOD PRESSURE: 103 MMHG | BODY MASS INDEX: 23.03 KG/M2 | DIASTOLIC BLOOD PRESSURE: 70 MMHG | HEART RATE: 103 BPM

## 2024-03-07 DIAGNOSIS — Z34.82 ENCOUNTER FOR SUPERVISION OF OTHER NORMAL PREGNANCY IN SECOND TRIMESTER: ICD-10-CM

## 2024-03-07 DIAGNOSIS — Z3A.18 18 WEEKS GESTATION OF PREGNANCY: Primary | ICD-10-CM

## 2024-03-07 LAB
GLUCOSE URINE, POC: NEGATIVE
PROTEIN UA: NEGATIVE

## 2024-03-07 PROCEDURE — 1036F TOBACCO NON-USER: CPT | Performed by: OBSTETRICS & GYNECOLOGY

## 2024-03-07 PROCEDURE — 99213 OFFICE O/P EST LOW 20 MIN: CPT | Performed by: OBSTETRICS & GYNECOLOGY

## 2024-03-07 PROCEDURE — G8420 CALC BMI NORM PARAMETERS: HCPCS | Performed by: OBSTETRICS & GYNECOLOGY

## 2024-03-07 PROCEDURE — G8484 FLU IMMUNIZE NO ADMIN: HCPCS | Performed by: OBSTETRICS & GYNECOLOGY

## 2024-03-07 PROCEDURE — 81002 URINALYSIS NONAUTO W/O SCOPE: CPT | Performed by: OBSTETRICS & GYNECOLOGY

## 2024-03-07 PROCEDURE — G8427 DOCREV CUR MEDS BY ELIG CLIN: HCPCS | Performed by: OBSTETRICS & GYNECOLOGY

## 2024-03-07 NOTE — PROGRESS NOTES
Pt here for routine prenatal.  Denies bleeding, leaking of fluid, reports lower R abdominal pain that is dependant on positioning. Reports it improves with pressure to that side.

## 2024-03-07 NOTE — PROGRESS NOTES
Positive quickening no BLD or LOF.  Increase in edema legs pelvic pressure.  Will refer to pelvic floor physical therapy for their input and assistance throughout the pregnancy.

## 2024-03-21 ENCOUNTER — ANCILLARY PROCEDURE (OUTPATIENT)
Dept: OBGYN CLINIC | Age: 29
End: 2024-03-21
Payer: COMMERCIAL

## 2024-03-21 ENCOUNTER — INITIAL PRENATAL (OUTPATIENT)
Dept: OBGYN CLINIC | Age: 29
End: 2024-03-21
Payer: COMMERCIAL

## 2024-03-21 VITALS
SYSTOLIC BLOOD PRESSURE: 110 MMHG | WEIGHT: 129 LBS | BODY MASS INDEX: 23.59 KG/M2 | HEART RATE: 96 BPM | DIASTOLIC BLOOD PRESSURE: 78 MMHG

## 2024-03-21 DIAGNOSIS — Z3A.20 20 WEEKS GESTATION OF PREGNANCY: Primary | ICD-10-CM

## 2024-03-21 DIAGNOSIS — Z36.3 ENCOUNTER FOR ANTENATAL SCREENING FOR MALFORMATION USING ULTRASOUND: ICD-10-CM

## 2024-03-21 DIAGNOSIS — Z03.75 SUSPECTED SHORTENING OF CERVIX NOT FOUND: ICD-10-CM

## 2024-03-21 DIAGNOSIS — O34.219 PREVIOUS CESAREAN DELIVERY AFFECTING PREGNANCY: ICD-10-CM

## 2024-03-21 LAB
GLUCOSE URINE, POC: NORMAL
PROTEIN UA: NEGATIVE

## 2024-03-21 PROCEDURE — 76811 OB US DETAILED SNGL FETUS: CPT | Performed by: OBSTETRICS & GYNECOLOGY

## 2024-03-21 PROCEDURE — 81002 URINALYSIS NONAUTO W/O SCOPE: CPT | Performed by: OBSTETRICS & GYNECOLOGY

## 2024-03-21 PROCEDURE — 99203 OFFICE O/P NEW LOW 30 MIN: CPT | Performed by: OBSTETRICS & GYNECOLOGY

## 2024-03-21 PROCEDURE — G8427 DOCREV CUR MEDS BY ELIG CLIN: HCPCS | Performed by: OBSTETRICS & GYNECOLOGY

## 2024-03-21 PROCEDURE — 76817 TRANSVAGINAL US OBSTETRIC: CPT | Performed by: OBSTETRICS & GYNECOLOGY

## 2024-03-21 PROCEDURE — G8420 CALC BMI NORM PARAMETERS: HCPCS | Performed by: OBSTETRICS & GYNECOLOGY

## 2024-03-21 PROCEDURE — 1036F TOBACCO NON-USER: CPT | Performed by: OBSTETRICS & GYNECOLOGY

## 2024-03-21 PROCEDURE — G8484 FLU IMMUNIZE NO ADMIN: HCPCS | Performed by: OBSTETRICS & GYNECOLOGY

## 2024-03-21 PROCEDURE — 99205 OFFICE O/P NEW HI 60 MIN: CPT | Performed by: OBSTETRICS & GYNECOLOGY

## 2024-03-21 NOTE — PROGRESS NOTES
Patient is here today for ob new visit. Denies any vaginal bleeding, or leakage of fluids.  Mild cramping.  Slight movement.

## 2024-03-21 NOTE — PROGRESS NOTES
3/21/24    SenthilMaria Victoria flowers DO  [address]  [address] [place] [address]     RE:  MILA DURAN  : 1995   AGE: 28 y.o.    This report has been created using voice recognition software. It may contain errors which are inherent in voice recognition technology.    Dear Dr. Araiza:    Mila Duran had an appointment today for the following indications:    Patient Active Problem List   Diagnosis    History of seizure after first pregnancy.     Vitamin D deficiency    Sub-chorionic hematomas 3/21/2024    Leg pain    Previous  delivery affecting pregnancy    Weakness of right arm    Right sided weakness    Paresthesia of right upper extremity    Encounter for  screening for malformation using ultrasound    Suspected shortening of cervix not found     Mila Duran is a 28 y.o. female, who is G5(3,0,1,3). She has an Estimated Date of Delivery: 24 based on her established dates.  She is currently 20 weeks 2 days gestation based on that assessment.     The patient had a history of seizure activity after her first pregnancy.  She was seen and evaluated by Dr. Adilson Kay, a neurologist.  She was subsequently treated with Keppra.  The patient discontinued this medication after several months of not having abnormal right arm movements which was the reason she was started on the medication.  She has apparently had no further symptomatology in this regard.      The patient was most recently evaluated by a neurologist Dr. Nicho Zacarias a neurologist at the [facility] on 2023 for a possible diagnosis of multiple sclerosis.  The following is information from Dr. Prasad note from 2023:    \"In 2023, the patient developed acute onset of episodes of abnormal vision which have continued to the present. She describes episodes of pulsating wavy lines in middle of the field in right eye that occur 8-10 times per day. These lines last about 1 minutes and

## 2024-03-22 NOTE — ED PROVIDER NOTES
possible for a visit       NEW MEDICATIONS     New Prescriptions    CEPHALEXIN (KEFLEX) 500 MG CAPSULE    Take 1 capsule by mouth 4 times daily for 7 days     Electronically signed by Claudio Mendoza PA-C   DD: 12/1/23  **This report was transcribed using voice recognition software. Every effort was made to ensure accuracy; however, inadvertent computerized transcription errors may be present.   END OF ED PROVIDER NOTE       Claudio Mendoza PA-C  12/01/23 2035 Never

## 2024-04-04 ENCOUNTER — ROUTINE PRENATAL (OUTPATIENT)
Age: 29
End: 2024-04-04
Payer: COMMERCIAL

## 2024-04-04 VITALS
HEART RATE: 114 BPM | SYSTOLIC BLOOD PRESSURE: 103 MMHG | DIASTOLIC BLOOD PRESSURE: 70 MMHG | WEIGHT: 133.6 LBS | BODY MASS INDEX: 24.44 KG/M2

## 2024-04-04 DIAGNOSIS — Z3A.22 22 WEEKS GESTATION OF PREGNANCY: Primary | ICD-10-CM

## 2024-04-04 LAB
GLUCOSE URINE, POC: NEGATIVE
PROTEIN UA: NEGATIVE

## 2024-04-04 PROCEDURE — 1036F TOBACCO NON-USER: CPT | Performed by: OBSTETRICS & GYNECOLOGY

## 2024-04-04 PROCEDURE — G8428 CUR MEDS NOT DOCUMENT: HCPCS | Performed by: OBSTETRICS & GYNECOLOGY

## 2024-04-04 PROCEDURE — 81002 URINALYSIS NONAUTO W/O SCOPE: CPT | Performed by: OBSTETRICS & GYNECOLOGY

## 2024-04-04 PROCEDURE — 99213 OFFICE O/P EST LOW 20 MIN: CPT | Performed by: OBSTETRICS & GYNECOLOGY

## 2024-04-04 PROCEDURE — G8420 CALC BMI NORM PARAMETERS: HCPCS | Performed by: OBSTETRICS & GYNECOLOGY

## 2024-04-04 NOTE — PROGRESS NOTES
Positive fetal movement no bleeding leaking of fluid.  Last ultrasound shows subchorionic hematoma quite large.  Reviewed bleeding risks.  Reviewed diagnosis of optic neuritis questionable diagnosis of MS she does see an MS physician in [facility].  Following with [facility].  With 3 previous C-sections would recommend delivery prior to 39 weeks

## 2024-04-04 NOTE — PROGRESS NOTES
Patient presents for a routine prenatal exam.  She denies any spotting, bleeding, cramping, or leaking of fluid.  Fetal movements are present. Patient fell in the shower Tuesday. States wasn't a horrible fall. Felt fetal movements afterwards.

## 2024-04-13 ENCOUNTER — HOSPITAL ENCOUNTER (EMERGENCY)
Age: 29
Discharge: HOME | End: 2024-04-13
Attending: EMERGENCY MEDICINE
Payer: COMMERCIAL

## 2024-04-13 ENCOUNTER — APPOINTMENT (OUTPATIENT)
Dept: GENERAL RADIOLOGY | Age: 29
End: 2024-04-13
Payer: COMMERCIAL

## 2024-04-13 VITALS
HEART RATE: 84 BPM | WEIGHT: 136 LBS | RESPIRATION RATE: 18 BRPM | DIASTOLIC BLOOD PRESSURE: 71 MMHG | BODY MASS INDEX: 24.1 KG/M2 | OXYGEN SATURATION: 99 % | SYSTOLIC BLOOD PRESSURE: 111 MMHG | TEMPERATURE: 98.1 F | HEIGHT: 63 IN

## 2024-04-13 DIAGNOSIS — R10.9 ABDOMINAL PAIN DURING PREGNANCY IN SECOND TRIMESTER: ICD-10-CM

## 2024-04-13 DIAGNOSIS — R10.13 ABDOMINAL PAIN, EPIGASTRIC: Primary | ICD-10-CM

## 2024-04-13 DIAGNOSIS — O26.892 ABDOMINAL PAIN DURING PREGNANCY IN SECOND TRIMESTER: ICD-10-CM

## 2024-04-13 LAB
ALBUMIN SERPL-MCNC: 3.5 G/DL (ref 3.5–5.2)
ALP SERPL-CCNC: 69 U/L (ref 35–104)
ALT SERPL-CCNC: 9 U/L (ref 0–32)
AMORPH SED URNS QL MICRO: PRESENT
ANION GAP SERPL CALCULATED.3IONS-SCNC: 8 MMOL/L (ref 7–16)
AST SERPL-CCNC: 13 U/L (ref 0–31)
BACTERIA URNS QL MICRO: ABNORMAL
BASOPHILS # BLD: 0.05 K/UL (ref 0–0.2)
BASOPHILS NFR BLD: 0 % (ref 0–2)
BILIRUB SERPL-MCNC: <0.2 MG/DL (ref 0–1.2)
BILIRUB UR QL STRIP: NEGATIVE
BUN SERPL-MCNC: 7 MG/DL (ref 6–20)
CALCIUM SERPL-MCNC: 8.8 MG/DL (ref 8.6–10.2)
CHLORIDE SERPL-SCNC: 106 MMOL/L (ref 98–107)
CLARITY UR: CLEAR
CO2 SERPL-SCNC: 21 MMOL/L (ref 22–29)
COLOR UR: YELLOW
CREAT SERPL-MCNC: 0.5 MG/DL (ref 0.5–1)
EKG ATRIAL RATE: 85 BPM
EKG P AXIS: 27 DEGREES
EKG P-R INTERVAL: 132 MS
EKG Q-T INTERVAL: 352 MS
EKG QRS DURATION: 76 MS
EKG QTC CALCULATION (BAZETT): 418 MS
EKG R AXIS: 80 DEGREES
EKG T AXIS: 31 DEGREES
EKG VENTRICULAR RATE: 85 BPM
EOSINOPHIL # BLD: 0.21 K/UL (ref 0.05–0.5)
EOSINOPHILS RELATIVE PERCENT: 2 % (ref 0–6)
EPI CELLS #/AREA URNS HPF: ABNORMAL /HPF
ERYTHROCYTE [DISTWIDTH] IN BLOOD BY AUTOMATED COUNT: 13.6 % (ref 11.5–15)
GFR SERPL CREATININE-BSD FRML MDRD: >90 ML/MIN/1.73M2
GLUCOSE SERPL-MCNC: 83 MG/DL (ref 74–99)
GLUCOSE UR STRIP-MCNC: NEGATIVE MG/DL
HCT VFR BLD AUTO: 35.4 % (ref 34–48)
HGB BLD-MCNC: 11.3 G/DL (ref 11.5–15.5)
HGB UR QL STRIP.AUTO: NEGATIVE
IMM GRANULOCYTES # BLD AUTO: 0.25 K/UL (ref 0–0.58)
IMM GRANULOCYTES NFR BLD: 2 % (ref 0–5)
INFLUENZA A BY PCR: NOT DETECTED
INFLUENZA B BY PCR: NOT DETECTED
KETONES UR STRIP-MCNC: NEGATIVE MG/DL
LEUKOCYTE ESTERASE UR QL STRIP: NEGATIVE
LIPASE SERPL-CCNC: 28 U/L (ref 13–60)
LYMPHOCYTES NFR BLD: 1.88 K/UL (ref 1.5–4)
LYMPHOCYTES RELATIVE PERCENT: 16 % (ref 20–42)
MAGNESIUM SERPL-MCNC: 1.7 MG/DL (ref 1.6–2.6)
MCH RBC QN AUTO: 27.4 PG (ref 26–35)
MCHC RBC AUTO-ENTMCNC: 31.9 G/DL (ref 32–34.5)
MCV RBC AUTO: 85.9 FL (ref 80–99.9)
MONOCYTES NFR BLD: 1.01 K/UL (ref 0.1–0.95)
MONOCYTES NFR BLD: 9 % (ref 2–12)
NEUTROPHILS NFR BLD: 71 % (ref 43–80)
NEUTS SEG NFR BLD: 8.48 K/UL (ref 1.8–7.3)
NITRITE UR QL STRIP: NEGATIVE
PH UR STRIP: 7 [PH] (ref 5–9)
PLATELET # BLD AUTO: 240 K/UL (ref 130–450)
PMV BLD AUTO: 11.9 FL (ref 7–12)
POTASSIUM SERPL-SCNC: 3.8 MMOL/L (ref 3.5–5)
PROT SERPL-MCNC: 6.7 G/DL (ref 6.4–8.3)
PROT UR STRIP-MCNC: NEGATIVE MG/DL
RBC # BLD AUTO: 4.12 M/UL (ref 3.5–5.5)
RBC #/AREA URNS HPF: ABNORMAL /HPF
SARS-COV-2 RDRP RESP QL NAA+PROBE: NOT DETECTED
SODIUM SERPL-SCNC: 135 MMOL/L (ref 132–146)
SP GR UR STRIP: 1.02 (ref 1–1.03)
SPECIMEN DESCRIPTION: NORMAL
TROPONIN I SERPL HS-MCNC: <6 NG/L (ref 0–9)
TROPONIN I SERPL HS-MCNC: <6 NG/L (ref 0–9)
UROBILINOGEN UR STRIP-ACNC: 0.2 EU/DL (ref 0–1)
WBC #/AREA URNS HPF: ABNORMAL /HPF
WBC OTHER # BLD: 11.9 K/UL (ref 4.5–11.5)

## 2024-04-13 PROCEDURE — 87635 SARS-COV-2 COVID-19 AMP PRB: CPT

## 2024-04-13 PROCEDURE — 6360000002 HC RX W HCPCS

## 2024-04-13 PROCEDURE — 84484 ASSAY OF TROPONIN QUANT: CPT

## 2024-04-13 PROCEDURE — 93005 ELECTROCARDIOGRAM TRACING: CPT

## 2024-04-13 PROCEDURE — 2580000003 HC RX 258: Performed by: EMERGENCY MEDICINE

## 2024-04-13 PROCEDURE — 83690 ASSAY OF LIPASE: CPT

## 2024-04-13 PROCEDURE — 85025 COMPLETE CBC W/AUTO DIFF WBC: CPT

## 2024-04-13 PROCEDURE — 96360 HYDRATION IV INFUSION INIT: CPT

## 2024-04-13 PROCEDURE — 96374 THER/PROPH/DIAG INJ IV PUSH: CPT

## 2024-04-13 PROCEDURE — 6370000000 HC RX 637 (ALT 250 FOR IP): Performed by: EMERGENCY MEDICINE

## 2024-04-13 PROCEDURE — 99284 EMERGENCY DEPT VISIT MOD MDM: CPT

## 2024-04-13 PROCEDURE — 83735 ASSAY OF MAGNESIUM: CPT

## 2024-04-13 PROCEDURE — 81001 URINALYSIS AUTO W/SCOPE: CPT

## 2024-04-13 PROCEDURE — 87502 INFLUENZA DNA AMP PROBE: CPT

## 2024-04-13 PROCEDURE — 80053 COMPREHEN METABOLIC PANEL: CPT

## 2024-04-13 RX ORDER — ONDANSETRON 2 MG/ML
4 INJECTION INTRAMUSCULAR; INTRAVENOUS ONCE
Status: COMPLETED | OUTPATIENT
Start: 2024-04-13 | End: 2024-04-13

## 2024-04-13 RX ORDER — ACETAMINOPHEN 500 MG
1000 TABLET ORAL ONCE
Status: COMPLETED | OUTPATIENT
Start: 2024-04-13 | End: 2024-04-13

## 2024-04-13 RX ORDER — 0.9 % SODIUM CHLORIDE 0.9 %
1000 INTRAVENOUS SOLUTION INTRAVENOUS ONCE
Status: COMPLETED | OUTPATIENT
Start: 2024-04-13 | End: 2024-04-13

## 2024-04-13 RX ADMIN — SODIUM CHLORIDE 1000 ML: 9 INJECTION, SOLUTION INTRAVENOUS at 11:54

## 2024-04-13 RX ADMIN — ONDANSETRON 4 MG: 2 INJECTION INTRAMUSCULAR; INTRAVENOUS at 11:56

## 2024-04-13 RX ADMIN — ACETAMINOPHEN 1000 MG: 500 TABLET ORAL at 11:56

## 2024-04-13 RX ADMIN — LIDOCAINE HYDROCHLORIDE: 20 SOLUTION ORAL at 11:56

## 2024-04-13 ASSESSMENT — PAIN SCALES - GENERAL: PAINLEVEL_OUTOF10: 8

## 2024-04-13 ASSESSMENT — PAIN - FUNCTIONAL ASSESSMENT: PAIN_FUNCTIONAL_ASSESSMENT: NONE - DENIES PAIN

## 2024-04-13 NOTE — ED PROVIDER NOTES
[facility] EMERGENCY DEPARTMENT  EMERGENCY DEPARTMENT ENCOUNTER      Pt Name: Yadira Duran  MRN: [id number]  Birthdate 1995  Date of evaluation: 2024  Provider: Trae Kaye MD  PCP: No primary care provider on file.  Note Started: 11:01 AM EDT 24    CHIEF COMPLAINT       Chief Complaint   Patient presents with    Chest Pain    Abdominal Pain     Chest pain, upper abd pain, back pain since yesterday, 24 weeks pregnant,        HISTORY OF PRESENT ILLNESS: 1 or more Elements   History From: Patient  Limitations to history : None    Yadira Duran is a 28 y.o. female who presents with complaints of chest pain associated with lower back pain and shoulder pain.  Reports that she began experiencing upper epigastric pain associated with food.  Also associated with nausea and generalized weakness without ataxia.  Reports pain is more severe and radiates into lower abdomen and lower back.  Denies vaginal bleeding, vaginal discharge.  Currently denies objective fevers, shortness of breath, hemoptysis, numbness, tingling extremities, dysuria, diarrhea, constipation.    Nursing Notes were all reviewed and agreed with or any disagreements were addressed in the HPI.    REVIEW OF SYSTEMS :    Positives and Pertinent negatives as per HPI.     PAST MEDICAL HISTORY/Chronic Conditions Affecting Care    has a past medical history of Depression, Endometriosis, IBS (irritable bowel syndrome), Optic neuritis (), and Seizure (HCC) (2015).     SURGICAL HISTORY     Past Surgical History:   Procedure Laterality Date     SECTION  2015     SECTION N/A 2022     SECTION performed by Paula Cast DO at [facility] OR    CHOLECYSTECTOMY  2017    ROBOT ASSISTED    COLONOSCOPY  04/30/2012    x2    DILATION AND EVACUATION  2016    missed ab    HEMORRHOID SURGERY  2020and 2021    LAPAROSCOPY N/A 10/18/2019    DIAGNOSTIC

## 2024-04-13 NOTE — ED PROVIDER NOTES
independent interpretation performed. Decision-making details documented in ED Course.    Risk  OTC drugs.  Prescription drug management.  Decision regarding hospitalization.           ED Course as of 04/13/24 1744   Sat Apr 13, 2024   1114 EKG:  This EKG is signed and interpreted by me, Dr. Desire MD    Rate: 85  Rhythm: Sinus  Interpretation: Normal sinus rhythm, normal KY interval, normal QRS, normal axis, normal QT interval, no acute ST or T wave changes  Comparison: stable as compared to patient's most recent EKG   [JA]   1114 I reviewed the patient's chart.    Patient seen by Dr. Araiza with OBGYN on 4/4/2024 for follow up. Per note, large subchorionic hematoma. Good FHR at that time. No leaking of fluid. [JA]   1116 I reviewed the patient's chart.    Patient seen by Dr. Brandt with cardiology on 3/15/2019. Per note, presenting with tachycardia and atypical chest pain.  Normal echo in 2015 per note. [JA]   1127 Risks versus benefits of chest x-ray were discussed with the patient.  Patient is 24 weeks pregnant.  Discussed that the risk is low to the fetus however there will still be radiation.  Patient would like to defer imaging at this time. [JA]   1136 Considered pulmonary embolus.  Patient's pain is mainly located in her epigastrium rating up to the center of her chest.  Pain is worsened with eating.  Pain is not pleuritic or sharp.  No history of clotting disorder or history of DVT/PE.  No recent travel or immobilization, leg edema, calf tenderness, hemoptysis, or syncope.  At this time clinically PE seems pretty unlikely.  I discussed with the patient that we can obtain D-dimer but if positive we would have to proceed with a CTA chest which does pose radiation exposure to the fetus.  Through shared decision-making, she feels comfortable deferring any further workup for PE as it seems very unlikely at this time.  She has no hypoxia. [JA]   1304 Fetal heart tones 162 [JA]      ED Course User  with all pertinent clinical information.      I have reviewed my findings and recommendations with the assigned Medical Resident, Yadira Duran and members of family present at the time of disposition.    My findings/plan: The primary encounter diagnosis was Abdominal pain, epigastric. A diagnosis of Abdominal pain during pregnancy in second trimester was also pertinent to this visit.    MD Desire Fox Jeanine, MD  04/13/24 9506

## 2024-04-15 LAB
EKG ATRIAL RATE: 85 BPM
EKG P AXIS: 27 DEGREES
EKG P-R INTERVAL: 132 MS
EKG Q-T INTERVAL: 352 MS
EKG QRS DURATION: 76 MS
EKG QTC CALCULATION (BAZETT): 418 MS
EKG R AXIS: 80 DEGREES
EKG T AXIS: 31 DEGREES
EKG VENTRICULAR RATE: 85 BPM

## 2024-04-15 PROCEDURE — 93010 ELECTROCARDIOGRAM REPORT: CPT | Performed by: INTERNAL MEDICINE

## 2024-04-23 ENCOUNTER — ROUTINE PRENATAL (OUTPATIENT)
Dept: OBGYN CLINIC | Age: 29
End: 2024-04-23
Payer: COMMERCIAL

## 2024-04-23 ENCOUNTER — ANCILLARY PROCEDURE (OUTPATIENT)
Dept: OBGYN CLINIC | Age: 29
End: 2024-04-23
Payer: COMMERCIAL

## 2024-04-23 VITALS
DIASTOLIC BLOOD PRESSURE: 76 MMHG | WEIGHT: 137 LBS | SYSTOLIC BLOOD PRESSURE: 111 MMHG | HEART RATE: 116 BPM | BODY MASS INDEX: 24.27 KG/M2

## 2024-04-23 DIAGNOSIS — E55.9 VITAMIN D DEFICIENCY: ICD-10-CM

## 2024-04-23 DIAGNOSIS — Z36.3 ENCOUNTER FOR ANTENATAL SCREENING FOR MALFORMATION USING ULTRASOUND: Primary | ICD-10-CM

## 2024-04-23 DIAGNOSIS — R56.9 SEIZURE (HCC): ICD-10-CM

## 2024-04-23 DIAGNOSIS — Z03.75 SUSPECTED SHORTENING OF CERVIX NOT FOUND: ICD-10-CM

## 2024-04-23 DIAGNOSIS — O34.219 PREVIOUS CESAREAN DELIVERY AFFECTING PREGNANCY: ICD-10-CM

## 2024-04-23 LAB
GLUCOSE URINE, POC: NORMAL
PROTEIN UA: NEGATIVE

## 2024-04-23 PROCEDURE — 99999 PR OFFICE/OUTPT VISIT,PROCEDURE ONLY: CPT | Performed by: OBSTETRICS & GYNECOLOGY

## 2024-04-23 PROCEDURE — 81002 URINALYSIS NONAUTO W/O SCOPE: CPT | Performed by: OBSTETRICS & GYNECOLOGY

## 2024-04-23 PROCEDURE — 76805 OB US >/= 14 WKS SNGL FETUS: CPT | Performed by: OBSTETRICS & GYNECOLOGY

## 2024-04-23 PROCEDURE — 76817 TRANSVAGINAL US OBSTETRIC: CPT | Performed by: OBSTETRICS & GYNECOLOGY

## 2024-04-23 PROCEDURE — 99213 OFFICE O/P EST LOW 20 MIN: CPT | Performed by: OBSTETRICS & GYNECOLOGY

## 2024-04-23 NOTE — PROGRESS NOTES
Yadira Duran presents to office today for follow up.  Patient denies bleeding or LOF.  States she has been having a lot of cramping for about a week.  Positive fetal movement.    
autism, genetic disease and mental retardation, not listed above.     Review of Systems :   CONSTITUTIONAL : No fever, no chills   HEENT : No headache, no visual changes, no rhinorrhea, no sore throat   CARDIOVASCULAR : No pain, no palpitations, no edema   RESPIRATORY : No pain, no shortness of breath   GASTROINTESTINAL : No N/V, no D/C, no abdominal pain   GENITOURINARY : No dysuria, hematuria and no incontinence   MUSCULOSKELETAL : No myalgia, No back pain  NEUROLOGICAL : No numbness, no tingling, no tremors. No history of seizures  ALL OTHER SYSTEMS WERE REPORTED AS NEGATIVE.    PERTINENT PHYSICAL EXAMINATION:   /76   Pulse (!) 116   Wt 62.1 kg (137 lb)   LMP  (LMP Unknown)   BMI 24.27 kg/m²   Urine dipstick:   Negative for Glucose    Negative for Albumin    Transvaginal ultrasound assessment of the cervix was performed. The cervical length was 34.7 mm, without funneling of the amniotic membranes.     IMPRESSION:    1.  IUP at 25 weeks 0 days Estimated Date of Delivery: 2024    2.  Three previous C-sections    3.  Non-immune to varicella    4.  Seen by neurology for seizure in  and evaluation for multiple sclerosis in     5.  Panorama screen showed no increased risk for fetal aneuploidy    6.  Negative Horizon carrier panel for 14 out of 14 diseases    7.  Current ultrasound shows large subchorionic hematomas on the superior and inferior aspects of the anterior placenta.    8.  No apparent fetal anatomic abnormalities in the areas visualized.  Limited visualization as noted.    9.  Cervical length 34.7 mm with no apparent funneling of the amniotic membranes 2024  10.  Currently taking Inderal, Imitrex, Protonix, and amitriptyline.  11.  Breech presentation 2024    PLAN:  The patient is to continue to follow with her neurologist Dr. Nicho Zacarias at the [facility].    The patient plans to have a repeat  section delivery with her current pregnancy.    I cautioned the

## 2024-04-23 NOTE — PATIENT INSTRUCTIONS
Weeks 26 to 30 of Your Pregnancy: Care Instructions  You're starting your last trimester. You'll probably feel your baby moving around more. Your back may ache as your body gets used to your baby's size and length. Take care of yourself, and pay attention to what your body needs.  Talk to your doctor about getting the Tdap shot. It will help protect your  against whooping cough (pertussis).   You may have Jacky-Álvarez contractions. They are single or several strong contractions without a pattern. These are practice contractions but not the start of labor.   Be kind to yourself.     Take breaks when you're tired.  Change positions often. Don't sit for too long or stand for too long.  At work, rest during breaks if you can. If you don't get breaks, talk to your doctor about writing a letter to your employer to request them.  Avoid fumes, chemicals, and tobacco smoke.  Be sexual if you want to.     You may be interested in sex, or you may not. Everyone is different.  Sex is okay unless your doctor tells you not to.  Your belly can make it hard to find good positions for sex. [place] and explore.  Watch for signs of  labor.     These signs include:  Menstrual-like cramps. Or you may have pain or pressure in your pelvis that happens in a pattern.  About 6 or more contractions in an hour (even after rest and a glass of water).  A low, dull backache that doesn't go away when you change positions.  An increase or change in vaginal discharge.  Your water breaking.  Know what to do if you think you are having contractions.     Drink 1 or 2 glasses of water.  Lie down on your left side for at least an hour.  While on your side, feel the top of your belly to see if it's tight.  Write down your contractions for an hour. Time how long it is from the start of one contraction to the start of the next.  Call your doctor if you have regular contractions.  Follow-up care is a key part of your treatment and

## 2024-04-24 ENCOUNTER — TELEPHONE (OUTPATIENT)
Dept: OBGYN | Age: 29
End: 2024-04-24

## 2024-04-24 NOTE — TELEPHONE ENCOUNTER
Pt called and left a Voicemail she broke off part of her tooth. She is scheduled to go to the dentist tomorrow and needs a release faxed over to [phone].

## 2024-04-25 ENCOUNTER — HOSPITAL ENCOUNTER (EMERGENCY)
Age: 29
Discharge: HOME | End: 2024-04-25
Payer: COMMERCIAL

## 2024-04-25 VITALS
HEART RATE: 95 BPM | OXYGEN SATURATION: 100 % | TEMPERATURE: 98.6 F | RESPIRATION RATE: 16 BRPM | BODY MASS INDEX: 24.27 KG/M2 | HEIGHT: 63 IN | WEIGHT: 137 LBS | DIASTOLIC BLOOD PRESSURE: 64 MMHG | SYSTOLIC BLOOD PRESSURE: 100 MMHG

## 2024-04-25 DIAGNOSIS — K08.89 PAIN, DENTAL: Primary | ICD-10-CM

## 2024-04-25 PROCEDURE — 99283 EMERGENCY DEPT VISIT LOW MDM: CPT

## 2024-04-25 PROCEDURE — 6370000000 HC RX 637 (ALT 250 FOR IP): Performed by: NURSE PRACTITIONER

## 2024-04-25 RX ORDER — ACETAMINOPHEN 500 MG
1000 TABLET ORAL ONCE
Status: COMPLETED | OUTPATIENT
Start: 2024-04-25 | End: 2024-04-25

## 2024-04-25 RX ADMIN — ACETAMINOPHEN 1000 MG: 500 TABLET ORAL at 13:04

## 2024-04-25 ASSESSMENT — PAIN - FUNCTIONAL ASSESSMENT: PAIN_FUNCTIONAL_ASSESSMENT: 0-10

## 2024-04-25 ASSESSMENT — PAIN SCALES - GENERAL: PAINLEVEL_OUTOF10: 7

## 2024-04-25 ASSESSMENT — PAIN DESCRIPTION - FREQUENCY: FREQUENCY: CONTINUOUS

## 2024-04-25 ASSESSMENT — PAIN DESCRIPTION - LOCATION: LOCATION: TEETH

## 2024-04-25 ASSESSMENT — PAIN DESCRIPTION - PAIN TYPE: TYPE: ACUTE PAIN

## 2024-04-25 ASSESSMENT — PAIN DESCRIPTION - ONSET: ONSET: ON-GOING

## 2024-04-25 ASSESSMENT — PAIN DESCRIPTION - ORIENTATION: ORIENTATION: LOWER;LEFT

## 2024-04-25 ASSESSMENT — PAIN DESCRIPTION - DESCRIPTORS: DESCRIPTORS: DISCOMFORT;ACHING

## 2024-04-25 NOTE — TELEPHONE ENCOUNTER
Patient called back in she is currently at her dentist office and is requesting  a letter to be faxed over for treatment . Please advise?

## 2024-04-25 NOTE — ED PROVIDER NOTES
prescribed medications. Patient continues to be non-toxic on re-evaluation. Findings were discussed with the patient and reasons to immediately return to the ED were articulated to them. They will follow-up with their PMD and dental clinic.      Discharge Instructions:   Patient referred to  [facility]  [address] [address]  [phone]  Go to   tomorrow as a walk in between 730-8 am or between 1230-1pm for further evaluation    [facility] Emergency Department  [address] [address]  [phone]  Go to   If symptoms worsen      MEDICATIONS:   DISCHARGE MEDICATIONS:  New Prescriptions    No medications on file       DISCONTINUED MEDICATIONS:  Discontinued Medications    No medications on file       Record Review:  Records Reviewed : None       Disposition Considerations:  This patient's ED course included: a personal history and physicial examination, re-evaluation prior to disposition, and multiple bedside re-evaluations  This patient has remained hemodynamically stable and improved during their ED course.       I emphasized the importance of follow-up with the physician I referred them to in the timeframe recommended.  I discussed with the patient emergent symptoms and the need to immediately return to the ER. Written information was included in their discharge instructions. Additional verbal discharge instructions were also given and discussed with the patient to supplement those generated by the EMR. We also discussed medications that were prescribed  (if any) including common side effects and interactions. The patient was advised to abstain from driving, operating heavy machinery or making significant decisions while taking medications such as opiates and muscle relaxers that may impair this. All questions were addressed.  They understand return precautions and discharge instructions. The patient  expressed

## 2024-05-03 ENCOUNTER — HOSPITAL ENCOUNTER (OUTPATIENT)
Age: 29
Setting detail: OBSERVATION
Discharge: HOME | End: 2024-05-03
Attending: OBSTETRICS & GYNECOLOGY | Admitting: OBSTETRICS & GYNECOLOGY
Payer: COMMERCIAL

## 2024-05-03 ENCOUNTER — TELEPHONE (OUTPATIENT)
Dept: OBGYN | Age: 29
End: 2024-05-03

## 2024-05-03 VITALS
RESPIRATION RATE: 16 BRPM | DIASTOLIC BLOOD PRESSURE: 63 MMHG | OXYGEN SATURATION: 100 % | HEART RATE: 91 BPM | SYSTOLIC BLOOD PRESSURE: 114 MMHG | TEMPERATURE: 98 F

## 2024-05-03 PROBLEM — O26.893 LOW BACK PAIN DURING PREGNANCY IN THIRD TRIMESTER: Status: ACTIVE | Noted: 2024-05-03

## 2024-05-03 PROBLEM — M54.50 LOW BACK PAIN DURING PREGNANCY IN THIRD TRIMESTER: Status: ACTIVE | Noted: 2024-05-03

## 2024-05-03 LAB
AMORPH SED URNS QL MICRO: PRESENT
BACTERIA URNS QL MICRO: ABNORMAL
BILIRUB UR QL STRIP: NEGATIVE
CLARITY UR: CLEAR
COLOR UR: YELLOW
EPI CELLS #/AREA URNS HPF: ABNORMAL /HPF
GLUCOSE UR STRIP-MCNC: NEGATIVE MG/DL
HGB UR QL STRIP.AUTO: NEGATIVE
KETONES UR STRIP-MCNC: NEGATIVE MG/DL
LEUKOCYTE ESTERASE UR QL STRIP: ABNORMAL
NITRITE UR QL STRIP: NEGATIVE
PH UR STRIP: 7.5 [PH] (ref 5–9)
PROT UR STRIP-MCNC: NEGATIVE MG/DL
RBC #/AREA URNS HPF: ABNORMAL /HPF
SP GR UR STRIP: 1.01 (ref 1–1.03)
UROBILINOGEN UR STRIP-ACNC: 0.2 EU/DL (ref 0–1)
WBC #/AREA URNS HPF: ABNORMAL /HPF

## 2024-05-03 PROCEDURE — 87086 URINE CULTURE/COLONY COUNT: CPT

## 2024-05-03 PROCEDURE — G0378 HOSPITAL OBSERVATION PER HR: HCPCS

## 2024-05-03 PROCEDURE — 81001 URINALYSIS AUTO W/SCOPE: CPT

## 2024-05-03 NOTE — PROGRESS NOTES
Patient arrived to unit with complaints of abdominal pain, ctx, and swelling to abdomen leaving indents. She is a , 26.3 previous c/s of Dr. Araiza. Patient reports positive fetal movement, denies any vaginal bleeding, or LOF. Placed on EFM an house officer notified of arrival.

## 2024-05-03 NOTE — H&P
Department of Obstetrics and Gynecology  Labor and Delivery  History & Physical    Patient:  Yadira Duran     Admit Date:  5/3/2024  5:23 PM  Medical Record Number:  [id number]    CHIEF COMPLAINT:  abdominal pain/back pain    PROBLEM LIST:     Patient Active Problem List   Diagnosis    Gastroesophageal reflux disease    Seizure (HCC)    Vitamin D deficiency    Palpitations    Intractable abdominal pain    Leg pain    Previous  delivery affecting pregnancy    Weakness of right arm    Right sided weakness    Paresthesia of right upper extremity    Encounter for  screening for malformation using ultrasound    Suspected shortening of cervix not found    Low back pain during pregnancy in third trimester           HISTORY OF PRESENT ILLNESS:    The patient is a 28 y.o.  female  at 26w3d. Patient presents with a chief complaint as above. Pt reports low back pain, that wraps around to the front. She states cramping started on Wednesday and has not improved. Pt reports urinary frequency. Pt denies lof, vb or decreased fm.     ESTIMATED DUE DATE: Estimated Date of Delivery: 24    PRENATAL CARE:  Complicated by: subchorionic hematoma, hx of seizures      Past OB History  OB History          5    Para   3    Term   3       0    AB   1    Living   3         SAB   1    IAB   0    Ectopic   0    Molar   0    Multiple   0    Live Births   3                Past Medical History:        Diagnosis Date    Depression     Endometriosis     IBS (irritable bowel syndrome)     Optic neuritis     Seizure (HCC) 2015    after delivery / not sure was a seizure, no issues since , no medication       Past Surgical History:        Procedure Laterality Date     SECTION  2015     SECTION N/A 2022     SECTION performed by Paula Cast DO at [facility] L&D OR    CHOLECYSTECTOMY  2017    ROBOT ASSISTED    COLONOSCOPY  04/30/2012    x2    DILATION AND

## 2024-05-03 NOTE — TELEPHONE ENCOUNTER
Pt call and left I VM c/o of cramping that she rates at a pain level of 7/8. She had some pinkish discharge and is concerned about edema in her abdomen. I instructed patient to go to L&D for evaluation. Pt verbalized understanding.

## 2024-05-04 NOTE — PROGRESS NOTES
Dr. Araiza at nurses station, urinalysis reviewed, new orders received for patient to be discharged home.

## 2024-05-05 LAB
MICROORGANISM SPEC CULT: ABNORMAL
SPECIMEN DESCRIPTION: ABNORMAL

## 2024-05-15 ENCOUNTER — TELEPHONE (OUTPATIENT)
Dept: OBGYN | Age: 29
End: 2024-05-15
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
## 2024-05-16 ENCOUNTER — HOSPITAL ENCOUNTER (OUTPATIENT)
Age: 29
Setting detail: OBSERVATION
Discharge: HOME | End: 2024-05-16
Attending: OBSTETRICS & GYNECOLOGY | Admitting: STUDENT IN AN ORGANIZED HEALTH CARE EDUCATION/TRAINING PROGRAM
Payer: COMMERCIAL

## 2024-05-16 VITALS
RESPIRATION RATE: 16 BRPM | DIASTOLIC BLOOD PRESSURE: 67 MMHG | HEART RATE: 90 BPM | SYSTOLIC BLOOD PRESSURE: 116 MMHG | TEMPERATURE: 98.3 F

## 2024-05-16 PROBLEM — Z3A.28 28 WEEKS GESTATION OF PREGNANCY: Status: ACTIVE | Noted: 2024-05-16

## 2024-05-16 LAB
AMNISURE, POC: NEGATIVE
Lab: NORMAL
NEGATIVE QC PASS/FAIL: NORMAL
POSITIVE QC PASS/FAIL: NORMAL

## 2024-05-16 PROCEDURE — 99211 OFF/OP EST MAY X REQ PHY/QHP: CPT

## 2024-05-16 PROCEDURE — 84112 EVAL AMNIOTIC FLUID PROTEIN: CPT

## 2024-05-16 NOTE — PROGRESS NOTES
Patient presents to labor and delivery iup28.2,  with C/O lower abdominal pain and pain in her vagina. Pt states she felt fluid leaking out. Amnisure NEGATIVE. Denies VB. +FM

## 2024-05-16 NOTE — H&P
Department of Obstetrics and Gynecology  Labor and Delivery  Attending History and Pysical       Subjective:   Yadira Duran is a 28 y.o. female  at 28w2d here for r/o ROM.    -VB, +FM, -Contractions  Reports intermittent positional pain that starts in her abdomen and extends into her vagina.       OB History    Para Term  AB Living   5 3 3 0 1 3   SAB IAB Ectopic Molar Multiple Live Births   1 0 0 0 0 3      # Outcome Date GA Lbr [place]/2nd Weight Sex Delivery Anes PTL Lv   5 Current            4 Term 22 38w5d  3.41 kg (7 lb 8.3 oz) F CS-LTranv Spinal N NATALIE   3 Term 18 39w0d  3.66 kg (8 lb 1.1 oz) F CS-LTranv Spinal N NATALIE   2 Term 01/03/15 39w5d  3.799 kg (8 lb 6 oz) M CS-LTranv Spinal N NATALIE      Complications: Fetal Intolerance   1 SAB                Review of Systems  Skin: no changes  All other review of systems negative    Past Medical History  Past Medical History:   Diagnosis Date    Depression     Endometriosis     IBS (irritable bowel syndrome)     Optic neuritis     Seizure (HCC) 2015    after delivery / not sure was a seizure, no issues since , no medication       Past Surgical History  Past Surgical History:   Procedure Laterality Date     SECTION  2015     SECTION N/A 2022     SECTION performed by Paula Cast DO at [facility] L&D OR    CHOLECYSTECTOMY  2017    ROBOT ASSISTED    COLONOSCOPY  04/30/2012    x2    DILATION AND EVACUATION  2016    missed ab    HEMORRHOID SURGERY  2020and 2021    LAPAROSCOPY N/A 10/18/2019    DIAGNOSTIC LAPAROSCOPY, OPEN VENTRAL HERNIA REPAIR performed by Michelle Song MD at [facility] OR    PELVIC LAPAROSCOPY Right     cystectomy    UT  DELIVERY ONLY N/A 2018     SECTION performed by Gregory Lowe DO at [facility] L&D    UPPER GASTROINTESTINAL ENDOSCOPY  2012    UPPER GASTROINTESTINAL ENDOSCOPY      UPPER GASTROINTESTINAL ENDOSCOPY  2017

## 2024-05-30 ENCOUNTER — HOSPITAL ENCOUNTER (OUTPATIENT)
Age: 29
Discharge: HOME | End: 2024-05-30
Attending: OBSTETRICS & GYNECOLOGY | Admitting: OBSTETRICS & GYNECOLOGY
Payer: COMMERCIAL

## 2024-05-30 VITALS
HEART RATE: 96 BPM | TEMPERATURE: 98.1 F | DIASTOLIC BLOOD PRESSURE: 66 MMHG | SYSTOLIC BLOOD PRESSURE: 111 MMHG | OXYGEN SATURATION: 100 % | RESPIRATION RATE: 16 BRPM

## 2024-05-30 LAB
BILIRUB UR QL STRIP: NEGATIVE
CLARITY UR: CLEAR
COLOR UR: YELLOW
EPI CELLS #/AREA URNS HPF: ABNORMAL /HPF
ERYTHROCYTE [DISTWIDTH] IN BLOOD BY AUTOMATED COUNT: 15 % (ref 11.5–15)
FIBRONECTIN FETAL VAG QL: NEGATIVE
GLUCOSE UR STRIP-MCNC: 100 MG/DL
HCT VFR BLD AUTO: 34.4 % (ref 34–48)
HGB BLD-MCNC: 10.8 G/DL (ref 11.5–15.5)
HGB UR QL STRIP.AUTO: NEGATIVE
KETONES UR STRIP-MCNC: ABNORMAL MG/DL
LEUKOCYTE ESTERASE UR QL STRIP: NEGATIVE
MCH RBC QN AUTO: 25.6 PG (ref 26–35)
MCHC RBC AUTO-ENTMCNC: 31.4 G/DL (ref 32–34.5)
MCV RBC AUTO: 81.5 FL (ref 80–99.9)
NITRITE UR QL STRIP: NEGATIVE
PH UR STRIP: 6 [PH] (ref 5–9)
PLATELET # BLD AUTO: 244 K/UL (ref 130–450)
PMV BLD AUTO: 12.1 FL (ref 7–12)
PROT UR STRIP-MCNC: ABNORMAL MG/DL
RBC # BLD AUTO: 4.22 M/UL (ref 3.5–5.5)
RBC #/AREA URNS HPF: ABNORMAL /HPF
SP GR UR STRIP: >1.03 (ref 1–1.03)
UROBILINOGEN UR STRIP-ACNC: 0.2 EU/DL (ref 0–1)
WBC #/AREA URNS HPF: ABNORMAL /HPF
WBC OTHER # BLD: 12.6 K/UL (ref 4.5–11.5)

## 2024-05-30 PROCEDURE — 96372 THER/PROPH/DIAG INJ SC/IM: CPT

## 2024-05-30 PROCEDURE — 85027 COMPLETE CBC AUTOMATED: CPT

## 2024-05-30 PROCEDURE — 96360 HYDRATION IV INFUSION INIT: CPT

## 2024-05-30 PROCEDURE — 81001 URINALYSIS AUTO W/SCOPE: CPT

## 2024-05-30 PROCEDURE — 2580000003 HC RX 258: Performed by: OBSTETRICS & GYNECOLOGY

## 2024-05-30 PROCEDURE — 6360000002 HC RX W HCPCS: Performed by: OBSTETRICS & GYNECOLOGY

## 2024-05-30 PROCEDURE — 82731 ASSAY OF FETAL FIBRONECTIN: CPT

## 2024-05-30 PROCEDURE — 99211 OFF/OP EST MAY X REQ PHY/QHP: CPT

## 2024-05-30 RX ORDER — TERBUTALINE SULFATE 1 MG/ML
0.25 INJECTION, SOLUTION SUBCUTANEOUS ONCE
Status: COMPLETED | OUTPATIENT
Start: 2024-05-30 | End: 2024-05-30

## 2024-05-30 RX ORDER — SODIUM CHLORIDE, SODIUM LACTATE, POTASSIUM CHLORIDE, CALCIUM CHLORIDE 600; 310; 30; 20 MG/100ML; MG/100ML; MG/100ML; MG/100ML
INJECTION, SOLUTION INTRAVENOUS CONTINUOUS
Status: DISCONTINUED | OUTPATIENT
Start: 2024-05-30 | End: 2024-05-30 | Stop reason: HOSPADM

## 2024-05-30 RX ADMIN — SODIUM CHLORIDE, POTASSIUM CHLORIDE, SODIUM LACTATE AND CALCIUM CHLORIDE: 600; 310; 30; 20 INJECTION, SOLUTION INTRAVENOUS at 15:42

## 2024-05-30 RX ADMIN — TERBUTALINE SULFATE 0.25 MG: 1 INJECTION, SOLUTION SUBCUTANEOUS at 15:44

## 2024-05-30 NOTE — PROGRESS NOTES
Assumed care of patient. Patient denies any LOF, VB, or anymore ctxs. Patient perceives +FM. Patient states she feels much better and would like to go home.

## 2024-05-30 NOTE — PROGRESS NOTES
presents to unit with c/o abdominal cramping and vaginal pressure. Denies vaginal bleeding and LOF. Perceives good fetal movement. EDC:24. 30 weeks 2 days gestation. Pt reports no complications with current pregnancy. Placed on efm.

## 2024-05-30 NOTE — H&P
Department of Obstetrics and Gynecology  Attending Obstetrics History and Physical      HISTORY OF PRESENT ILLNESS:      The patient is a 28 y.o.  5 parity 3 at 30 weeks 2 days.  Complaining of ctx's every 10-11 minutes. No lof or vb. Three previous  sections.    Estimated Due Date:  24  Contractions:  Yes  Leaking of fluid: no  nfm  Blleeding:  No    PRENATAL CARE:    Complications: No      Active Problems:    * No active hospital problems. *  Resolved Problems:    * No resolved hospital problems. *        PAST OB HISTORY    OB History    Para Term  AB Living   5 3 3 0 1 3   SAB IAB Ectopic Molar Multiple Live Births   1 0 0 0 0 3      # Outcome Date GA Lbr [place]/2nd Weight Sex Delivery Anes PTL Lv   5 Current            4 Term 22 38w5d  3.41 kg (7 lb 8.3 oz) F CS-LTranv Spinal N NATALIE   3 Term 18 39w0d  3.66 kg (8 lb 1.1 oz) F CS-LTranv Spinal N NATALIE   2 Term 01/03/15 39w5d  3.799 kg (8 lb 6 oz) M CS-LTranv Spinal N NATALIE      Complications: Fetal Intolerance   1 SAB                    Pre-eclampsia:  No      :  Yes       D & C:  Yes       Cerclage:  No      LEEP:  No      Myomectomy:  No       Labor: No    Past Medical History:    Past Medical History:   Diagnosis Date    Depression     Endometriosis     IBS (irritable bowel syndrome)     Optic neuritis     Seizure (HCC) 2015    after delivery / not sure was a seizure, no issues since , no medication        Past Surgical History:    Past Surgical History:   Procedure Laterality Date     SECTION  2015     SECTION N/A 2022     SECTION performed by Paula Cast DO at [facility] L&D OR    CHOLECYSTECTOMY  2017    ROBOT ASSISTED    COLONOSCOPY  04/30/2012    x2    DILATION AND EVACUATION  2016    missed ab    HEMORRHOID SURGERY  2020and 2021    LAPAROSCOPY N/A 10/18/2019    DIAGNOSTIC LAPAROSCOPY, OPEN VENTRAL HERNIA REPAIR performed by Michelle CUNNINGHAM  MD Nohemi at [facility] OR    PELVIC LAPAROSCOPY Right     cystectomy    SD  DELIVERY ONLY N/A 2018     SECTION performed by Gregory Lowe DO at [facility] L&D    UPPER GASTROINTESTINAL ENDOSCOPY  2012    UPPER GASTROINTESTINAL ENDOSCOPY      UPPER GASTROINTESTINAL ENDOSCOPY  2017    VENTRAL HERNIA REPAIR N/A 2021    OPEN INCISIONAL HERNIA REPAIR performed by Salomón Haddad MD at [facility] OR        Past Family History:  Family History   Problem Relation Age of Onset    Cancer Father         Bladder cancer       ROS:  Const: No fever, chills, night sweats, no recent unexplained weight gain/loss  HEENT: No blurred vision, double vision; no ear problems; no sore throat, congestion; no running nose.  Resp: No cough, no sputum, no pleuritic chest pain, no sob  Cardio: No chest pain, no exertional dyspnea, no PND, no orthopnea, no palpitation, no leg swelling.   GI: No dysphagia, no reflux; no abdominal pain, no n/v; no c/d. No hematochezia    : No dysuria, no frequency, hesitancy; no hematuria  MSK: no joint pain, no myalgia, no change in ROM  Neuro: no focal weakness in extremities, no slurred speech, no double vision, no numbness or tingling in extremities  Endo: no heat/cold intolerance, no polyphagia, polydipsia or polyuria  Hem: no increased bleeding, no bruising, no lymphadenopathy  Skin: no skin changes  Psych: no depressed mood, no suicidal ideation    Social History:     reports that she quit smoking about 11 years ago. Her smoking use included cigarettes. She started smoking about 11 years ago. She has a 1.0 pack-year smoking history. She has never used smokeless tobacco. She reports that she does not drink alcohol and does not use drugs.     Medications Prior to Admission:  Medications Prior to Admission: propranolol (INDERAL) 10 MG tablet, Take 2 tablets by mouth 3 times daily (Patient not taking: Reported on 5/3/2024)  SUMAtriptan (IMITREX) 100 MG tablet, Take 1 tablet

## 2024-05-31 NOTE — PROGRESS NOTES
Dr. Gregg at nurses station. Updated patient is feeling better and would like to go home. Discharge order received.

## 2024-05-31 NOTE — PROGRESS NOTES
Discharge instructions provided to patient. Patient verbalized understanding. All questions answered. Patient ambulated off unit without difficulty.

## 2024-05-31 NOTE — DISCHARGE INSTRUCTIONS
Home Undelivered Discharge Instructions    After Discharge Orders:    Future Appointments   Date Time Provider Department Center   6/19/2024 10:40 AM Maria Victoria Araiza, DO [facility]               Diet:  normal diet as tolerated    Rest: no sex    Other instructions: Do kick counts once a day on your baby. Choose the time of day your baby is most active. Get in a comfortable lying or sitting position and time how long it takes to feel 10 kicks, twists, turns, swishes, or rolls. Call your physician or midwife if there have not been 10 kicks in 2 hours    Call physician or midwife, return to Labor and Delivery, call 911, or go to the nearest Emergency Room if: increased leakage or fluid, contractions more than  6 per  1 hour, decreased fetal movement, persistent low back pain or cramping, bleeding from vaginal area, difficulty urinating, pain with urination, difficulty breathing, new calf pain, persistent headache, or vision change

## 2024-06-04 ENCOUNTER — HOSPITAL ENCOUNTER (OUTPATIENT)
Age: 29
Discharge: HOME | End: 2024-06-04
Attending: OBSTETRICS & GYNECOLOGY | Admitting: OBSTETRICS & GYNECOLOGY
Payer: COMMERCIAL

## 2024-06-04 ENCOUNTER — TELEPHONE (OUTPATIENT)
Dept: OBGYN | Age: 29
End: 2024-06-04

## 2024-06-04 VITALS — DIASTOLIC BLOOD PRESSURE: 63 MMHG | HEART RATE: 99 BPM | SYSTOLIC BLOOD PRESSURE: 121 MMHG | TEMPERATURE: 98.8 F

## 2024-06-04 PROCEDURE — 99239 HOSP IP/OBS DSCHRG MGMT >30: CPT | Performed by: OBSTETRICS & GYNECOLOGY

## 2024-06-04 PROCEDURE — 2580000003 HC RX 258: Performed by: OBSTETRICS & GYNECOLOGY

## 2024-06-04 PROCEDURE — 6360000002 HC RX W HCPCS: Performed by: OBSTETRICS & GYNECOLOGY

## 2024-06-04 RX ORDER — TERBUTALINE SULFATE 1 MG/ML
0.25 INJECTION, SOLUTION SUBCUTANEOUS ONCE
Status: COMPLETED | OUTPATIENT
Start: 2024-06-04 | End: 2024-06-04

## 2024-06-04 RX ORDER — SODIUM CHLORIDE, SODIUM LACTATE, POTASSIUM CHLORIDE, AND CALCIUM CHLORIDE .6; .31; .03; .02 G/100ML; G/100ML; G/100ML; G/100ML
1000 INJECTION, SOLUTION INTRAVENOUS ONCE
Status: COMPLETED | OUTPATIENT
Start: 2024-06-04 | End: 2024-06-04

## 2024-06-04 RX ADMIN — SODIUM CHLORIDE, POTASSIUM CHLORIDE, SODIUM LACTATE AND CALCIUM CHLORIDE 1000 ML: 600; 310; 30; 20 INJECTION, SOLUTION INTRAVENOUS at 17:04

## 2024-06-04 RX ADMIN — TERBUTALINE SULFATE 0.25 MG: 1 INJECTION, SOLUTION SUBCUTANEOUS at 16:47

## 2024-06-04 NOTE — PROGRESS NOTES
01/18/24 0500   Appointment Info   Signing clinician's name / credentials Temitope Sanches, PT, DPT   Total/Authorized Visits [facility]; NO biofeedback   Visits Used 2   Medical Diagnosis Slow transit constipation (K59.01)   PT Tx Diagnosis impaired pelvic floor function, impaied sense of urge   Quick Adds Pelvic Consent;Virtual Visit   Virtual Visit   Time of service begin: 1301   Time of service end: 1333   Virtual Visit Rationale The virtual visit will provide the care the patient needs. We reviewed the patient's chart, and PTRx prescription to determine the following telemedicine visit is appropriate and effective for the patient's care.   Provider Location (Distant Site) Office   Patient Location (Originating Site) Home/other residence   Progress Note/Certification   Onset of illness/injury or Date of Surgery 10/11/23  (order date; however, started withholding around time of potty training (age 3yo))   Therapy Frequency 1x/week x1 month then EOW x3 months   Predicted Duration 4 months   Progress Note Due Date 04/08/24   Progress Note Completed Date 01/10/24   GOALS   PT Goals 2;3   PT Goal 1   Goal Identifier urge identification   Goal Description Jareth will be able to independently  identify urge and intentionally have a BM on the toilet given full access to supports as needed in >than 80% of attempts in order to decrease functional constipation.   Goal Progress x2 over past week!   Target Date 04/08/24   PT Goal 2   Goal Identifier constipation management   Goal Description Jorge Luis and/or caregivers will report bowel movement frequency of 5-7x/week with a report of Type 4-5 on the [place] Stool Scale and no straining x3 consecutive weeks in order to help resolve functional constipation and reduce episodes of FI.   Target Date 04/08/24   PT Goal 3   Goal Identifier LTG- FI   Goal Description Jorge Luis and/or caregivers will report 0 instances of fecal incontinence for 3 consecutive weeks in order to return to  Paged Dr Consuelo Lyn, covering for Dr Radha Odom for patient to be discharged. "social activities without leakage.   Goal Progress 1/18: 0   Target Date 05/08/24   Subjective Report   Subjective Report Present with mom via VV. Does not have car today. Jareth identified BM urge x2 this week and successfully had a BM on toilet! Pooping 1x/day, type 6 usually on BSS. Doing \"okay\" with sitting on toilet 3x/day- Jareth totally agreeable, mom and dad forgetting to remind him. Grandma had a stroke, so dad has not been home much. Feels morning time is hardest.     Limited data to report on as pt attended only 1 session (virtually) outside of HEP.         DISCHARGE  Reason for Discharge: Patient chooses to discontinue therapy.  Patient has failed to schedule further appointments.    Equipment Issued: HEP    Discharge Plan: Patient to continue home program.    Referring Provider:  Sisi Mane    [facility] you [facility]  Please contact me with any questions at [phone] or [email or web address].     YANI HernandezT, PT       "

## 2024-06-04 NOTE — PROGRESS NOTES
Dr. Thorpe updated IV fluids are done, patient states she is feeling about the same but would like to go home. Verbal orders to recheck SVE, if the same okay for discharge.

## 2024-06-04 NOTE — PROGRESS NOTES
Pt also reports \"something bulging from vagina\". Upon visual inspection there is a 2-3cm swollen area of vagina.

## 2024-06-04 NOTE — PROGRESS NOTES
presents to unit with c/o contractions every 15 minutes. Denies vaginal bleeding and lof. Perceives good fetal movement. EDC:24. 31 weeks gestation. Pt reports no complications with current pregnancy. Placed on efm. Call light within reach. Three previous c/s.

## 2024-06-04 NOTE — TELEPHONE ENCOUNTER
Patient called and left a . States she lost her mucous plug with some pink tinge.  States she is having contractions that are 7 minutes apart and strong. Returned call to patient. Advised that if water breaks she needs to go to L&D. Also instructed to go there if contractions become 5 minutes apart. Instructed to put pad on if she is uncertain and see if it becomes wet. Patient verbalized understanding. Told her I would pass message along to provider with update.

## 2024-06-04 NOTE — PROGRESS NOTES
Discharge paperwork gone over with patient, all questions answered at this time. Patient ambulated off unit.

## 2024-06-10 ENCOUNTER — TELEPHONE (OUTPATIENT)
Dept: ADMINISTRATIVE | Age: 29
End: 2024-06-10

## 2024-06-10 NOTE — TELEPHONE ENCOUNTER
Patient called and left a message on the voicemail. Pt c/o intermittent contractions for the last 2 days. Went to Labor and Delivery, states was given an injection to stop the contractions. She was not dilating at the time. Patient is currently 31/6, scheduled on 6/19 for routine prenatal visit and would like to know if she can or needs to come in sooner to be checked.  Please advise.

## 2024-06-10 NOTE — TELEPHONE ENCOUNTER
Patient is schedule for 06/19 for appointment, patient is calling in to get in a lot sooner due to having contractions, she states that she has went to ER twice and they have stopped them, but she had them off and on this weekend, and she is wanting an appointment.    Please advice  Transferred to office.

## 2024-06-19 ENCOUNTER — ROUTINE PRENATAL (OUTPATIENT)
Dept: OBGYN | Age: 29
End: 2024-06-19
Payer: COMMERCIAL

## 2024-06-19 ENCOUNTER — HOSPITAL ENCOUNTER (OUTPATIENT)
Age: 29
Discharge: HOME | End: 2024-06-19
Payer: COMMERCIAL

## 2024-06-19 VITALS
HEART RATE: 105 BPM | WEIGHT: 149.9 LBS | SYSTOLIC BLOOD PRESSURE: 99 MMHG | BODY MASS INDEX: 26.55 KG/M2 | DIASTOLIC BLOOD PRESSURE: 68 MMHG

## 2024-06-19 DIAGNOSIS — Z34.93 ENCOUNTER FOR SUPERVISION OF NORMAL PREGNANCY IN THIRD TRIMESTER, UNSPECIFIED GRAVIDITY: Primary | ICD-10-CM

## 2024-06-19 DIAGNOSIS — Z34.93 ENCOUNTER FOR SUPERVISION OF NORMAL PREGNANCY IN THIRD TRIMESTER, UNSPECIFIED GRAVIDITY: ICD-10-CM

## 2024-06-19 LAB
AMOUNT GLUCOSE GIVEN: 50 G
COLLECT TIME, 1HR GLUCOSE: NORMAL
GLUCOSE 3H P 100 G GLC PO SERPL-MCNC: 100 MG/DL
GLUCOSE URINE, POC: NEGATIVE
PROTEIN UA: POSITIVE

## 2024-06-19 PROCEDURE — 81002 URINALYSIS NONAUTO W/O SCOPE: CPT | Performed by: OBSTETRICS & GYNECOLOGY

## 2024-06-19 PROCEDURE — 82947 ASSAY GLUCOSE BLOOD QUANT: CPT

## 2024-06-19 PROCEDURE — 36415 COLL VENOUS BLD VENIPUNCTURE: CPT

## 2024-06-19 PROCEDURE — 99213 OFFICE O/P EST LOW 20 MIN: CPT | Performed by: OBSTETRICS & GYNECOLOGY

## 2024-06-19 PROCEDURE — G8427 DOCREV CUR MEDS BY ELIG CLIN: HCPCS | Performed by: OBSTETRICS & GYNECOLOGY

## 2024-06-19 PROCEDURE — 86592 SYPHILIS TEST NON-TREP QUAL: CPT

## 2024-06-19 PROCEDURE — G8419 CALC BMI OUT NRM PARAM NOF/U: HCPCS | Performed by: OBSTETRICS & GYNECOLOGY

## 2024-06-19 PROCEDURE — 90715 TDAP VACCINE 7 YRS/> IM: CPT

## 2024-06-19 PROCEDURE — 1036F TOBACCO NON-USER: CPT | Performed by: OBSTETRICS & GYNECOLOGY

## 2024-06-19 NOTE — PROGRESS NOTES
Good FM.  No BLD LOF or CTN's.  Emotionally doing well.  We have not seen her since 25 weeks multiple hospital visits.  So Tdap today, -week labs ordered today.  Growth ultrasound ordered and at next visit will arrange repeat  since this will be #4 need to deliver before 39 weeks

## 2024-06-19 NOTE — PROGRESS NOTES
Patient alert and pleasant today with no complaints.  Here today for prenatal visit.  Urine for glucose is negative, protein is trace. Tdap given in right deltoid. Patient tolerated well.  Discharge instructions given and patient directed to call the office with any questions or concerns and verbalized understanding.

## 2024-06-20 LAB — RPR SER QL: NONREACTIVE

## 2024-06-22 ENCOUNTER — ANESTHESIA (OUTPATIENT)
Dept: LABOR AND DELIVERY | Age: 29
End: 2024-06-22
Payer: COMMERCIAL

## 2024-06-22 ENCOUNTER — HOSPITAL ENCOUNTER (INPATIENT)
Age: 29
LOS: 4 days | Discharge: HOME | DRG: 540 | End: 2024-06-26
Attending: OBSTETRICS & GYNECOLOGY | Admitting: STUDENT IN AN ORGANIZED HEALTH CARE EDUCATION/TRAINING PROGRAM
Payer: COMMERCIAL

## 2024-06-22 ENCOUNTER — ANESTHESIA EVENT (OUTPATIENT)
Dept: LABOR AND DELIVERY | Age: 29
End: 2024-06-22
Payer: COMMERCIAL

## 2024-06-22 DIAGNOSIS — R10.9 ACUTE POSTOPERATIVE ABDOMINAL PAIN: Primary | ICD-10-CM

## 2024-06-22 DIAGNOSIS — G89.18 ACUTE POSTOPERATIVE ABDOMINAL PAIN: Primary | ICD-10-CM

## 2024-06-22 PROBLEM — N93.9 VAGINAL BLEEDING: Status: ACTIVE | Noted: 2024-06-22

## 2024-06-22 PROBLEM — Z34.93 NORMAL PREGNANCY IN THIRD TRIMESTER: Status: ACTIVE | Noted: 2024-06-22

## 2024-06-22 LAB
ABO + RH BLD: NORMAL
AMORPH SED URNS QL MICRO: PRESENT
AMPHET UR QL SCN: NEGATIVE
ARM BAND NUMBER: NORMAL
BACTERIA URNS QL MICRO: ABNORMAL
BARBITURATES UR QL SCN: NEGATIVE
BASOPHILS # BLD: 0.02 K/UL (ref 0–0.2)
BASOPHILS NFR BLD: 0 % (ref 0–2)
BENZODIAZ UR QL: NEGATIVE
BILIRUB UR QL STRIP: NEGATIVE
BLOOD BANK SAMPLE EXPIRATION: NORMAL
BLOOD GROUP ANTIBODIES SERPL: NEGATIVE
BUPRENORPHINE UR QL: NEGATIVE
CANNABINOIDS UR QL SCN: NEGATIVE
CLARITY UR: ABNORMAL
COCAINE UR QL SCN: NEGATIVE
COLOR UR: YELLOW
EOSINOPHIL # BLD: 0.01 K/UL (ref 0.05–0.5)
EOSINOPHILS RELATIVE PERCENT: 0 % (ref 0–6)
EPI CELLS #/AREA URNS HPF: ABNORMAL /HPF
ERYTHROCYTE [DISTWIDTH] IN BLOOD BY AUTOMATED COUNT: 16.8 % (ref 11.5–15)
FENTANYL UR QL: NEGATIVE
GLUCOSE UR STRIP-MCNC: NEGATIVE MG/DL
HCT VFR BLD AUTO: 33.1 % (ref 34–48)
HGB BLD-MCNC: 10.1 G/DL (ref 11.5–15.5)
HGB UR QL STRIP.AUTO: NEGATIVE
IMM GRANULOCYTES # BLD AUTO: 0.14 K/UL (ref 0–0.58)
IMM GRANULOCYTES NFR BLD: 1 % (ref 0–5)
KETONES UR STRIP-MCNC: >80 MG/DL
LEUKOCYTE ESTERASE UR QL STRIP: ABNORMAL
LYMPHOCYTES NFR BLD: 1.07 K/UL (ref 1.5–4)
LYMPHOCYTES RELATIVE PERCENT: 9 % (ref 20–42)
MCH RBC QN AUTO: 24.5 PG (ref 26–35)
MCHC RBC AUTO-ENTMCNC: 30.5 G/DL (ref 32–34.5)
MCV RBC AUTO: 80.1 FL (ref 80–99.9)
METHADONE UR QL: NEGATIVE
MONOCYTES NFR BLD: 0.24 K/UL (ref 0.1–0.95)
MONOCYTES NFR BLD: 2 % (ref 2–12)
NEUTROPHILS NFR BLD: 87 % (ref 43–80)
NEUTS SEG NFR BLD: 10.23 K/UL (ref 1.8–7.3)
NITRITE UR QL STRIP: NEGATIVE
OPIATES UR QL SCN: NEGATIVE
OXYCODONE UR QL SCN: NEGATIVE
PCP UR QL SCN: NEGATIVE
PH UR STRIP: 6 [PH] (ref 5–9)
PLATELET # BLD AUTO: 216 K/UL (ref 130–450)
PMV BLD AUTO: 12.3 FL (ref 7–12)
PROT UR STRIP-MCNC: 30 MG/DL
RBC # BLD AUTO: 4.13 M/UL (ref 3.5–5.5)
RBC #/AREA URNS HPF: ABNORMAL /HPF
SP GR UR STRIP: >1.03 (ref 1–1.03)
TEST INFORMATION: NORMAL
UROBILINOGEN UR STRIP-ACNC: 0.2 EU/DL (ref 0–1)
WBC #/AREA URNS HPF: ABNORMAL /HPF
WBC OTHER # BLD: 11.7 K/UL (ref 4.5–11.5)

## 2024-06-22 PROCEDURE — 87086 URINE CULTURE/COLONY COUNT: CPT

## 2024-06-22 PROCEDURE — 86901 BLOOD TYPING SEROLOGIC RH(D): CPT

## 2024-06-22 PROCEDURE — G0378 HOSPITAL OBSERVATION PER HR: HCPCS

## 2024-06-22 PROCEDURE — 6360000002 HC RX W HCPCS: Performed by: STUDENT IN AN ORGANIZED HEALTH CARE EDUCATION/TRAINING PROGRAM

## 2024-06-22 PROCEDURE — 81001 URINALYSIS AUTO W/SCOPE: CPT

## 2024-06-22 PROCEDURE — 2580000003 HC RX 258: Performed by: STUDENT IN AN ORGANIZED HEALTH CARE EDUCATION/TRAINING PROGRAM

## 2024-06-22 PROCEDURE — 99222 1ST HOSP IP/OBS MODERATE 55: CPT | Performed by: STUDENT IN AN ORGANIZED HEALTH CARE EDUCATION/TRAINING PROGRAM

## 2024-06-22 PROCEDURE — 86850 RBC ANTIBODY SCREEN: CPT

## 2024-06-22 PROCEDURE — 6360000002 HC RX W HCPCS

## 2024-06-22 PROCEDURE — 85025 COMPLETE CBC W/AUTO DIFF WBC: CPT

## 2024-06-22 PROCEDURE — 6370000000 HC RX 637 (ALT 250 FOR IP): Performed by: STUDENT IN AN ORGANIZED HEALTH CARE EDUCATION/TRAINING PROGRAM

## 2024-06-22 PROCEDURE — 80307 DRUG TEST PRSMV CHEM ANLYZR: CPT

## 2024-06-22 PROCEDURE — 86900 BLOOD TYPING SEROLOGIC ABO: CPT

## 2024-06-22 PROCEDURE — 1220000000 HC SEMI PRIVATE OB R&B

## 2024-06-22 RX ORDER — FENTANYL CITRATE 50 UG/ML
25 INJECTION, SOLUTION INTRAMUSCULAR; INTRAVENOUS ONCE
Status: COMPLETED | OUTPATIENT
Start: 2024-06-22 | End: 2024-06-22

## 2024-06-22 RX ORDER — BETAMETHASONE SODIUM PHOSPHATE AND BETAMETHASONE ACETATE 3; 3 MG/ML; MG/ML
12 INJECTION, SUSPENSION INTRA-ARTICULAR; INTRALESIONAL; INTRAMUSCULAR; SOFT TISSUE EVERY 24 HOURS
Status: DISPENSED | OUTPATIENT
Start: 2024-06-22 | End: 2024-06-24

## 2024-06-22 RX ORDER — MEPERIDINE HYDROCHLORIDE 25 MG/ML
25 INJECTION INTRAMUSCULAR; INTRAVENOUS; SUBCUTANEOUS ONCE
Status: COMPLETED | OUTPATIENT
Start: 2024-06-22 | End: 2024-06-22

## 2024-06-22 RX ORDER — CITRIC ACID/SODIUM CITRATE 334-500MG
SOLUTION, ORAL ORAL
Status: COMPLETED
Start: 2024-06-22 | End: 2024-06-23

## 2024-06-22 RX ORDER — SODIUM CHLORIDE, SODIUM LACTATE, POTASSIUM CHLORIDE, AND CALCIUM CHLORIDE .6; .31; .03; .02 G/100ML; G/100ML; G/100ML; G/100ML
500 INJECTION, SOLUTION INTRAVENOUS ONCE
Status: COMPLETED | OUTPATIENT
Start: 2024-06-22 | End: 2024-06-22

## 2024-06-22 RX ORDER — FENTANYL CITRATE 50 UG/ML
25 INJECTION, SOLUTION INTRAMUSCULAR; INTRAVENOUS
Status: DISCONTINUED | OUTPATIENT
Start: 2024-06-22 | End: 2024-06-26 | Stop reason: HOSPADM

## 2024-06-22 RX ORDER — SODIUM CHLORIDE 0.9 % (FLUSH) 0.9 %
5-40 SYRINGE (ML) INJECTION EVERY 12 HOURS SCHEDULED
Status: DISCONTINUED | OUTPATIENT
Start: 2024-06-23 | End: 2024-06-23 | Stop reason: SDUPTHER

## 2024-06-22 RX ORDER — SODIUM CHLORIDE, SODIUM LACTATE, POTASSIUM CHLORIDE, CALCIUM CHLORIDE 600; 310; 30; 20 MG/100ML; MG/100ML; MG/100ML; MG/100ML
INJECTION, SOLUTION INTRAVENOUS CONTINUOUS
Status: DISCONTINUED | OUTPATIENT
Start: 2024-06-23 | End: 2024-06-26 | Stop reason: HOSPADM

## 2024-06-22 RX ORDER — PROMETHAZINE HYDROCHLORIDE 25 MG/ML
25 INJECTION, SOLUTION INTRAMUSCULAR; INTRAVENOUS ONCE
Status: COMPLETED | OUTPATIENT
Start: 2024-06-22 | End: 2024-06-22

## 2024-06-22 RX ORDER — WATER 10 ML/10ML
INJECTION INTRAMUSCULAR; INTRAVENOUS; SUBCUTANEOUS
Status: DISPENSED
Start: 2024-06-22 | End: 2024-06-23

## 2024-06-22 RX ORDER — TERBUTALINE SULFATE 1 MG/ML
INJECTION, SOLUTION SUBCUTANEOUS
Status: COMPLETED
Start: 2024-06-22 | End: 2024-06-22

## 2024-06-22 RX ORDER — SODIUM CHLORIDE, SODIUM LACTATE, POTASSIUM CHLORIDE, AND CALCIUM CHLORIDE .6; .31; .03; .02 G/100ML; G/100ML; G/100ML; G/100ML
500 INJECTION, SOLUTION INTRAVENOUS ONCE
Status: DISCONTINUED | OUTPATIENT
Start: 2024-06-22 | End: 2024-06-26 | Stop reason: HOSPADM

## 2024-06-22 RX ORDER — SODIUM CHLORIDE, SODIUM LACTATE, POTASSIUM CHLORIDE, AND CALCIUM CHLORIDE .6; .31; .03; .02 G/100ML; G/100ML; G/100ML; G/100ML
1000 INJECTION, SOLUTION INTRAVENOUS ONCE
Status: DISCONTINUED | OUTPATIENT
Start: 2024-06-23 | End: 2024-06-26 | Stop reason: HOSPADM

## 2024-06-22 RX ORDER — TERBUTALINE SULFATE 1 MG/ML
0.25 INJECTION, SOLUTION SUBCUTANEOUS ONCE
Status: COMPLETED | OUTPATIENT
Start: 2024-06-22 | End: 2024-06-22

## 2024-06-22 RX ORDER — INDOMETHACIN 25 MG/1
50 CAPSULE ORAL
Status: DISCONTINUED | OUTPATIENT
Start: 2024-06-23 | End: 2024-06-26 | Stop reason: HOSPADM

## 2024-06-22 RX ORDER — NIFEDIPINE 10 MG/1
10 CAPSULE ORAL EVERY 4 HOURS
Status: DISCONTINUED | OUTPATIENT
Start: 2024-06-22 | End: 2024-06-26 | Stop reason: HOSPADM

## 2024-06-22 RX ORDER — INDOMETHACIN 25 MG/1
100 CAPSULE ORAL ONCE
Status: COMPLETED | OUTPATIENT
Start: 2024-06-22 | End: 2024-06-22

## 2024-06-22 RX ORDER — ONDANSETRON 2 MG/ML
4 INJECTION INTRAMUSCULAR; INTRAVENOUS EVERY 6 HOURS PRN
Status: DISCONTINUED | OUTPATIENT
Start: 2024-06-22 | End: 2024-06-23 | Stop reason: SDUPTHER

## 2024-06-22 RX ORDER — CITRIC ACID/SODIUM CITRATE 334-500MG
30 SOLUTION, ORAL ORAL ONCE
Status: COMPLETED | OUTPATIENT
Start: 2024-06-23 | End: 2024-06-23

## 2024-06-22 RX ORDER — SODIUM CHLORIDE 9 MG/ML
INJECTION, SOLUTION INTRAVENOUS PRN
Status: DISCONTINUED | OUTPATIENT
Start: 2024-06-22 | End: 2024-06-23 | Stop reason: SDUPTHER

## 2024-06-22 RX ORDER — TERBUTALINE SULFATE 1 MG/ML
0.25 INJECTION, SOLUTION SUBCUTANEOUS ONCE
Status: DISCONTINUED | OUTPATIENT
Start: 2024-06-22 | End: 2024-06-22

## 2024-06-22 RX ORDER — SODIUM CHLORIDE 0.9 % (FLUSH) 0.9 %
10 SYRINGE (ML) INJECTION PRN
Status: DISCONTINUED | OUTPATIENT
Start: 2024-06-22 | End: 2024-06-26 | Stop reason: HOSPADM

## 2024-06-22 RX ORDER — CEFAZOLIN 2 G/1
INJECTION, POWDER, FOR SOLUTION INTRAMUSCULAR; INTRAVENOUS
Status: DISPENSED
Start: 2024-06-22 | End: 2024-06-23

## 2024-06-22 RX ADMIN — FENTANYL CITRATE 25 MCG: 50 INJECTION INTRAMUSCULAR; INTRAVENOUS at 22:08

## 2024-06-22 RX ADMIN — WATER 2000 MG: 1 INJECTION INTRAMUSCULAR; INTRAVENOUS; SUBCUTANEOUS at 16:10

## 2024-06-22 RX ADMIN — INDOMETHACIN 100 MG: 25 CAPSULE ORAL at 17:53

## 2024-06-22 RX ADMIN — SODIUM CHLORIDE, POTASSIUM CHLORIDE, SODIUM LACTATE AND CALCIUM CHLORIDE 500 ML: 600; 310; 30; 20 INJECTION, SOLUTION INTRAVENOUS at 15:25

## 2024-06-22 RX ADMIN — TERBUTALINE SULFATE 0.25 MG: 1 INJECTION, SOLUTION SUBCUTANEOUS at 16:08

## 2024-06-22 RX ADMIN — BETAMETHASONE ACETATE AND BETAMETHASONE SODIUM PHOSPHATE 12 MG: 3; 3 INJECTION, SUSPENSION INTRA-ARTICULAR; INTRALESIONAL; INTRAMUSCULAR; SOFT TISSUE at 17:55

## 2024-06-22 RX ADMIN — NIFEDIPINE 10 MG: 10 CAPSULE ORAL at 20:48

## 2024-06-22 RX ADMIN — MEPERIDINE HYDROCHLORIDE 25 MG: 25 INJECTION INTRAMUSCULAR; INTRAVENOUS; SUBCUTANEOUS at 20:01

## 2024-06-22 RX ADMIN — PROMETHAZINE HYDROCHLORIDE 25 MG: 25 INJECTION INTRAMUSCULAR; INTRAVENOUS at 20:01

## 2024-06-22 RX ADMIN — TERBUTALINE SULFATE 0.25 MG: 1 INJECTION, SOLUTION SUBCUTANEOUS at 22:51

## 2024-06-22 ASSESSMENT — PAIN SCALES - GENERAL
PAINLEVEL_OUTOF10: 10
PAINLEVEL_OUTOF10: 9
PAINLEVEL_OUTOF10: 9

## 2024-06-22 ASSESSMENT — LIFESTYLE VARIABLES: SMOKING_STATUS: 0

## 2024-06-22 NOTE — H&P
Obstetric History and Physical       CHIEF COMPLAINT:  vaginal bleeding    HISTORY OF PRESENT ILLNESS:      Yadira Duran is a 28 y.o., , female who presents at 33w4d with an EDC of Estimated Date of Delivery: 24.  OB History          5    Para   3    Term   3       0    AB   1    Living   3         SAB   1    IAB   0    Ectopic   0    Molar   0    Multiple   0    Live Births   3            Patient presents with a chief complaint as above and is being evaluated for vaginal bleeding  -She describes the bleeding as pink discharge when wiping  -She has been outside all day  -She has some cramping but doesn't feel like labor contractions  -baby is moving well  -Denies LOF       PRENATAL CARE:    Complicated by: non compliant with prenatal care, multiple triage visits, three previous C/Ss    PAST OB HISTORY  OB History          5    Para   3    Term   3       0    AB   1    Living   3         SAB   1    IAB   0    Ectopic   0    Molar   0    Multiple   0    Live Births   3                Past Medical History:        Diagnosis Date    Depression     Endometriosis     IBS (irritable bowel syndrome)     Optic neuritis     Seizure (HCC) 2015    after delivery / not sure was a seizure, no issues since , no medication     Past Surgical History:        Procedure Laterality Date     SECTION  2015     SECTION N/A 2022     SECTION performed by Paula Cast DO at [facility] OR    CHOLECYSTECTOMY  2017    ROBOT ASSISTED    COLONOSCOPY  04/30/2012    x2    DILATION AND EVACUATION  2016    missed ab    HEMORRHOID SURGERY  2020and 2021    LAPAROSCOPY N/A 10/18/2019    DIAGNOSTIC LAPAROSCOPY, OPEN VENTRAL HERNIA REPAIR performed by Michelle Song MD at [facility] OR    PELVIC LAPAROSCOPY Right     cystectomy    OR  DELIVERY ONLY N/A 2018     SECTION performed by Gregory Lowe DO at [facility]    UPPER     Bladder cancer     Medications Prior to Admission:  Medications Prior to Admission: propranolol (INDERAL) 10 MG tablet, Take 2 tablets by mouth 3 times daily (Patient not taking: Reported on 5/3/2024)  SUMAtriptan (IMITREX) 100 MG tablet, Take 1 tablet and repeat in 2 hours 1 time if no significant decrease in the migraine (Patient not taking: Reported on 5/30/2024)  pantoprazole (PROTONIX) 40 MG tablet, Take 1 tablet by mouth every morning (before breakfast) (Patient not taking: Reported on 5/16/2024)  amitriptyline (ELAVIL) 10 MG tablet, Take 1 tablet by mouth nightly (Patient not taking: Reported on 5/16/2024)  Prenatal MV-Min-Fe Fum-FA-DHA (PRENATAL 1 PO), Take by mouth  vitamin D (CHOLECALCIFEROL) 50 MCG (2000 UT) TABS tablet, Take 1 tablet by mouth daily (Patient not taking: Reported on 5/16/2024)    REVIEW OF SYSTEMS:    CONSTITUTIONAL:  negative  RESPIRATORY:  negative  CARDIOVASCULAR:  negative  GASTROINTESTINAL:  negative  ALLERGIC/IMMUNOLOGIC:  negative  NEUROLOGICAL:  negative  BEHAVIOR/PSYCH:  negative    PHYSICAL EXAM:  Vitals:    06/22/24 1446   BP: 110/62   Pulse: (!) 131   Resp: 18   Temp: 98.1 °F (36.7 °C)   TempSrc: Oral     General appearance:  awake, alert, cooperative, no apparent distress, and appears stated age  Neurologic:  Awake, alert, oriented to name, place and time.    Lungs:  No increased work of breathing, good air exchange  Abdomen:  Soft, non tender, gravid, consistent with her gestational age  Fetal heart rate:  Reassuring.  Pelvis:  Adequate pelvis, no bleeding in vagina  Cervix: 1 cm 25% firm -2  Contraction frequency:  3-5 minutes    Membranes:  Intact    ASSESSMENT AND PLAN:    R/o PTL and vaginal bleeding  UA dark - UA and UC   IVF  Recheck cervical exam  Terb x 1 dose

## 2024-06-22 NOTE — PROGRESS NOTES
Assumed care of patient.  Vitals WNL.  Patient still having ctxs.  +FM.  No other OB complaints at this time.

## 2024-06-22 NOTE — PROGRESS NOTES
Patient arrived to unit with complaints of pink tinged discharge noted after using bathroom when wiping. She reports good fetal movement, no LOF, and mild occasional intermittent cramping that is not painful. She is a  33.4 [facility]. Placed on EFM and house officer notified of arrival.

## 2024-06-22 NOTE — PROGRESS NOTES
Spoke with Dr. Vuong, rechecked patient, still 1/thick  Would like patient to get demerol and phenergan for pain 6/10.     New orders noted.

## 2024-06-22 NOTE — PROGRESS NOTES
Spoke with Dr. Vuong notified that patient ctx every 1-2, patient stating they are still 6/10 on pain scale, same as when she was admitted, patient already given fluid bolus, terb and ancef as ordered a while ago.     New orders for indocin, celestone and another fluid bolus.

## 2024-06-23 PROBLEM — O46.93 VAGINAL BLEEDING IN PREGNANCY, THIRD TRIMESTER: Status: ACTIVE | Noted: 2024-06-23

## 2024-06-23 LAB
MICROORGANISM SPEC CULT: NO GROWTH
SERVICE CMNT-IMP: NORMAL
SPECIMEN DESCRIPTION: NORMAL

## 2024-06-23 PROCEDURE — 2580000003 HC RX 258: Performed by: NURSE ANESTHETIST, CERTIFIED REGISTERED

## 2024-06-23 PROCEDURE — 6360000002 HC RX W HCPCS: Performed by: STUDENT IN AN ORGANIZED HEALTH CARE EDUCATION/TRAINING PROGRAM

## 2024-06-23 PROCEDURE — 7100000000 HC PACU RECOVERY - FIRST 15 MIN: Performed by: STUDENT IN AN ORGANIZED HEALTH CARE EDUCATION/TRAINING PROGRAM

## 2024-06-23 PROCEDURE — 6370000000 HC RX 637 (ALT 250 FOR IP)

## 2024-06-23 PROCEDURE — 2709999900 HC NON-CHARGEABLE SUPPLY: Performed by: STUDENT IN AN ORGANIZED HEALTH CARE EDUCATION/TRAINING PROGRAM

## 2024-06-23 PROCEDURE — 3700000001 HC ADD 15 MINUTES (ANESTHESIA): Performed by: STUDENT IN AN ORGANIZED HEALTH CARE EDUCATION/TRAINING PROGRAM

## 2024-06-23 PROCEDURE — APPNB30 APP NON BILLABLE TIME 0-30 MINS: Performed by: ADVANCED PRACTICE MIDWIFE

## 2024-06-23 PROCEDURE — 1220000000 HC SEMI PRIVATE OB R&B

## 2024-06-23 PROCEDURE — 3700000000 HC ANESTHESIA ATTENDED CARE: Performed by: STUDENT IN AN ORGANIZED HEALTH CARE EDUCATION/TRAINING PROGRAM

## 2024-06-23 PROCEDURE — 36415 COLL VENOUS BLD VENIPUNCTURE: CPT

## 2024-06-23 PROCEDURE — 6360000002 HC RX W HCPCS

## 2024-06-23 PROCEDURE — 6360000002 HC RX W HCPCS: Performed by: ANESTHESIOLOGY

## 2024-06-23 PROCEDURE — 87340 HEPATITIS B SURFACE AG IA: CPT

## 2024-06-23 PROCEDURE — 6370000000 HC RX 637 (ALT 250 FOR IP): Performed by: STUDENT IN AN ORGANIZED HEALTH CARE EDUCATION/TRAINING PROGRAM

## 2024-06-23 PROCEDURE — 3609079900 HC CESAREAN SECTION: Performed by: STUDENT IN AN ORGANIZED HEALTH CARE EDUCATION/TRAINING PROGRAM

## 2024-06-23 PROCEDURE — 6360000002 HC RX W HCPCS: Performed by: NURSE ANESTHETIST, CERTIFIED REGISTERED

## 2024-06-23 PROCEDURE — 2580000003 HC RX 258: Performed by: STUDENT IN AN ORGANIZED HEALTH CARE EDUCATION/TRAINING PROGRAM

## 2024-06-23 PROCEDURE — 7100000001 HC PACU RECOVERY - ADDTL 15 MIN: Performed by: STUDENT IN AN ORGANIZED HEALTH CARE EDUCATION/TRAINING PROGRAM

## 2024-06-23 PROCEDURE — 88307 TISSUE EXAM BY PATHOLOGIST: CPT

## 2024-06-23 PROCEDURE — 59514 CESAREAN DELIVERY ONLY: CPT | Performed by: STUDENT IN AN ORGANIZED HEALTH CARE EDUCATION/TRAINING PROGRAM

## 2024-06-23 RX ORDER — OXYCODONE HYDROCHLORIDE 5 MG/1
5 TABLET ORAL EVERY 4 HOURS PRN
Status: DISCONTINUED | OUTPATIENT
Start: 2024-06-23 | End: 2024-06-23 | Stop reason: SDUPTHER

## 2024-06-23 RX ORDER — PROCHLORPERAZINE EDISYLATE 5 MG/ML
5 INJECTION INTRAMUSCULAR; INTRAVENOUS
Status: ACTIVE | OUTPATIENT
Start: 2024-06-23 | End: 2024-06-24

## 2024-06-23 RX ORDER — SODIUM CHLORIDE 9 MG/ML
INJECTION, SOLUTION INTRAVENOUS PRN
Status: DISCONTINUED | OUTPATIENT
Start: 2024-06-23 | End: 2024-06-23 | Stop reason: SDUPTHER

## 2024-06-23 RX ORDER — FENTANYL CITRATE 50 UG/ML
50 INJECTION, SOLUTION INTRAMUSCULAR; INTRAVENOUS EVERY 5 MIN PRN
Status: DISCONTINUED | OUTPATIENT
Start: 2024-06-23 | End: 2024-06-26 | Stop reason: HOSPADM

## 2024-06-23 RX ORDER — MORPHINE SULFATE 1 MG/ML
INJECTION, SOLUTION EPIDURAL; INTRATHECAL; INTRAVENOUS PRN
Status: DISCONTINUED | OUTPATIENT
Start: 2024-06-23 | End: 2024-06-23 | Stop reason: SDUPTHER

## 2024-06-23 RX ORDER — SODIUM CHLORIDE 9 MG/ML
INJECTION, SOLUTION INTRAVENOUS PRN
Status: DISCONTINUED | OUTPATIENT
Start: 2024-06-23 | End: 2024-06-26 | Stop reason: HOSPADM

## 2024-06-23 RX ORDER — ONDANSETRON 2 MG/ML
4 INJECTION INTRAMUSCULAR; INTRAVENOUS EVERY 6 HOURS PRN
Status: DISCONTINUED | OUTPATIENT
Start: 2024-06-23 | End: 2024-06-26 | Stop reason: HOSPADM

## 2024-06-23 RX ORDER — SODIUM CHLORIDE 0.9 % (FLUSH) 0.9 %
5-40 SYRINGE (ML) INJECTION PRN
Status: DISCONTINUED | OUTPATIENT
Start: 2024-06-23 | End: 2024-06-23 | Stop reason: SDUPTHER

## 2024-06-23 RX ORDER — OXYCODONE HYDROCHLORIDE 5 MG/1
10 TABLET ORAL EVERY 4 HOURS PRN
Status: DISCONTINUED | OUTPATIENT
Start: 2024-06-23 | End: 2024-06-23 | Stop reason: SDUPTHER

## 2024-06-23 RX ORDER — KETOROLAC TROMETHAMINE 30 MG/ML
15 INJECTION, SOLUTION INTRAMUSCULAR; INTRAVENOUS EVERY 6 HOURS PRN
Status: DISPENSED | OUTPATIENT
Start: 2024-06-23 | End: 2024-06-24

## 2024-06-23 RX ORDER — SODIUM CHLORIDE, SODIUM LACTATE, POTASSIUM CHLORIDE, CALCIUM CHLORIDE 600; 310; 30; 20 MG/100ML; MG/100ML; MG/100ML; MG/100ML
INJECTION, SOLUTION INTRAVENOUS CONTINUOUS PRN
Status: DISCONTINUED | OUTPATIENT
Start: 2024-06-23 | End: 2024-06-23 | Stop reason: SDUPTHER

## 2024-06-23 RX ORDER — SODIUM CHLORIDE 0.9 % (FLUSH) 0.9 %
5-40 SYRINGE (ML) INJECTION PRN
Status: DISCONTINUED | OUTPATIENT
Start: 2024-06-23 | End: 2024-06-26 | Stop reason: HOSPADM

## 2024-06-23 RX ORDER — METHYLERGONOVINE MALEATE 0.2 MG/ML
INJECTION INTRAVENOUS PRN
Status: DISCONTINUED | OUTPATIENT
Start: 2024-06-23 | End: 2024-06-23 | Stop reason: SDUPTHER

## 2024-06-23 RX ORDER — ACETAMINOPHEN 500 MG
1000 TABLET ORAL EVERY 8 HOURS SCHEDULED
Status: DISCONTINUED | OUTPATIENT
Start: 2024-06-23 | End: 2024-06-26 | Stop reason: HOSPADM

## 2024-06-23 RX ORDER — DIPHENHYDRAMINE HYDROCHLORIDE 50 MG/ML
25 INJECTION INTRAMUSCULAR; INTRAVENOUS EVERY 6 HOURS PRN
Status: DISCONTINUED | OUTPATIENT
Start: 2024-06-23 | End: 2024-06-23 | Stop reason: SDUPTHER

## 2024-06-23 RX ORDER — ONDANSETRON 2 MG/ML
INJECTION INTRAMUSCULAR; INTRAVENOUS PRN
Status: DISCONTINUED | OUTPATIENT
Start: 2024-06-23 | End: 2024-06-23 | Stop reason: SDUPTHER

## 2024-06-23 RX ORDER — MODIFIED LANOLIN
OINTMENT (GRAM) TOPICAL
Status: DISCONTINUED | OUTPATIENT
Start: 2024-06-23 | End: 2024-06-26 | Stop reason: HOSPADM

## 2024-06-23 RX ORDER — ONDANSETRON 4 MG/1
4 TABLET, ORALLY DISINTEGRATING ORAL EVERY 8 HOURS PRN
Status: DISCONTINUED | OUTPATIENT
Start: 2024-06-23 | End: 2024-06-26 | Stop reason: HOSPADM

## 2024-06-23 RX ORDER — BUPIVACAINE HYDROCHLORIDE 7.5 MG/ML
INJECTION, SOLUTION INTRASPINAL PRN
Status: DISCONTINUED | OUTPATIENT
Start: 2024-06-23 | End: 2024-06-23 | Stop reason: SDUPTHER

## 2024-06-23 RX ORDER — PRENATAL WITH FERROUS FUM AND FOLIC ACID 3080; 920; 120; 400; 22; 1.84; 3; 20; 10; 1; 12; 200; 27; 25; 2 [IU]/1; [IU]/1; MG/1; [IU]/1; MG/1; MG/1; MG/1; MG/1; MG/1; MG/1; UG/1; MG/1; MG/1; MG/1; MG/1
1 TABLET ORAL DAILY
Status: DISCONTINUED | OUTPATIENT
Start: 2024-06-23 | End: 2024-06-26 | Stop reason: HOSPADM

## 2024-06-23 RX ORDER — ONDANSETRON 2 MG/ML
4 INJECTION INTRAMUSCULAR; INTRAVENOUS EVERY 6 HOURS PRN
Status: DISCONTINUED | OUTPATIENT
Start: 2024-06-23 | End: 2024-06-23 | Stop reason: SDUPTHER

## 2024-06-23 RX ORDER — OXYCODONE HYDROCHLORIDE 5 MG/1
10 TABLET ORAL EVERY 4 HOURS PRN
Status: DISCONTINUED | OUTPATIENT
Start: 2024-06-23 | End: 2024-06-26 | Stop reason: HOSPADM

## 2024-06-23 RX ORDER — OXYCODONE HYDROCHLORIDE 5 MG/1
5 TABLET ORAL EVERY 4 HOURS PRN
Status: DISCONTINUED | OUTPATIENT
Start: 2024-06-23 | End: 2024-06-26 | Stop reason: HOSPADM

## 2024-06-23 RX ORDER — FENTANYL CITRATE 50 UG/ML
25 INJECTION, SOLUTION INTRAMUSCULAR; INTRAVENOUS EVERY 5 MIN PRN
Status: DISCONTINUED | OUTPATIENT
Start: 2024-06-23 | End: 2024-06-26 | Stop reason: HOSPADM

## 2024-06-23 RX ORDER — NALOXONE HYDROCHLORIDE 0.4 MG/ML
INJECTION, SOLUTION INTRAMUSCULAR; INTRAVENOUS; SUBCUTANEOUS PRN
Status: DISCONTINUED | OUTPATIENT
Start: 2024-06-23 | End: 2024-06-26 | Stop reason: HOSPADM

## 2024-06-23 RX ORDER — DIPHENHYDRAMINE HYDROCHLORIDE 50 MG/ML
25 INJECTION INTRAMUSCULAR; INTRAVENOUS EVERY 6 HOURS PRN
Status: DISCONTINUED | OUTPATIENT
Start: 2024-06-23 | End: 2024-06-26 | Stop reason: HOSPADM

## 2024-06-23 RX ORDER — DOCUSATE SODIUM 100 MG/1
100 CAPSULE, LIQUID FILLED ORAL 2 TIMES DAILY
Status: DISCONTINUED | OUTPATIENT
Start: 2024-06-23 | End: 2024-06-26 | Stop reason: HOSPADM

## 2024-06-23 RX ORDER — PHENYLEPHRINE HCL IN 0.9% NACL 1 MG/10 ML
SYRINGE (ML) INTRAVENOUS PRN
Status: DISCONTINUED | OUTPATIENT
Start: 2024-06-23 | End: 2024-06-23 | Stop reason: SDUPTHER

## 2024-06-23 RX ORDER — SODIUM CHLORIDE 0.9 % (FLUSH) 0.9 %
5-40 SYRINGE (ML) INJECTION EVERY 12 HOURS SCHEDULED
Status: DISCONTINUED | OUTPATIENT
Start: 2024-06-23 | End: 2024-06-26 | Stop reason: HOSPADM

## 2024-06-23 RX ORDER — SODIUM CHLORIDE 0.9 % (FLUSH) 0.9 %
5-40 SYRINGE (ML) INJECTION EVERY 12 HOURS SCHEDULED
Status: DISCONTINUED | OUTPATIENT
Start: 2024-06-23 | End: 2024-06-23 | Stop reason: SDUPTHER

## 2024-06-23 RX ORDER — DIPHENHYDRAMINE HCL 25 MG
25 TABLET ORAL EVERY 6 HOURS PRN
Status: DISCONTINUED | OUTPATIENT
Start: 2024-06-23 | End: 2024-06-23 | Stop reason: SDUPTHER

## 2024-06-23 RX ADMIN — SODIUM CHLORIDE, POTASSIUM CHLORIDE, SODIUM LACTATE AND CALCIUM CHLORIDE: 600; 310; 30; 20 INJECTION, SOLUTION INTRAVENOUS at 00:15

## 2024-06-23 RX ADMIN — METHYLERGONOVINE MALEATE 200 MCG: 0.2 INJECTION, SOLUTION INTRAMUSCULAR; INTRAVENOUS at 00:44

## 2024-06-23 RX ADMIN — Medication 200 MCG: at 00:44

## 2024-06-23 RX ADMIN — Medication 200 MCG: at 00:25

## 2024-06-23 RX ADMIN — Medication 200 MCG: at 00:37

## 2024-06-23 RX ADMIN — Medication 200 MCG: at 00:22

## 2024-06-23 RX ADMIN — SODIUM CHLORIDE, POTASSIUM CHLORIDE, SODIUM LACTATE AND CALCIUM CHLORIDE: 600; 310; 30; 20 INJECTION, SOLUTION INTRAVENOUS at 00:14

## 2024-06-23 RX ADMIN — Medication 909 ML/HR: at 00:33

## 2024-06-23 RX ADMIN — DIPHENHYDRAMINE HYDROCHLORIDE 25 MG: 50 INJECTION INTRAMUSCULAR; INTRAVENOUS at 04:57

## 2024-06-23 RX ADMIN — ACETAMINOPHEN 1000 MG: 500 TABLET ORAL at 20:56

## 2024-06-23 RX ADMIN — Medication 200 MCG: at 00:33

## 2024-06-23 RX ADMIN — Medication 87.3 MILLI-UNITS/MIN: at 01:49

## 2024-06-23 RX ADMIN — Medication 200 MCG: at 00:28

## 2024-06-23 RX ADMIN — Medication 30 ML: at 00:02

## 2024-06-23 RX ADMIN — Medication 87.3 MILLI-UNITS/MIN: at 00:44

## 2024-06-23 RX ADMIN — WATER 2000 MG: 1 INJECTION INTRAMUSCULAR; INTRAVENOUS; SUBCUTANEOUS at 00:03

## 2024-06-23 RX ADMIN — Medication 166.7 ML: at 00:32

## 2024-06-23 RX ADMIN — MORPHINE SULFATE 0.15 MG: 1 INJECTION, SOLUTION EPIDURAL; INTRATHECAL; INTRAVENOUS at 00:19

## 2024-06-23 RX ADMIN — BUPIVACAINE HYDROCHLORIDE 12 MG: 7.5 INJECTION, SOLUTION SUBARACHNOID at 00:19

## 2024-06-23 RX ADMIN — SODIUM CITRATE AND CITRIC ACID MONOHYDRATE 30 ML: 500; 334 SOLUTION ORAL at 00:02

## 2024-06-23 RX ADMIN — DOCUSATE SODIUM 100 MG: 100 CAPSULE, LIQUID FILLED ORAL at 20:56

## 2024-06-23 RX ADMIN — DOCUSATE SODIUM 100 MG: 100 CAPSULE, LIQUID FILLED ORAL at 11:54

## 2024-06-23 RX ADMIN — ONDANSETRON 4 MG: 2 INJECTION INTRAMUSCULAR; INTRAVENOUS at 00:33

## 2024-06-23 RX ADMIN — KETOROLAC TROMETHAMINE 15 MG: 30 INJECTION, SOLUTION INTRAMUSCULAR at 11:53

## 2024-06-23 ASSESSMENT — PAIN DESCRIPTION - LOCATION
LOCATION: ABDOMEN
LOCATION: ABDOMEN

## 2024-06-23 ASSESSMENT — PAIN SCALES - GENERAL
PAINLEVEL_OUTOF10: 7
PAINLEVEL_OUTOF10: 7

## 2024-06-23 ASSESSMENT — PAIN DESCRIPTION - DESCRIPTORS: DESCRIPTORS: DISCOMFORT;SORE

## 2024-06-23 ASSESSMENT — PAIN DESCRIPTION - ORIENTATION: ORIENTATION: LOWER;MID

## 2024-06-23 NOTE — PLAN OF CARE
Problem: Discharge Planning  Goal: Discharge to home or other facility with appropriate resources  2024 by Chanell Medina RN  Outcome: Progressing     Problem: Vaginal Birth or  Section  Goal: Fetal and maternal status remain reassuring during the birth process  Description:  Birth OB-Pregnancy care plan goal which identifies if the fetal and maternal status remain reassuring during the birth process  2024 by Chanell Medina RN  Outcome: Progressing     Problem: Pain  Goal: Verbalizes/displays adequate comfort level or baseline comfort level  2024 by Chanell Medina RN  Outcome: Progressing  Flowsheets (Taken 2024)  Verbalizes/displays adequate comfort level or baseline comfort level:   Encourage patient to monitor pain and request assistance   Assess pain using appropriate pain scale     Problem: Infection - Adult  Goal: Absence of infection at discharge  2024 by Chanell Medina RN  Outcome: Progressing     Problem: Infection - Adult  Goal: Absence of infection during hospitalization  2024 by Chanell Medina RN  Outcome: Progressing     Problem: Infection - Adult  Goal: Absence of fever/infection during anticipated neutropenic period  2024 by Chanell Medina RN  Outcome: Progressing     Problem: ABCDS Injury Assessment  Goal: Absence of physical injury  Outcome: Progressing

## 2024-06-23 NOTE — PROGRESS NOTES
Dr. Vuong at nurses [facility], originally ordered another dose of turb, after she spoke to Dr. Torre order received to hold turb and given nifedipine 10mg Q4.

## 2024-06-23 NOTE — PROGRESS NOTES
Talked with Dr. Torre regarding patient  S/p celestone, indocin, terb,  IVF, ancef  He suggests nifedipine 10 mg q 4 hours  He does not suggest magnesium   He says if not making cervical change, do not delivery baby despite contractions

## 2024-06-23 NOTE — PROGRESS NOTES
Viable  delivery with Dr. Vuong via repeat LTCS at 0032. Apgars 8/9. NICU called for delivery and  transferred to NICU.

## 2024-06-23 NOTE — PROCEDURES
PROCEDURE NOTE  Date: 2024   Name: Yadira Duran  YOB: 1995    Procedures    First Assist Brief Operative Note    PreOp DX:  33w5d Pregnancy; EDC= 824  Previous c/s x 3, labor  Patient Active Problem List   Diagnosis    Gastroesophageal reflux disease    Seizure (HCC)    Vitamin D deficiency    Palpitations    Intractable abdominal pain    Leg pain    Previous  delivery affecting pregnancy    Weakness of right arm    Right sided weakness    Paresthesia of right upper extremity    Encounter for  screening for malformation using ultrasound    Suspected shortening of cervix not found    Low back pain during pregnancy in third trimester    28 weeks gestation of pregnancy    Vaginal bleeding    Normal pregnancy in third trimester         Post OP Dx:  33w5d Pregnancy delivered by   Living male baby delivered       Procedure:  Surgery/ First Assistance   In the absence of qualified resident I first assisted    Anesthesia: Spinal       Summary: The patient was identified and a Time Out Performed. All were in agreement.    Under Spinal Anesthesia the abdomen was prepared and draped using fire prevention and safety protocols.     With my technical assistance Dr. Vuong performed  an uncomplicated  Section, opening the abdomen, incising the uterus and delivering a living male baby at 0032, weight pending baby went to NICU  Dr Brooks assisted until uterus was closed then I scrubbed in  The uterus and the abdomen were closed per routine.     Procedure was well tolerated.    I first assisted with opening the patient, tissue retraction, delivery of the baby, manipulation of suture, and closing of the patient. I was present for the entire case.    Refer to the Surgeons's Operative Report for details.    ERMIAS Toussaint CNM,   2024 12:54 AM

## 2024-06-23 NOTE — DISCHARGE INSTRUCTIONS
Follow-up with your OB doctor in 1 week if  incision check and in 6 weeks for pelvic exam unless otherwise instructed.   Call office for an appointment.    For breastfeeding support, you can contact our lactation specialists at [phone] or [phone]    DIET  Eat a well balanced diet focusing on foods high in fiber and protein  Drink plenty of fluids especially water.  To avoid constipation you may take a mild stool softener as recommended by your doctor or midwife.    ACTIVITY  Gradually increase your activity.  Resume exercise regimen only after advised by your doctor or midwife.  Avoid lifting anything heavier than your baby or a gallon of milk for SIX weeks.   Avoid driving until your doctor or midwife has given their approval.  Rise slowly from a lying to sitting and then a standing position.  Climb stairs one at a time.  Use caution when carrying your baby up and down the stairs.  No sexual activity for 6 weeks or until advised by your doctor - Nothing in vagina: intercourse, tampons, or douching.   Be prepared to discuss family planning at your follow-up OB visit.   You may feel tired or have a lack of energy.  You may continue your prenatal vitamin to replenish nutrients post delivery.  Nap when baby naps to catch up on sleep.  May return to work or school in 6 weeks or as directed by OB.     EMOTIONS  You may feed yanes, sad, teary, & overwhelmed.  Contact your OB provider if you feel you may be showing signs of postpartum depression, or have thoughts of harming yourself or your infant.  If infant will not stop crying, contact another adult for help or place infant in their crib on their back and take a break.  NEVER shake your infant.      BLEEDING  Vaginal bleeding will decrease in amount over the next few weeks.  You will notice that as your activity increases, your flow may increase.  This is your body's way of telling you, you need to take things easier and rest more often.  Call your

## 2024-06-23 NOTE — PROGRESS NOTES
Multiple attempts to accurately keep patient on EFM.  Picking up MHR due to patient thrashing around and curling up in pain.  Dr. Vuong at nurses station.  When in room baseline 140s.  Will continue to instruct patient to not sit straight up during contraction so we can appropriately monitor baby.

## 2024-06-23 NOTE — LACTATION NOTE
Mom continues to pump colostrum for her premature baby in the nicu.  This is mom's first experience with providing breast milk.  Went over pump use and care.  Denies needing supplies or assistance at this time.  Mom has a breast pump for home.

## 2024-06-23 NOTE — PROGRESS NOTES
Patient up to bathroom with standby assist. Estrada removed. Emi care and new pads applied. Due to void within 6-8 hours. IV saline locked. Patient assisted to wheelchair and then to NICU.

## 2024-06-23 NOTE — PROGRESS NOTES
Patient admitted into room 323 and oriented to surroundings. Introduced self and wrote this RN's name and phone extension on patient's white board. Phone and nurse's call light at patient's bedside and instructed to use for any needs. Patient instructed on new admission informational packet at bedside Patient also instructed patient on unit visitation policy and that one same support person 18 years old or older may stay overnight if desired. Patient verbalized understanding of all of the above.

## 2024-06-23 NOTE — OP NOTE
Indications: IUP at 33.5 weeks in early labor with a history of previous C/S x 3    Management options were reviewed, the risks of surgery were discussed, including, but not limited to that of anesthesia, infection, hemorrhage, transfusion, blood borne disease, bowel/bladder/ureteral/vascular injury, and any corrective surgeries (bladder, ureter, bowel, hysterectomy), uterine or cervical lacerations, injury to the baby, DVT,PE, pain and death.  Questions were answered to the patient's satisfaction and informed consent was obtained to proceed as planned with a primary low transverse  delivery.      Procedure(s): Repeat Low Transverse  Section via Pfannenstiel Skin Incision     Surgeon(s): Yolette Vuong DO     Assistant: Dr. Todd ANNE, Dominic Curiel CNM     Anesthesia: Spinal with Duramorph     Complications: None     Estimated Blood Loss (mL): 500     Fluids Replaced (mL): 1 L     Urine Output (mL): 200 cc      PROCEDURE IN DETAIL:  The patient was taken to the operating room, where patient identification was confirmed and a Time Out was performed. Duramorph spinal anesthesia was found to be adequate.  She was prepped with a DuraPrep solution and draped in the usual sterile fashion in the dorsal supine position with leftward tilt.  A Pfannenstiel skin incision was then made with a scalpel through the previous  section scar  and carried to the underlying lay of fascia with the Bovie.  The fascia was nicked in the midline and incision was extended laterally with the Wooten scissors.  Superior aspect of the fascial incision was grasped with Kocher clamps and elevated.  The underlying rectus muscle was dissected using sharp and blunt dissection. Attention was then turned to the inferior aspect of this incision, which in a similar fashion was grasped with Kocher clamps and elevated.  The underlying rectus muscle was dissected off using sharp and blunt dissection.  The rectus muscles were

## 2024-06-23 NOTE — PLAN OF CARE
Problem: Discharge Planning  Goal: Discharge to home or other facility with appropriate resources  Outcome: Progressing     Problem: Vaginal Birth or  Section  Goal: Fetal and maternal status remain reassuring during the birth process  Description:  Birth OB-Pregnancy care plan goal which identifies if the fetal and maternal status remain reassuring during the birth process  2024 by Lo Dumont RN  Outcome: Progressing  2024 by Ric Velazco RN  Outcome: Progressing     Problem: Pain  Goal: Verbalizes/displays adequate comfort level or baseline comfort level  2024 by Lo Dumont RN  Outcome: Progressing  2024 by Ric Velazco RN  Outcome: Progressing     Problem: Infection - Adult  Goal: Absence of infection at discharge  2024 by Lo Dumont RN  Outcome: Progressing  2024 by Ric Velazco RN  Outcome: Progressing  Goal: Absence of infection during hospitalization  2024 by Lo Dumont RN  Outcome: Progressing  2024 by Ric Velazco RN  Outcome: Progressing  Goal: Absence of fever/infection during anticipated neutropenic period  2024 by Lo Dumont RN  Outcome: Progressing  2024 by Ric Velazco RN  Outcome: Progressing

## 2024-06-24 LAB
ERYTHROCYTE [DISTWIDTH] IN BLOOD BY AUTOMATED COUNT: 17 % (ref 11.5–15)
HBV SURFACE AG SERPL QL IA: NONREACTIVE
HCT VFR BLD AUTO: 31.2 % (ref 34–48)
HGB BLD-MCNC: 9.3 G/DL (ref 11.5–15.5)
MCH RBC QN AUTO: 24.4 PG (ref 26–35)
MCHC RBC AUTO-ENTMCNC: 29.8 G/DL (ref 32–34.5)
MCV RBC AUTO: 81.9 FL (ref 80–99.9)
PLATELET # BLD AUTO: 178 K/UL (ref 130–450)
PMV BLD AUTO: 12.2 FL (ref 7–12)
RBC # BLD AUTO: 3.81 M/UL (ref 3.5–5.5)
WBC OTHER # BLD: 16.8 K/UL (ref 4.5–11.5)

## 2024-06-24 PROCEDURE — 85027 COMPLETE CBC AUTOMATED: CPT

## 2024-06-24 PROCEDURE — 1220000000 HC SEMI PRIVATE OB R&B

## 2024-06-24 PROCEDURE — 6370000000 HC RX 637 (ALT 250 FOR IP): Performed by: STUDENT IN AN ORGANIZED HEALTH CARE EDUCATION/TRAINING PROGRAM

## 2024-06-24 PROCEDURE — 99024 POSTOP FOLLOW-UP VISIT: CPT | Performed by: OBSTETRICS & GYNECOLOGY

## 2024-06-24 RX ADMIN — DOCUSATE SODIUM 100 MG: 100 CAPSULE, LIQUID FILLED ORAL at 21:52

## 2024-06-24 RX ADMIN — ACETAMINOPHEN 1000 MG: 500 TABLET ORAL at 21:51

## 2024-06-24 RX ADMIN — PRENATAL WITH FERROUS FUM AND FOLIC ACID 1 TABLET: 3080; 920; 120; 400; 22; 1.84; 3; 20; 10; 1; 12; 200; 27; 25; 2 TABLET ORAL at 08:49

## 2024-06-24 RX ADMIN — OXYCODONE 10 MG: 5 TABLET ORAL at 08:49

## 2024-06-24 RX ADMIN — DOCUSATE SODIUM 100 MG: 100 CAPSULE, LIQUID FILLED ORAL at 08:49

## 2024-06-24 RX ADMIN — OXYCODONE 10 MG: 5 TABLET ORAL at 21:51

## 2024-06-24 RX ADMIN — OXYCODONE 10 MG: 5 TABLET ORAL at 14:13

## 2024-06-24 RX ADMIN — Medication: at 08:48

## 2024-06-24 RX ADMIN — ACETAMINOPHEN 1000 MG: 500 TABLET ORAL at 05:30

## 2024-06-24 ASSESSMENT — PAIN SCALES - GENERAL
PAINLEVEL_OUTOF10: 8
PAINLEVEL_OUTOF10: 6
PAINLEVEL_OUTOF10: 8
PAINLEVEL_OUTOF10: 8

## 2024-06-24 ASSESSMENT — PAIN DESCRIPTION - DESCRIPTORS
DESCRIPTORS: DISCOMFORT
DESCRIPTORS: DISCOMFORT;SORE
DESCRIPTORS: DISCOMFORT
DESCRIPTORS: DISCOMFORT

## 2024-06-24 ASSESSMENT — PAIN - FUNCTIONAL ASSESSMENT
PAIN_FUNCTIONAL_ASSESSMENT: ACTIVITIES ARE NOT PREVENTED

## 2024-06-24 ASSESSMENT — PAIN DESCRIPTION - LOCATION
LOCATION: ABDOMEN;INCISION
LOCATION: ABDOMEN
LOCATION: INCISION
LOCATION: INCISION

## 2024-06-24 ASSESSMENT — PAIN DESCRIPTION - ORIENTATION
ORIENTATION: LOWER
ORIENTATION: MID;LOWER
ORIENTATION: LOWER;MID
ORIENTATION: LOWER

## 2024-06-24 NOTE — PROGRESS NOTES
Subjective:     Postpartum Day 1:  Delivery    The patient feels well.  Pain is well controlled with current medications.  The patient is ambulating well. The patient is tolerating a normal diet. Flatus has been passed.    Objective:        Vitals:    24   BP: (!) 93/58   Pulse: 90   Resp: 18   Temp: 98 °F (36.7 °C)   SpO2: 98%       No intake or output data in the 24 hours ending 24  Lab Results   Component Value Date    WBC 16.8 (H) 2024    HGB 9.3 (L) 2024    HCT 31.2 (L) 2024    MCV 81.9 2024     2024       General:    alert, appears stated age, and cooperative           Uterine    firm   Incision:  dressing dry   DVT Evaluation:  No evidence of DVT seen on physical exam.     Assessment:     Status post  section. Doing well postoperatively.     Plan:     Continue current care.      Electronically signed by Radha Simeon DO on 2024 at 8:28 AM

## 2024-06-24 NOTE — CARE COORDINATION
SW Discharge Planning   SW received consult for \"     + Marijuana in January       Baby is in NICU, NICU SW will work with family       Electronically signed by LILLIE Fernandez on 6/24/2024 at 2:18 PM

## 2024-06-24 NOTE — PLAN OF CARE
Problem: Discharge Planning  Goal: Discharge to home or other facility with appropriate resources  Outcome: Progressing     Problem: Vaginal Birth or  Section  Goal: Fetal and maternal status remain reassuring during the birth process  Description:  Birth OB-Pregnancy care plan goal which identifies if the fetal and maternal status remain reassuring during the birth process  Outcome: Progressing     Problem: Pain  Goal: Verbalizes/displays adequate comfort level or baseline comfort level  Outcome: Progressing  Flowsheets  Taken 2024 1528 by Chanell Medina RN  Verbalizes/displays adequate comfort level or baseline comfort level:   Encourage patient to monitor pain and request assistance   Assess pain using appropriate pain scale  Taken 2024 1153 by Chanell Medina RN  Verbalizes/displays adequate comfort level or baseline comfort level:   Encourage patient to monitor pain and request assistance   Assess pain using appropriate pain scale   Administer analgesics based on type and severity of pain and evaluate response     Problem: Infection - Adult  Goal: Absence of infection at discharge  Outcome: Progressing  Goal: Absence of infection during hospitalization  Outcome: Progressing  Goal: Absence of fever/infection during anticipated neutropenic period  Outcome: Progressing     Problem: ABCDS Injury Assessment  Goal: Absence of physical injury  Outcome: Progressing

## 2024-06-25 PROCEDURE — 6370000000 HC RX 637 (ALT 250 FOR IP): Performed by: STUDENT IN AN ORGANIZED HEALTH CARE EDUCATION/TRAINING PROGRAM

## 2024-06-25 PROCEDURE — APPNB15 APP NON BILLABLE TIME 0-15 MINS: Performed by: ADVANCED PRACTICE MIDWIFE

## 2024-06-25 PROCEDURE — 1220000000 HC SEMI PRIVATE OB R&B

## 2024-06-25 RX ORDER — BISACODYL 5 MG/1
5 TABLET, DELAYED RELEASE ORAL ONCE
Status: COMPLETED | OUTPATIENT
Start: 2024-06-25 | End: 2024-06-26

## 2024-06-25 RX ADMIN — DOCUSATE SODIUM 100 MG: 100 CAPSULE, LIQUID FILLED ORAL at 07:49

## 2024-06-25 RX ADMIN — OXYCODONE 10 MG: 5 TABLET ORAL at 16:36

## 2024-06-25 RX ADMIN — OXYCODONE 10 MG: 5 TABLET ORAL at 06:30

## 2024-06-25 RX ADMIN — OXYCODONE 10 MG: 5 TABLET ORAL at 02:11

## 2024-06-25 RX ADMIN — ACETAMINOPHEN 1000 MG: 500 TABLET ORAL at 06:30

## 2024-06-25 RX ADMIN — ACETAMINOPHEN 1000 MG: 500 TABLET ORAL at 16:36

## 2024-06-25 RX ADMIN — PRENATAL WITH FERROUS FUM AND FOLIC ACID 1 TABLET: 3080; 920; 120; 400; 22; 1.84; 3; 20; 10; 1; 12; 200; 27; 25; 2 TABLET ORAL at 07:49

## 2024-06-25 ASSESSMENT — PAIN DESCRIPTION - DESCRIPTORS
DESCRIPTORS: DISCOMFORT;SORE;TENDER
DESCRIPTORS: ACHING;CRAMPING;DISCOMFORT
DESCRIPTORS: DISCOMFORT;SORE;TENDER

## 2024-06-25 ASSESSMENT — PAIN DESCRIPTION - LOCATION
LOCATION: INCISION
LOCATION: INCISION
LOCATION: ABDOMEN

## 2024-06-25 ASSESSMENT — PAIN DESCRIPTION - ORIENTATION
ORIENTATION: LOWER
ORIENTATION: LOWER

## 2024-06-25 ASSESSMENT — PAIN SCALES - GENERAL
PAINLEVEL_OUTOF10: 7

## 2024-06-25 NOTE — PROGRESS NOTES
POD #2    Patient:  Yadira Duran     Admit Date:  6/22/2024  2:35 PM  Medical Record Number:  [id number]   Today's Date: 6/25/2024    S: Seen patient in NICU    No complaints; tolerating diet: affirms; ambulating well: affirms; voiding without difficulty:  affirms; bm: denies; flatus: affirms; pain meds appropriate: affirms; vaginal bleeding: thin lochia.    O:   Vitals:    06/25/24 0040 06/25/24 0211 06/25/24 0630 06/25/24 0700   BP: 101/67   106/63   Pulse: 87   85   Resp: 15 16 16 16   Temp: 98.5 °F (36.9 °C)   97.8 °F (36.6 °C)   TempSrc: Oral   Oral   SpO2: 98%   98%   Weight:       Height:         Gen: A&O, cooperative, pleasant   Heart: RRR   Lungs: CTA b/l   Abd; soft, NT, non distended, +BS  Back: no CVA or paraspinal tenderness   Ext: No clubbing, cyanosis, edema:rare , no cords palpable, no calf tenderness   Neuro: intact   Inc: clean, dry, intact  Uterus: firm, well contracted, nt   Emi pad: thin lochia    Recent Results (from the past 72 hour(s))   Urinalysis with Microscopic    Collection Time: 06/22/24  3:27 PM   Result Value Ref Range    Color, UA Yellow Yellow    Turbidity UA Cloudy (A) Clear    Glucose, Ur NEGATIVE NEGATIVE mg/dL    Bilirubin, Urine NEGATIVE NEGATIVE    Ketones, Urine >80 (A) NEGATIVE mg/dL    Specific Gravity, UA >1.030 (H) 1.005 - 1.030    Urine Hgb NEGATIVE NEGATIVE    pH, Urine 6.0 5.0 - 9.0    Protein, UA 30 (A) NEGATIVE mg/dL    Urobilinogen, Urine 0.2 0.0 - 1.0 EU/dL    Nitrite, Urine NEGATIVE NEGATIVE    Leukocyte Esterase, Urine MODERATE (A) NEGATIVE    WBC, UA 10 TO 20 (A) 0 TO 5 /HPF    RBC, UA 0 TO 2 0 TO 2 /HPF    Epithelial Cells, UA 10 TO 20 /HPF    Bacteria, UA 1+ (A) None    Amorphous, UA PRESENT    Culture, Urine    Collection Time: 06/22/24  3:27 PM    Specimen: Urine, clean catch   Result Value Ref Range    Specimen Description .CLEAN CATCH URINE     Special Requests Site: Urine     Culture NO GROWTH    Drug Screen Multi Urine With Bup    Collection Time:  06/22/24  3:27 PM   Result Value Ref Range    Amphetamine Screen, Ur NEGATIVE NEGATIVE    Barbiturate Screen, Ur NEGATIVE NEGATIVE    Cocaine Metabolite, Urine NEGATIVE NEGATIVE    Benzodiazepine Screen, Urine NEGATIVE NEGATIVE    Buprenorphine Urine NEGATIVE NEGATIVE    Methadone Screen, Urine NEGATIVE NEGATIVE    Opiates, Urine NEGATIVE NEGATIVE    Phencyclidine, Urine NEGATIVE NEGATIVE    Cannabinoid Scrn, Ur NEGATIVE NEGATIVE    Fentanyl, Ur NEGATIVE NEGATIVE    Oxycodone Screen, Ur NEGATIVE NEGATIVE    Test Information       These drug screen results are for medical purposes only and should not be considered definitive or confirmed.   CBC with Auto Differential    Collection Time: 06/22/24  9:10 PM   Result Value Ref Range    WBC 11.7 (H) 4.5 - 11.5 k/uL    RBC 4.13 3.50 - 5.50 m/uL    Hemoglobin 10.1 (L) 11.5 - 15.5 g/dL    Hematocrit 33.1 (L) 34.0 - 48.0 %    MCV 80.1 80.0 - 99.9 fL    MCH 24.5 (L) 26.0 - 35.0 pg    MCHC 30.5 (L) 32.0 - 34.5 g/dL    RDW 16.8 (H) 11.5 - 15.0 %    Platelets 216 130 - 450 k/uL    MPV 12.3 (H) 7.0 - 12.0 fL    Neutrophils % 87 (H) 43.0 - 80.0 %    Lymphocytes % 9 (L) 20.0 - 42.0 %    Monocytes % 2 2.0 - 12.0 %    Eosinophils % 0 0 - 6 %    Basophils % 0 0.0 - 2.0 %    Immature Granulocytes % 1 0.0 - 5.0 %    Neutrophils Absolute 10.23 (H) 1.80 - 7.30 k/uL    Lymphocytes Absolute 1.07 (L) 1.50 - 4.00 k/uL    Monocytes Absolute 0.24 0.10 - 0.95 k/uL    Eosinophils Absolute 0.01 (L) 0.05 - 0.50 k/uL    Basophils Absolute 0.02 0.00 - 0.20 k/uL    Immature Granulocytes Absolute 0.14 0.00 - 0.58 k/uL   TYPE AND SCREEN    Collection Time: 06/22/24  9:10 PM   Result Value Ref Range    Blood Bank Sample Expiration 06/25/2024,2359     Arm Band Number [id number]     ABO/Rh O POSITIVE     Antibody Screen NEGATIVE    Hepatitis B Surface Antigen    Collection Time: 06/23/24  6:00 AM   Result Value Ref Range    Hepatitis B Surface Ag NONREACTIVE NONREACTIVE   CBC    Collection Time: 06/24/24  5:30

## 2024-06-25 NOTE — LACTATION NOTE
Mom continues to pump colostrum for her premature baby in the nicu.  Gave mom more storage bottles.  Pumping going well.  Going home today.

## 2024-06-25 NOTE — PLAN OF CARE
Problem: Pain  Goal: Verbalizes/displays adequate comfort level or baseline comfort level  6/25/2024 1026 by Carol Hester RN  Outcome: Progressing     Problem: Infection - Adult  Goal: Absence of infection during hospitalization  6/25/2024 1026 by Carol Hester, RN  Outcome: Progressing     Problem: Postpartum  Goal: Experiences normal postpartum course  Description:  Postpartum OB-Pregnancy care plan goal which identifies if the mother is experiencing a normal postpartum course  Outcome: Progressing

## 2024-06-25 NOTE — PLAN OF CARE
Problem: Discharge Planning  Goal: Discharge to home or other facility with appropriate resources  Outcome: Progressing     Problem: Vaginal Birth or  Section  Goal: Fetal and maternal status remain reassuring during the birth process  Description:  Birth OB-Pregnancy care plan goal which identifies if the fetal and maternal status remain reassuring during the birth process  Outcome: Progressing     Problem: Pain  Goal: Verbalizes/displays adequate comfort level or baseline comfort level  Outcome: Progressing     Problem: Infection - Adult  Goal: Absence of infection at discharge  Outcome: Progressing  Goal: Absence of infection during hospitalization  Outcome: Progressing  Goal: Absence of fever/infection during anticipated neutropenic period  Outcome: Progressing     Problem: ABCDS Injury Assessment  Goal: Absence of physical injury  Outcome: Progressing

## 2024-06-26 VITALS
RESPIRATION RATE: 16 BRPM | TEMPERATURE: 98.4 F | WEIGHT: 159 LBS | BODY MASS INDEX: 29.26 KG/M2 | HEIGHT: 62 IN | DIASTOLIC BLOOD PRESSURE: 60 MMHG | SYSTOLIC BLOOD PRESSURE: 102 MMHG | HEART RATE: 80 BPM | OXYGEN SATURATION: 97 %

## 2024-06-26 PROBLEM — Z3A.33 33 WEEKS GESTATION OF PREGNANCY: Status: ACTIVE | Noted: 2024-06-26

## 2024-06-26 PROCEDURE — APPNB30 APP NON BILLABLE TIME 0-30 MINS: Performed by: PHYSICIAN ASSISTANT

## 2024-06-26 PROCEDURE — 90707 MMR VACCINE SC: CPT | Performed by: PHYSICIAN ASSISTANT

## 2024-06-26 PROCEDURE — 6370000000 HC RX 637 (ALT 250 FOR IP): Performed by: ADVANCED PRACTICE MIDWIFE

## 2024-06-26 PROCEDURE — 6370000000 HC RX 637 (ALT 250 FOR IP): Performed by: PHYSICIAN ASSISTANT

## 2024-06-26 PROCEDURE — 6370000000 HC RX 637 (ALT 250 FOR IP): Performed by: STUDENT IN AN ORGANIZED HEALTH CARE EDUCATION/TRAINING PROGRAM

## 2024-06-26 PROCEDURE — 6360000002 HC RX W HCPCS: Performed by: PHYSICIAN ASSISTANT

## 2024-06-26 PROCEDURE — 90471 IMMUNIZATION ADMIN: CPT | Performed by: PHYSICIAN ASSISTANT

## 2024-06-26 RX ORDER — FERROUS SULFATE 325(65) MG
325 TABLET ORAL 2 TIMES DAILY WITH MEALS
Status: DISCONTINUED | OUTPATIENT
Start: 2024-06-26 | End: 2024-06-26 | Stop reason: HOSPADM

## 2024-06-26 RX ORDER — PSEUDOEPHEDRINE HCL 30 MG
100 TABLET ORAL 2 TIMES DAILY
Qty: 20 CAPSULE | Refills: 0 | Status: SHIPPED | OUTPATIENT
Start: 2024-06-26

## 2024-06-26 RX ORDER — IBUPROFEN 600 MG/1
600 TABLET ORAL 4 TIMES DAILY PRN
Qty: 30 TABLET | Refills: 0 | Status: SHIPPED | OUTPATIENT
Start: 2024-06-26

## 2024-06-26 RX ORDER — OXYCODONE HYDROCHLORIDE 5 MG/1
5 TABLET ORAL EVERY 6 HOURS PRN
Qty: 12 TABLET | Refills: 0 | Status: SHIPPED | OUTPATIENT
Start: 2024-06-26 | End: 2024-06-29

## 2024-06-26 RX ADMIN — PRENATAL WITH FERROUS FUM AND FOLIC ACID 1 TABLET: 3080; 920; 120; 400; 22; 1.84; 3; 20; 10; 1; 12; 200; 27; 25; 2 TABLET ORAL at 11:02

## 2024-06-26 RX ADMIN — DOCUSATE SODIUM 100 MG: 100 CAPSULE, LIQUID FILLED ORAL at 11:02

## 2024-06-26 RX ADMIN — DOCUSATE SODIUM 100 MG: 100 CAPSULE, LIQUID FILLED ORAL at 01:05

## 2024-06-26 RX ADMIN — OXYCODONE 10 MG: 5 TABLET ORAL at 11:02

## 2024-06-26 RX ADMIN — BISACODYL 5 MG: 5 TABLET, COATED ORAL at 01:05

## 2024-06-26 RX ADMIN — FERROUS SULFATE TAB 325 MG (65 MG ELEMENTAL FE) 325 MG: 325 (65 FE) TAB at 11:02

## 2024-06-26 RX ADMIN — ACETAMINOPHEN 1000 MG: 500 TABLET ORAL at 11:03

## 2024-06-26 RX ADMIN — OXYCODONE 10 MG: 5 TABLET ORAL at 01:05

## 2024-06-26 RX ADMIN — MEASLES, MUMPS, AND RUBELLA VIRUS VACCINE LIVE 0.5 ML: 1000; 12500; 1000 INJECTION, POWDER, LYOPHILIZED, FOR SUSPENSION SUBCUTANEOUS at 11:33

## 2024-06-26 ASSESSMENT — PAIN SCALES - GENERAL
PAINLEVEL_OUTOF10: 7
PAINLEVEL_OUTOF10: 7

## 2024-06-26 ASSESSMENT — PAIN DESCRIPTION - LOCATION
LOCATION: ABDOMEN
LOCATION: ABDOMEN

## 2024-06-26 ASSESSMENT — PAIN DESCRIPTION - DESCRIPTORS
DESCRIPTORS: ACHING;CRAMPING;DISCOMFORT
DESCRIPTORS: ACHING;CRAMPING;DISCOMFORT

## 2024-06-26 NOTE — PLAN OF CARE
Problem: Postpartum  Goal: Experiences normal postpartum course  Description:  Postpartum OB-Pregnancy care plan goal which identifies if the mother is experiencing a normal postpartum course  6/26/2024 1111 by Carol Hester RN  Outcome: Completed     Problem: Infection - Adult  Goal: Absence of fever/infection during anticipated neutropenic period  6/26/2024 1111 by Carol Hester RN  Outcome: Completed     Problem: Infection - Adult  Goal: Absence of infection during hospitalization  6/26/2024 1111 by Carol Hester RN  Outcome: Completed     Problem: Infection - Adult  Goal: Absence of infection at discharge  6/26/2024 1111 by Carol Hester RN  Outcome: Completed

## 2024-06-26 NOTE — DISCHARGE SUMMARY
Left message for patient at      Telephone Information:   Mobile [phone]    to schedule procedure.  Patient to return call to Open Access Scheduling Patient Line [phone].  
Obstetrical Discharge Form    Patient Nam: Yadira Duran    YOB: 1995    Medical Record Number: [id number]    Primary OB Clinician: DO Gisele    Reason for Hospitalization, Chief Complaint, Diagnosis, etc: 28 year old female  5 para 3 at 33 weeks' 4 days' gestation admitted with c/o vaginal bleeding. Patient received Celestone, indocin, terbutaline, IV fluids, Ancef, and Nifedipine. MgSO4 was not recommended by MFM. Patient continued to have uterine contractions and made cervical change. Decision was made for a repeat  section by Dr. Vuong. History of 3 previous  sections.    Gestational Age at time of delivery:  33 weeks 5 days    Date of Admission: 2024    Date of Delivery: 2024    Delivery Type: repeat  section, low transverse incision    Anesthesia: Spinal     Baby: Liveborn male, weighing 2715 grams with Apgar scores of 8 & 9, delivered at 00:32    Intrapartum complications: Acute on chronic blood loss anemia- iron was ordered    Feeding method: pumping colostrum for infant in NICU    Discharge Date: 2024 post-op/postpartum day # 3    Condition:  Mother wasdischarged home in stable condition. Infant remains in NICU.  Plan:     Patient advised to call and schedule follow-up appointment with Dr. Araiza next week for dressing removal and incision check, or sooner if needed or desired.    Precautions given: call for: fever greater than 101 F, pain, heavy bleeding, vomiting, shortness of breath, breast redness or pain, calf pain, or any concerns. No heavy lifting. No sexual intercourse for 6 weeks. Call for: redness, drainage, or separation of incision.    Please refer to chart for further details.    Scripts: Roxicodone, Motrin, Colace. Resume PNV with iron.        Daphney Galdamez PA-C  
Single

## 2024-06-26 NOTE — PROGRESS NOTES
SUBJECTIVE:  Patient without complaint. Doing well. Denies dizziness, lightheadedness, or shortness of breath. Voiding without difficulty. Passing flatus. Tolerating regular diet. Normal lochia' \"less than a period\", denies passing clots. Ambulating in hallway. No BM yet. Denies emotional concerns. Pumping colostrum for infant in NICU.    OBJECTIVE:  /60   Pulse 80   Temp 98.4 °F (36.9 °C) (Oral)   Resp 16   Ht 1.575 m (5' 2\")   Wt 72.1 kg (159 lb)   LMP  (LMP Unknown)   SpO2 97%   BMI 29.08 kg/m²   Recent Labs     24  0530 24  2110 24  1539   WBC 16.8* 11.7* 12.6*   HGB 9.3* 10.1* 10.8*   HCT 31.2* 33.1* 34.4   MCV 81.9 80.1 81.5    216 244      Physical Exam:  General: A&O. Cooperative. Comfortable.  Coronary: RRR  Pulmonary: CTA b/l  Abdomen: soft, appropriately tender. (+) BS x 4 quadrants.   Incision covered with Mepilex dressing: clean, no saturation   Uterus firm, well-contracted, nontender  Peripad: Lochia thin rubra, scant  Back: (-) CVA or paraspinal tenderness.   Extremities: (-) edema, (-) calf tenderness.  Neuro: Grossly intact.        ASSESSMENT/PLAN: 28 y.o. female  at 33w5d s/p repeat LTCS 2024 at 00:32 secondary to 3 previous  sections.  Postoperative Day #3  Advance care  Acute on chronic anemia- FeSO4, asymptomatic  Leukocytosis- afebrile and uterus non-tender  Discharge- precautions given  Patient advised to call and schedule a post-op/postpartum visit for next week for dressing removal and incision check, and at 6 weeks postpartum  Scripts for Roxicodone, Motrin, and Colace, Resume PNV with iron       Daphney Galdamez PA-C 2024 7:30 AM

## 2024-06-26 NOTE — PROGRESS NOTES
Discharge teaching given to patient. Patient received MMR before discharge. Patient going to NICU to see baby at this time and denies the need for a wheelchair. All belongings with patient at this time. Patient ambulated off floor at this time in stable condition.

## 2024-06-26 NOTE — PLAN OF CARE
Problem: Discharge Planning  Goal: Discharge to home or other facility with appropriate resources  Outcome: Progressing     Problem: Vaginal Birth or  Section  Goal: Fetal and maternal status remain reassuring during the birth process  Description:  Birth OB-Pregnancy care plan goal which identifies if the fetal and maternal status remain reassuring during the birth process  Outcome: Progressing     Problem: Pain  Goal: Verbalizes/displays adequate comfort level or baseline comfort level  Outcome: Progressing     Problem: Infection - Adult  Goal: Absence of infection at discharge  Outcome: Progressing  Goal: Absence of infection during hospitalization  Outcome: Progressing  Goal: Absence of fever/infection during anticipated neutropenic period  Outcome: Progressing     Problem: ABCDS Injury Assessment  Goal: Absence of physical injury  Outcome: Progressing     Problem: Postpartum  Goal: Experiences normal postpartum course  Description:  Postpartum OB-Pregnancy care plan goal which identifies if the mother is experiencing a normal postpartum course  Outcome: Progressing  Goal: Appropriate maternal -  bonding  Description:  Postpartum OB-Pregnancy care plan goal which identifies if the mother and  are bonding appropriately  Outcome: Progressing  Goal: Establishment of infant feeding pattern  Description:  Postpartum OB-Pregnancy care plan goal which identifies if the mother is establishing a feeding pattern with their   Outcome: Progressing  Goal: Incisions, wounds, or drain sites healing without S/S of infection  Outcome: Progressing     Problem: Safety - Adult  Goal: Free from fall injury  Outcome: Progressing

## 2024-07-02 ENCOUNTER — OFFICE VISIT (OUTPATIENT)
Dept: OBGYN | Age: 29
End: 2024-07-02
Payer: COMMERCIAL

## 2024-07-02 VITALS
SYSTOLIC BLOOD PRESSURE: 106 MMHG | HEART RATE: 100 BPM | WEIGHT: 135.1 LBS | BODY MASS INDEX: 24.71 KG/M2 | DIASTOLIC BLOOD PRESSURE: 69 MMHG

## 2024-07-02 DIAGNOSIS — Z09 POSTOP CHECK: Primary | ICD-10-CM

## 2024-07-02 PROBLEM — O34.219 PREVIOUS CESAREAN DELIVERY AFFECTING PREGNANCY, ANTEPARTUM: Status: RESOLVED | Noted: 2022-04-30 | Resolved: 2024-07-02

## 2024-07-02 PROBLEM — Z34.93 NORMAL PREGNANCY IN THIRD TRIMESTER: Status: RESOLVED | Noted: 2024-06-22 | Resolved: 2024-07-02

## 2024-07-02 PROBLEM — M79.606 LEG PAIN: Status: RESOLVED | Noted: 2022-02-18 | Resolved: 2024-07-02

## 2024-07-02 PROBLEM — R53.1 RIGHT SIDED WEAKNESS: Status: RESOLVED | Noted: 2023-08-07 | Resolved: 2024-07-02

## 2024-07-02 PROBLEM — M54.50 LOW BACK PAIN DURING PREGNANCY IN THIRD TRIMESTER: Status: RESOLVED | Noted: 2024-05-03 | Resolved: 2024-07-02

## 2024-07-02 PROBLEM — O46.93 VAGINAL BLEEDING IN PREGNANCY, THIRD TRIMESTER: Status: RESOLVED | Noted: 2024-06-23 | Resolved: 2024-07-02

## 2024-07-02 PROBLEM — N93.9 VAGINAL BLEEDING: Status: RESOLVED | Noted: 2024-06-22 | Resolved: 2024-07-02

## 2024-07-02 PROBLEM — R10.9 INTRACTABLE ABDOMINAL PAIN: Status: RESOLVED | Noted: 2021-05-06 | Resolved: 2024-07-02

## 2024-07-02 PROBLEM — Z36.3 ENCOUNTER FOR ANTENATAL SCREENING FOR MALFORMATION USING ULTRASOUND: Status: RESOLVED | Noted: 2024-03-21 | Resolved: 2024-07-02

## 2024-07-02 PROBLEM — O26.893 LOW BACK PAIN DURING PREGNANCY IN THIRD TRIMESTER: Status: RESOLVED | Noted: 2024-05-03 | Resolved: 2024-07-02

## 2024-07-02 PROBLEM — Z03.75 SUSPECTED SHORTENING OF CERVIX NOT FOUND: Status: RESOLVED | Noted: 2024-03-21 | Resolved: 2024-07-02

## 2024-07-02 PROBLEM — R00.2 PALPITATIONS: Status: RESOLVED | Noted: 2019-03-15 | Resolved: 2024-07-02

## 2024-07-02 PROBLEM — Z3A.33 33 WEEKS GESTATION OF PREGNANCY: Status: RESOLVED | Noted: 2024-06-26 | Resolved: 2024-07-02

## 2024-07-02 PROBLEM — R29.898 WEAKNESS OF RIGHT ARM: Status: RESOLVED | Noted: 2023-08-07 | Resolved: 2024-07-02

## 2024-07-02 PROBLEM — Z3A.28 28 WEEKS GESTATION OF PREGNANCY: Status: RESOLVED | Noted: 2024-05-16 | Resolved: 2024-07-02

## 2024-07-02 PROBLEM — R20.2 PARESTHESIA OF RIGHT UPPER EXTREMITY: Status: RESOLVED | Noted: 2023-08-08 | Resolved: 2024-07-02

## 2024-07-02 LAB — SURGICAL PATHOLOGY REPORT: NORMAL

## 2024-07-02 PROCEDURE — 99213 OFFICE O/P EST LOW 20 MIN: CPT | Performed by: OBSTETRICS & GYNECOLOGY

## 2024-07-02 PROCEDURE — 1111F DSCHRG MED/CURRENT MED MERGE: CPT | Performed by: OBSTETRICS & GYNECOLOGY

## 2024-07-02 PROCEDURE — G8427 DOCREV CUR MEDS BY ELIG CLIN: HCPCS | Performed by: OBSTETRICS & GYNECOLOGY

## 2024-07-02 PROCEDURE — G8420 CALC BMI NORM PARAMETERS: HCPCS | Performed by: OBSTETRICS & GYNECOLOGY

## 2024-07-02 PROCEDURE — 1036F TOBACCO NON-USER: CPT | Performed by: OBSTETRICS & GYNECOLOGY

## 2024-07-02 NOTE — PROGRESS NOTES
Yadira Duran is a 28-year-old G5, P4 female who underwent a repeat LTCS on 2024.  Pregnancy was not complicated by HTN, GDM or thyroid she reports she went to the hospital because of pink spotting on the monitor she was revealing contractions she said she had cervical change from 1 to 2 cm so at that time they proceeded with a repeat .  Denies fever chills nausea vomiting diarrhea or constipation.  Pain is well-controlled.  Desires tubal sterilization    Patient presents for incision check    Past Medical History:   Diagnosis Date    Depression     Endometriosis     IBS (irritable bowel syndrome)     Optic neuritis     Seizure (HCC) 2015    after delivery / not sure was a seizure, no issues since , no medication        Past Surgical History:   Procedure Laterality Date     SECTION  2015     SECTION N/A 2022     SECTION performed by Paula Cast DO at [facility] OR     SECTION N/A 2024     SECTION performed by Yolette Vuong DO at [facility] OR    CHOLECYSTECTOMY  2017    ROBOT ASSISTED    COLONOSCOPY  04/30/2012    x2    DILATION AND EVACUATION  2016    missed ab    HEMORRHOID SURGERY  2020and 2021    LAPAROSCOPY N/A 10/18/2019    DIAGNOSTIC LAPAROSCOPY, OPEN VENTRAL HERNIA REPAIR performed by Michelle Song MD at [facility] OR    PELVIC LAPAROSCOPY Right     cystectomy    [place]  DELIVERY ONLY N/A 2018     SECTION performed by Gregory Lowe DO at [facility] L[facility]D    UPPER GASTROINTESTINAL ENDOSCOPY  2012    UPPER GASTROINTESTINAL ENDOSCOPY      UPPER GASTROINTESTINAL ENDOSCOPY  2017    VENTRAL HERNIA REPAIR N/A 2021    OPEN INCISIONAL HERNIA REPAIR performed by Salomón Haddad MD at [facility] OR        Family History   Problem Relation Age of Onset    Cancer Father         Bladder cancer        Social History       Tobacco History       Smoking Status  Former Smoking Start

## 2024-07-02 NOTE — PROGRESS NOTES
Patient here for OB postpartum visit. Removed bandage on incision site. Incision dry, intact, and well approximated. There is one stitch left on right side of incision site. Patient is breast feeding. Patient is vaginally spotting. States she has been vaginally bleeding a lot but then it slows down. Reports incision site was painful yesterday, but has no pain today.

## 2024-08-07 ENCOUNTER — POSTPARTUM VISIT (OUTPATIENT)
Dept: OBGYN | Age: 29
End: 2024-08-07
Payer: COMMERCIAL

## 2024-08-07 VITALS
DIASTOLIC BLOOD PRESSURE: 81 MMHG | HEART RATE: 95 BPM | WEIGHT: 134 LBS | SYSTOLIC BLOOD PRESSURE: 128 MMHG | BODY MASS INDEX: 24.51 KG/M2

## 2024-08-07 DIAGNOSIS — R10.32 LLQ ABDOMINAL PAIN: ICD-10-CM

## 2024-08-07 DIAGNOSIS — N89.8 DISCHARGE FROM THE VAGINA: ICD-10-CM

## 2024-08-07 DIAGNOSIS — Z34.81 ENCOUNTER FOR SUPERVISION OF OTHER NORMAL PREGNANCY IN FIRST TRIMESTER: ICD-10-CM

## 2024-08-07 DIAGNOSIS — R87.612 LGSIL ON PAP SMEAR OF CERVIX: ICD-10-CM

## 2024-08-07 PROCEDURE — 99213 OFFICE O/P EST LOW 20 MIN: CPT

## 2024-08-07 NOTE — PROGRESS NOTES
Postpartum Visit: Patient here for postpartum visit. She is 6 weeks post partum following a low cervical transverse  section. I have fully reviewed the prenatal and intrapartum course. The delivery was at 33 gestational weeks, PTL. Outcome: RCS.  Anesthesia: Spinal.  Postpartum course has been uncomplicated.  Baby's course has been doing well without problems. Baby is feeding both breast and bottle.  Bleeding spotting red and brown.  Bowel function is normal. Bladder function is normal. Patient is sexually active. Contraception method is none. Postpartum depression screening: negative, 5.    Last pap LSIL +HPV  Incision healed well without complication  VE: tenderness to LLQ, cervix lesion with d/c mucoid    Pain LLQ for last 2 nights, horrible shooting into vagina, denies any urinary or vaginal s/s. Hx ovarian cyst.    Pap, urine culture, pelvic ultrasound, genital culture    ERMIAS [place] - LYSSA

## 2024-08-09 ENCOUNTER — HOSPITAL ENCOUNTER (OUTPATIENT)
Dept: ULTRASOUND IMAGING | Age: 29
End: 2024-08-09
Payer: COMMERCIAL

## 2024-08-09 DIAGNOSIS — R10.32 LLQ ABDOMINAL PAIN: ICD-10-CM

## 2024-08-09 LAB
CULTURE: NORMAL
CULTURE: NORMAL
SPECIMEN DESCRIPTION: NORMAL

## 2024-08-09 PROCEDURE — 76856 US EXAM PELVIC COMPLETE: CPT

## 2024-08-11 LAB
CULTURE: NORMAL
SPECIMEN DESCRIPTION: NORMAL

## 2024-09-19 ENCOUNTER — APPOINTMENT (OUTPATIENT)
Dept: ULTRASOUND IMAGING | Age: 29
End: 2024-09-19
Payer: COMMERCIAL

## 2024-09-19 ENCOUNTER — HOSPITAL ENCOUNTER (EMERGENCY)
Age: 29
Discharge: LEFT BEFORE DISCHARGE | End: 2024-09-19
Attending: EMERGENCY MEDICINE
Payer: COMMERCIAL

## 2024-09-19 ENCOUNTER — APPOINTMENT (OUTPATIENT)
Dept: CT IMAGING | Age: 29
End: 2024-09-19
Payer: COMMERCIAL

## 2024-09-19 VITALS
OXYGEN SATURATION: 96 % | BODY MASS INDEX: 24.87 KG/M2 | SYSTOLIC BLOOD PRESSURE: 103 MMHG | DIASTOLIC BLOOD PRESSURE: 66 MMHG | WEIGHT: 136 LBS | RESPIRATION RATE: 16 BRPM | HEART RATE: 99 BPM | TEMPERATURE: 98.4 F

## 2024-09-19 DIAGNOSIS — R10.32 LEFT LOWER QUADRANT ABDOMINAL PAIN: Primary | ICD-10-CM

## 2024-09-19 DIAGNOSIS — R10.30 LOWER ABDOMINAL PAIN: ICD-10-CM

## 2024-09-19 LAB
ALBUMIN SERPL-MCNC: 4.3 G/DL (ref 3.5–5.2)
ALP SERPL-CCNC: 126 U/L (ref 35–104)
ALT SERPL-CCNC: 19 U/L (ref 0–32)
ANION GAP SERPL CALCULATED.3IONS-SCNC: 8 MMOL/L (ref 7–16)
AST SERPL-CCNC: 18 U/L (ref 0–31)
BACTERIA URNS QL MICRO: ABNORMAL
BASOPHILS # BLD: 0.03 K/UL (ref 0–0.2)
BASOPHILS NFR BLD: 1 % (ref 0–2)
BILIRUB SERPL-MCNC: 0.2 MG/DL (ref 0–1.2)
BILIRUB UR QL STRIP: NEGATIVE
BUN SERPL-MCNC: 12 MG/DL (ref 6–20)
CALCIUM SERPL-MCNC: 9.1 MG/DL (ref 8.6–10.2)
CHLORIDE SERPL-SCNC: 105 MMOL/L (ref 98–107)
CLARITY UR: CLEAR
CO2 SERPL-SCNC: 26 MMOL/L (ref 22–29)
COLOR UR: YELLOW
CREAT SERPL-MCNC: 0.7 MG/DL (ref 0.5–1)
EOSINOPHIL # BLD: 0.15 K/UL (ref 0.05–0.5)
EOSINOPHILS RELATIVE PERCENT: 2 % (ref 0–6)
EPI CELLS #/AREA URNS HPF: ABNORMAL /HPF
ERYTHROCYTE [DISTWIDTH] IN BLOOD BY AUTOMATED COUNT: 17.3 % (ref 11.5–15)
GFR, ESTIMATED: >90 ML/MIN/1.73M2
GLUCOSE SERPL-MCNC: 102 MG/DL (ref 74–99)
GLUCOSE UR STRIP-MCNC: NEGATIVE MG/DL
HCG UR QL: NEGATIVE
HCT VFR BLD AUTO: 41.3 % (ref 34–48)
HGB BLD-MCNC: 13.3 G/DL (ref 11.5–15.5)
HGB UR QL STRIP.AUTO: NEGATIVE
IMM GRANULOCYTES # BLD AUTO: <0.03 K/UL (ref 0–0.58)
IMM GRANULOCYTES NFR BLD: 0 % (ref 0–5)
KETONES UR STRIP-MCNC: NEGATIVE MG/DL
LACTATE BLDV-SCNC: 0.7 MMOL/L (ref 0.5–2.2)
LEUKOCYTE ESTERASE UR QL STRIP: NEGATIVE
LIPASE SERPL-CCNC: 32 U/L (ref 13–60)
LYMPHOCYTES NFR BLD: 2.94 K/UL (ref 1.5–4)
LYMPHOCYTES RELATIVE PERCENT: 47 % (ref 20–42)
MCH RBC QN AUTO: 27.3 PG (ref 26–35)
MCHC RBC AUTO-ENTMCNC: 32.2 G/DL (ref 32–34.5)
MCV RBC AUTO: 84.6 FL (ref 80–99.9)
MONOCYTES NFR BLD: 0.54 K/UL (ref 0.1–0.95)
MONOCYTES NFR BLD: 9 % (ref 2–12)
NEUTROPHILS NFR BLD: 41 % (ref 43–80)
NEUTS SEG NFR BLD: 2.59 K/UL (ref 1.8–7.3)
NITRITE UR QL STRIP: NEGATIVE
PH UR STRIP: 6.5 [PH] (ref 5–9)
PLATELET # BLD AUTO: 259 K/UL (ref 130–450)
PMV BLD AUTO: 11 FL (ref 7–12)
POTASSIUM SERPL-SCNC: 3.8 MMOL/L (ref 3.5–5)
PROT SERPL-MCNC: 7.4 G/DL (ref 6.4–8.3)
PROT UR STRIP-MCNC: NEGATIVE MG/DL
RBC # BLD AUTO: 4.88 M/UL (ref 3.5–5.5)
RBC #/AREA URNS HPF: ABNORMAL /HPF
SODIUM SERPL-SCNC: 139 MMOL/L (ref 132–146)
SP GR UR STRIP: 1.02 (ref 1–1.03)
UROBILINOGEN UR STRIP-ACNC: 0.2 EU/DL (ref 0–1)
WBC #/AREA URNS HPF: ABNORMAL /HPF
WBC OTHER # BLD: 6.3 K/UL (ref 4.5–11.5)

## 2024-09-19 PROCEDURE — 80053 COMPREHEN METABOLIC PANEL: CPT

## 2024-09-19 PROCEDURE — 83605 ASSAY OF LACTIC ACID: CPT

## 2024-09-19 PROCEDURE — 83690 ASSAY OF LIPASE: CPT

## 2024-09-19 PROCEDURE — 85025 COMPLETE CBC W/AUTO DIFF WBC: CPT

## 2024-09-19 PROCEDURE — 93975 VASCULAR STUDY: CPT

## 2024-09-19 PROCEDURE — 81001 URINALYSIS AUTO W/SCOPE: CPT

## 2024-09-19 PROCEDURE — 84703 CHORIONIC GONADOTROPIN ASSAY: CPT

## 2024-09-19 PROCEDURE — 2580000003 HC RX 258

## 2024-09-19 PROCEDURE — 76856 US EXAM PELVIC COMPLETE: CPT

## 2024-09-19 PROCEDURE — 99284 EMERGENCY DEPT VISIT MOD MDM: CPT

## 2024-09-19 RX ORDER — 0.9 % SODIUM CHLORIDE 0.9 %
1000 INTRAVENOUS SOLUTION INTRAVENOUS ONCE
Status: COMPLETED | OUTPATIENT
Start: 2024-09-19 | End: 2024-09-19

## 2024-09-19 RX ORDER — IOPAMIDOL 755 MG/ML
75 INJECTION, SOLUTION INTRAVASCULAR
Status: DISCONTINUED | OUTPATIENT
Start: 2024-09-19 | End: 2024-09-19 | Stop reason: HOSPADM

## 2024-09-19 RX ADMIN — SODIUM CHLORIDE 1000 ML: 9 INJECTION, SOLUTION INTRAVENOUS at 18:45

## 2024-09-19 ASSESSMENT — PAIN DESCRIPTION - DESCRIPTORS: DESCRIPTORS: PRESSURE

## 2024-09-19 ASSESSMENT — PAIN DESCRIPTION - ORIENTATION: ORIENTATION: LEFT

## 2024-09-19 ASSESSMENT — PAIN DESCRIPTION - LOCATION: LOCATION: GROIN

## 2024-09-19 ASSESSMENT — PAIN - FUNCTIONAL ASSESSMENT: PAIN_FUNCTIONAL_ASSESSMENT: 0-10

## 2024-09-19 ASSESSMENT — PAIN SCALES - GENERAL: PAINLEVEL_OUTOF10: 5

## 2024-09-19 ASSESSMENT — PAIN DESCRIPTION - PAIN TYPE: TYPE: ACUTE PAIN

## 2024-10-01 ENCOUNTER — PROCEDURE VISIT (OUTPATIENT)
Dept: OBGYN | Age: 29
End: 2024-10-01
Payer: COMMERCIAL

## 2024-10-01 VITALS
HEART RATE: 99 BPM | SYSTOLIC BLOOD PRESSURE: 119 MMHG | BODY MASS INDEX: 24.69 KG/M2 | DIASTOLIC BLOOD PRESSURE: 75 MMHG | WEIGHT: 135 LBS

## 2024-10-01 DIAGNOSIS — R87.612 LGSIL ON PAP SMEAR OF CERVIX: ICD-10-CM

## 2024-10-01 DIAGNOSIS — N93.9 ABNORMAL UTERINE BLEEDING (AUB): Primary | ICD-10-CM

## 2024-10-01 DIAGNOSIS — R87.810 CERVICAL HIGH RISK HPV (HUMAN PAPILLOMAVIRUS) TEST POSITIVE: ICD-10-CM

## 2024-10-01 LAB
CONTROL: NORMAL
PREGNANCY TEST URINE, POC: NEGATIVE

## 2024-10-01 PROCEDURE — 99214 OFFICE O/P EST MOD 30 MIN: CPT | Performed by: OBSTETRICS & GYNECOLOGY

## 2024-10-01 PROCEDURE — 81025 URINE PREGNANCY TEST: CPT | Performed by: OBSTETRICS & GYNECOLOGY

## 2024-10-01 PROCEDURE — 57455 BIOPSY OF CERVIX W/SCOPE: CPT | Performed by: OBSTETRICS & GYNECOLOGY

## 2024-10-01 NOTE — PROGRESS NOTES
Here today for colposcopy. Instructed on the procedure of colposcopy, voiced understanding and permit signed for colposcopy with possible biopsies.  Urine pregnancy done with negative results.  Prepared for procedure.  Time out done by   Prior to starting the procedure.  Tolerated procedure.  No bleeding noted after procedure, rosa pad applied.  To return in one week for results.  Discharge instructions reviewed verbally and written AVS given to patient.  Voiced understanding and agreement.   
Preop LGSIL positive high risk HPV other    Postop same    Surgeon Teodora LUIS    Informed consent and timeout performed  Pregnancy test negative    Procedure colposcopy of the cervix upper and adjacent vagina with biopsies    Procedure the vagina and cervix were cleansed with sterile water.     A 3 to 5% acetic acid solution was applied to the cervix     Squamocolumnar junction visualized 360    Acetowhite 11:00    Punctations 11:00    Mosaics none  Hemostasis achieved with silver nitrate      Tolerated procedure well  Vaginal rest x72 hours  Report bleeding soaking through 2 pads in an hour  Fever 100.4 or above  We will call with pathology report    
[de-identified] : Diagnosis: Severe osteoporosis.\par \par I discussed my findings with the patient. I recommended PT with precautions given her severe osteoporosis. She will continue to work with her endocrinologist. She should follow up with me on an as needed basis as she does not require surgical intervention.

## 2024-10-04 LAB — SURGICAL PATHOLOGY REPORT: NORMAL

## 2024-10-09 ENCOUNTER — TELEPHONE (OUTPATIENT)
Dept: OBGYN | Age: 29
End: 2024-10-09

## 2024-12-09 ENCOUNTER — HOSPITAL ENCOUNTER (EMERGENCY)
Age: 29
Discharge: HOME | End: 2024-12-09
Attending: EMERGENCY MEDICINE
Payer: COMMERCIAL

## 2024-12-09 ENCOUNTER — APPOINTMENT (OUTPATIENT)
Dept: CT IMAGING | Age: 29
End: 2024-12-09
Payer: COMMERCIAL

## 2024-12-09 VITALS
WEIGHT: 140 LBS | TEMPERATURE: 98.1 F | HEIGHT: 62 IN | OXYGEN SATURATION: 100 % | RESPIRATION RATE: 16 BRPM | BODY MASS INDEX: 25.76 KG/M2 | DIASTOLIC BLOOD PRESSURE: 67 MMHG | HEART RATE: 85 BPM | SYSTOLIC BLOOD PRESSURE: 103 MMHG

## 2024-12-09 DIAGNOSIS — H53.8 BLURRY VISION, RIGHT EYE: Primary | ICD-10-CM

## 2024-12-09 DIAGNOSIS — R51.9 NONINTRACTABLE HEADACHE, UNSPECIFIED CHRONICITY PATTERN, UNSPECIFIED HEADACHE TYPE: ICD-10-CM

## 2024-12-09 LAB
ALBUMIN SERPL-MCNC: 4.3 G/DL (ref 3.5–5.2)
ALP SERPL-CCNC: 114 U/L (ref 35–104)
ALT SERPL-CCNC: 12 U/L (ref 0–32)
ANION GAP SERPL CALCULATED.3IONS-SCNC: 10 MMOL/L (ref 7–16)
AST SERPL-CCNC: 15 U/L (ref 0–31)
BASOPHILS # BLD: 0.06 K/UL (ref 0–0.2)
BASOPHILS NFR BLD: 1 % (ref 0–2)
BILIRUB SERPL-MCNC: 0.3 MG/DL (ref 0–1.2)
BUN SERPL-MCNC: 9 MG/DL (ref 6–20)
CALCIUM SERPL-MCNC: 9.4 MG/DL (ref 8.6–10.2)
CHLORIDE SERPL-SCNC: 109 MMOL/L (ref 98–107)
CO2 SERPL-SCNC: 22 MMOL/L (ref 22–29)
CREAT SERPL-MCNC: 0.6 MG/DL (ref 0.5–1)
EOSINOPHIL # BLD: 0.13 K/UL (ref 0.05–0.5)
EOSINOPHILS RELATIVE PERCENT: 2 % (ref 0–6)
ERYTHROCYTE [DISTWIDTH] IN BLOOD BY AUTOMATED COUNT: 13.4 % (ref 11.5–15)
GFR, ESTIMATED: >90 ML/MIN/1.73M2
GLUCOSE SERPL-MCNC: 86 MG/DL (ref 74–99)
HCG SERPL QL: NEGATIVE
HCT VFR BLD AUTO: 42.6 % (ref 34–48)
HGB BLD-MCNC: 13.5 G/DL (ref 11.5–15.5)
IMM GRANULOCYTES # BLD AUTO: <0.03 K/UL (ref 0–0.58)
IMM GRANULOCYTES NFR BLD: 0 % (ref 0–5)
LYMPHOCYTES NFR BLD: 2.97 K/UL (ref 1.5–4)
LYMPHOCYTES RELATIVE PERCENT: 35 % (ref 20–42)
MCH RBC QN AUTO: 28.2 PG (ref 26–35)
MCHC RBC AUTO-ENTMCNC: 31.7 G/DL (ref 32–34.5)
MCV RBC AUTO: 88.9 FL (ref 80–99.9)
MONOCYTES NFR BLD: 0.66 K/UL (ref 0.1–0.95)
MONOCYTES NFR BLD: 8 % (ref 2–12)
NEUTROPHILS NFR BLD: 55 % (ref 43–80)
NEUTS SEG NFR BLD: 4.57 K/UL (ref 1.8–7.3)
PLATELET # BLD AUTO: 292 K/UL (ref 130–450)
PMV BLD AUTO: 11.4 FL (ref 7–12)
POTASSIUM SERPL-SCNC: 4 MMOL/L (ref 3.5–5)
PROT SERPL-MCNC: 7.5 G/DL (ref 6.4–8.3)
RBC # BLD AUTO: 4.79 M/UL (ref 3.5–5.5)
SODIUM SERPL-SCNC: 141 MMOL/L (ref 132–146)
WBC OTHER # BLD: 8.4 K/UL (ref 4.5–11.5)

## 2024-12-09 PROCEDURE — 85025 COMPLETE CBC W/AUTO DIFF WBC: CPT

## 2024-12-09 PROCEDURE — 80053 COMPREHEN METABOLIC PANEL: CPT

## 2024-12-09 PROCEDURE — 96375 TX/PRO/DX INJ NEW DRUG ADDON: CPT

## 2024-12-09 PROCEDURE — 84703 CHORIONIC GONADOTROPIN ASSAY: CPT

## 2024-12-09 PROCEDURE — 6360000002 HC RX W HCPCS: Performed by: EMERGENCY MEDICINE

## 2024-12-09 PROCEDURE — 99284 EMERGENCY DEPT VISIT MOD MDM: CPT

## 2024-12-09 PROCEDURE — 2580000003 HC RX 258: Performed by: EMERGENCY MEDICINE

## 2024-12-09 PROCEDURE — 70450 CT HEAD/BRAIN W/O DYE: CPT

## 2024-12-09 PROCEDURE — 96374 THER/PROPH/DIAG INJ IV PUSH: CPT

## 2024-12-09 RX ORDER — 0.9 % SODIUM CHLORIDE 0.9 %
1000 INTRAVENOUS SOLUTION INTRAVENOUS ONCE
Status: COMPLETED | OUTPATIENT
Start: 2024-12-09 | End: 2024-12-09

## 2024-12-09 RX ORDER — DIPHENHYDRAMINE HYDROCHLORIDE 50 MG/ML
25 INJECTION INTRAMUSCULAR; INTRAVENOUS ONCE
Status: COMPLETED | OUTPATIENT
Start: 2024-12-09 | End: 2024-12-09

## 2024-12-09 RX ORDER — METOCLOPRAMIDE 10 MG/1
10 TABLET ORAL 4 TIMES DAILY
Qty: 12 TABLET | Refills: 0 | Status: SHIPPED | OUTPATIENT
Start: 2024-12-09 | End: 2024-12-12

## 2024-12-09 RX ORDER — METOCLOPRAMIDE HYDROCHLORIDE 5 MG/ML
10 INJECTION INTRAMUSCULAR; INTRAVENOUS ONCE
Status: COMPLETED | OUTPATIENT
Start: 2024-12-09 | End: 2024-12-09

## 2024-12-09 RX ORDER — PREGABALIN 25 MG/1
25 CAPSULE ORAL 2 TIMES DAILY
COMMUNITY

## 2024-12-09 RX ADMIN — SODIUM CHLORIDE 1000 ML: 9 INJECTION, SOLUTION INTRAVENOUS at 13:54

## 2024-12-09 RX ADMIN — METOCLOPRAMIDE 10 MG: 5 INJECTION, SOLUTION INTRAMUSCULAR; INTRAVENOUS at 13:54

## 2024-12-09 RX ADMIN — DIPHENHYDRAMINE HYDROCHLORIDE 25 MG: 50 INJECTION INTRAMUSCULAR; INTRAVENOUS at 13:54

## 2024-12-09 ASSESSMENT — PAIN SCALES - GENERAL: PAINLEVEL_OUTOF10: 1

## 2024-12-09 ASSESSMENT — VISUAL ACUITY
OD: 20/40
OU: 20/25
OS: 20/25

## 2024-12-09 ASSESSMENT — PAIN DESCRIPTION - LOCATION: LOCATION: HEAD

## 2024-12-09 ASSESSMENT — PAIN - FUNCTIONAL ASSESSMENT: PAIN_FUNCTIONAL_ASSESSMENT: 0-10

## 2024-12-09 ASSESSMENT — PAIN DESCRIPTION - ORIENTATION: ORIENTATION: RIGHT

## 2024-12-09 ASSESSMENT — PAIN DESCRIPTION - DESCRIPTORS: DESCRIPTORS: ACHING

## 2024-12-09 NOTE — DISCHARGE INSTRUCTIONS
Can take the prescribed Reglan, with Benadryl and Tylenol for headache.     [facility] today to schedule a follow-up appointment.

## 2024-12-09 NOTE — ED PROVIDER NOTES
likely with migraine with aura.  Bedside ultrasound was performed of the eye that showed no evidence of retinal detachment or vitreous hemorrhage, no evidence of optic nerve irregularity.  Labs no leukocytosis or anemia.  Electrolyte disturbance or acute kidney injury.  Negative pregnancy.  CT head shows no evidence of intracranial hemorrhage or mass effect.  Patient is reassured by today's findings and reports that her headache is improved.  Patient reports that she no longer falls with any by the [facility] and would like to follow-up with somebody in the area.  Patient understands the importance of follow-up with an eye doctor for possible optic neuritis again and will follow-up with neurology outpatient.  Will provide a prescription for Reglan and instructed her to take it with Benadryl and Tylenol for headache cocktail at home.      CONSULTS: (Who and What was discussed)  None        FINAL IMPRESSION      1. Blurry vision, right eye    2. Nonintractable headache, unspecified chronicity pattern, unspecified headache type          DISPOSITION/PLAN     DISPOSITION Decision To Discharge 12/09/2024 03:57:32 PM    DISPOSITION  Disposition: Discharge to home  Patient condition is stable    12/9/24, 2:52 PM EST.    Giovani Spaulding DO  Emergency Medicine    PATIENT REFERRED TO:  [facility]  [address], [place] [address] [phone]  Call today  for follow up    Kyle Paredes MD  [address] [facility] [address] [phone]    Call today  for follow up      DISCHARGE MEDICATIONS:  Discharge Medication List as of 12/9/2024  4:18 PM        START taking these medications    Details   metoclopramide (REGLAN) 10 MG tablet Take 1 tablet by mouth 4 times daily for 12 doses, Disp-12 tablet, R-0Normal             DISCONTINUED MEDICATIONS:  Discharge Medication List as of 12/9/2024  4:18 PM                 (Please note that portions of this note were completed with a voice recognition

## 2025-01-10 ENCOUNTER — HOSPITAL ENCOUNTER (OUTPATIENT)
Age: 30
Discharge: HOME | End: 2025-01-12

## 2025-01-10 PROCEDURE — 88302 TISSUE EXAM BY PATHOLOGIST: CPT

## 2025-01-14 LAB — SURGICAL PATHOLOGY REPORT: NORMAL

## 2025-01-20 ENCOUNTER — OFFICE VISIT (OUTPATIENT)
Dept: FAMILY MEDICINE CLINIC | Age: 30
End: 2025-01-20
Payer: COMMERCIAL

## 2025-01-20 ENCOUNTER — HOSPITAL ENCOUNTER (EMERGENCY)
Age: 30
Discharge: LEFT BEFORE DISCHARGE | End: 2025-01-20
Attending: EMERGENCY MEDICINE
Payer: COMMERCIAL

## 2025-01-20 VITALS
RESPIRATION RATE: 14 BRPM | HEIGHT: 62 IN | HEART RATE: 88 BPM | TEMPERATURE: 97.4 F | OXYGEN SATURATION: 98 % | BODY MASS INDEX: 24.48 KG/M2 | DIASTOLIC BLOOD PRESSURE: 85 MMHG | SYSTOLIC BLOOD PRESSURE: 119 MMHG | WEIGHT: 133 LBS

## 2025-01-20 VITALS
SYSTOLIC BLOOD PRESSURE: 124 MMHG | WEIGHT: 133 LBS | BODY MASS INDEX: 24.48 KG/M2 | HEART RATE: 79 BPM | OXYGEN SATURATION: 99 % | TEMPERATURE: 99 F | HEIGHT: 62 IN | RESPIRATION RATE: 16 BRPM | DIASTOLIC BLOOD PRESSURE: 80 MMHG

## 2025-01-20 DIAGNOSIS — G89.18 POSTOPERATIVE ABDOMINAL PAIN WITH FEVER: Primary | ICD-10-CM

## 2025-01-20 DIAGNOSIS — R50.82 POSTOPERATIVE ABDOMINAL PAIN WITH FEVER: Primary | ICD-10-CM

## 2025-01-20 DIAGNOSIS — R10.84 GENERALIZED ABDOMINAL PAIN: Primary | ICD-10-CM

## 2025-01-20 DIAGNOSIS — R10.9 POSTOPERATIVE ABDOMINAL PAIN WITH FEVER: Primary | ICD-10-CM

## 2025-01-20 PROCEDURE — G8420 CALC BMI NORM PARAMETERS: HCPCS | Performed by: STUDENT IN AN ORGANIZED HEALTH CARE EDUCATION/TRAINING PROGRAM

## 2025-01-20 PROCEDURE — 1036F TOBACCO NON-USER: CPT | Performed by: STUDENT IN AN ORGANIZED HEALTH CARE EDUCATION/TRAINING PROGRAM

## 2025-01-20 PROCEDURE — G8427 DOCREV CUR MEDS BY ELIG CLIN: HCPCS | Performed by: STUDENT IN AN ORGANIZED HEALTH CARE EDUCATION/TRAINING PROGRAM

## 2025-01-20 PROCEDURE — 99281 EMR DPT VST MAYX REQ PHY/QHP: CPT

## 2025-01-20 PROCEDURE — 99214 OFFICE O/P EST MOD 30 MIN: CPT | Performed by: STUDENT IN AN ORGANIZED HEALTH CARE EDUCATION/TRAINING PROGRAM

## 2025-01-20 RX ORDER — IBUPROFEN 800 MG/1
800 TABLET, FILM COATED ORAL EVERY 8 HOURS
COMMUNITY
Start: 2025-01-10

## 2025-01-20 SDOH — ECONOMIC STABILITY: FOOD INSECURITY: WITHIN THE PAST 12 MONTHS, YOU WORRIED THAT YOUR FOOD WOULD RUN OUT BEFORE YOU GOT MONEY TO BUY MORE.: NEVER TRUE

## 2025-01-20 SDOH — ECONOMIC STABILITY: FOOD INSECURITY: WITHIN THE PAST 12 MONTHS, THE FOOD YOU BOUGHT JUST DIDN'T LAST AND YOU DIDN'T HAVE MONEY TO GET MORE.: NEVER TRUE

## 2025-01-20 ASSESSMENT — PATIENT HEALTH QUESTIONNAIRE - PHQ9
2. FEELING DOWN, DEPRESSED OR HOPELESS: NOT AT ALL
SUM OF ALL RESPONSES TO PHQ QUESTIONS 1-9: 0
1. LITTLE INTEREST OR PLEASURE IN DOING THINGS: NOT AT ALL
SUM OF ALL RESPONSES TO PHQ9 QUESTIONS 1 & 2: 0

## 2025-01-20 ASSESSMENT — PAIN SCALES - GENERAL: PAINLEVEL_OUTOF10: 8

## 2025-01-20 ASSESSMENT — PAIN DESCRIPTION - ORIENTATION: ORIENTATION: MID;UPPER

## 2025-01-20 ASSESSMENT — ENCOUNTER SYMPTOMS
NAUSEA: 0
RHINORRHEA: 0
ABDOMINAL PAIN: 1
SHORTNESS OF BREATH: 0
COUGH: 0
VOMITING: 0

## 2025-01-20 ASSESSMENT — PAIN DESCRIPTION - LOCATION: LOCATION: ABDOMEN

## 2025-01-20 ASSESSMENT — PAIN DESCRIPTION - DESCRIPTORS: DESCRIPTORS: DISCOMFORT

## 2025-01-20 ASSESSMENT — PAIN - FUNCTIONAL ASSESSMENT: PAIN_FUNCTIONAL_ASSESSMENT: 0-10

## 2025-01-20 NOTE — ED NOTES
Patient eloped from the department prior to me evaluating her.     James Cobos DO  01/20/25 1828

## 2025-01-20 NOTE — PROGRESS NOTES
Yadira Duran (:  1995) is a 29 y.o. female,Established patient, here for evaluation of the following chief complaint(s):  Fever (Last night 100.6/Today 100.2 ) and Wound Check (2 incisions are red & swollen, pain)         Assessment & Plan  Postoperative abdominal pain with fever              Return if symptoms worsen or fail to improve.     Post op with painful incision + fever. Advised to go to the nearest ER immediately. She was in agreement and stated she will be going to [facility]. Discussed return and ER precautions. Discussed benefits, risks and expected course of therapy as well as other options. Opportunity given to ask questions. Patient and or parent verbalized understanding    Subjective   Incision pain  POD 8 from a tubal ligation  Tmax 100.6 at home  Feels bloated  Notices she is sweating a lot, having some dizziness          Review of Systems   Constitutional:  Positive for chills, diaphoresis and fever.   HENT:  Negative for congestion and rhinorrhea.    Respiratory:  Negative for cough and shortness of breath.    Cardiovascular:  Negative for chest pain and leg swelling.   Gastrointestinal:  Positive for abdominal pain. Negative for nausea and vomiting.   Genitourinary:  Negative for dysuria and hematuria.   Musculoskeletal:  Negative for arthralgias and myalgias.   Skin:  Positive for wound. Negative for rash.   Neurological:  Negative for dizziness and light-headedness.          Objective   Physical Exam  Vitals reviewed.   Constitutional:       General: She is not in acute distress.  HENT:      Head: Normocephalic and atraumatic.   Eyes:      Extraocular Movements: Extraocular movements intact.      Conjunctiva/sclera: Conjunctivae normal.   Cardiovascular:      Rate and Rhythm: Normal rate.   Pulmonary:      Effort: Pulmonary effort is normal. No respiratory distress.   Abdominal:      General: Abdomen is flat.      Tenderness: There is abdominal tenderness. There is 
DISCHARGE

## 2025-01-20 NOTE — ED TRIAGE NOTES
Department of Emergency Medicine  PROVIDER IN TRIAGE NOTE                        1/20/25  3:35 PM EST    Date of Encounter: 1/20/25   MRN: [id number]      HPI: Yadira Duran is a 29 y.o. female who presents to the ED for Post-op Problem (8 days past tubal ligation), Fever (100.6 at home), and Abdominal Pain (swelling)  Patient reports symptoms for several days. Endorses fever of 100.6F last night measured by ear, and abdominal pain/bloating. Denies additional symptoms at time of triage.     Vitals:    01/20/25 1535 01/20/25 1536   BP: 119/85    Pulse: 88    Resp: 14    Temp: 97.4 °F (36.3 °C)    TempSrc: Oral    SpO2: 98%    Weight:  60.3 kg (133 lb)   Height:  1.575 m (5' 2\")       Provider-Patient relationship only established for Provider In Triage (PIT).  Per employer/facility request for [facility] to initiate contact and input an assessment note in triage during high volume surges.  Full assessment, HPI and examination not performed.  Secondary to high volume, low staffing, and/or boarding- patient to await bed availability.  This ends my PIT-Patient relationship.   Electronically signed by JULIA Mishra   DD: 1/20/25

## 2025-01-21 ENCOUNTER — APPOINTMENT (OUTPATIENT)
Dept: CT IMAGING | Age: 30
End: 2025-01-21
Payer: COMMERCIAL

## 2025-01-21 ENCOUNTER — HOSPITAL ENCOUNTER (EMERGENCY)
Age: 30
Discharge: HOME | End: 2025-01-21
Attending: EMERGENCY MEDICINE
Payer: COMMERCIAL

## 2025-01-21 VITALS
HEART RATE: 87 BPM | DIASTOLIC BLOOD PRESSURE: 70 MMHG | WEIGHT: 133 LBS | BODY MASS INDEX: 24.48 KG/M2 | OXYGEN SATURATION: 100 % | HEIGHT: 62 IN | RESPIRATION RATE: 18 BRPM | TEMPERATURE: 98.2 F | SYSTOLIC BLOOD PRESSURE: 103 MMHG

## 2025-01-21 DIAGNOSIS — R19.00 PELVIC MASS: ICD-10-CM

## 2025-01-21 DIAGNOSIS — T81.49XA INFLAMMATION OF OPERATIVE INCISION: Primary | ICD-10-CM

## 2025-01-21 LAB
ALBUMIN SERPL-MCNC: 4.5 G/DL (ref 3.5–5.2)
ALP SERPL-CCNC: 107 U/L (ref 35–104)
ALT SERPL-CCNC: 21 U/L (ref 0–32)
ANION GAP SERPL CALCULATED.3IONS-SCNC: 11 MMOL/L (ref 7–16)
AST SERPL-CCNC: 18 U/L (ref 0–31)
BACTERIA URNS QL MICRO: ABNORMAL
BASOPHILS # BLD: 0.05 K/UL (ref 0–0.2)
BASOPHILS NFR BLD: 1 % (ref 0–2)
BILIRUB DIRECT SERPL-MCNC: <0.2 MG/DL (ref 0–0.3)
BILIRUB INDIRECT SERPL-MCNC: ABNORMAL MG/DL (ref 0–1)
BILIRUB SERPL-MCNC: 0.2 MG/DL (ref 0–1.2)
BILIRUB UR QL STRIP: NEGATIVE
BUN SERPL-MCNC: 8 MG/DL (ref 6–20)
CALCIUM SERPL-MCNC: 9 MG/DL (ref 8.6–10.2)
CHLORIDE SERPL-SCNC: 106 MMOL/L (ref 98–107)
CLARITY UR: CLEAR
CO2 SERPL-SCNC: 23 MMOL/L (ref 22–29)
COLOR UR: YELLOW
CREAT SERPL-MCNC: 0.6 MG/DL (ref 0.5–1)
EOSINOPHIL # BLD: 0.13 K/UL (ref 0.05–0.5)
EOSINOPHILS RELATIVE PERCENT: 2 % (ref 0–6)
EPI CELLS #/AREA URNS HPF: ABNORMAL /HPF
ERYTHROCYTE [DISTWIDTH] IN BLOOD BY AUTOMATED COUNT: 13.7 % (ref 11.5–15)
GFR, ESTIMATED: >90 ML/MIN/1.73M2
GLUCOSE SERPL-MCNC: 67 MG/DL (ref 74–99)
GLUCOSE UR STRIP-MCNC: NEGATIVE MG/DL
HCG UR QL: NEGATIVE
HCG, URINE, POC: NEGATIVE
HCT VFR BLD AUTO: 44.4 % (ref 34–48)
HGB BLD-MCNC: 14.1 G/DL (ref 11.5–15.5)
HGB UR QL STRIP.AUTO: ABNORMAL
IMM GRANULOCYTES # BLD AUTO: 0.04 K/UL (ref 0–0.58)
IMM GRANULOCYTES NFR BLD: 1 % (ref 0–5)
KETONES UR STRIP-MCNC: NEGATIVE MG/DL
LACTATE BLDV-SCNC: 1.3 MMOL/L (ref 0.5–1.9)
LEUKOCYTE ESTERASE UR QL STRIP: ABNORMAL
LYMPHOCYTES NFR BLD: 2.45 K/UL (ref 1.5–4)
LYMPHOCYTES RELATIVE PERCENT: 35 % (ref 20–42)
Lab: NORMAL
MCH RBC QN AUTO: 28.1 PG (ref 26–35)
MCHC RBC AUTO-ENTMCNC: 31.8 G/DL (ref 32–34.5)
MCV RBC AUTO: 88.6 FL (ref 80–99.9)
MONOCYTES NFR BLD: 0.76 K/UL (ref 0.1–0.95)
MONOCYTES NFR BLD: 11 % (ref 2–12)
NEGATIVE QC PASS/FAIL: NORMAL
NEUTROPHILS NFR BLD: 52 % (ref 43–80)
NEUTS SEG NFR BLD: 3.68 K/UL (ref 1.8–7.3)
NITRITE UR QL STRIP: NEGATIVE
PH UR STRIP: 6 [PH] (ref 5–9)
PLATELET # BLD AUTO: 288 K/UL (ref 130–450)
PMV BLD AUTO: 11.5 FL (ref 7–12)
POSITIVE QC PASS/FAIL: NORMAL
POTASSIUM SERPL-SCNC: 3.5 MMOL/L (ref 3.5–5)
PROT SERPL-MCNC: 7.9 G/DL (ref 6.4–8.3)
PROT UR STRIP-MCNC: NEGATIVE MG/DL
RBC # BLD AUTO: 5.01 M/UL (ref 3.5–5.5)
RBC #/AREA URNS HPF: ABNORMAL /HPF
SODIUM SERPL-SCNC: 140 MMOL/L (ref 132–146)
SP GR UR STRIP: >1.03 (ref 1–1.03)
UROBILINOGEN UR STRIP-ACNC: 0.2 EU/DL (ref 0–1)
WBC #/AREA URNS HPF: ABNORMAL /HPF
WBC OTHER # BLD: 7.1 K/UL (ref 4.5–11.5)

## 2025-01-21 PROCEDURE — 96360 HYDRATION IV INFUSION INIT: CPT

## 2025-01-21 PROCEDURE — 6370000000 HC RX 637 (ALT 250 FOR IP): Performed by: EMERGENCY MEDICINE

## 2025-01-21 PROCEDURE — 80053 COMPREHEN METABOLIC PANEL: CPT

## 2025-01-21 PROCEDURE — 6360000004 HC RX CONTRAST MEDICATION: Performed by: RADIOLOGY

## 2025-01-21 PROCEDURE — 74177 CT ABD & PELVIS W/CONTRAST: CPT

## 2025-01-21 PROCEDURE — 84703 CHORIONIC GONADOTROPIN ASSAY: CPT

## 2025-01-21 PROCEDURE — 82248 BILIRUBIN DIRECT: CPT

## 2025-01-21 PROCEDURE — 83605 ASSAY OF LACTIC ACID: CPT

## 2025-01-21 PROCEDURE — 2580000003 HC RX 258: Performed by: EMERGENCY MEDICINE

## 2025-01-21 PROCEDURE — 81001 URINALYSIS AUTO W/SCOPE: CPT

## 2025-01-21 PROCEDURE — 87040 BLOOD CULTURE FOR BACTERIA: CPT

## 2025-01-21 PROCEDURE — 85025 COMPLETE CBC W/AUTO DIFF WBC: CPT

## 2025-01-21 PROCEDURE — 99285 EMERGENCY DEPT VISIT HI MDM: CPT

## 2025-01-21 RX ORDER — CEPHALEXIN 500 MG/1
500 CAPSULE ORAL ONCE
Status: COMPLETED | OUTPATIENT
Start: 2025-01-21 | End: 2025-01-21

## 2025-01-21 RX ORDER — CEPHALEXIN 500 MG/1
500 CAPSULE ORAL 4 TIMES DAILY
Qty: 40 CAPSULE | Refills: 0 | Status: SHIPPED | OUTPATIENT
Start: 2025-01-21 | End: 2025-01-31

## 2025-01-21 RX ORDER — IOPAMIDOL 755 MG/ML
75 INJECTION, SOLUTION INTRAVASCULAR
Status: COMPLETED | OUTPATIENT
Start: 2025-01-21 | End: 2025-01-21

## 2025-01-21 RX ORDER — 0.9 % SODIUM CHLORIDE 0.9 %
1000 INTRAVENOUS SOLUTION INTRAVENOUS ONCE
Status: COMPLETED | OUTPATIENT
Start: 2025-01-21 | End: 2025-01-21

## 2025-01-21 RX ORDER — OXYCODONE AND ACETAMINOPHEN 5; 325 MG/1; MG/1
1 TABLET ORAL ONCE
Status: COMPLETED | OUTPATIENT
Start: 2025-01-21 | End: 2025-01-21

## 2025-01-21 RX ORDER — SODIUM CHLORIDE 0.9 % (FLUSH) 0.9 %
10 SYRINGE (ML) INJECTION PRN
Status: DISCONTINUED | OUTPATIENT
Start: 2025-01-21 | End: 2025-01-21 | Stop reason: HOSPADM

## 2025-01-21 RX ADMIN — OXYCODONE HYDROCHLORIDE AND ACETAMINOPHEN 1 TABLET: 5; 325 TABLET ORAL at 11:07

## 2025-01-21 RX ADMIN — SODIUM CHLORIDE 1000 ML: 9 INJECTION, SOLUTION INTRAVENOUS at 11:43

## 2025-01-21 RX ADMIN — CEPHALEXIN 500 MG: 500 CAPSULE ORAL at 16:26

## 2025-01-21 RX ADMIN — IOPAMIDOL 75 ML: 755 INJECTION, SOLUTION INTRAVENOUS at 14:54

## 2025-01-21 ASSESSMENT — ENCOUNTER SYMPTOMS
SHORTNESS OF BREATH: 0
COUGH: 0
NAUSEA: 0
ABDOMINAL DISTENTION: 0
SORE THROAT: 0
DIARRHEA: 0
BACK PAIN: 0
VOMITING: 0
EYE REDNESS: 0
WHEEZING: 0
CONSTIPATION: 0
ABDOMINAL PAIN: 1
EYE DISCHARGE: 0
SINUS PRESSURE: 0
EYE PAIN: 0

## 2025-01-21 ASSESSMENT — PAIN DESCRIPTION - ORIENTATION
ORIENTATION: MID
ORIENTATION: MID

## 2025-01-21 ASSESSMENT — PAIN - FUNCTIONAL ASSESSMENT: PAIN_FUNCTIONAL_ASSESSMENT: 0-10

## 2025-01-21 ASSESSMENT — PAIN SCALES - GENERAL
PAINLEVEL_OUTOF10: 8
PAINLEVEL_OUTOF10: 6
PAINLEVEL_OUTOF10: 8

## 2025-01-21 ASSESSMENT — PAIN DESCRIPTION - LOCATION
LOCATION: ABDOMEN

## 2025-01-21 ASSESSMENT — PAIN DESCRIPTION - DESCRIPTORS: DESCRIPTORS: STABBING;PRESSURE

## 2025-01-21 NOTE — ED PROVIDER NOTES
29-year-old female presents to the emergency department with abdominal pain bloating and concern for a wound infection after she had a tubal ligation 11 days ago with Dr. Tobar.  Patient reported on Saturday she noticed some burning sensation at an epigastric incision.  She states that she then noticed some redness around the incision and had a fever of 101 she came to the emergency department yesterday but got tired of waiting so she left the department.  She states no fevers chills nausea vomiting  urinary symptoms leg swelling or other complaints    The history is provided by the patient.   Abdominal Pain  Pain location:  Epigastric  Pain quality: aching    Pain radiates to:  Does not radiate  Pain severity:  Mild  Onset quality:  Gradual  Duration:  2 days  Timing:  Intermittent  Progression:  Waxing and waning  Chronicity:  New  Context: previous surgery    Relieved by:  Nothing  Worsened by:  Nothing  Ineffective treatments:  None tried  Associated symptoms: fever    Associated symptoms: no chest pain, no chills, no constipation, no cough, no diarrhea, no dysuria, no fatigue, no nausea, no shortness of breath, no sore throat and no vomiting         Review of Systems   Constitutional:  Positive for fever. Negative for chills and fatigue.   HENT:  Negative for ear pain, sinus pressure and sore throat.    Eyes:  Negative for pain, discharge and redness.   Respiratory:  Negative for cough, shortness of breath and wheezing.    Cardiovascular:  Negative for chest pain.   Gastrointestinal:  Positive for abdominal pain. Negative for abdominal distention, constipation, diarrhea, nausea and vomiting.   Genitourinary:  Negative for dysuria and frequency.   Musculoskeletal:  Negative for arthralgias and back pain.   Skin:  Positive for wound. Negative for rash.   Neurological:  Negative for weakness and headaches.   Hematological:  Negative for adenopathy.   All other systems reviewed and are negative.

## 2025-01-21 NOTE — PROGRESS NOTES
Called by er. Patient complaining of some redness and tenderness at umbilical incision.no fever. Wbc okay  Some erytema per er physician  Told to give keflex  Patient given appt for tomorrow at 1pm for an u/s

## 2025-01-25 LAB
EKG ATRIAL RATE: 85 BPM
EKG P AXIS: 66 DEGREES
EKG P-R INTERVAL: 156 MS
EKG Q-T INTERVAL: 354 MS
EKG QRS DURATION: 72 MS
EKG QTC CALCULATION (BAZETT): 421 MS
EKG R AXIS: 92 DEGREES
EKG T AXIS: 50 DEGREES
EKG VENTRICULAR RATE: 85 BPM

## 2025-01-26 LAB
MICROORGANISM SPEC CULT: NORMAL
MICROORGANISM SPEC CULT: NORMAL
SERVICE CMNT-IMP: NORMAL
SERVICE CMNT-IMP: NORMAL
SPECIMEN DESCRIPTION: NORMAL
SPECIMEN DESCRIPTION: NORMAL

## 2025-01-28 ENCOUNTER — TELEPHONE (OUTPATIENT)
Dept: OBGYN | Age: 30
End: 2025-01-28

## 2025-01-28 NOTE — TELEPHONE ENCOUNTER
Patient left a vm on answering machine. She had a tubal ligation with Dr. Tobar and then ended up in the ED with a 5.4 cm cyst. She has had some spotting and bleeding that is maroon colored. She tried to f/u with him but \"it's been a whole thing and I trust her and would like a second opinion.\" Would you like me to add her on tomorrow or does she have to f/u up with him since he did the surgery.  Called patient and she will come in tomorrow at 11

## 2025-01-29 ENCOUNTER — OFFICE VISIT (OUTPATIENT)
Dept: OBGYN | Age: 30
End: 2025-01-29
Payer: COMMERCIAL

## 2025-01-29 VITALS
SYSTOLIC BLOOD PRESSURE: 124 MMHG | WEIGHT: 132 LBS | DIASTOLIC BLOOD PRESSURE: 78 MMHG | TEMPERATURE: 97.9 F | HEART RATE: 89 BPM | BODY MASS INDEX: 24.14 KG/M2 | OXYGEN SATURATION: 98 %

## 2025-01-29 DIAGNOSIS — N83.202 CYST OF LEFT OVARY: Primary | ICD-10-CM

## 2025-01-29 PROCEDURE — 99212 OFFICE O/P EST SF 10 MIN: CPT | Performed by: OBSTETRICS & GYNECOLOGY

## 2025-01-29 PROCEDURE — 1036F TOBACCO NON-USER: CPT | Performed by: OBSTETRICS & GYNECOLOGY

## 2025-01-29 PROCEDURE — 99213 OFFICE O/P EST LOW 20 MIN: CPT | Performed by: OBSTETRICS & GYNECOLOGY

## 2025-01-29 PROCEDURE — G8420 CALC BMI NORM PARAMETERS: HCPCS | Performed by: OBSTETRICS & GYNECOLOGY

## 2025-01-29 PROCEDURE — G8427 DOCREV CUR MEDS BY ELIG CLIN: HCPCS | Performed by: OBSTETRICS & GYNECOLOGY

## 2025-01-29 NOTE — PROGRESS NOTES
Patient here today states she had a tubal ligation done on 1/10/25 by Dr. Darden. Went to er due to abdominal pain, diagnosed with ovarian cyst and given 2 antibiotics. Was to follow up with [facility] on Monday and now they can't see her until February 12th. Patient was worried about waiting that long so asked to come in to see Dr. Araiza sooner because she trusts her opinion.     Patient states she's had 2 periods this month and right now is heavy with a lot of clots. Changing pad every 2.5-3 hours.   Discharge instructions have been discussed with the patient. Patient advised to call our office with any questions or concerns.   Voiced understanding.

## 2025-01-29 NOTE — PROGRESS NOTES
Yadira Duran is a 29-year-old who underwent a bilateral salpingectomy on 1 - 10 - 25.  She presents today because she has had 2 menses this month and had pelvic pain went to the emergency room told she had a cyst which she feels like just ruptured while she was in the office today.  She denies any nausea vomiting diarrhea or constipation.  She reports they placed her on 2 antibiotics when she was in the emergency room.        Past Medical History:   Diagnosis Date    Depression     Endometriosis     IBS (irritable bowel syndrome)     Optic neuritis     Seizure (HCC) 2015    after delivery / not sure was a seizure, no issues since , no medication        Past Surgical History:   Procedure Laterality Date     SECTION  2015     SECTION N/A 2022     SECTION performed by Paula Cast DO at [facility] OR     SECTION N/A 2024     SECTION performed by Yolette Vuong DO at [facility] OR    CHOLECYSTECTOMY  2017    ROBOT ASSISTED    COLONOSCOPY  04/30/2012    x2    DILATION AND EVACUATION  2016    missed ab    HEMORRHOID SURGERY  2020and 2021    LAPAROSCOPY N/A 10/18/2019    DIAGNOSTIC LAPAROSCOPY, OPEN VENTRAL HERNIA REPAIR performed by Michelle Song MD at [facility] OR    PELVIC LAPAROSCOPY Right     cystectomy    [place]  DELIVERY ONLY N/A 2018     SECTION performed by Gregory Lowe DO at [facility]D    SALPINGECTOMY Bilateral 01/10/2025    UPPER GASTROINTESTINAL ENDOSCOPY  2012    UPPER GASTROINTESTINAL ENDOSCOPY      UPPER GASTROINTESTINAL ENDOSCOPY  2017    VENTRAL HERNIA REPAIR N/A 2021    OPEN INCISIONAL HERNIA REPAIR performed by Salomón Haddad MD at [facility] OR        Family History   Problem Relation Age of Onset    Cancer Father         Bladder cancer        Social History       Tobacco History       Smoking Status  Former Smoking Start Date  2012 Quit Date  3/31/2013 Average

## 2025-01-30 ENCOUNTER — APPOINTMENT (OUTPATIENT)
Dept: CT IMAGING | Age: 30
End: 2025-01-30
Payer: COMMERCIAL

## 2025-01-30 ENCOUNTER — APPOINTMENT (OUTPATIENT)
Dept: ULTRASOUND IMAGING | Age: 30
End: 2025-01-30
Payer: COMMERCIAL

## 2025-01-30 ENCOUNTER — HOSPITAL ENCOUNTER (EMERGENCY)
Age: 30
Discharge: HOME | End: 2025-01-30
Payer: COMMERCIAL

## 2025-01-30 VITALS
SYSTOLIC BLOOD PRESSURE: 114 MMHG | WEIGHT: 132 LBS | TEMPERATURE: 98.4 F | HEART RATE: 92 BPM | RESPIRATION RATE: 20 BRPM | BODY MASS INDEX: 23.39 KG/M2 | HEIGHT: 63 IN | DIASTOLIC BLOOD PRESSURE: 60 MMHG | OXYGEN SATURATION: 100 %

## 2025-01-30 DIAGNOSIS — N93.9 VAGINAL BLEEDING: ICD-10-CM

## 2025-01-30 DIAGNOSIS — N83.202 CYST OF LEFT OVARY: Primary | ICD-10-CM

## 2025-01-30 DIAGNOSIS — K59.00 CONSTIPATION, UNSPECIFIED CONSTIPATION TYPE: ICD-10-CM

## 2025-01-30 LAB
ALBUMIN SERPL-MCNC: 4.2 G/DL (ref 3.5–5.2)
ALP SERPL-CCNC: 97 U/L (ref 35–104)
ALT SERPL-CCNC: 21 U/L (ref 0–32)
ANION GAP SERPL CALCULATED.3IONS-SCNC: 10 MMOL/L (ref 7–16)
AST SERPL-CCNC: 18 U/L (ref 0–31)
BACTERIA URNS QL MICRO: ABNORMAL
BASOPHILS # BLD: 0.04 K/UL (ref 0–0.2)
BASOPHILS NFR BLD: 1 % (ref 0–2)
BILIRUB SERPL-MCNC: 0.2 MG/DL (ref 0–1.2)
BILIRUB UR QL STRIP: NEGATIVE
BUN SERPL-MCNC: 9 MG/DL (ref 6–20)
CALCIUM SERPL-MCNC: 8.8 MG/DL (ref 8.6–10.2)
CHLORIDE SERPL-SCNC: 108 MMOL/L (ref 98–107)
CLARITY UR: CLEAR
CO2 SERPL-SCNC: 26 MMOL/L (ref 22–29)
COLOR UR: YELLOW
CREAT SERPL-MCNC: 0.6 MG/DL (ref 0.5–1)
EOSINOPHIL # BLD: 0.18 K/UL (ref 0.05–0.5)
EOSINOPHILS RELATIVE PERCENT: 3 % (ref 0–6)
ERYTHROCYTE [DISTWIDTH] IN BLOOD BY AUTOMATED COUNT: 13.8 % (ref 11.5–15)
GFR, ESTIMATED: >90 ML/MIN/1.73M2
GLUCOSE SERPL-MCNC: 90 MG/DL (ref 74–99)
GLUCOSE UR STRIP-MCNC: NEGATIVE MG/DL
HCG UR QL: NEGATIVE
HCT VFR BLD AUTO: 39 % (ref 34–48)
HGB BLD-MCNC: 12.7 G/DL (ref 11.5–15.5)
HGB UR QL STRIP.AUTO: ABNORMAL
IMM GRANULOCYTES # BLD AUTO: 0.03 K/UL (ref 0–0.58)
IMM GRANULOCYTES NFR BLD: 0 % (ref 0–5)
KETONES UR STRIP-MCNC: NEGATIVE MG/DL
LACTATE BLDV-SCNC: 1.2 MMOL/L (ref 0.5–2.2)
LEUKOCYTE ESTERASE UR QL STRIP: NEGATIVE
LYMPHOCYTES NFR BLD: 2.19 K/UL (ref 1.5–4)
LYMPHOCYTES RELATIVE PERCENT: 33 % (ref 20–42)
MCH RBC QN AUTO: 28.6 PG (ref 26–35)
MCHC RBC AUTO-ENTMCNC: 32.6 G/DL (ref 32–34.5)
MCV RBC AUTO: 87.8 FL (ref 80–99.9)
MONOCYTES NFR BLD: 0.55 K/UL (ref 0.1–0.95)
MONOCYTES NFR BLD: 8 % (ref 2–12)
NEUTROPHILS NFR BLD: 55 % (ref 43–80)
NEUTS SEG NFR BLD: 3.71 K/UL (ref 1.8–7.3)
NITRITE UR QL STRIP: NEGATIVE
PH UR STRIP: 6 [PH] (ref 5–8)
PLATELET # BLD AUTO: 263 K/UL (ref 130–450)
PMV BLD AUTO: 11.1 FL (ref 7–12)
POTASSIUM SERPL-SCNC: 3.7 MMOL/L (ref 3.5–5)
PROT SERPL-MCNC: 6.9 G/DL (ref 6.4–8.3)
PROT UR STRIP-MCNC: ABNORMAL MG/DL
RBC # BLD AUTO: 4.44 M/UL (ref 3.5–5.5)
RBC #/AREA URNS HPF: ABNORMAL /HPF
SODIUM SERPL-SCNC: 144 MMOL/L (ref 132–146)
SP GR UR STRIP: 1.02 (ref 1–1.03)
UROBILINOGEN UR STRIP-ACNC: 0.2 EU/DL (ref 0–1)
WBC #/AREA URNS HPF: ABNORMAL /HPF
WBC OTHER # BLD: 6.7 K/UL (ref 4.5–11.5)

## 2025-01-30 PROCEDURE — 6370000000 HC RX 637 (ALT 250 FOR IP)

## 2025-01-30 PROCEDURE — 83605 ASSAY OF LACTIC ACID: CPT

## 2025-01-30 PROCEDURE — 6360000004 HC RX CONTRAST MEDICATION: Performed by: RADIOLOGY

## 2025-01-30 PROCEDURE — 85025 COMPLETE CBC W/AUTO DIFF WBC: CPT

## 2025-01-30 PROCEDURE — 80053 COMPREHEN METABOLIC PANEL: CPT

## 2025-01-30 PROCEDURE — 99285 EMERGENCY DEPT VISIT HI MDM: CPT

## 2025-01-30 PROCEDURE — 84703 CHORIONIC GONADOTROPIN ASSAY: CPT

## 2025-01-30 PROCEDURE — 93975 VASCULAR STUDY: CPT

## 2025-01-30 PROCEDURE — 81001 URINALYSIS AUTO W/SCOPE: CPT

## 2025-01-30 PROCEDURE — 76830 TRANSVAGINAL US NON-OB: CPT

## 2025-01-30 PROCEDURE — 74177 CT ABD & PELVIS W/CONTRAST: CPT

## 2025-01-30 RX ORDER — IOPAMIDOL 755 MG/ML
75 INJECTION, SOLUTION INTRAVASCULAR
Status: COMPLETED | OUTPATIENT
Start: 2025-01-30 | End: 2025-01-30

## 2025-01-30 RX ORDER — ACETAMINOPHEN 325 MG/1
650 TABLET ORAL ONCE
Status: COMPLETED | OUTPATIENT
Start: 2025-01-30 | End: 2025-01-30

## 2025-01-30 RX ADMIN — IOPAMIDOL 75 ML: 755 INJECTION, SOLUTION INTRAVENOUS at 17:00

## 2025-01-30 RX ADMIN — ACETAMINOPHEN 650 MG: 325 TABLET ORAL at 15:46

## 2025-01-30 ASSESSMENT — PAIN DESCRIPTION - ORIENTATION
ORIENTATION: LEFT
ORIENTATION: LEFT;LOWER

## 2025-01-30 ASSESSMENT — PAIN DESCRIPTION - LOCATION
LOCATION: ABDOMEN;BACK
LOCATION: ABDOMEN

## 2025-01-30 ASSESSMENT — PAIN - FUNCTIONAL ASSESSMENT: PAIN_FUNCTIONAL_ASSESSMENT: 0-10

## 2025-01-30 ASSESSMENT — PAIN DESCRIPTION - FREQUENCY: FREQUENCY: CONTINUOUS

## 2025-01-30 ASSESSMENT — PAIN SCALES - GENERAL
PAINLEVEL_OUTOF10: 8
PAINLEVEL_OUTOF10: 8

## 2025-01-30 ASSESSMENT — PAIN DESCRIPTION - PAIN TYPE: TYPE: ACUTE PAIN

## 2025-01-30 ASSESSMENT — PAIN DESCRIPTION - ONSET: ONSET: SUDDEN

## 2025-01-30 ASSESSMENT — PAIN DESCRIPTION - DESCRIPTORS
DESCRIPTORS: CRAMPING
DESCRIPTORS: CRAMPING;DISCOMFORT

## 2025-01-30 NOTE — ED PROVIDER NOTES
[facility] EMERGENCY DEPARTMENT  EMERGENCY DEPARTMENT ENCOUNTER        Pt Name: Yadira Duran  MRN: [id number]  Birthdate 1995  Date of evaluation: 1/30/2025  Provider: ERMIAS Thompson - CNP  PCP: No primary care provider on file.  Note Started: 2:51 PM EST 1/30/25      LISET. I have evaluated this patient.        CHIEF COMPLAINT       Chief Complaint   Patient presents with    Abdominal Pain     Had a tubal on the 10th.  Just diagnosed with an ovarian cyst.  Pain not going away since yesterday.   Currently on second period this month and having \"huge\" clots.        HISTORY OF PRESENT ILLNESS: 1 or more Elements     History from : Patient    Limitations to history : None    Yadira Duran is a 29 y.o. female who presents to the ED with complaints of left lower quadrant abdominal pain and vaginal bleeding with clots.  Patient denies any nausea, vomiting, dysuria.  Patient states she had a tubal ligation on 1/10.  Patient states she is changing her pad approximately every 2-1/2 hours.  Patient states she she was seen in the ED on 1/21 and diagnosed with a left-sided ovarian cyst.  Patient states she was seen by her gynecologist yesterday.  Patient states she had worsening pain in the left lower quadrant last night.  Patient denies any chest pain, shortness of breath, headache, lightheadedness, dizziness, fever, chills, body aches.  Patient denies any other symptoms.  Patient has no other complaints at this time.    Nursing Notes were all reviewed and agreed with or any disagreements were addressed in the HPI.    REVIEW OF SYSTEMS :      Review of Systems   Constitutional: Negative.    HENT: Negative.     Eyes: Negative.    Respiratory: Negative.     Cardiovascular: Negative.    Gastrointestinal:  Positive for abdominal pain.   Endocrine: Negative.    Genitourinary:  Positive for vaginal bleeding.   Musculoskeletal: Negative.    Skin: Negative.    Allergic/Immunologic:  status: Never Used   Substance Use Topics    Alcohol use: No    Drug use: Never       SCREENINGS   NIH Stroke Scale  NIH Stroke Scale Assessed: No    Siobhan Coma Scale  Eye Opening: Spontaneous  Best Verbal Response: Oriented  Best Motor Response: Obeys commands  Siohban Coma Scale Score: 15                CIWA Assessment  BP: 114/60  Pulse: 92           PHYSICAL EXAM  1 or more Elements     ED Triage Vitals   BP Systolic BP Percentile Diastolic BP Percentile Temp Temp Source Pulse Respirations SpO2   01/30/25 1323 -- -- 01/30/25 1323 01/30/25 1323 01/30/25 1323 01/30/25 1323 01/30/25 1323   114/60   98.4 °F (36.9 °C) Oral 92 20 100 %      Height Weight - Scale         01/30/25 1335 01/30/25 1335         1.6 m (5' 3\") 59.9 kg (132 lb)             Physical Exam  Vitals and nursing note reviewed.   Constitutional:       General: She is not in acute distress.     Appearance: Normal appearance. She is not ill-appearing, toxic-appearing or diaphoretic.   HENT:      Head: Normocephalic and atraumatic.   Eyes:      Extraocular Movements: Extraocular movements intact.      Conjunctiva/sclera: Conjunctivae normal.   Cardiovascular:      Rate and Rhythm: Normal rate.      Pulses: Normal pulses.      Heart sounds: Normal heart sounds.   Pulmonary:      Effort: Pulmonary effort is normal.      Breath sounds: Normal breath sounds.   Abdominal:      Palpations: Abdomen is soft.      Tenderness: There is abdominal tenderness in the left lower quadrant. There is no right CVA tenderness, left CVA tenderness, guarding or rebound.   Genitourinary:     Comments: Patient declined  Musculoskeletal:         General: Normal range of motion.      Cervical back: Normal range of motion.   Skin:     General: Skin is warm and dry.      Capillary Refill: Capillary refill takes less than 2 seconds.   Neurological:      General: No focal deficit present.      Mental Status: She is alert and oriented to person, place, and time.   Psychiatric:

## 2025-01-30 NOTE — ED TRIAGE NOTES
Department of Emergency Medicine  FIRST PROVIDER TRIAGE NOTE             Independent MLP           1/30/25  1:35 PM EST    Date of Encounter: 1/30/25   MRN: [id number]      HPI: Yadira Duran is a 29 y.o. female who presents to the ED for Abdominal Pain (Had a tubal on the 10th.  Just diagnosed with an ovarian cyst.  Pain not going away since yesterday.   Currently on second period this month and having \"huge\" clots. )      ROS: Negative for cp, Suicidal ideation, or Homicidal Ideation.    PE: Gen Appearance/Constitutional: alert  CV: regular rate     Initial Plan of Care: All treatment areas with department are currently occupied. Plan to order/Initiate the following while awaiting opening in ED: ultrasound, urine pregnancy, and urinalysis with microscopic.  Initiate Treatment-Testing, Proceed toTreatment Area When Bed Available for ED Attending/MLP to Continue Care    Electronically signed by ERMIAS Thompson CNP   DD: 1/30/25

## 2025-01-31 ASSESSMENT — ENCOUNTER SYMPTOMS
ALLERGIC/IMMUNOLOGIC NEGATIVE: 1
EYES NEGATIVE: 1
RESPIRATORY NEGATIVE: 1
ABDOMINAL PAIN: 1

## 2025-02-09 SDOH — HEALTH STABILITY: PHYSICAL HEALTH: ON AVERAGE, HOW MANY DAYS PER WEEK DO YOU ENGAGE IN MODERATE TO STRENUOUS EXERCISE (LIKE A BRISK WALK)?: 1 DAY

## 2025-02-09 SDOH — HEALTH STABILITY: PHYSICAL HEALTH: ON AVERAGE, HOW MANY MINUTES DO YOU ENGAGE IN EXERCISE AT THIS LEVEL?: 20 MIN

## 2025-02-12 ENCOUNTER — OFFICE VISIT (OUTPATIENT)
Dept: FAMILY MEDICINE CLINIC | Age: 30
End: 2025-02-12
Payer: COMMERCIAL

## 2025-02-12 VITALS
HEIGHT: 62 IN | OXYGEN SATURATION: 99 % | TEMPERATURE: 97.5 F | DIASTOLIC BLOOD PRESSURE: 60 MMHG | WEIGHT: 133 LBS | SYSTOLIC BLOOD PRESSURE: 90 MMHG | BODY MASS INDEX: 24.48 KG/M2 | HEART RATE: 97 BPM

## 2025-02-12 DIAGNOSIS — G44.009 MIGRAINE-CLUSTER HEADACHE SYNDROME: ICD-10-CM

## 2025-02-12 DIAGNOSIS — G62.9 PERIPHERAL POLYNEUROPATHY: Primary | ICD-10-CM

## 2025-02-12 PROCEDURE — 1036F TOBACCO NON-USER: CPT | Performed by: STUDENT IN AN ORGANIZED HEALTH CARE EDUCATION/TRAINING PROGRAM

## 2025-02-12 PROCEDURE — G8427 DOCREV CUR MEDS BY ELIG CLIN: HCPCS | Performed by: STUDENT IN AN ORGANIZED HEALTH CARE EDUCATION/TRAINING PROGRAM

## 2025-02-12 PROCEDURE — G2211 COMPLEX E/M VISIT ADD ON: HCPCS | Performed by: STUDENT IN AN ORGANIZED HEALTH CARE EDUCATION/TRAINING PROGRAM

## 2025-02-12 PROCEDURE — 99214 OFFICE O/P EST MOD 30 MIN: CPT | Performed by: STUDENT IN AN ORGANIZED HEALTH CARE EDUCATION/TRAINING PROGRAM

## 2025-02-12 PROCEDURE — G8420 CALC BMI NORM PARAMETERS: HCPCS | Performed by: STUDENT IN AN ORGANIZED HEALTH CARE EDUCATION/TRAINING PROGRAM

## 2025-02-12 RX ORDER — PREGABALIN 25 MG/1
25 CAPSULE ORAL 2 TIMES DAILY
Qty: 180 CAPSULE | Refills: 1 | Status: SHIPPED | OUTPATIENT
Start: 2025-02-12 | End: 2025-08-11

## 2025-02-12 RX ORDER — VERAPAMIL HYDROCHLORIDE 240 MG/1
240 TABLET, FILM COATED, EXTENDED RELEASE ORAL DAILY
Qty: 90 TABLET | Refills: 1 | Status: SHIPPED | OUTPATIENT
Start: 2025-02-12

## 2025-02-12 NOTE — PROGRESS NOTES
Current Outpatient Medications   Medication Sig Dispense Refill    verapamil (CALAN SR) 240 MG extended release tablet Take 1 tablet by mouth daily 90 tablet 1    pregabalin (LYRICA) 25 MG capsule Take 1 capsule by mouth 2 times daily for 180 days. Max Daily Amount: 50 mg 180 capsule 1     No current facility-administered medications for this visit.            ROS   Reviewed as above, otherwise negative       Physical Exam   Vitals:   Vitals:    02/12/25 1431   BP: 90/60   Pulse: 97   Temp: 97.5 °F (36.4 °C)   SpO2: 99%       Physical Exam  Vitals reviewed.   Constitutional:       Appearance: Normal appearance.   HENT:      Head: Normocephalic and atraumatic.   Cardiovascular:      Rate and Rhythm: Normal rate and regular rhythm.      Pulses: Normal pulses.      Heart sounds: Normal heart sounds.   Pulmonary:      Effort: Pulmonary effort is normal.      Breath sounds: Normal breath sounds.   Neurological:      Mental Status: She is alert.   Psychiatric:         Mood and Affect: Mood normal.         Behavior: Behavior normal.           Assessment and Plan     1. Peripheral polyneuropathy  Comments:  Stable.  Continue Lyrica 25 mg twice daily.  Orders:  -     pregabalin (LYRICA) 25 MG capsule; Take 1 capsule by mouth 2 times daily for 180 days. Max Daily Amount: 50 mg, Disp-180 capsule, [facility]  -     CBC; Future  -     Comprehensive Metabolic Panel; Future  -     TSH; Future  -     T4, Free; Future  -     Magnesium; Future  -     Vitamin B12 & Folate; Future  2. Migraine-cluster headache syndrome  Comments:  Unstable.  Begin trial of verapamil 240 mg daily.  Orders:  -     verapamil (CALAN SR) 240 MG extended release tablet; Take 1 tablet by mouth daily, Disp-90 tablet, [facility]  -     CBC; Future  -     Comprehensive Metabolic Panel; Future  -     TSH; Future  -     T4, Free; Future  -     Magnesium; Future  -     Vitamin B12 & Folate; Future

## 2025-02-19 ENCOUNTER — OFFICE VISIT (OUTPATIENT)
Dept: FAMILY MEDICINE CLINIC | Age: 30
End: 2025-02-19
Payer: COMMERCIAL

## 2025-02-19 VITALS
BODY MASS INDEX: 25.03 KG/M2 | OXYGEN SATURATION: 98 % | WEIGHT: 136 LBS | TEMPERATURE: 97 F | HEART RATE: 70 BPM | DIASTOLIC BLOOD PRESSURE: 72 MMHG | HEIGHT: 62 IN | SYSTOLIC BLOOD PRESSURE: 102 MMHG

## 2025-02-19 DIAGNOSIS — R35.0 URINARY FREQUENCY: Primary | ICD-10-CM

## 2025-02-19 DIAGNOSIS — R35.0 URINARY FREQUENCY: ICD-10-CM

## 2025-02-19 DIAGNOSIS — N30.00 ACUTE CYSTITIS WITHOUT HEMATURIA: ICD-10-CM

## 2025-02-19 LAB
BILIRUBIN, POC: NORMAL
BLOOD URINE, POC: NORMAL
CLARITY, POC: CLEAR
COLOR, POC: YELLOW
GLUCOSE URINE, POC: NORMAL MG/DL
HBA1C MFR BLD: 4.9 %
KETONES, POC: NORMAL MG/DL
LEUKOCYTE EST, POC: NORMAL
NITRITE, POC: NORMAL
PH, POC: 7
PROTEIN, POC: NORMAL MG/DL
SPECIFIC GRAVITY, POC: 1.02
UROBILINOGEN, POC: NORMAL MG/DL

## 2025-02-19 PROCEDURE — 83036 HEMOGLOBIN GLYCOSYLATED A1C: CPT | Performed by: STUDENT IN AN ORGANIZED HEALTH CARE EDUCATION/TRAINING PROGRAM

## 2025-02-19 PROCEDURE — 1036F TOBACCO NON-USER: CPT | Performed by: STUDENT IN AN ORGANIZED HEALTH CARE EDUCATION/TRAINING PROGRAM

## 2025-02-19 PROCEDURE — G8427 DOCREV CUR MEDS BY ELIG CLIN: HCPCS | Performed by: STUDENT IN AN ORGANIZED HEALTH CARE EDUCATION/TRAINING PROGRAM

## 2025-02-19 PROCEDURE — G8420 CALC BMI NORM PARAMETERS: HCPCS | Performed by: STUDENT IN AN ORGANIZED HEALTH CARE EDUCATION/TRAINING PROGRAM

## 2025-02-19 PROCEDURE — 99213 OFFICE O/P EST LOW 20 MIN: CPT | Performed by: STUDENT IN AN ORGANIZED HEALTH CARE EDUCATION/TRAINING PROGRAM

## 2025-02-19 PROCEDURE — 81002 URINALYSIS NONAUTO W/O SCOPE: CPT | Performed by: STUDENT IN AN ORGANIZED HEALTH CARE EDUCATION/TRAINING PROGRAM

## 2025-02-19 RX ORDER — NITROFURANTOIN MACROCRYSTALS 50 MG/1
50 CAPSULE ORAL 4 TIMES DAILY
Qty: 28 CAPSULE | Refills: 0 | Status: SHIPPED | OUTPATIENT
Start: 2025-02-19 | End: 2025-02-26

## 2025-02-19 NOTE — PROGRESS NOTES
[facility] C[facility]  [address] [place] [id number]  Dept: [phone]  Dept Fax: [phone]   DATE OF VISIT : 2025      Patient:  Yadira Duran  Age: 29 y.o.       : 1995      Chief complaint:   Chief Complaint   Patient presents with    Urinary Frequency    Vaginal Odor     Odor to urine -- sweet smelling         History of Present Illness     History of Present Illness  The patient is a 29-year-old female who presents today with urinary discomfort.    She has been experiencing frequent urination for the past 2 months, necessitating 4 to 5 bathroom visits per night. She reports no associated pain or burning sensation during urination. However, she notes that delaying urination results in discomfort. Additionally, she mentions an unusual odor in her urine, reminiscent of the smell of anesthesia post-surgery. She reports no vaginal discharge, itching, or burning sensation.    ALLERGIES  The patient has no known allergies.      Medication List:    Current Outpatient Medications   Medication Sig Dispense Refill    nitrofurantoin (MACRODANTIN) 50 MG capsule Take 1 capsule by mouth 4 times daily for 7 days 28 capsule 0    verapamil (CALAN SR) 240 MG extended release tablet Take 1 tablet by mouth daily 90 tablet 1    pregabalin (LYRICA) 25 MG capsule Take 1 capsule by mouth 2 times daily for 180 days. Max Daily Amount: 50 mg 180 capsule 1     No current facility-administered medications for this visit.            ROS   Reviewed as above, otherwise negative       Physical Exam   Vitals:   Vitals:    25 1614   BP: 102/72   Pulse: 70   Temp: 97 °F (36.1 °C)   SpO2: 98%       Physical Exam  Vitals reviewed.   Constitutional:       Appearance: Normal appearance.   HENT:      Head: Normocephalic and atraumatic.   Cardiovascular:      Rate and Rhythm: Normal rate and regular rhythm.      Pulses: Normal pulses.      Heart sounds: Normal

## 2025-02-21 LAB
CULTURE: NO GROWTH
Lab: NORMAL
SPECIMEN DESCRIPTION: NORMAL

## 2025-02-27 ENCOUNTER — HOSPITAL ENCOUNTER (EMERGENCY)
Age: 30
Discharge: HOME | End: 2025-02-27
Attending: EMERGENCY MEDICINE
Payer: COMMERCIAL

## 2025-02-27 ENCOUNTER — APPOINTMENT (OUTPATIENT)
Dept: CT IMAGING | Age: 30
End: 2025-02-27
Payer: COMMERCIAL

## 2025-02-27 VITALS
DIASTOLIC BLOOD PRESSURE: 75 MMHG | HEIGHT: 62 IN | SYSTOLIC BLOOD PRESSURE: 112 MMHG | OXYGEN SATURATION: 100 % | WEIGHT: 136 LBS | BODY MASS INDEX: 25.03 KG/M2 | HEART RATE: 82 BPM | TEMPERATURE: 97.1 F | RESPIRATION RATE: 16 BRPM

## 2025-02-27 DIAGNOSIS — H43.393 FLOATERS IN VISUAL FIELD, BILATERAL: ICD-10-CM

## 2025-02-27 DIAGNOSIS — R10.9 BILATERAL FLANK PAIN: Primary | ICD-10-CM

## 2025-02-27 DIAGNOSIS — B34.9 VIRAL ILLNESS: ICD-10-CM

## 2025-02-27 LAB
ALBUMIN SERPL-MCNC: 4.5 G/DL (ref 3.5–5.2)
ALP SERPL-CCNC: 114 U/L (ref 35–104)
ALT SERPL-CCNC: 26 U/L (ref 0–32)
ANION GAP SERPL CALCULATED.3IONS-SCNC: 11 MMOL/L (ref 7–16)
AST SERPL-CCNC: 20 U/L (ref 0–31)
B PARAP IS1001 DNA NPH QL NAA+NON-PROBE: NOT DETECTED
B PERT DNA SPEC QL NAA+PROBE: NOT DETECTED
BACTERIA URNS QL MICRO: ABNORMAL
BASOPHILS # BLD: 0.07 K/UL (ref 0–0.2)
BASOPHILS NFR BLD: 1 % (ref 0–2)
BILIRUB SERPL-MCNC: 0.4 MG/DL (ref 0–1.2)
BILIRUB UR QL STRIP: NEGATIVE
BUN SERPL-MCNC: 10 MG/DL (ref 6–20)
C PNEUM DNA NPH QL NAA+NON-PROBE: NOT DETECTED
CALCIUM SERPL-MCNC: 9.4 MG/DL (ref 8.6–10.2)
CHLORIDE SERPL-SCNC: 105 MMOL/L (ref 98–107)
CLARITY UR: CLEAR
CO2 SERPL-SCNC: 24 MMOL/L (ref 22–29)
COLOR UR: YELLOW
CREAT SERPL-MCNC: 0.8 MG/DL (ref 0.5–1)
EOSINOPHIL # BLD: 0.11 K/UL (ref 0.05–0.5)
EOSINOPHILS RELATIVE PERCENT: 1 % (ref 0–6)
EPI CELLS #/AREA URNS HPF: ABNORMAL /HPF
ERYTHROCYTE [DISTWIDTH] IN BLOOD BY AUTOMATED COUNT: 14.1 % (ref 11.5–15)
FLUAV RNA NPH QL NAA+NON-PROBE: NOT DETECTED
FLUBV RNA NPH QL NAA+NON-PROBE: NOT DETECTED
GFR, ESTIMATED: >90 ML/MIN/1.73M2
GLUCOSE SERPL-MCNC: 84 MG/DL (ref 74–99)
GLUCOSE UR STRIP-MCNC: NEGATIVE MG/DL
HADV DNA NPH QL NAA+NON-PROBE: NOT DETECTED
HCG SERPL QL: NEGATIVE
HCG, URINE, POC: NEGATIVE
HCOV 229E RNA NPH QL NAA+NON-PROBE: NOT DETECTED
HCOV HKU1 RNA NPH QL NAA+NON-PROBE: NOT DETECTED
HCOV NL63 RNA NPH QL NAA+NON-PROBE: NOT DETECTED
HCOV OC43 RNA NPH QL NAA+NON-PROBE: DETECTED
HCT VFR BLD AUTO: 42.9 % (ref 34–48)
HGB BLD-MCNC: 13.5 G/DL (ref 11.5–15.5)
HGB UR QL STRIP.AUTO: ABNORMAL
HMPV RNA NPH QL NAA+NON-PROBE: NOT DETECTED
HPIV1 RNA NPH QL NAA+NON-PROBE: NOT DETECTED
HPIV2 RNA NPH QL NAA+NON-PROBE: NOT DETECTED
HPIV3 RNA NPH QL NAA+NON-PROBE: NOT DETECTED
HPIV4 RNA NPH QL NAA+NON-PROBE: NOT DETECTED
IMM GRANULOCYTES # BLD AUTO: 0.04 K/UL (ref 0–0.58)
IMM GRANULOCYTES NFR BLD: 0 % (ref 0–5)
INFLUENZA A BY PCR: NOT DETECTED
INFLUENZA B BY PCR: NOT DETECTED
KETONES UR STRIP-MCNC: ABNORMAL MG/DL
LACTATE BLDV-SCNC: 0.9 MMOL/L (ref 0.5–2.2)
LEUKOCYTE ESTERASE UR QL STRIP: NEGATIVE
LYMPHOCYTES NFR BLD: 2.36 K/UL (ref 1.5–4)
LYMPHOCYTES RELATIVE PERCENT: 21 % (ref 20–42)
Lab: NORMAL
M PNEUMO DNA NPH QL NAA+NON-PROBE: NOT DETECTED
MCH RBC QN AUTO: 28.1 PG (ref 26–35)
MCHC RBC AUTO-ENTMCNC: 31.5 G/DL (ref 32–34.5)
MCV RBC AUTO: 89.4 FL (ref 80–99.9)
MONOCYTES NFR BLD: 0.77 K/UL (ref 0.1–0.95)
MONOCYTES NFR BLD: 7 % (ref 2–12)
NEGATIVE QC PASS/FAIL: NORMAL
NEUTROPHILS NFR BLD: 70 % (ref 43–80)
NEUTS SEG NFR BLD: 7.79 K/UL (ref 1.8–7.3)
NITRITE UR QL STRIP: NEGATIVE
PH UR STRIP: 6 [PH] (ref 5–8)
PLATELET # BLD AUTO: 275 K/UL (ref 130–450)
PMV BLD AUTO: 11.6 FL (ref 7–12)
POSITIVE QC PASS/FAIL: NORMAL
POTASSIUM SERPL-SCNC: 3.9 MMOL/L (ref 3.5–5)
PROT SERPL-MCNC: 7.8 G/DL (ref 6.4–8.3)
PROT UR STRIP-MCNC: NEGATIVE MG/DL
RBC # BLD AUTO: 4.8 M/UL (ref 3.5–5.5)
RBC #/AREA URNS HPF: ABNORMAL /HPF
RSV RNA NPH QL NAA+NON-PROBE: NOT DETECTED
RV+EV RNA NPH QL NAA+NON-PROBE: NOT DETECTED
SARS-COV-2 RDRP RESP QL NAA+PROBE: NOT DETECTED
SARS-COV-2 RNA NPH QL NAA+NON-PROBE: NOT DETECTED
SODIUM SERPL-SCNC: 140 MMOL/L (ref 132–146)
SP GR UR STRIP: 1.02 (ref 1–1.03)
SPECIMEN DESCRIPTION: ABNORMAL
SPECIMEN DESCRIPTION: NORMAL
TROPONIN I SERPL HS-MCNC: <6 NG/L (ref 0–9)
UROBILINOGEN UR STRIP-ACNC: 0.2 EU/DL (ref 0–1)
WBC #/AREA URNS HPF: ABNORMAL /HPF
WBC OTHER # BLD: 11.1 K/UL (ref 4.5–11.5)

## 2025-02-27 PROCEDURE — 84703 CHORIONIC GONADOTROPIN ASSAY: CPT

## 2025-02-27 PROCEDURE — 80053 COMPREHEN METABOLIC PANEL: CPT

## 2025-02-27 PROCEDURE — 96360 HYDRATION IV INFUSION INIT: CPT

## 2025-02-27 PROCEDURE — 87635 SARS-COV-2 COVID-19 AMP PRB: CPT

## 2025-02-27 PROCEDURE — 87502 INFLUENZA DNA AMP PROBE: CPT

## 2025-02-27 PROCEDURE — 74177 CT ABD & PELVIS W/CONTRAST: CPT

## 2025-02-27 PROCEDURE — 99285 EMERGENCY DEPT VISIT HI MDM: CPT

## 2025-02-27 PROCEDURE — 85025 COMPLETE CBC W/AUTO DIFF WBC: CPT

## 2025-02-27 PROCEDURE — 83605 ASSAY OF LACTIC ACID: CPT

## 2025-02-27 PROCEDURE — 81001 URINALYSIS AUTO W/SCOPE: CPT

## 2025-02-27 PROCEDURE — 93005 ELECTROCARDIOGRAM TRACING: CPT

## 2025-02-27 PROCEDURE — 84484 ASSAY OF TROPONIN QUANT: CPT

## 2025-02-27 PROCEDURE — 0202U NFCT DS 22 TRGT SARS-COV-2: CPT

## 2025-02-27 PROCEDURE — 6360000004 HC RX CONTRAST MEDICATION: Performed by: RADIOLOGY

## 2025-02-27 PROCEDURE — 96361 HYDRATE IV INFUSION ADD-ON: CPT

## 2025-02-27 PROCEDURE — 2580000003 HC RX 258

## 2025-02-27 RX ORDER — LORAZEPAM 0.5 MG/1
0.5 TABLET ORAL EVERY 4 HOURS PRN
Status: DISCONTINUED | OUTPATIENT
Start: 2025-02-27 | End: 2025-02-27

## 2025-02-27 RX ORDER — KETOROLAC TROMETHAMINE 15 MG/ML
15 INJECTION, SOLUTION INTRAMUSCULAR; INTRAVENOUS ONCE
Status: DISCONTINUED | OUTPATIENT
Start: 2025-02-27 | End: 2025-02-27

## 2025-02-27 RX ORDER — 0.9 % SODIUM CHLORIDE 0.9 %
1000 INTRAVENOUS SOLUTION INTRAVENOUS ONCE
Status: COMPLETED | OUTPATIENT
Start: 2025-02-27 | End: 2025-02-27

## 2025-02-27 RX ORDER — IOPAMIDOL 755 MG/ML
75 INJECTION, SOLUTION INTRAVASCULAR
Status: COMPLETED | OUTPATIENT
Start: 2025-02-27 | End: 2025-02-27

## 2025-02-27 RX ADMIN — SODIUM CHLORIDE 1000 ML: 9 INJECTION, SOLUTION INTRAVENOUS at 14:37

## 2025-02-27 RX ADMIN — IOPAMIDOL 75 ML: 755 INJECTION, SOLUTION INTRAVENOUS at 16:06

## 2025-02-27 ASSESSMENT — PAIN SCALES - GENERAL: PAINLEVEL_OUTOF10: 6

## 2025-02-27 NOTE — ED PROVIDER NOTES
[facility] EMERGENCY DEPARTMENT  EMERGENCY DEPARTMENT ENCOUNTER        Pt Name: Yadira Duran  MRN: [id number]  Birthdate 1995  Date of evaluation: 2025  Provider: Leilani Pompa DO  PCP: Norberto Fink MD  Note Started: 2:01 PM EST 25    CHIEF COMPLAINT       Chief Complaint   Patient presents with    Flank Pain     Bilateral, vision changes today, irregular heart rhythm at urgent care        HISTORY OF PRESENT ILLNESS: 1 or more Elements   Yadira Duran is a 29 y.o. female who presents to the emergency department with chief complaint of bilateral flank pain beginning today.  Patient with recent URI type symptoms and some mild left sided chest pressure yesterday which has mostly resolved but persisted into today.  She woke up today with bilateral flank pain and was concerned about a urinary tract infection.  Patient had been seen at urgent care and was told to come to the emergency department as they felt her heart rate was 'irregular\".  Patient is very anxious on presentation and is feeling lightheaded with changes in her vision described as \"spots\" which she will intermittently get.  She is otherwise denying any dysuria, hematuria, hematochezia, fevers, chills, nausea, vomiting, abdominal pain, SOB, or syncope. She had a recent tubal ligation on January 10.     Nursing Notes were all reviewed and agreed with or any disagreements were addressed in the HPI.    REVIEW OF SYSTEMS :    Positives and Pertinent negatives as per HPI.    PAST MEDICAL HISTORY/Chronic Conditions Affecting Care    has a past medical history of Depression, Endometriosis, IBS (irritable bowel syndrome), Optic neuritis (), and Seizure ([facility]) (2015).     SURGICAL HISTORY     Past Surgical History:   Procedure Laterality Date     SECTION  2015     SECTION N/A 2022     SECTION performed by Paula Cast DO at [facility] L[facility] OR     SECTION  Percentile Temp Temp src Pulse Respirations SpO2   02/27/25 1335 -- -- 02/27/25 1335 -- 02/27/25 1335 02/27/25 1335 02/27/25 1335   92/71   97.1 °F (36.2 °C)  (!) 108 18 99 %      Height Weight - Scale         02/27/25 1334 02/27/25 1334         1.575 m (5' 2\") 61.7 kg (136 lb)             Constitutional/General: Alert and oriented x3, no acute distress  Head: Normocephalic and atraumatic  Eyes: PERRL, EOMI, conjunctiva normal, sclera non icteric  ENT:  Oropharynx clear, handling secretions, dry mucous membranes  Neck: Supple, full ROM, no stridor  Respiratory: Lungs clear to auscultation bilaterally, no wheezes, rales, or rhonchi. Not in respiratory distress  Cardiovascular:  Regular rate. Regular rhythm. 2+ palpable symmetrical DP and radial pulses bilaterally  Chest: No chest wall tenderness  GI:  Abdomen Soft, Non tender, Non distended.  No rebound, guarding, or rigidity.  Bilateral CVA tenderness.   Musculoskeletal: Moves all extremities x 4. Warm and well perfused, no cyanosis, no edema. Capillary refill <3 seconds  Integument: skin warm and dry. No rashes   Neurologic: GCS 15, no focal deficits, symmetric strength 5/5 in the upper and lower extremities bilaterally    DIAGNOSTIC RESULTS   LABS:    Labs Reviewed   CBC WITH AUTO DIFFERENTIAL - Abnormal; Notable for the following components:       Result Value    MCHC 31.5 (*)     Neutrophils Absolute 7.79 (*)     All other components within normal limits   COMPREHENSIVE METABOLIC PANEL - Abnormal; Notable for the following components:    Alkaline Phosphatase 114 (*)     All other components within normal limits   URINALYSIS WITH MICROSCOPIC - Abnormal; Notable for the following components:    Ketones, Urine TRACE (*)     Urine Hgb MODERATE (*)     RBC, UA 3 to 5 (*)     Bacteria, UA TRACE (*)     All other components within normal limits   COVID-19, RAPID   INFLUENZA A + B, PCR   RESPIRATORY PANEL, MOLECULAR, WITH COVID-19   TROPONIN   LACTIC ACID   HCG, SERUM,

## 2025-02-28 LAB
EKG ATRIAL RATE: 87 BPM
EKG P AXIS: 57 DEGREES
EKG P-R INTERVAL: 152 MS
EKG Q-T INTERVAL: 366 MS
EKG QRS DURATION: 82 MS
EKG QTC CALCULATION (BAZETT): 440 MS
EKG R AXIS: 83 DEGREES
EKG T AXIS: 42 DEGREES
EKG VENTRICULAR RATE: 87 BPM

## 2025-02-28 PROCEDURE — 93010 ELECTROCARDIOGRAM REPORT: CPT | Performed by: INTERNAL MEDICINE

## 2025-03-17 ENCOUNTER — E-VISIT (OUTPATIENT)
Dept: PRIMARY CARE CLINIC | Age: 30
End: 2025-03-17
Payer: COMMERCIAL

## 2025-03-17 DIAGNOSIS — R12 HEARTBURN: Primary | ICD-10-CM

## 2025-03-17 PROCEDURE — 99422 OL DIG E/M SVC 11-20 MIN: CPT | Performed by: NURSE PRACTITIONER

## 2025-03-17 RX ORDER — OMEPRAZOLE 20 MG/1
20 CAPSULE, DELAYED RELEASE ORAL
Qty: 30 CAPSULE | Refills: 0 | Status: SHIPPED | OUTPATIENT
Start: 2025-03-17

## 2025-03-17 NOTE — PROGRESS NOTES
Reviewed questionnaire    Reviewed meds/allergies    Dx Heartburn    Plan Rx given for prilosec, follow up with PCP for further management    Time spent on visit 11 min

## 2025-03-19 ENCOUNTER — APPOINTMENT (OUTPATIENT)
Dept: CT IMAGING | Age: 30
End: 2025-03-19
Payer: COMMERCIAL

## 2025-03-19 ENCOUNTER — HOSPITAL ENCOUNTER (EMERGENCY)
Age: 30
Discharge: HOME | End: 2025-03-19
Attending: EMERGENCY MEDICINE
Payer: COMMERCIAL

## 2025-03-19 VITALS
RESPIRATION RATE: 18 BRPM | HEIGHT: 63 IN | WEIGHT: 136 LBS | OXYGEN SATURATION: 99 % | TEMPERATURE: 98.8 F | BODY MASS INDEX: 24.1 KG/M2 | DIASTOLIC BLOOD PRESSURE: 65 MMHG | HEART RATE: 89 BPM | SYSTOLIC BLOOD PRESSURE: 98 MMHG

## 2025-03-19 DIAGNOSIS — K42.9 UMBILICAL HERNIA WITHOUT OBSTRUCTION AND WITHOUT GANGRENE: ICD-10-CM

## 2025-03-19 DIAGNOSIS — R10.9 RIGHT SIDED ABDOMINAL PAIN: Primary | ICD-10-CM

## 2025-03-19 DIAGNOSIS — K59.00 CONSTIPATION, UNSPECIFIED CONSTIPATION TYPE: ICD-10-CM

## 2025-03-19 LAB
ALBUMIN SERPL-MCNC: 4.5 G/DL (ref 3.5–5.2)
ALP SERPL-CCNC: 102 U/L (ref 35–104)
ALT SERPL-CCNC: 11 U/L (ref 0–32)
ANION GAP SERPL CALCULATED.3IONS-SCNC: 11 MMOL/L (ref 7–16)
AST SERPL-CCNC: 15 U/L (ref 0–31)
BACTERIA URNS QL MICRO: ABNORMAL
BASOPHILS # BLD: 0.04 K/UL (ref 0–0.2)
BASOPHILS NFR BLD: 0 % (ref 0–2)
BILIRUB SERPL-MCNC: 0.6 MG/DL (ref 0–1.2)
BILIRUB UR QL STRIP: NEGATIVE
BUN SERPL-MCNC: 11 MG/DL (ref 6–20)
CALCIUM SERPL-MCNC: 9.3 MG/DL (ref 8.6–10.2)
CHLORIDE SERPL-SCNC: 106 MMOL/L (ref 98–107)
CLARITY UR: CLEAR
CO2 SERPL-SCNC: 21 MMOL/L (ref 22–29)
COLOR UR: YELLOW
CREAT SERPL-MCNC: 0.6 MG/DL (ref 0.5–1)
EOSINOPHIL # BLD: 0.08 K/UL (ref 0.05–0.5)
EOSINOPHILS RELATIVE PERCENT: 1 % (ref 0–6)
EPI CELLS #/AREA URNS HPF: ABNORMAL /HPF
ERYTHROCYTE [DISTWIDTH] IN BLOOD BY AUTOMATED COUNT: 14 % (ref 11.5–15)
GFR, ESTIMATED: >90 ML/MIN/1.73M2
GLUCOSE SERPL-MCNC: 98 MG/DL (ref 74–99)
GLUCOSE UR STRIP-MCNC: NEGATIVE MG/DL
HCG, URINE, POC: NEGATIVE
HCT VFR BLD AUTO: 46.2 % (ref 34–48)
HGB BLD-MCNC: 14.5 G/DL (ref 11.5–15.5)
HGB UR QL STRIP.AUTO: ABNORMAL
IMM GRANULOCYTES # BLD AUTO: 0.04 K/UL (ref 0–0.58)
IMM GRANULOCYTES NFR BLD: 0 % (ref 0–5)
KETONES UR STRIP-MCNC: NEGATIVE MG/DL
LACTATE BLDV-SCNC: 0.7 MMOL/L (ref 0.5–2.2)
LEUKOCYTE ESTERASE UR QL STRIP: ABNORMAL
LIPASE SERPL-CCNC: 25 U/L (ref 13–60)
LYMPHOCYTES NFR BLD: 1.15 K/UL (ref 1.5–4)
LYMPHOCYTES RELATIVE PERCENT: 12 % (ref 20–42)
Lab: NORMAL
MCH RBC QN AUTO: 28.4 PG (ref 26–35)
MCHC RBC AUTO-ENTMCNC: 31.4 G/DL (ref 32–34.5)
MCV RBC AUTO: 90.4 FL (ref 80–99.9)
MONOCYTES NFR BLD: 0.79 K/UL (ref 0.1–0.95)
MONOCYTES NFR BLD: 8 % (ref 2–12)
NEGATIVE QC PASS/FAIL: NORMAL
NEUTROPHILS NFR BLD: 78 % (ref 43–80)
NEUTS SEG NFR BLD: 7.53 K/UL (ref 1.8–7.3)
NITRITE UR QL STRIP: NEGATIVE
PH UR STRIP: 6 [PH] (ref 5–8)
PLATELET # BLD AUTO: 243 K/UL (ref 130–450)
PMV BLD AUTO: 11.3 FL (ref 7–12)
POSITIVE QC PASS/FAIL: NORMAL
POTASSIUM SERPL-SCNC: 4.3 MMOL/L (ref 3.5–5)
PROT SERPL-MCNC: 7.8 G/DL (ref 6.4–8.3)
PROT UR STRIP-MCNC: NEGATIVE MG/DL
RBC # BLD AUTO: 5.11 M/UL (ref 3.5–5.5)
RBC #/AREA URNS HPF: ABNORMAL /HPF
SODIUM SERPL-SCNC: 138 MMOL/L (ref 132–146)
SP GR UR STRIP: >1.03 (ref 1–1.03)
UROBILINOGEN UR STRIP-ACNC: 0.2 EU/DL (ref 0–1)
WBC #/AREA URNS HPF: ABNORMAL /HPF
WBC OTHER # BLD: 9.6 K/UL (ref 4.5–11.5)

## 2025-03-19 PROCEDURE — 96375 TX/PRO/DX INJ NEW DRUG ADDON: CPT

## 2025-03-19 PROCEDURE — 74177 CT ABD & PELVIS W/CONTRAST: CPT

## 2025-03-19 PROCEDURE — 83690 ASSAY OF LIPASE: CPT

## 2025-03-19 PROCEDURE — 2500000003 HC RX 250 WO HCPCS: Performed by: PHYSICIAN ASSISTANT

## 2025-03-19 PROCEDURE — 96374 THER/PROPH/DIAG INJ IV PUSH: CPT

## 2025-03-19 PROCEDURE — 2580000003 HC RX 258: Performed by: PHYSICIAN ASSISTANT

## 2025-03-19 PROCEDURE — 83605 ASSAY OF LACTIC ACID: CPT

## 2025-03-19 PROCEDURE — 99285 EMERGENCY DEPT VISIT HI MDM: CPT

## 2025-03-19 PROCEDURE — 6360000002 HC RX W HCPCS: Performed by: PHYSICIAN ASSISTANT

## 2025-03-19 PROCEDURE — 85025 COMPLETE CBC W/AUTO DIFF WBC: CPT

## 2025-03-19 PROCEDURE — 81001 URINALYSIS AUTO W/SCOPE: CPT

## 2025-03-19 PROCEDURE — 6360000004 HC RX CONTRAST MEDICATION: Performed by: RADIOLOGY

## 2025-03-19 PROCEDURE — 80053 COMPREHEN METABOLIC PANEL: CPT

## 2025-03-19 RX ORDER — SENNA AND DOCUSATE SODIUM 50; 8.6 MG/1; MG/1
1 TABLET, FILM COATED ORAL DAILY
Qty: 30 TABLET | Refills: 0 | Status: SHIPPED | OUTPATIENT
Start: 2025-03-19

## 2025-03-19 RX ORDER — IOPAMIDOL 755 MG/ML
75 INJECTION, SOLUTION INTRAVASCULAR
Status: COMPLETED | OUTPATIENT
Start: 2025-03-19 | End: 2025-03-19

## 2025-03-19 RX ORDER — POLYETHYLENE GLYCOL 3350 17 G/17G
17 POWDER, FOR SOLUTION ORAL EVERY MORNING
Qty: 510 G | Refills: 0 | Status: SHIPPED | OUTPATIENT
Start: 2025-03-19 | End: 2025-04-18

## 2025-03-19 RX ORDER — 0.9 % SODIUM CHLORIDE 0.9 %
1000 INTRAVENOUS SOLUTION INTRAVENOUS ONCE
Status: COMPLETED | OUTPATIENT
Start: 2025-03-19 | End: 2025-03-19

## 2025-03-19 RX ORDER — KETOROLAC TROMETHAMINE 30 MG/ML
30 INJECTION, SOLUTION INTRAMUSCULAR; INTRAVENOUS ONCE
Status: COMPLETED | OUTPATIENT
Start: 2025-03-19 | End: 2025-03-19

## 2025-03-19 RX ORDER — ONDANSETRON 2 MG/ML
4 INJECTION INTRAMUSCULAR; INTRAVENOUS ONCE
Status: COMPLETED | OUTPATIENT
Start: 2025-03-19 | End: 2025-03-19

## 2025-03-19 RX ADMIN — IOPAMIDOL 75 ML: 755 INJECTION, SOLUTION INTRAVENOUS at 11:21

## 2025-03-19 RX ADMIN — ONDANSETRON 4 MG: 2 INJECTION, SOLUTION INTRAMUSCULAR; INTRAVENOUS at 09:55

## 2025-03-19 RX ADMIN — SODIUM CHLORIDE 1000 ML: 0.9 INJECTION, SOLUTION INTRAVENOUS at 09:55

## 2025-03-19 RX ADMIN — FAMOTIDINE 20 MG: 10 INJECTION, SOLUTION INTRAVENOUS at 09:56

## 2025-03-19 RX ADMIN — KETOROLAC TROMETHAMINE 30 MG: 30 INJECTION, SOLUTION INTRAMUSCULAR at 09:57

## 2025-03-19 ASSESSMENT — PAIN SCALES - GENERAL
PAINLEVEL_OUTOF10: 7
PAINLEVEL_OUTOF10: 7
PAINLEVEL_OUTOF10: 3
PAINLEVEL_OUTOF10: 7

## 2025-03-19 ASSESSMENT — PAIN DESCRIPTION - DESCRIPTORS
DESCRIPTORS: BURNING
DESCRIPTORS: BURNING
DESCRIPTORS: DISCOMFORT

## 2025-03-19 ASSESSMENT — PAIN DESCRIPTION - ORIENTATION: ORIENTATION: RIGHT;UPPER

## 2025-03-19 ASSESSMENT — PAIN DESCRIPTION - LOCATION
LOCATION: ABDOMEN

## 2025-03-19 ASSESSMENT — PAIN DESCRIPTION - PAIN TYPE: TYPE: ACUTE PAIN

## 2025-03-19 ASSESSMENT — PAIN - FUNCTIONAL ASSESSMENT
PAIN_FUNCTIONAL_ASSESSMENT: PREVENTS OR INTERFERES SOME ACTIVE ACTIVITIES AND ADLS
PAIN_FUNCTIONAL_ASSESSMENT: 0-10

## 2025-03-19 ASSESSMENT — PAIN DESCRIPTION - FREQUENCY: FREQUENCY: CONTINUOUS

## 2025-03-19 NOTE — ED PROVIDER NOTES
Negative    Lot Number [id number]     Positive QC Pass/Fail Pass     Negative QC Pass/Fail Pass      Imaging:  CT ABDOMEN PELVIS W IV CONTRAST Additional Contrast? None   Final Result   There is no evidence of acute abdominal/pelvic abnormality.      4 mm nonobstructing right renal calculus.           -- MEDICAL DECISION MAKING --   Patient received a personal history and physicial examination and re-evaluation prior to disposition    History From: History from : Patient  Limitations to history : None    Record Review:  Outpatient Notes reviewed- (dates)   ED note reviewed from 2/27/2025  ED note reviewed from 1/30/2025  ED note reviewed from 1/20/2025  OB/GYN note reviewed from 1/29/2025    Other Records Reviewed : None    CC/HPI Summary, DDx, ED Course, and Reassessment:   Patient presents to the ED for Abdominal Pain (Upper abdominal pain began Sunday night, emesis )    This is a 29 y.o. female who presents to the ED for Abdominal Pain (Upper abdominal pain began Sunday night, emesis )  .   This is a 29-year-old female who presents to the emergency department for right sided abdominal pain that began Sunday.  She states the pain worsened last night and she did have one episode of vomiting this morning.  Labs done today are reassuring. No leukocytosis, No RAFFAELE, electrolytes are within normal limits. CT scan reveals no acute intraabdominal pathology. There is constipation. Patient will be sent home with PeriColace and Miralax and advised to follow up with PCP. She will also be given Gastroenterology follow-up. Increase fiber, increase fluids, increase vegetables. Patient was in no distress and able to ambulate out of department with no difficulty. Vitals stable. Patient advised to continue already prescribed Omeprazole.     Patient was given the following medications:  Medications   sodium chloride 0.9 % bolus 1,000 mL (0 mLs IntraVENous Stopped 3/19/25 1055)   ketorolac (TORADOL) injection 30 mg (30 mg IntraVENous

## 2025-04-09 ENCOUNTER — OFFICE VISIT (OUTPATIENT)
Dept: FAMILY MEDICINE CLINIC | Age: 30
End: 2025-04-09
Payer: COMMERCIAL

## 2025-04-09 VITALS
SYSTOLIC BLOOD PRESSURE: 108 MMHG | RESPIRATION RATE: 18 BRPM | TEMPERATURE: 97.9 F | BODY MASS INDEX: 23.04 KG/M2 | HEIGHT: 63 IN | HEART RATE: 90 BPM | DIASTOLIC BLOOD PRESSURE: 70 MMHG | OXYGEN SATURATION: 99 % | WEIGHT: 130 LBS

## 2025-04-09 DIAGNOSIS — R11.0 NAUSEA: ICD-10-CM

## 2025-04-09 DIAGNOSIS — G35 EXACERBATION OF MULTIPLE SCLEROSIS (HCC): Primary | ICD-10-CM

## 2025-04-09 PROCEDURE — 1036F TOBACCO NON-USER: CPT | Performed by: NURSE PRACTITIONER

## 2025-04-09 PROCEDURE — G8420 CALC BMI NORM PARAMETERS: HCPCS | Performed by: NURSE PRACTITIONER

## 2025-04-09 PROCEDURE — G8427 DOCREV CUR MEDS BY ELIG CLIN: HCPCS | Performed by: NURSE PRACTITIONER

## 2025-04-09 PROCEDURE — 99214 OFFICE O/P EST MOD 30 MIN: CPT | Performed by: NURSE PRACTITIONER

## 2025-04-09 RX ORDER — ONDANSETRON 4 MG/1
4 TABLET, ORALLY DISINTEGRATING ORAL 3 TIMES DAILY PRN
Qty: 21 TABLET | Refills: 0 | Status: SHIPPED | OUTPATIENT
Start: 2025-04-09

## 2025-04-09 RX ORDER — PREDNISONE 10 MG/1
TABLET ORAL
Qty: 18 TABLET | Refills: 0 | Status: SHIPPED | OUTPATIENT
Start: 2025-04-09 | End: 2025-04-18

## 2025-04-09 NOTE — PROGRESS NOTES
ACUTE PRIMARY CARE VISIT  25  Name: Yadira Duran   : 1995   Age: 29 y.o.  Sex: female   Chief Complaint   Patient presents with    Numbness     Left side of her head/face since last night    Abdominal Pain     HPI:     History of Present Illness  The patient is a 29-year-old female presenting for evaluation of multiple sclerosis (MS), nausea, and abdominal pain.    Multiple Sclerosis (MS)  - History of MS with new onset numbness and tingling on the left side of her face and head last night  - Reports persistent heaviness in her leg, worsening over the past two weeks  - Generalized numbness  - Previously under neurologic care, discontinued medications due to pregnancy  - Child is now 9 months old and symptoms are worsening  - Seeks to resume treatment  - Currently on Lyrica and recently restarted metoprolol    Nausea and Abdominal Pain  - Severe nausea and abdominal pain prompted a gastroenterologist consultation  - No gastrointestinal issues found; she suspects MS flare-up  - Using old prescription of Reglan, which alleviates symptoms  - Prescribed omeprazole but not yet started  - No pregnancy possibility due to tubal ligation    Supplemental information: Diagnosed with mild gastroparesis a few years ago.  Objective:     Vitals:    25 1638   BP: 108/70   BP Site: Left Upper Arm   Patient Position: Sitting   BP Cuff Size: Medium Adult   Pulse: 90   Resp: 18   Temp: 97.9 °F (36.6 °C)   TempSrc: Temporal   SpO2: 99%   Weight: 59 kg (130 lb)   Height: 1.6 m (5' 3\")     Physical Exam    Physical Exam  Vitals and nursing note reviewed.   Constitutional:       General: She is not in acute distress.     Appearance: Normal appearance. She is not ill-appearing.   HENT:      Head: Normocephalic and atraumatic.      Right Ear: External ear normal.      Left Ear: External ear normal.      Nose: Nose normal.   Eyes:      General: Lids are normal.      Extraocular Movements: Extraocular movements intact.

## 2025-04-28 ENCOUNTER — OFFICE VISIT (OUTPATIENT)
Dept: FAMILY MEDICINE CLINIC | Age: 30
End: 2025-04-28
Payer: COMMERCIAL

## 2025-04-28 VITALS
BODY MASS INDEX: 23.21 KG/M2 | WEIGHT: 131 LBS | SYSTOLIC BLOOD PRESSURE: 110 MMHG | DIASTOLIC BLOOD PRESSURE: 58 MMHG | HEART RATE: 69 BPM | HEIGHT: 63 IN | TEMPERATURE: 97.2 F | RESPIRATION RATE: 17 BRPM | OXYGEN SATURATION: 99 %

## 2025-04-28 DIAGNOSIS — R20.0 NUMBNESS AND TINGLING OF RIGHT FACE: Primary | ICD-10-CM

## 2025-04-28 DIAGNOSIS — R20.2 NUMBNESS AND TINGLING OF RIGHT FACE: Primary | ICD-10-CM

## 2025-04-28 PROCEDURE — 99215 OFFICE O/P EST HI 40 MIN: CPT | Performed by: FAMILY MEDICINE

## 2025-04-28 PROCEDURE — G8420 CALC BMI NORM PARAMETERS: HCPCS | Performed by: FAMILY MEDICINE

## 2025-04-28 PROCEDURE — 1036F TOBACCO NON-USER: CPT | Performed by: FAMILY MEDICINE

## 2025-04-28 PROCEDURE — G8427 DOCREV CUR MEDS BY ELIG CLIN: HCPCS | Performed by: FAMILY MEDICINE

## 2025-04-28 NOTE — PROGRESS NOTES
[facility]    [facility] Roger presents to the office today for   Chief Complaint   Patient presents with    eye bulging     Right    Tingling     Right side of face. Onset today        History of Present Illness  The patient presents for evaluation of multiple sclerosis.    A perceived exacerbation of multiple sclerosis symptoms is reported, characterized by facial swelling and tingling on the right side. The onset of facial numbness was noted this morning. No arm or leg symptoms are present, although slight weakness is acknowledged, which is not considered unusual. She is not currently breastfeeding. No febrile episodes or pain on the right side of the face are reported, but a persistent tingling sensation is described. A diagnosis of multiple sclerosis has been established, with two identified lesions. The last consultation occurred approximately 1.5 years ago, prior to her pregnancy. Maintenance medication for the condition is not currently being taken, but medication for nerve pain is in use. Prednisone was prescribed a few weeks ago, providing some relief.    A history of seizures is noted, occurring postpartum 10 years ago, with no recurrence since then.    Review of Systems     BP (!) 110/58   Pulse 69   Temp 97.2 °F (36.2 °C) (Temporal)   Resp 17   Ht 1.6 m (5' 3\")   Wt 59.4 kg (131 lb)   LMP 04/25/2025 (Exact Date)   SpO2 99%   BMI 23.21 kg/m²   Physical Exam  Constitutional:       Appearance: Normal appearance.   HENT:      Head: Normocephalic and atraumatic.   Eyes:      Extraocular Movements: Extraocular movements intact.      Conjunctiva/sclera: Conjunctivae normal.   Cardiovascular:      Rate and Rhythm: Normal rate.   Pulmonary:      Effort: Pulmonary effort is normal.   Skin:     General: Skin is warm.   Neurological:      Mental Status: She is alert and oriented to person, place, and time.      Comments: R eye slight bulge  Decreased sensation to R forehead and cheek  Strength

## 2025-05-05 ENCOUNTER — OFFICE VISIT (OUTPATIENT)
Dept: FAMILY MEDICINE CLINIC | Age: 30
End: 2025-05-05
Payer: COMMERCIAL

## 2025-05-05 VITALS
OXYGEN SATURATION: 98 % | HEART RATE: 62 BPM | BODY MASS INDEX: 23.04 KG/M2 | SYSTOLIC BLOOD PRESSURE: 116 MMHG | DIASTOLIC BLOOD PRESSURE: 72 MMHG | WEIGHT: 130 LBS | TEMPERATURE: 97.7 F | RESPIRATION RATE: 20 BRPM | HEIGHT: 63 IN

## 2025-05-05 DIAGNOSIS — N39.0 URINARY TRACT INFECTION WITH HEMATURIA, SITE UNSPECIFIED: ICD-10-CM

## 2025-05-05 DIAGNOSIS — R10.9 FLANK PAIN: ICD-10-CM

## 2025-05-05 DIAGNOSIS — R30.0 DYSURIA: ICD-10-CM

## 2025-05-05 DIAGNOSIS — R30.0 DYSURIA: Primary | ICD-10-CM

## 2025-05-05 DIAGNOSIS — R31.9 URINARY TRACT INFECTION WITH HEMATURIA, SITE UNSPECIFIED: ICD-10-CM

## 2025-05-05 LAB
BILIRUBIN, POC: NORMAL
BLOOD URINE, POC: NORMAL
CLARITY, POC: CLEAR
COLOR, POC: YELLOW
CONTROL: NORMAL
GLUCOSE URINE, POC: NORMAL MG/DL
KETONES, POC: NORMAL MG/DL
LEUKOCYTE EST, POC: NORMAL
NITRITE, POC: NORMAL
PH, POC: 7
PREGNANCY TEST URINE, POC: NORMAL
PROTEIN, POC: 30 MG/DL
SPECIFIC GRAVITY, POC: 1.02
UROBILINOGEN, POC: 0.2 MG/DL

## 2025-05-05 PROCEDURE — 81025 URINE PREGNANCY TEST: CPT | Performed by: EMERGENCY MEDICINE

## 2025-05-05 PROCEDURE — 81002 URINALYSIS NONAUTO W/O SCOPE: CPT | Performed by: EMERGENCY MEDICINE

## 2025-05-05 PROCEDURE — G8420 CALC BMI NORM PARAMETERS: HCPCS | Performed by: EMERGENCY MEDICINE

## 2025-05-05 PROCEDURE — 99213 OFFICE O/P EST LOW 20 MIN: CPT | Performed by: EMERGENCY MEDICINE

## 2025-05-05 PROCEDURE — 1036F TOBACCO NON-USER: CPT | Performed by: EMERGENCY MEDICINE

## 2025-05-05 PROCEDURE — G8427 DOCREV CUR MEDS BY ELIG CLIN: HCPCS | Performed by: EMERGENCY MEDICINE

## 2025-05-05 RX ORDER — CEFDINIR 300 MG/1
300 CAPSULE ORAL 2 TIMES DAILY
Qty: 14 CAPSULE | Refills: 0 | Status: SHIPPED | OUTPATIENT
Start: 2025-05-05 | End: 2025-05-12

## 2025-05-05 ASSESSMENT — ENCOUNTER SYMPTOMS
SHORTNESS OF BREATH: 0
COUGH: 0
ABDOMINAL DISTENTION: 0
EYE PAIN: 0
DIARRHEA: 0
EYE DISCHARGE: 0
WHEEZING: 0
SINUS PRESSURE: 0
SORE THROAT: 0
VOMITING: 0
EYE REDNESS: 0
NAUSEA: 0
BACK PAIN: 0

## 2025-05-05 NOTE — PROGRESS NOTES
Chief Complaint:   Dysuria (Onset x 1 day/States that she has flank pain on bilateral sides )      History of Present Illness   Source of history provided by:  patient.      Yadira Duran is a 29 y.o. old female who has a past medical history of:   Past Medical History:   Diagnosis Date    Depression     Endometriosis     IBS (irritable bowel syndrome)     Optic neuritis     Seizure (HCC) 2015    after delivery / not sure was a seizure, no issues since , no medication    presents to the [facility] care for dysuria.  Pt states the symptoms have progressed over the past 2 days.  Pt states they have had increased frequency, lower abdominal pressure, and occasional nausea.  Pos flank pain.  Denies any vaginal discharge, vaginal bleeding, vomiting, diarrhea, or lethargy.   Patient's last menstrual period was 2025 (exact date).      ROS   Review of Systems   Constitutional:  Negative for chills and fever.   HENT:  Negative for ear pain, sinus pressure and sore throat.    Eyes:  Negative for pain, discharge and redness.   Respiratory:  Negative for cough, shortness of breath and wheezing.    Cardiovascular:  Negative for chest pain.   Gastrointestinal:  Negative for abdominal distention, diarrhea, nausea and vomiting.   Genitourinary:  Positive for dysuria, flank pain and frequency.   Musculoskeletal:  Negative for arthralgias and back pain.   Skin:  Negative for rash and wound.   Neurological:  Negative for weakness and headaches.   Hematological:  Negative for adenopathy.   Psychiatric/Behavioral: Negative.     All other systems reviewed and are negative.       Past Surgical history:   Past Surgical History:   Procedure Laterality Date     SECTION  2015     SECTION N/A 2022     [facility]     SECTION N/A 2024     SECTION performed by Yolette Vuong DO at [facility]    CHOLECYSTECTOMY

## 2025-05-07 ENCOUNTER — RESULTS FOLLOW-UP (OUTPATIENT)
Dept: FAMILY MEDICINE CLINIC | Age: 30
End: 2025-05-07

## 2025-05-07 LAB
CULTURE: ABNORMAL
SPECIMEN DESCRIPTION: ABNORMAL

## 2025-05-08 ENCOUNTER — OFFICE VISIT (OUTPATIENT)
Dept: FAMILY MEDICINE CLINIC | Age: 30
End: 2025-05-08
Payer: COMMERCIAL

## 2025-05-08 VITALS
WEIGHT: 130 LBS | DIASTOLIC BLOOD PRESSURE: 82 MMHG | HEART RATE: 101 BPM | HEIGHT: 63 IN | TEMPERATURE: 97.7 F | SYSTOLIC BLOOD PRESSURE: 122 MMHG | BODY MASS INDEX: 23.04 KG/M2 | OXYGEN SATURATION: 97 %

## 2025-05-08 DIAGNOSIS — R31.9 URINARY TRACT INFECTION WITH HEMATURIA, SITE UNSPECIFIED: Primary | ICD-10-CM

## 2025-05-08 DIAGNOSIS — N39.0 URINARY TRACT INFECTION WITH HEMATURIA, SITE UNSPECIFIED: ICD-10-CM

## 2025-05-08 DIAGNOSIS — N39.0 URINARY TRACT INFECTION WITH HEMATURIA, SITE UNSPECIFIED: Primary | ICD-10-CM

## 2025-05-08 DIAGNOSIS — R31.9 URINARY TRACT INFECTION WITH HEMATURIA, SITE UNSPECIFIED: ICD-10-CM

## 2025-05-08 DIAGNOSIS — N20.0 NEPHROLITHIASIS: Primary | ICD-10-CM

## 2025-05-08 LAB
BILIRUBIN, POC: NORMAL
BLOOD URINE, POC: NORMAL
CLARITY, POC: CLEAR
COLOR, POC: NORMAL
CONTROL: NORMAL
GLUCOSE URINE, POC: 250 MG/DL
KETONES, POC: 15 MG/DL
LEUKOCYTE EST, POC: NORMAL
NITRITE, POC: POSITIVE
PH, POC: 5
PREGNANCY TEST URINE, POC: NORMAL
PROTEIN, POC: 100 MG/DL
SPECIFIC GRAVITY, POC: 1.02
UROBILINOGEN, POC: 4 MG/DL

## 2025-05-08 PROCEDURE — 99213 OFFICE O/P EST LOW 20 MIN: CPT | Performed by: PHYSICIAN ASSISTANT

## 2025-05-08 PROCEDURE — 81025 URINE PREGNANCY TEST: CPT | Performed by: PHYSICIAN ASSISTANT

## 2025-05-08 PROCEDURE — G8427 DOCREV CUR MEDS BY ELIG CLIN: HCPCS | Performed by: PHYSICIAN ASSISTANT

## 2025-05-08 PROCEDURE — 1036F TOBACCO NON-USER: CPT | Performed by: PHYSICIAN ASSISTANT

## 2025-05-08 PROCEDURE — G8420 CALC BMI NORM PARAMETERS: HCPCS | Performed by: PHYSICIAN ASSISTANT

## 2025-05-08 PROCEDURE — 81002 URINALYSIS NONAUTO W/O SCOPE: CPT | Performed by: PHYSICIAN ASSISTANT

## 2025-05-08 RX ORDER — CIPROFLOXACIN 500 MG/1
500 TABLET, FILM COATED ORAL 2 TIMES DAILY
Qty: 14 TABLET | Refills: 0 | Status: SHIPPED | OUTPATIENT
Start: 2025-05-08 | End: 2025-05-15

## 2025-05-08 RX ORDER — TAMSULOSIN HYDROCHLORIDE 0.4 MG/1
0.4 CAPSULE ORAL DAILY
Qty: 7 CAPSULE | Refills: 0 | Status: SHIPPED | OUTPATIENT
Start: 2025-05-08 | End: 2025-05-15

## 2025-05-08 RX ORDER — SULFAMETHOXAZOLE AND TRIMETHOPRIM 800; 160 MG/1; MG/1
1 TABLET ORAL 2 TIMES DAILY
Qty: 14 TABLET | Refills: 0 | Status: SHIPPED | OUTPATIENT
Start: 2025-05-08 | End: 2025-05-08

## 2025-05-08 NOTE — PROGRESS NOTES
Chief Complaint       Urinary Tract Infection (Currently on antibiotics for this), Pain (Worse with voiding ), Back Pain (Lower ), and Nausea      History of Present Illness   Source of history provided by:  patient.      Yadira Duran is a 29 y.o. old female presenting to the walk in clinic for evaluation of progressive dysuria, suprapubic pressure, nausea, chills, and bilateral flank pain which has been present for the past 5 days.  Patient was initially seen in our office on 2025 and treated for a suspected UTI with a course of Omnicef.  Her urine culture showed intermediate sensitivity to Omnicef.  She has been taking this as prescribed without relief of symptoms.  Reports possible gross hematuria. Denies any fever, chills, vaginal discharge, vaginal bleeding, possibility of pregnancy, vomiting, diarrhea, or lethargy.  Patient's last menstrual period was 2025 (exact date).    ROS    Unless otherwise stated in this report or unable to obtain because of the patient's clinical or mental status as evidenced by the medical record, this patients's positive and negative responses for Review of Systems, constitutional, psych, eyes, ENT, cardiovascular, respiratory, gastrointestinal, neurological, genitourinary, musculoskeletal, integument systems and systems related to the presenting problem are either stated in the preceding or were not pertinent or were negative for the symptoms and/or complaints related to the medical problem.    Past Medical History:  has a past medical history of Depression, Endometriosis, IBS (irritable bowel syndrome), Optic neuritis, and Seizure (HCC).  Past Surgical History:  has a past surgical history that includes Colonoscopy (2012); Upper gastrointestinal endoscopy (2012); Upper gastrointestinal endoscopy;  section (2015); Upper gastrointestinal endoscopy (2017); Cholecystectomy (2017); pr  delivery only (N/A, 2018);

## 2025-05-10 LAB
CULTURE: ABNORMAL
SPECIMEN DESCRIPTION: ABNORMAL

## 2025-05-11 ENCOUNTER — RESULTS FOLLOW-UP (OUTPATIENT)
Dept: FAMILY MEDICINE CLINIC | Age: 30
End: 2025-05-11

## 2025-06-17 ENCOUNTER — OFFICE VISIT (OUTPATIENT)
Dept: FAMILY MEDICINE CLINIC | Age: 30
End: 2025-06-17
Payer: COMMERCIAL

## 2025-06-17 VITALS
SYSTOLIC BLOOD PRESSURE: 106 MMHG | BODY MASS INDEX: 24.29 KG/M2 | HEART RATE: 85 BPM | DIASTOLIC BLOOD PRESSURE: 56 MMHG | TEMPERATURE: 97.6 F | OXYGEN SATURATION: 98 % | HEIGHT: 62 IN | WEIGHT: 132 LBS

## 2025-06-17 DIAGNOSIS — R07.89 CHEST TIGHTNESS: ICD-10-CM

## 2025-06-17 DIAGNOSIS — R11.0 NAUSEA: ICD-10-CM

## 2025-06-17 DIAGNOSIS — G44.009 MIGRAINE-CLUSTER HEADACHE SYNDROME: ICD-10-CM

## 2025-06-17 DIAGNOSIS — G62.9 PERIPHERAL POLYNEUROPATHY: Primary | ICD-10-CM

## 2025-06-17 PROCEDURE — 99214 OFFICE O/P EST MOD 30 MIN: CPT | Performed by: PHYSICIAN ASSISTANT

## 2025-06-17 PROCEDURE — 1036F TOBACCO NON-USER: CPT | Performed by: PHYSICIAN ASSISTANT

## 2025-06-17 PROCEDURE — 93000 ELECTROCARDIOGRAM COMPLETE: CPT | Performed by: PHYSICIAN ASSISTANT

## 2025-06-17 PROCEDURE — G8420 CALC BMI NORM PARAMETERS: HCPCS | Performed by: PHYSICIAN ASSISTANT

## 2025-06-17 PROCEDURE — G8427 DOCREV CUR MEDS BY ELIG CLIN: HCPCS | Performed by: PHYSICIAN ASSISTANT

## 2025-06-17 RX ORDER — ONDANSETRON 4 MG/1
4 TABLET, ORALLY DISINTEGRATING ORAL 3 TIMES DAILY PRN
Qty: 15 TABLET | Refills: 0 | Status: SHIPPED | OUTPATIENT
Start: 2025-06-17

## 2025-06-17 RX ORDER — PREDNISONE 10 MG/1
TABLET ORAL
Qty: 30 TABLET | Refills: 0 | Status: SHIPPED | OUTPATIENT
Start: 2025-06-17

## 2025-06-17 NOTE — PROGRESS NOTES
without erythema or perforation bilaterally.    Throat:  Pharynx without injection, exudate, or tonsillar hypertrophy.  Airway patient.  Neck:  Normal ROM.  Supple.  Lungs:  Clear to auscultation and breath sounds equal.  Heart:  Regular rate and rhythm, normal heart sounds, without pathological murmurs, ectopy, gallops, or rubs.  Abdomen:  Soft, nontender, good bowel sounds.  No firm or pulsatile mass.  Back:  No costovertebral tenderness.  Skin:  Normal turgor.  Warm, dry, without visible rash, unless noted elsewhere.  Neurological:  Alert and oriented.  Motor functions intact.    Lab / Imaging Results   (All laboratory and radiology results have been personally reviewed by myself)  Labs:  No results found for this visit on 06/17/25.    Imaging:  All Radiology results interpreted by Radiologist unless otherwise noted.      Assessment / Plan     Impression(s):  Yadira was seen today for numbness and eye problem.    Diagnoses and all orders for this visit:    Peripheral polyneuropathy  -     predniSONE (DELTASONE) 10 MG tablet; 1 tab po QID x 3 days, 1 tab po TID x 3 days, 1 tab po BID x 3 days, 1 tab po QD until gone    Migraine-cluster headache syndrome    Chest tightness  -     EKG 12 Lead; Future  -     EKG 12 Lead    Nausea  -     ondansetron (ZOFRAN-ODT) 4 MG disintegrating tablet; Take 1 tablet by mouth 3 times daily as needed for Nausea or Vomiting      Disposition:  Disposition: Discharge to home.    Assessment & Plan  1. Suspected multiple sclerosis (MS) exacerbation.  Her symptoms, including numbness, tremors, and nausea, suggest an MS flare-up. She ran out of Lyrica a couple of weeks ago, which may have contributed to the exacerbation.  A prescription for prednisone with a 12-day tapering schedule starting at 40 mg will be provided. She is advised to contact her pharmacy for a Lyrica refill, as there appears to be one remaining refill available. A prescription for Zofran will also be issued for use as

## 2025-06-26 ENCOUNTER — APPOINTMENT (OUTPATIENT)
Dept: GENERAL RADIOLOGY | Age: 30
End: 2025-06-26
Payer: COMMERCIAL

## 2025-06-26 ENCOUNTER — HOSPITAL ENCOUNTER (EMERGENCY)
Age: 30
Discharge: HOME | End: 2025-06-26
Attending: EMERGENCY MEDICINE
Payer: COMMERCIAL

## 2025-06-26 ENCOUNTER — OFFICE VISIT (OUTPATIENT)
Dept: FAMILY MEDICINE CLINIC | Age: 30
End: 2025-06-26
Payer: COMMERCIAL

## 2025-06-26 ENCOUNTER — APPOINTMENT (OUTPATIENT)
Dept: CT IMAGING | Age: 30
End: 2025-06-26
Payer: COMMERCIAL

## 2025-06-26 VITALS
DIASTOLIC BLOOD PRESSURE: 86 MMHG | SYSTOLIC BLOOD PRESSURE: 122 MMHG | OXYGEN SATURATION: 97 % | TEMPERATURE: 98.6 F | HEART RATE: 76 BPM | RESPIRATION RATE: 16 BRPM

## 2025-06-26 VITALS
DIASTOLIC BLOOD PRESSURE: 70 MMHG | WEIGHT: 133 LBS | BODY MASS INDEX: 24.48 KG/M2 | HEART RATE: 126 BPM | TEMPERATURE: 97.4 F | HEIGHT: 62 IN | SYSTOLIC BLOOD PRESSURE: 116 MMHG | OXYGEN SATURATION: 99 %

## 2025-06-26 DIAGNOSIS — G62.9 PERIPHERAL POLYNEUROPATHY: ICD-10-CM

## 2025-06-26 DIAGNOSIS — R07.9 CHEST PAIN, UNSPECIFIED TYPE: ICD-10-CM

## 2025-06-26 DIAGNOSIS — R00.0 TACHYCARDIA: Primary | ICD-10-CM

## 2025-06-26 DIAGNOSIS — R51.9 ACUTE NONINTRACTABLE HEADACHE, UNSPECIFIED HEADACHE TYPE: Primary | ICD-10-CM

## 2025-06-26 DIAGNOSIS — G44.009 MIGRAINE-CLUSTER HEADACHE SYNDROME: ICD-10-CM

## 2025-06-26 DIAGNOSIS — R11.2 NAUSEA AND VOMITING, UNSPECIFIED VOMITING TYPE: ICD-10-CM

## 2025-06-26 LAB
ALBUMIN SERPL-MCNC: 4.7 G/DL (ref 3.5–5.2)
ALP SERPL-CCNC: 95 U/L (ref 35–104)
ALT SERPL-CCNC: 24 U/L (ref 0–35)
ANION GAP SERPL CALCULATED.3IONS-SCNC: 12 MMOL/L (ref 7–16)
AST SERPL-CCNC: 17 U/L (ref 0–35)
BASOPHILS # BLD: 0.04 K/UL (ref 0–0.2)
BASOPHILS NFR BLD: 0 % (ref 0–2)
BILIRUB SERPL-MCNC: 0.2 MG/DL (ref 0–1.2)
BILIRUB UR QL STRIP: NEGATIVE
BUN SERPL-MCNC: 8 MG/DL (ref 6–20)
CALCIUM SERPL-MCNC: 9.3 MG/DL (ref 8.6–10)
CHLORIDE SERPL-SCNC: 105 MMOL/L (ref 98–107)
CLARITY UR: CLEAR
CO2 SERPL-SCNC: 24 MMOL/L (ref 22–29)
COLOR UR: YELLOW
CREAT SERPL-MCNC: 0.6 MG/DL (ref 0.5–1)
D-DIMER QUANTITATIVE: <200 NG/ML DDU (ref 0–230)
EKG ATRIAL RATE: 106 BPM
EKG P AXIS: 65 DEGREES
EKG P-R INTERVAL: 148 MS
EKG Q-T INTERVAL: 340 MS
EKG QRS DURATION: 78 MS
EKG QTC CALCULATION (BAZETT): 451 MS
EKG R AXIS: 86 DEGREES
EKG T AXIS: 45 DEGREES
EKG VENTRICULAR RATE: 106 BPM
EOSINOPHIL # BLD: 0.06 K/UL (ref 0.05–0.5)
EOSINOPHILS RELATIVE PERCENT: 1 % (ref 0–6)
EPI CELLS #/AREA URNS HPF: ABNORMAL /HPF
ERYTHROCYTE [DISTWIDTH] IN BLOOD BY AUTOMATED COUNT: 14.6 % (ref 11.5–15)
GFR, ESTIMATED: >90 ML/MIN/1.73M2
GLUCOSE SERPL-MCNC: 87 MG/DL (ref 74–99)
GLUCOSE UR STRIP-MCNC: NEGATIVE MG/DL
HCG, URINE, POC: NEGATIVE
HCT VFR BLD AUTO: 46.1 % (ref 34–48)
HGB BLD-MCNC: 14.4 G/DL (ref 11.5–15.5)
HGB UR QL STRIP.AUTO: ABNORMAL
IMM GRANULOCYTES # BLD AUTO: 0.1 K/UL (ref 0–0.58)
IMM GRANULOCYTES NFR BLD: 1 % (ref 0–5)
KETONES UR STRIP-MCNC: NEGATIVE MG/DL
LACTATE BLDV-SCNC: 1.1 MMOL/L (ref 0.5–2.2)
LEUKOCYTE ESTERASE UR QL STRIP: NEGATIVE
LIPASE SERPL-CCNC: 27 U/L (ref 13–60)
LYMPHOCYTES NFR BLD: 2.7 K/UL (ref 1.5–4)
LYMPHOCYTES RELATIVE PERCENT: 20 % (ref 20–42)
Lab: NORMAL
MCH RBC QN AUTO: 28.1 PG (ref 26–35)
MCHC RBC AUTO-ENTMCNC: 31.2 G/DL (ref 32–34.5)
MCV RBC AUTO: 90 FL (ref 80–99.9)
MONOCYTES NFR BLD: 0.84 K/UL (ref 0.1–0.95)
MONOCYTES NFR BLD: 6 % (ref 2–12)
NEGATIVE QC PASS/FAIL: NORMAL
NEUTROPHILS NFR BLD: 72 % (ref 43–80)
NEUTS SEG NFR BLD: 9.51 K/UL (ref 1.8–7.3)
NITRITE UR QL STRIP: NEGATIVE
PH UR STRIP: 6.5 [PH] (ref 5–8)
PLATELET # BLD AUTO: 375 K/UL (ref 130–450)
PMV BLD AUTO: 11.1 FL (ref 7–12)
POSITIVE QC PASS/FAIL: NORMAL
POTASSIUM SERPL-SCNC: 3.9 MMOL/L (ref 3.5–5.1)
PROT SERPL-MCNC: 7.7 G/DL (ref 6.4–8.3)
PROT UR STRIP-MCNC: NEGATIVE MG/DL
RBC # BLD AUTO: 5.12 M/UL (ref 3.5–5.5)
RBC #/AREA URNS HPF: ABNORMAL /HPF
SODIUM SERPL-SCNC: 141 MMOL/L (ref 136–145)
SP GR UR STRIP: <1.005 (ref 1–1.03)
TROPONIN I SERPL HS-MCNC: <6 NG/L (ref 0–14)
TROPONIN I SERPL HS-MCNC: <6 NG/L (ref 0–14)
UROBILINOGEN UR STRIP-ACNC: 0.2 EU/DL (ref 0–1)
WBC #/AREA URNS HPF: ABNORMAL /HPF
WBC OTHER # BLD: 13.3 K/UL (ref 4.5–11.5)

## 2025-06-26 PROCEDURE — G8420 CALC BMI NORM PARAMETERS: HCPCS | Performed by: PHYSICIAN ASSISTANT

## 2025-06-26 PROCEDURE — 93000 ELECTROCARDIOGRAM COMPLETE: CPT | Performed by: PHYSICIAN ASSISTANT

## 2025-06-26 PROCEDURE — 6360000004 HC RX CONTRAST MEDICATION: Performed by: RADIOLOGY

## 2025-06-26 PROCEDURE — 96375 TX/PRO/DX INJ NEW DRUG ADDON: CPT

## 2025-06-26 PROCEDURE — 83690 ASSAY OF LIPASE: CPT

## 2025-06-26 PROCEDURE — 1036F TOBACCO NON-USER: CPT | Performed by: PHYSICIAN ASSISTANT

## 2025-06-26 PROCEDURE — 81001 URINALYSIS AUTO W/SCOPE: CPT

## 2025-06-26 PROCEDURE — 70496 CT ANGIOGRAPHY HEAD: CPT

## 2025-06-26 PROCEDURE — 71045 X-RAY EXAM CHEST 1 VIEW: CPT

## 2025-06-26 PROCEDURE — 74177 CT ABD & PELVIS W/CONTRAST: CPT

## 2025-06-26 PROCEDURE — 2580000003 HC RX 258: Performed by: EMERGENCY MEDICINE

## 2025-06-26 PROCEDURE — 83605 ASSAY OF LACTIC ACID: CPT

## 2025-06-26 PROCEDURE — 6360000002 HC RX W HCPCS: Performed by: EMERGENCY MEDICINE

## 2025-06-26 PROCEDURE — 85379 FIBRIN DEGRADATION QUANT: CPT

## 2025-06-26 PROCEDURE — 93005 ELECTROCARDIOGRAM TRACING: CPT | Performed by: EMERGENCY MEDICINE

## 2025-06-26 PROCEDURE — 70498 CT ANGIOGRAPHY NECK: CPT

## 2025-06-26 PROCEDURE — 99285 EMERGENCY DEPT VISIT HI MDM: CPT

## 2025-06-26 PROCEDURE — 96374 THER/PROPH/DIAG INJ IV PUSH: CPT

## 2025-06-26 PROCEDURE — 96376 TX/PRO/DX INJ SAME DRUG ADON: CPT

## 2025-06-26 PROCEDURE — 96361 HYDRATE IV INFUSION ADD-ON: CPT

## 2025-06-26 PROCEDURE — 93010 ELECTROCARDIOGRAM REPORT: CPT | Performed by: INTERNAL MEDICINE

## 2025-06-26 PROCEDURE — 6370000000 HC RX 637 (ALT 250 FOR IP): Performed by: EMERGENCY MEDICINE

## 2025-06-26 PROCEDURE — G8427 DOCREV CUR MEDS BY ELIG CLIN: HCPCS | Performed by: PHYSICIAN ASSISTANT

## 2025-06-26 PROCEDURE — 99215 OFFICE O/P EST HI 40 MIN: CPT | Performed by: PHYSICIAN ASSISTANT

## 2025-06-26 PROCEDURE — 80053 COMPREHEN METABOLIC PANEL: CPT

## 2025-06-26 PROCEDURE — 87086 URINE CULTURE/COLONY COUNT: CPT

## 2025-06-26 PROCEDURE — 85025 COMPLETE CBC W/AUTO DIFF WBC: CPT

## 2025-06-26 PROCEDURE — 84484 ASSAY OF TROPONIN QUANT: CPT

## 2025-06-26 RX ORDER — IOPAMIDOL 755 MG/ML
75 INJECTION, SOLUTION INTRAVASCULAR
Status: COMPLETED | OUTPATIENT
Start: 2025-06-26 | End: 2025-06-26

## 2025-06-26 RX ORDER — PROCHLORPERAZINE EDISYLATE 5 MG/ML
10 INJECTION INTRAMUSCULAR; INTRAVENOUS ONCE
Status: COMPLETED | OUTPATIENT
Start: 2025-06-26 | End: 2025-06-26

## 2025-06-26 RX ORDER — ACETAMINOPHEN 500 MG
1000 TABLET ORAL ONCE
Status: COMPLETED | OUTPATIENT
Start: 2025-06-26 | End: 2025-06-26

## 2025-06-26 RX ORDER — DIPHENHYDRAMINE HYDROCHLORIDE 50 MG/ML
25 INJECTION, SOLUTION INTRAMUSCULAR; INTRAVENOUS ONCE
Status: COMPLETED | OUTPATIENT
Start: 2025-06-26 | End: 2025-06-26

## 2025-06-26 RX ORDER — 0.9 % SODIUM CHLORIDE 0.9 %
1000 INTRAVENOUS SOLUTION INTRAVENOUS ONCE
Status: COMPLETED | OUTPATIENT
Start: 2025-06-26 | End: 2025-06-26

## 2025-06-26 RX ADMIN — PROCHLORPERAZINE EDISYLATE 10 MG: 5 INJECTION INTRAMUSCULAR; INTRAVENOUS at 16:59

## 2025-06-26 RX ADMIN — DIPHENHYDRAMINE HYDROCHLORIDE 25 MG: 50 INJECTION INTRAMUSCULAR; INTRAVENOUS at 18:21

## 2025-06-26 RX ADMIN — IOPAMIDOL 75 ML: 755 INJECTION, SOLUTION INTRAVENOUS at 19:31

## 2025-06-26 RX ADMIN — ACETAMINOPHEN 1000 MG: 500 TABLET ORAL at 16:18

## 2025-06-26 RX ADMIN — DIPHENHYDRAMINE HYDROCHLORIDE 25 MG: 50 INJECTION INTRAMUSCULAR; INTRAVENOUS at 16:58

## 2025-06-26 RX ADMIN — SODIUM CHLORIDE 1000 ML: 0.9 INJECTION, SOLUTION INTRAVENOUS at 16:59

## 2025-06-26 ASSESSMENT — PAIN SCALES - GENERAL
PAINLEVEL_OUTOF10: 7
PAINLEVEL_OUTOF10: 2

## 2025-06-26 ASSESSMENT — PAIN DESCRIPTION - LOCATION
LOCATION: HEAD
LOCATION: HEAD

## 2025-06-26 ASSESSMENT — PAIN - FUNCTIONAL ASSESSMENT: PAIN_FUNCTIONAL_ASSESSMENT: 0-10

## 2025-06-26 ASSESSMENT — PAIN DESCRIPTION - DESCRIPTORS: DESCRIPTORS: ACHING

## 2025-06-26 ASSESSMENT — ENCOUNTER SYMPTOMS
VOMITING: 1
SHORTNESS OF BREATH: 0
NAUSEA: 1
EYE REDNESS: 0
ABDOMINAL PAIN: 1

## 2025-06-26 NOTE — ED PROVIDER NOTES
1.  Pattern of mild constipation.      2.  No indication for acute intraperitoneal or retroperitoneal process in the   abdomen or in the pelvis.         XR CHEST PORTABLE   Final Result   No acute process.               RADIOLOGY:   Non-plain film images such as CT, Ultrasound and MRI are read by the radiologist. Plain radiographic images are visualized and preliminarily interpreted by the ED Provider       Interpretation per the Radiologist below, if available at the time of this note:    CTA HEAD W CONTRAST   Final Result   CT HEAD:      1. No acute intracranial abnormality.   2. Normal CT appearance of the head.   3. Nothing seen to correlate with the history of headache.      CTA NECK:      Normal appearance of the cervical carotid and vertebral arteries.      CTA HEAD:      No stenosis, occlusion, or aneurysm of the proximal intracranial arteries.         CTA NECK W CONTRAST   Final Result   CT HEAD:      1. No acute intracranial abnormality.   2. Normal CT appearance of the head.   3. Nothing seen to correlate with the history of headache.      CTA NECK:      Normal appearance of the cervical carotid and vertebral arteries.      CTA HEAD:      No stenosis, occlusion, or aneurysm of the proximal intracranial arteries.         CT ABDOMEN PELVIS W IV CONTRAST Additional Contrast? None   Final Result   1.  Pattern of mild constipation.      2.  No indication for acute intraperitoneal or retroperitoneal process in the   abdomen or in the pelvis.         XR CHEST PORTABLE   Final Result   No acute process.           No results found.    No results found.    PROCEDURES   Unless otherwise noted below, none       MDM:   Dr. Jasson DYKES am the primary physician of record.    Yadira Duran is a 29 y.o. female who presents to the ED for headache and chest pain  Vital signs upon arrival /86   Pulse 76   Temp 98.6 °F (37 °C) (Oral)   Resp 16   SpO2 97%     History/physical examination/differential

## 2025-06-26 NOTE — PROGRESS NOTES
Chief Complaint       Tachycardia (Was at work and HR was 156, had Dr at work listen to her and was okay /Headache and noticed some swelling around lips /Was on prednisone /)      History of Present Illness   Source of history provided by:  patient.    History of Present Illness  The patient is a 29-year-old female who presents for evaluation of tachycardia.    She experienced an episode of tachycardia at work today, with her heart rate escalating from 112 to 156. This was accompanied by a headache, facial burning sensation, and light sensitivity. She also reports dizziness, lip swelling, and chest tightness. Her pulse oximeter readings fluctuated between 72 and 156 over a 20-minute period. She has been maintaining hydration and does not report any feelings of anxiety. She has consulted a cardiologist in the past, approximately 6 years ago, but no cardiac issues were identified at that time. She had previously worn a Holter monitor, but the results were lost. She reports that her current heart rate is unusually high compared to her typical baseline. She has been on prednisone for treatment of suspected MS/polyneuropathy since 6/17 without adverse effects.    Supplemental Information  She has resumed her Lyrica medication. She has observed increased swelling in her ankles today.    ALLERGIES  The patient is allergic to MORPHINE and NAPROXEN.      ROS    Unless otherwise stated in this report or unable to obtain because of the patient's clinical or mental status as evidenced by the medical record, this patients's positive and negative responses for Review of Systems, constitutional, psych, eyes, ENT, cardiovascular, respiratory, gastrointestinal, neurological, genitourinary, musculoskeletal, integument systems and systems related to the presenting problem are either stated in the preceding or were not pertinent or were negative for the symptoms and/or complaints related to the medical problem.    Past Medical

## 2025-06-28 LAB
MICROORGANISM SPEC CULT: NO GROWTH
SERVICE CMNT-IMP: NORMAL
SPECIMEN DESCRIPTION: NORMAL

## 2025-07-02 ENCOUNTER — OFFICE VISIT (OUTPATIENT)
Dept: FAMILY MEDICINE CLINIC | Age: 30
End: 2025-07-02
Payer: COMMERCIAL

## 2025-07-02 VITALS
TEMPERATURE: 97.3 F | BODY MASS INDEX: 24.11 KG/M2 | WEIGHT: 131 LBS | HEIGHT: 62 IN | DIASTOLIC BLOOD PRESSURE: 70 MMHG | HEART RATE: 101 BPM | SYSTOLIC BLOOD PRESSURE: 102 MMHG | OXYGEN SATURATION: 100 %

## 2025-07-02 DIAGNOSIS — R00.2 PALPITATIONS: Primary | ICD-10-CM

## 2025-07-02 DIAGNOSIS — R53.83 OTHER FATIGUE: ICD-10-CM

## 2025-07-02 DIAGNOSIS — R00.2 PALPITATIONS: ICD-10-CM

## 2025-07-02 DIAGNOSIS — G44.009 MIGRAINE-CLUSTER HEADACHE SYNDROME: ICD-10-CM

## 2025-07-02 LAB
T4 FREE: 1.4 NG/DL (ref 0.9–1.7)
TSH SERPL DL<=0.05 MIU/L-ACNC: 0.78 UIU/ML (ref 0.27–4.2)

## 2025-07-02 PROCEDURE — G8420 CALC BMI NORM PARAMETERS: HCPCS | Performed by: STUDENT IN AN ORGANIZED HEALTH CARE EDUCATION/TRAINING PROGRAM

## 2025-07-02 PROCEDURE — 99214 OFFICE O/P EST MOD 30 MIN: CPT | Performed by: STUDENT IN AN ORGANIZED HEALTH CARE EDUCATION/TRAINING PROGRAM

## 2025-07-02 PROCEDURE — 1036F TOBACCO NON-USER: CPT | Performed by: STUDENT IN AN ORGANIZED HEALTH CARE EDUCATION/TRAINING PROGRAM

## 2025-07-02 PROCEDURE — G8427 DOCREV CUR MEDS BY ELIG CLIN: HCPCS | Performed by: STUDENT IN AN ORGANIZED HEALTH CARE EDUCATION/TRAINING PROGRAM

## 2025-07-02 RX ORDER — PROPRANOLOL HYDROCHLORIDE 60 MG/1
60 CAPSULE, EXTENDED RELEASE ORAL DAILY
Qty: 90 CAPSULE | Refills: 2 | Status: SHIPPED | OUTPATIENT
Start: 2025-07-02

## 2025-07-02 ASSESSMENT — VISUAL ACUITY: OU: 1

## 2025-07-02 NOTE — PROGRESS NOTES
[facility] [address] [address]  Dept: [phone]  Dept Fax: [phone]   DATE OF VISIT : 2025      Patient:  Yadira Duran  Age: 29 y.o.       : 1995      Chief complaint:   Chief Complaint   Patient presents with    Lesion(s)     Lesion on liver          History of Present Illness       History of Present Illness  The patient presents for evaluation of palpitations, migraines, and right-sided swelling.    She experienced an episode of irregular heartbeats on 2025, with her pulse oximeter readings fluctuating between 130s, 71, 152, and 80 over a period of 20 minutes. This was accompanied by dizziness and hyperventilation. An EKG performed in the emergency department revealed sinus tachycardia. A similar episode occurred on 2025, but she did not have a pulse oximeter at that time. She also reported feeling unwell over the weekend, with symptoms including right-sided headache, nausea, vomiting, and weight loss. She has been experiencing chest tightness during these episodes but does not believe they are anxiety or panic attacks. These episodes are often accompanied by severe headaches. She had a Holter monitor test approximately 6 years ago due to similar palpitations, but the results were lost.    She continues to experience migraines, which were exacerbated by verapamil. She has previously tried propranolol, which provided some relief.    She has noticed swelling on the right side of her body, including her hands, face, and eyes. She also reports feeling tired at the end of the day and experiencing bloating. She has no family history of thyroid issues and has had her thyroid checked multiple times, most recently in  or .    A recent abdominal CT scan revealed a lesion on her liver, which was identified as a 3 mm hypodense lesion in the right lobe of the liver, most likely a cyst. The lesion

## 2025-07-03 ENCOUNTER — RESULTS FOLLOW-UP (OUTPATIENT)
Dept: PRIMARY CARE CLINIC | Age: 30
End: 2025-07-03

## 2025-07-16 ENCOUNTER — HOSPITAL ENCOUNTER (OUTPATIENT)
Dept: SLEEP CENTER | Age: 30
Discharge: HOME | End: 2025-07-16
Attending: STUDENT IN AN ORGANIZED HEALTH CARE EDUCATION/TRAINING PROGRAM
Payer: COMMERCIAL

## 2025-07-16 DIAGNOSIS — R00.2 PALPITATIONS: ICD-10-CM

## 2025-07-16 PROCEDURE — 93246 EXT ECG>7D<15D RECORDING: CPT

## 2025-07-23 ENCOUNTER — OFFICE VISIT (OUTPATIENT)
Dept: FAMILY MEDICINE CLINIC | Age: 30
End: 2025-07-23
Payer: COMMERCIAL

## 2025-07-23 VITALS
HEART RATE: 112 BPM | TEMPERATURE: 97.1 F | BODY MASS INDEX: 24.11 KG/M2 | SYSTOLIC BLOOD PRESSURE: 118 MMHG | HEIGHT: 62 IN | DIASTOLIC BLOOD PRESSURE: 70 MMHG | OXYGEN SATURATION: 100 % | WEIGHT: 131 LBS | RESPIRATION RATE: 16 BRPM

## 2025-07-23 DIAGNOSIS — B96.89 ACUTE BACTERIAL SINUSITIS: Primary | ICD-10-CM

## 2025-07-23 DIAGNOSIS — J01.90 ACUTE BACTERIAL SINUSITIS: Primary | ICD-10-CM

## 2025-07-23 PROCEDURE — 96372 THER/PROPH/DIAG INJ SC/IM: CPT | Performed by: FAMILY MEDICINE

## 2025-07-23 PROCEDURE — G8427 DOCREV CUR MEDS BY ELIG CLIN: HCPCS | Performed by: FAMILY MEDICINE

## 2025-07-23 PROCEDURE — 99213 OFFICE O/P EST LOW 20 MIN: CPT | Performed by: FAMILY MEDICINE

## 2025-07-23 PROCEDURE — G8420 CALC BMI NORM PARAMETERS: HCPCS | Performed by: FAMILY MEDICINE

## 2025-07-23 PROCEDURE — 1036F TOBACCO NON-USER: CPT | Performed by: FAMILY MEDICINE

## 2025-07-23 RX ORDER — DOXYCYCLINE HYCLATE 100 MG
100 TABLET ORAL 2 TIMES DAILY
Qty: 20 TABLET | Refills: 0 | Status: SHIPPED | OUTPATIENT
Start: 2025-07-23 | End: 2025-08-02

## 2025-07-23 RX ORDER — METHYLPREDNISOLONE ACETATE 40 MG/ML
40 INJECTION, SUSPENSION INTRA-ARTICULAR; INTRALESIONAL; INTRAMUSCULAR; SOFT TISSUE ONCE
Status: COMPLETED | OUTPATIENT
Start: 2025-07-23 | End: 2025-07-23

## 2025-07-23 RX ORDER — GUAIFENESIN 600 MG/1
600 TABLET, EXTENDED RELEASE ORAL 2 TIMES DAILY
Qty: 30 TABLET | Refills: 0 | Status: SHIPPED | OUTPATIENT
Start: 2025-07-23 | End: 2025-08-07

## 2025-07-23 RX ADMIN — METHYLPREDNISOLONE ACETATE 40 MG: 40 INJECTION, SUSPENSION INTRA-ARTICULAR; INTRALESIONAL; INTRAMUSCULAR; SOFT TISSUE at 12:30

## 2025-07-23 NOTE — PROGRESS NOTES
OFFICE NOTE    25  Name: Yadira Duran  :1995   Sex:female   Age:29 y.o.      SUBJECTIVE  Chief Complaint   Patient presents with    Cough     X 3 wks     Congestion     X 3 wks        History of Present Illness  The patient is a 29-year-old female who presents for evaluation of sinusitis.    She has been experiencing symptoms of sinusitis for the past 3 weeks, initially attributing them to her seasonal allergies. She reports chest congestion and the presence of colored mucus in the mornings, but not throughout the day. She also mentions fluid accumulation behind her ears, causing a muffled sensation, particularly in the left ear. Initially, she experienced severe throat pain that extended into her neck, but this symptom has since subsided. She has been managing her symptoms with over-the-counter medications. She has previously taken doxycycline and notes that cefdinir has not been effective for her in the past.    Occupations: Works at a [employer]    PAST SURGICAL HISTORY:  - Tubal ligation       Review of Systems         Current Outpatient Medications:     doxycycline hyclate (VIBRA-TABS) 100 MG tablet, Take 1 tablet by mouth 2 times daily for 10 days, Disp: 20 tablet, Rfl: 0    guaiFENesin (MUCINEX) 600 MG extended release tablet, Take 1 tablet by mouth 2 times daily for 15 days, Disp: 30 tablet, Rfl: 0    propranolol (INDERAL LA) 60 MG extended release capsule, Take 1 capsule by mouth daily, Disp: 90 capsule, Rfl: 2    omeprazole (PRILOSEC) 20 MG delayed release capsule, Take 1 capsule by mouth every morning (before breakfast), Disp: 30 capsule, Rfl: 0    pregabalin (LYRICA) 25 MG capsule, Take 1 capsule by mouth 2 times daily for 180 days. Max Daily Amount: 50 mg, Disp: 180 capsule, Rfl: 1    ondansetron (ZOFRAN-ODT) 4 MG disintegrating tablet, Take 1 tablet by mouth 3 times daily as needed for Nausea or Vomiting (Patient not taking: Reported on 2025), Disp: 15 tablet, Rfl: 0

## 2025-07-28 ENCOUNTER — OFFICE VISIT (OUTPATIENT)
Dept: NEUROLOGY | Age: 30
End: 2025-07-28
Payer: COMMERCIAL

## 2025-07-28 VITALS
HEART RATE: 90 BPM | SYSTOLIC BLOOD PRESSURE: 105 MMHG | BODY MASS INDEX: 24.14 KG/M2 | TEMPERATURE: 98 F | DIASTOLIC BLOOD PRESSURE: 72 MMHG | WEIGHT: 132 LBS | OXYGEN SATURATION: 98 %

## 2025-07-28 DIAGNOSIS — R20.2 PARESTHESIAS: ICD-10-CM

## 2025-07-28 DIAGNOSIS — R56.9 SEIZURE (HCC): ICD-10-CM

## 2025-07-28 DIAGNOSIS — H46.9 OPTIC NEURITIS: ICD-10-CM

## 2025-07-28 DIAGNOSIS — R53.1 RIGHT SIDED WEAKNESS: Primary | ICD-10-CM

## 2025-07-28 PROCEDURE — G8420 CALC BMI NORM PARAMETERS: HCPCS | Performed by: NURSE PRACTITIONER

## 2025-07-28 PROCEDURE — 1036F TOBACCO NON-USER: CPT | Performed by: NURSE PRACTITIONER

## 2025-07-28 PROCEDURE — 99204 OFFICE O/P NEW MOD 45 MIN: CPT | Performed by: NURSE PRACTITIONER

## 2025-07-28 PROCEDURE — G8427 DOCREV CUR MEDS BY ELIG CLIN: HCPCS | Performed by: NURSE PRACTITIONER

## 2025-07-28 NOTE — PROGRESS NOTES
[facility]  NEUROLOGY  Shree Alexandre Jr., M.D., F.A.C.P.  Severo Babin, DNP, APRN, ACNS-BC  Scott Ngo, MSN, APRN-FNP-C  Meaghan Martínez, MSPAS, PA-C  Nilsa Gomez, MSN, APRN-FNP-C  Paris Pittman, MSN, APRN-FNP-C   Vane West, MSN, APRN-FNP-C  [facility] [address]  Phone: [phone]   [place] [address]  Phone: [phone]  Fax: [phone]       Yadira Duran is a 29 y.o. right handed female     Patient presents to neurology clinic for multiple concerns today    Patient presents alone and is deemed a good historian    Past Medical History:     Past Medical History:   Diagnosis Date    Depression     Endometriosis     IBS (irritable bowel syndrome)     Optic neuritis     Seizure (HCC) 2015    after delivery / not sure was a seizure, no issues since , no medication       Past Surgical History:       Past Surgical History:   Procedure Laterality Date     SECTION  2015     SECTION N/A 2022     SECTION performed by Paula Cast DO at [facility]     SECTION N/A 2024     SECTION performed by Yolette Vuong DO at [facility]    CHOLECYSTECTOMY  2017    ROBOT ASSISTED    COLONOSCOPY  04/30/2012    x2    DILATION AND EVACUATION  2016    missed ab    HEMORRHOID SURGERY  2020and 2021    LAPAROSCOPY N/A 10/18/2019    DIAGNOSTIC LAPAROSCOPY, OPEN VENTRAL HERNIA REPAIR performed by Michelle Song MD at [facility] OR    PELVIC LAPAROSCOPY Right     cystectomy    [place]  DELIVERY ONLY N/A 2018     SECTION performed by Gregory Lowe DO at [facility]    SALPINGECTOMY Bilateral 01/10/2025    UPPER GASTROINTESTINAL ENDOSCOPY  2012    UPPER GASTROINTESTINAL ENDOSCOPY      UPPER GASTROINTESTINAL ENDOSCOPY  2017    VENTRAL HERNIA REPAIR N/A 2021    OPEN INCISIONAL HERNIA REPAIR performed by Salomón Haddad MD at [facility] OR

## 2025-08-07 ENCOUNTER — APPOINTMENT (OUTPATIENT)
Dept: CT IMAGING | Age: 30
DRG: 043 | End: 2025-08-07
Payer: COMMERCIAL

## 2025-08-07 ENCOUNTER — HOSPITAL ENCOUNTER (INPATIENT)
Age: 30
LOS: 2 days | Discharge: LEFT BEFORE DISCHARGE | DRG: 043 | End: 2025-08-09
Attending: EMERGENCY MEDICINE | Admitting: INTERNAL MEDICINE
Payer: COMMERCIAL

## 2025-08-07 ENCOUNTER — OFFICE VISIT (OUTPATIENT)
Dept: FAMILY MEDICINE CLINIC | Age: 30
End: 2025-08-07
Payer: COMMERCIAL

## 2025-08-07 ENCOUNTER — TELEPHONE (OUTPATIENT)
Dept: FAMILY MEDICINE CLINIC | Age: 30
End: 2025-08-07

## 2025-08-07 VITALS
DIASTOLIC BLOOD PRESSURE: 80 MMHG | HEART RATE: 73 BPM | WEIGHT: 130 LBS | SYSTOLIC BLOOD PRESSURE: 102 MMHG | HEIGHT: 62 IN | OXYGEN SATURATION: 99 % | BODY MASS INDEX: 23.92 KG/M2 | TEMPERATURE: 97 F

## 2025-08-07 DIAGNOSIS — H53.451 PERIPHERAL VISUAL FIELD DEFECT OF RIGHT EYE: ICD-10-CM

## 2025-08-07 DIAGNOSIS — K62.5 RECTAL BLEEDING: ICD-10-CM

## 2025-08-07 DIAGNOSIS — R11.0 NAUSEA: ICD-10-CM

## 2025-08-07 DIAGNOSIS — R19.7 DIARRHEA, UNSPECIFIED TYPE: ICD-10-CM

## 2025-08-07 DIAGNOSIS — K52.9 COLITIS: Primary | ICD-10-CM

## 2025-08-07 DIAGNOSIS — R10.84 GENERALIZED ABDOMINAL PAIN: Primary | ICD-10-CM

## 2025-08-07 PROBLEM — H46.9 OPTIC NEURITIS: Status: ACTIVE | Noted: 2025-08-07

## 2025-08-07 LAB
ALBUMIN SERPL-MCNC: 4.1 G/DL (ref 3.5–5.2)
ALP SERPL-CCNC: 83 U/L (ref 35–104)
ALT SERPL-CCNC: 19 U/L (ref 0–35)
ANION GAP SERPL CALCULATED.3IONS-SCNC: 12 MMOL/L (ref 7–16)
AST SERPL-CCNC: 19 U/L (ref 0–35)
BASOPHILS # BLD: 0.06 K/UL (ref 0–0.2)
BASOPHILS NFR BLD: 1 % (ref 0–2)
BILIRUB SERPL-MCNC: 0.3 MG/DL (ref 0–1.2)
BILIRUB UR QL STRIP: NEGATIVE
BNP SERPL-MCNC: <36 PG/ML (ref 0–125)
BUN SERPL-MCNC: 9 MG/DL (ref 6–20)
CALCIUM SERPL-MCNC: 8.9 MG/DL (ref 8.6–10)
CHLORIDE SERPL-SCNC: 107 MMOL/L (ref 98–107)
CLARITY UR: CLEAR
CO2 SERPL-SCNC: 18 MMOL/L (ref 22–29)
COLOR UR: YELLOW
CREAT SERPL-MCNC: 0.5 MG/DL (ref 0.5–1)
EOSINOPHIL # BLD: 0.21 K/UL (ref 0.05–0.5)
EOSINOPHILS RELATIVE PERCENT: 2 % (ref 0–6)
ERYTHROCYTE [DISTWIDTH] IN BLOOD BY AUTOMATED COUNT: 14.7 % (ref 11.5–15)
GFR, ESTIMATED: >90 ML/MIN/1.73M2
GLUCOSE SERPL-MCNC: 84 MG/DL (ref 74–99)
GLUCOSE UR STRIP-MCNC: NEGATIVE MG/DL
HCG, URINE, POC: NEGATIVE
HCT VFR BLD AUTO: 39.9 % (ref 34–48)
HGB BLD-MCNC: 12.7 G/DL (ref 11.5–15.5)
HGB UR QL STRIP.AUTO: ABNORMAL
IMM GRANULOCYTES # BLD AUTO: 0.04 K/UL (ref 0–0.58)
IMM GRANULOCYTES NFR BLD: 0 % (ref 0–5)
KETONES UR STRIP-MCNC: 15 MG/DL
LEUKOCYTE ESTERASE UR QL STRIP: NEGATIVE
LIPASE SERPL-CCNC: 27 U/L (ref 13–60)
LYMPHOCYTES NFR BLD: 2.39 K/UL (ref 1.5–4)
LYMPHOCYTES RELATIVE PERCENT: 24 % (ref 20–42)
Lab: NORMAL
MCH RBC QN AUTO: 28.5 PG (ref 26–35)
MCHC RBC AUTO-ENTMCNC: 31.8 G/DL (ref 32–34.5)
MCV RBC AUTO: 89.7 FL (ref 80–99.9)
MONOCYTES NFR BLD: 0.76 K/UL (ref 0.1–0.95)
MONOCYTES NFR BLD: 8 % (ref 2–12)
NEGATIVE QC PASS/FAIL: NORMAL
NEUTROPHILS NFR BLD: 65 % (ref 43–80)
NEUTS SEG NFR BLD: 6.51 K/UL (ref 1.8–7.3)
NITRITE UR QL STRIP: NEGATIVE
PH UR STRIP: 6 [PH] (ref 5–8)
PLATELET # BLD AUTO: 260 K/UL (ref 130–450)
PMV BLD AUTO: 11.3 FL (ref 7–12)
POSITIVE QC PASS/FAIL: NORMAL
POTASSIUM SERPL-SCNC: 3.8 MMOL/L (ref 3.5–5.1)
PROT SERPL-MCNC: 6.9 G/DL (ref 6.4–8.3)
PROT UR STRIP-MCNC: NEGATIVE MG/DL
RBC # BLD AUTO: 4.45 M/UL (ref 3.5–5.5)
RBC #/AREA URNS HPF: ABNORMAL /HPF
SODIUM SERPL-SCNC: 137 MMOL/L (ref 136–145)
SP GR UR STRIP: >1.03 (ref 1–1.03)
TROPONIN I SERPL HS-MCNC: <6 NG/L (ref 0–14)
UROBILINOGEN UR STRIP-ACNC: 0.2 EU/DL (ref 0–1)
WBC #/AREA URNS HPF: ABNORMAL /HPF
WBC OTHER # BLD: 10 K/UL (ref 4.5–11.5)

## 2025-08-07 PROCEDURE — 99285 EMERGENCY DEPT VISIT HI MDM: CPT

## 2025-08-07 PROCEDURE — 96372 THER/PROPH/DIAG INJ SC/IM: CPT

## 2025-08-07 PROCEDURE — 96365 THER/PROPH/DIAG IV INF INIT: CPT

## 2025-08-07 PROCEDURE — 80053 COMPREHEN METABOLIC PANEL: CPT

## 2025-08-07 PROCEDURE — 83690 ASSAY OF LIPASE: CPT

## 2025-08-07 PROCEDURE — 1200000000 HC SEMI PRIVATE

## 2025-08-07 PROCEDURE — 85025 COMPLETE CBC W/AUTO DIFF WBC: CPT

## 2025-08-07 PROCEDURE — 1036F TOBACCO NON-USER: CPT | Performed by: INTERNAL MEDICINE

## 2025-08-07 PROCEDURE — 87086 URINE CULTURE/COLONY COUNT: CPT

## 2025-08-07 PROCEDURE — 70450 CT HEAD/BRAIN W/O DYE: CPT

## 2025-08-07 PROCEDURE — 6360000002 HC RX W HCPCS: Performed by: EMERGENCY MEDICINE

## 2025-08-07 PROCEDURE — 2500000003 HC RX 250 WO HCPCS: Performed by: EMERGENCY MEDICINE

## 2025-08-07 PROCEDURE — 84484 ASSAY OF TROPONIN QUANT: CPT

## 2025-08-07 PROCEDURE — 81001 URINALYSIS AUTO W/SCOPE: CPT

## 2025-08-07 PROCEDURE — 99213 OFFICE O/P EST LOW 20 MIN: CPT | Performed by: INTERNAL MEDICINE

## 2025-08-07 PROCEDURE — 83880 ASSAY OF NATRIURETIC PEPTIDE: CPT

## 2025-08-07 PROCEDURE — 99223 1ST HOSP IP/OBS HIGH 75: CPT | Performed by: INTERNAL MEDICINE

## 2025-08-07 PROCEDURE — G8420 CALC BMI NORM PARAMETERS: HCPCS | Performed by: INTERNAL MEDICINE

## 2025-08-07 PROCEDURE — 96366 THER/PROPH/DIAG IV INF ADDON: CPT

## 2025-08-07 PROCEDURE — G0378 HOSPITAL OBSERVATION PER HR: HCPCS

## 2025-08-07 PROCEDURE — G8427 DOCREV CUR MEDS BY ELIG CLIN: HCPCS | Performed by: INTERNAL MEDICINE

## 2025-08-07 PROCEDURE — 6360000002 HC RX W HCPCS: Performed by: INTERNAL MEDICINE

## 2025-08-07 PROCEDURE — 74177 CT ABD & PELVIS W/CONTRAST: CPT

## 2025-08-07 PROCEDURE — 96375 TX/PRO/DX INJ NEW DRUG ADDON: CPT

## 2025-08-07 PROCEDURE — 6360000004 HC RX CONTRAST MEDICATION: Performed by: RADIOLOGY

## 2025-08-07 RX ORDER — MAGNESIUM SULFATE IN WATER 40 MG/ML
2000 INJECTION, SOLUTION INTRAVENOUS PRN
Status: DISCONTINUED | OUTPATIENT
Start: 2025-08-07 | End: 2025-08-09 | Stop reason: HOSPADM

## 2025-08-07 RX ORDER — METRONIDAZOLE 500 MG/100ML
500 INJECTION, SOLUTION INTRAVENOUS ONCE
Status: COMPLETED | OUTPATIENT
Start: 2025-08-07 | End: 2025-08-07

## 2025-08-07 RX ORDER — POTASSIUM CHLORIDE 1500 MG/1
40 TABLET, EXTENDED RELEASE ORAL PRN
Status: DISCONTINUED | OUTPATIENT
Start: 2025-08-07 | End: 2025-08-09 | Stop reason: HOSPADM

## 2025-08-07 RX ORDER — ACETAMINOPHEN 325 MG/1
650 TABLET ORAL EVERY 6 HOURS PRN
Status: DISCONTINUED | OUTPATIENT
Start: 2025-08-07 | End: 2025-08-09 | Stop reason: HOSPADM

## 2025-08-07 RX ORDER — POTASSIUM CHLORIDE 7.45 MG/ML
10 INJECTION INTRAVENOUS PRN
Status: DISCONTINUED | OUTPATIENT
Start: 2025-08-07 | End: 2025-08-09 | Stop reason: HOSPADM

## 2025-08-07 RX ORDER — SODIUM CHLORIDE 0.9 % (FLUSH) 0.9 %
5-40 SYRINGE (ML) INJECTION EVERY 12 HOURS SCHEDULED
Status: DISCONTINUED | OUTPATIENT
Start: 2025-08-07 | End: 2025-08-09 | Stop reason: HOSPADM

## 2025-08-07 RX ORDER — ENOXAPARIN SODIUM 100 MG/ML
40 INJECTION SUBCUTANEOUS DAILY
Status: DISCONTINUED | OUTPATIENT
Start: 2025-08-07 | End: 2025-08-09

## 2025-08-07 RX ORDER — ACETAMINOPHEN 650 MG/1
650 SUPPOSITORY RECTAL EVERY 6 HOURS PRN
Status: DISCONTINUED | OUTPATIENT
Start: 2025-08-07 | End: 2025-08-09 | Stop reason: HOSPADM

## 2025-08-07 RX ORDER — IOPAMIDOL 755 MG/ML
75 INJECTION, SOLUTION INTRAVASCULAR
Status: COMPLETED | OUTPATIENT
Start: 2025-08-07 | End: 2025-08-07

## 2025-08-07 RX ORDER — MIDAZOLAM HYDROCHLORIDE 2 MG/2ML
1 INJECTION, SOLUTION INTRAMUSCULAR; INTRAVENOUS ONCE
Status: DISCONTINUED | OUTPATIENT
Start: 2025-08-07 | End: 2025-08-09 | Stop reason: HOSPADM

## 2025-08-07 RX ORDER — SODIUM CHLORIDE 9 MG/ML
INJECTION, SOLUTION INTRAVENOUS PRN
Status: DISCONTINUED | OUTPATIENT
Start: 2025-08-07 | End: 2025-08-09 | Stop reason: HOSPADM

## 2025-08-07 RX ORDER — ONDANSETRON 2 MG/ML
4 INJECTION INTRAMUSCULAR; INTRAVENOUS EVERY 6 HOURS PRN
Status: DISCONTINUED | OUTPATIENT
Start: 2025-08-07 | End: 2025-08-09 | Stop reason: HOSPADM

## 2025-08-07 RX ORDER — SODIUM CHLORIDE 0.9 % (FLUSH) 0.9 %
5-40 SYRINGE (ML) INJECTION PRN
Status: DISCONTINUED | OUTPATIENT
Start: 2025-08-07 | End: 2025-08-09 | Stop reason: HOSPADM

## 2025-08-07 RX ORDER — POLYETHYLENE GLYCOL 3350 17 G/17G
17 POWDER, FOR SOLUTION ORAL DAILY PRN
Status: DISCONTINUED | OUTPATIENT
Start: 2025-08-07 | End: 2025-08-09 | Stop reason: HOSPADM

## 2025-08-07 RX ORDER — ONDANSETRON 4 MG/1
4 TABLET, ORALLY DISINTEGRATING ORAL EVERY 8 HOURS PRN
Status: DISCONTINUED | OUTPATIENT
Start: 2025-08-07 | End: 2025-08-09 | Stop reason: HOSPADM

## 2025-08-07 RX ORDER — PROPRANOLOL HYDROCHLORIDE 60 MG/1
60 CAPSULE, EXTENDED RELEASE ORAL DAILY
Status: DISCONTINUED | OUTPATIENT
Start: 2025-08-08 | End: 2025-08-09 | Stop reason: HOSPADM

## 2025-08-07 RX ADMIN — ENOXAPARIN SODIUM 40 MG: 100 INJECTION SUBCUTANEOUS at 22:35

## 2025-08-07 RX ADMIN — METRONIDAZOLE 500 MG: 500 INJECTION, SOLUTION INTRAVENOUS at 19:28

## 2025-08-07 RX ADMIN — WATER 1000 MG: 1 INJECTION INTRAMUSCULAR; INTRAVENOUS; SUBCUTANEOUS at 19:26

## 2025-08-07 RX ADMIN — IOPAMIDOL 75 ML: 755 INJECTION, SOLUTION INTRAVENOUS at 15:34

## 2025-08-07 ASSESSMENT — ENCOUNTER SYMPTOMS
VOMITING: 0
ABDOMINAL PAIN: 1
BLOOD IN STOOL: 1
NAUSEA: 1
SHORTNESS OF BREATH: 0
COUGH: 0
DIARRHEA: 1

## 2025-08-07 ASSESSMENT — PAIN - FUNCTIONAL ASSESSMENT: PAIN_FUNCTIONAL_ASSESSMENT: NONE - DENIES PAIN

## 2025-08-08 LAB
MICROORGANISM SPEC CULT: ABNORMAL
SERVICE CMNT-IMP: ABNORMAL
SPECIMEN DESCRIPTION: ABNORMAL

## 2025-08-08 PROCEDURE — 6370000000 HC RX 637 (ALT 250 FOR IP): Performed by: INTERNAL MEDICINE

## 2025-08-08 PROCEDURE — 99232 SBSQ HOSP IP/OBS MODERATE 35: CPT | Performed by: STUDENT IN AN ORGANIZED HEALTH CARE EDUCATION/TRAINING PROGRAM

## 2025-08-08 PROCEDURE — G0378 HOSPITAL OBSERVATION PER HR: HCPCS

## 2025-08-08 PROCEDURE — 2500000003 HC RX 250 WO HCPCS: Performed by: INTERNAL MEDICINE

## 2025-08-08 PROCEDURE — 1200000000 HC SEMI PRIVATE

## 2025-08-08 PROCEDURE — 99255 IP/OBS CONSLTJ NEW/EST HI 80: CPT | Performed by: PSYCHIATRY & NEUROLOGY

## 2025-08-08 RX ORDER — DIAZEPAM 10 MG/2ML
5 INJECTION, SOLUTION INTRAMUSCULAR; INTRAVENOUS ONCE
Status: DISCONTINUED | OUTPATIENT
Start: 2025-08-08 | End: 2025-08-09 | Stop reason: HOSPADM

## 2025-08-08 RX ADMIN — ACETAMINOPHEN 650 MG: 325 TABLET ORAL at 17:16

## 2025-08-08 RX ADMIN — SODIUM CHLORIDE, PRESERVATIVE FREE 10 ML: 5 INJECTION INTRAVENOUS at 20:24

## 2025-08-08 ASSESSMENT — PAIN - FUNCTIONAL ASSESSMENT: PAIN_FUNCTIONAL_ASSESSMENT: ACTIVITIES ARE NOT PREVENTED

## 2025-08-08 ASSESSMENT — PAIN DESCRIPTION - DESCRIPTORS: DESCRIPTORS: ACHING

## 2025-08-08 ASSESSMENT — PAIN SCALES - GENERAL: PAINLEVEL_OUTOF10: 5

## 2025-08-08 ASSESSMENT — PAIN DESCRIPTION - ORIENTATION: ORIENTATION: RIGHT;LEFT

## 2025-08-08 ASSESSMENT — PAIN DESCRIPTION - LOCATION: LOCATION: HEAD;ABDOMEN

## 2025-08-09 VITALS
HEART RATE: 78 BPM | BODY MASS INDEX: 23.92 KG/M2 | SYSTOLIC BLOOD PRESSURE: 97 MMHG | WEIGHT: 130 LBS | OXYGEN SATURATION: 97 % | HEIGHT: 62 IN | RESPIRATION RATE: 16 BRPM | TEMPERATURE: 98.4 F | DIASTOLIC BLOOD PRESSURE: 67 MMHG

## 2025-08-09 LAB
BASOPHILS # BLD: 0.06 K/UL (ref 0–0.2)
BASOPHILS NFR BLD: 1 % (ref 0–2)
EOSINOPHIL # BLD: 0.22 K/UL (ref 0.05–0.5)
EOSINOPHILS RELATIVE PERCENT: 2 % (ref 0–6)
ERYTHROCYTE [DISTWIDTH] IN BLOOD BY AUTOMATED COUNT: 14.8 % (ref 11.5–15)
HCT VFR BLD AUTO: 41.3 % (ref 34–48)
HGB BLD-MCNC: 13.7 G/DL (ref 11.5–15.5)
IMM GRANULOCYTES # BLD AUTO: 0.04 K/UL (ref 0–0.58)
IMM GRANULOCYTES NFR BLD: 0 % (ref 0–5)
LYMPHOCYTES NFR BLD: 2.15 K/UL (ref 1.5–4)
LYMPHOCYTES RELATIVE PERCENT: 22 % (ref 20–42)
MCH RBC QN AUTO: 28.9 PG (ref 26–35)
MCHC RBC AUTO-ENTMCNC: 33.2 G/DL (ref 32–34.5)
MCV RBC AUTO: 87.1 FL (ref 80–99.9)
MONOCYTES NFR BLD: 0.78 K/UL (ref 0.1–0.95)
MONOCYTES NFR BLD: 8 % (ref 2–12)
NEUTROPHILS NFR BLD: 67 % (ref 43–80)
NEUTS SEG NFR BLD: 6.54 K/UL (ref 1.8–7.3)
PLATELET # BLD AUTO: 283 K/UL (ref 130–450)
PMV BLD AUTO: 10.8 FL (ref 7–12)
RBC # BLD AUTO: 4.74 M/UL (ref 3.5–5.5)
WBC OTHER # BLD: 9.8 K/UL (ref 4.5–11.5)

## 2025-08-09 PROCEDURE — 85025 COMPLETE CBC W/AUTO DIFF WBC: CPT

## 2025-08-09 PROCEDURE — G0378 HOSPITAL OBSERVATION PER HR: HCPCS

## 2025-08-09 PROCEDURE — 6370000000 HC RX 637 (ALT 250 FOR IP): Performed by: INTERNAL MEDICINE

## 2025-08-09 RX ADMIN — ACETAMINOPHEN 650 MG: 325 TABLET ORAL at 07:36

## 2025-08-09 RX ADMIN — PROPRANOLOL HYDROCHLORIDE 60 MG: 60 CAPSULE, EXTENDED RELEASE ORAL at 09:13

## 2025-08-09 ASSESSMENT — PAIN DESCRIPTION - ORIENTATION: ORIENTATION: RIGHT;ANTERIOR

## 2025-08-09 ASSESSMENT — PAIN SCALES - GENERAL: PAINLEVEL_OUTOF10: 5

## 2025-08-09 ASSESSMENT — PAIN DESCRIPTION - LOCATION: LOCATION: HEAD

## 2025-08-09 ASSESSMENT — PAIN DESCRIPTION - DESCRIPTORS: DESCRIPTORS: ACHING

## 2025-08-09 ASSESSMENT — PAIN - FUNCTIONAL ASSESSMENT
PAIN_FUNCTIONAL_ASSESSMENT: 0-10
PAIN_FUNCTIONAL_ASSESSMENT: ACTIVITIES ARE NOT PREVENTED

## 2025-08-29 ENCOUNTER — HOSPITAL ENCOUNTER (OUTPATIENT)
Dept: MRI IMAGING | Age: 30
Discharge: HOME | End: 2025-08-31
Attending: EMERGENCY MEDICINE
Payer: COMMERCIAL

## 2025-08-29 ENCOUNTER — HOSPITAL ENCOUNTER (OUTPATIENT)
Dept: MRI IMAGING | Age: 30
Discharge: HOME | End: 2025-08-31
Attending: PSYCHIATRY & NEUROLOGY
Payer: COMMERCIAL

## 2025-08-29 PROCEDURE — 6360000004 HC RX CONTRAST MEDICATION: Performed by: RADIOLOGY

## 2025-08-29 PROCEDURE — 2500000003 HC RX 250 WO HCPCS: Performed by: RADIOLOGY

## 2025-08-29 PROCEDURE — 72156 MRI NECK SPINE W/O & W/DYE: CPT

## 2025-08-29 PROCEDURE — A9579 GAD-BASE MR CONTRAST NOS,1ML: HCPCS | Performed by: RADIOLOGY

## 2025-08-29 PROCEDURE — 70553 MRI BRAIN STEM W/O & W/DYE: CPT

## 2025-08-29 PROCEDURE — 72157 MRI CHEST SPINE W/O & W/DYE: CPT

## 2025-08-29 RX ORDER — GADOTERIDOL 279.3 MG/ML
12 INJECTION INTRAVENOUS
Status: COMPLETED | OUTPATIENT
Start: 2025-08-29 | End: 2025-08-29

## 2025-08-29 RX ORDER — SODIUM CHLORIDE 0.9 % (FLUSH) 0.9 %
5-40 SYRINGE (ML) INJECTION 2 TIMES DAILY
Status: DISCONTINUED | OUTPATIENT
Start: 2025-08-29 | End: 2025-09-01 | Stop reason: HOSPADM

## 2025-08-29 RX ADMIN — GADOTERIDOL 12 ML: 279.3 INJECTION, SOLUTION INTRAVENOUS at 10:02

## 2025-08-29 RX ADMIN — Medication 10 ML: at 10:01

## 2025-09-03 ENCOUNTER — TELEPHONE (OUTPATIENT)
Dept: FAMILY MEDICINE CLINIC | Age: 30
End: 2025-09-03

## 2025-09-03 ENCOUNTER — TELEPHONE (OUTPATIENT)
Dept: NEUROLOGY | Age: 30
End: 2025-09-03

## (undated) DEVICE — SUTURE VCRL + SZ 0 L36IN ABSRB VLT L36MM CT-1 1/2 CIR VCP346H

## (undated) DEVICE — PEN: MARKING STD 100/CS: Brand: MEDICAL ACTION INDUSTRIES

## (undated) DEVICE — STRIP,CLOSURE,WOUND,MEDI-STRIP,1/2X4: Brand: MEDLINE

## (undated) DEVICE — GLOVE ORANGE PI 8   MSG9080

## (undated) DEVICE — SUTURE PLN GUT SZ 3-0 L27IN ABSRB YELLOWISH TAN L36MM CT-1 842H

## (undated) DEVICE — 3M™ STERI-STRIP™ COMPOUND BENZOIN TINCTURE 40 BAGS/CARTON 4 CARTONS/CASE C1544: Brand: 3M™ STERI-STRIP™

## (undated) DEVICE — Z DISCONTINUED USE 2275676 GLOVE SURG SZ 65 L12IN FNGR THK87MIL DK GRN LTX FREE ISOLEX

## (undated) DEVICE — TELFA ADHESIVE ISLAND DRESSING: Brand: TELFA

## (undated) DEVICE — VACUETTE® TUBE 6 ML Z SERUM CLOT ACTIVATOR 13X100 RED CAP-BLACK RING, NON-RIDGED: Brand: VACUETTE

## (undated) DEVICE — GOWN,SIRUS,POLYRNF,BRTHSLV,XLN/XL,20/CS: Brand: MEDLINE

## (undated) DEVICE — CONTAINER,SPEC,PNEUM TUBE,3OZ,STRL PATH: Brand: MEDLINE

## (undated) DEVICE — SHEET, T, LAPAROTOMY, STERILE: Brand: MEDLINE

## (undated) DEVICE — PENCIL ES L3M BTTN SWCH HOLSTER W/ BLDE ELECTRD EDGE

## (undated) DEVICE — SUTURE STRATAFIX SYMMETRIC SZ 1 L18IN ABSRB VLT CT1 L36CM SXPP1A404

## (undated) DEVICE — GLOVE SURG SZ 6 THK91MIL LTX FREE SYN POLYISOPRENE ANTI

## (undated) DEVICE — GLOVE SURG SZ 65 L12IN FNGR THK83MIL CRM POLYISOPRENE

## (undated) DEVICE — MEDI-VAC YANKAUER SUCTION HANDLE W/BULBOUS TIP: Brand: CARDINAL HEALTH

## (undated) DEVICE — NEEDLE SPNL 22GA L7IN BLK HUB S STL W/ QNCKE PNT W/OUT

## (undated) DEVICE — DOUBLE BASIN SET: Brand: MEDLINE INDUSTRIES, INC.

## (undated) DEVICE — TUBING, SUCTION, 3/16" X 12', STRAIGHT: Brand: MEDLINE

## (undated) DEVICE — GLOVE SURG SZ 65 THK91MIL LTX FREE SYN POLYISOPRENE

## (undated) DEVICE — SOLUTION IV IRRIG POUR BRL 0.9% SODIUM CHL 2F7124

## (undated) DEVICE — WARMER SCP LAP

## (undated) DEVICE — HYPODERMIC SAFETY NEEDLE: Brand: MAGELLAN

## (undated) DEVICE — KIT,ANTI FOG,W/SPONGE & FLUID,SOFT PACK: Brand: MEDLINE

## (undated) DEVICE — STANDARD HYPODERMIC NEEDLE,ALUMINUM HUB: Brand: MONOJECT

## (undated) DEVICE — TOWEL,OR,DSP,ST,BLUE,STD,6/PK,12PK/CS: Brand: MEDLINE

## (undated) DEVICE — ELECTRODE PT RET AD L9FT HI MOIST COND ADH HYDRGEL CORDED

## (undated) DEVICE — CANNULA SEAL

## (undated) DEVICE — SUTURE ABSORBABLE BRAIDED 4-0 KS 27 IN 60 MM UD VICRYL +

## (undated) DEVICE — SPONGE LAP W18XL18IN WHT COT 4 PLY FLD STRUNG RADPQ DISP ST

## (undated) DEVICE — TROCAR: Brand: KII® SLEEVE

## (undated) DEVICE — CATHETERIZATION KIT FOL16 FR 2000 CC DRAINAGE BG LUBRICATH

## (undated) DEVICE — CHLORAPREP 26ML ORANGE

## (undated) DEVICE — PACK PROCEDURE SURG GEN CUST

## (undated) DEVICE — SET INSTRUMENT LAP I

## (undated) DEVICE — COUNTER NDL 30 COUNT DBL MAG

## (undated) DEVICE — Device: Brand: PORTEX

## (undated) DEVICE — TOTAL TRAY, DB, 100% SILI FOLEY, 16FR 10: Brand: MEDLINE

## (undated) DEVICE — BLADELESS OBTURATOR: Brand: WECK VISTA

## (undated) DEVICE — RETRACTOR WEITLANER BLUNT

## (undated) DEVICE — INSUFFLATION NEEDLE TO ESTABLISH PNEUMOPERITONEUM.: Brand: INSUFFLATION NEEDLE

## (undated) DEVICE — SYRINGE MED 10ML LUERLOCK TIP W/O SFTY DISP

## (undated) DEVICE — DRAPE,LAP,CHOLE,W/TROUGHS,STERILE: Brand: MEDLINE

## (undated) DEVICE — INTENDED FOR TISSUE SEPARATION, AND OTHER PROCEDURES THAT REQUIRE A SHARP SURGICAL BLADE TO PUNCTURE OR CUT.: Brand: BARD-PARKER ® STAINLESS STEEL BLADES

## (undated) DEVICE — GOWN,SIRUS,FABRNF,L,20/CS: Brand: MEDLINE

## (undated) DEVICE — 3000CC GUARDIAN II: Brand: GUARDIAN

## (undated) DEVICE — APPLICATOR PREP 26ML 0.7% IOD POVACRYLEX 74% ISO ALC ST

## (undated) DEVICE — SUTURE ABSORBABLE MONOFILAMENT 0 TP1 60 IN VIO PDS + PDP991G

## (undated) DEVICE — SHEET,DRAPE,53X77,STERILE: Brand: MEDLINE

## (undated) DEVICE — ARM DRAPE

## (undated) DEVICE — SYRINGE 20ML LL S/C 50

## (undated) DEVICE — BLADE SURG NO20 S STL STR DISP GLASSVAN

## (undated) DEVICE — COVER,LIGHT HANDLE,FLX,2/PK: Brand: MEDLINE INDUSTRIES, INC.

## (undated) DEVICE — Device

## (undated) DEVICE — NEEDLE HYPO 22GA L1.5IN BLK POLYPR HUB S STL REG BVL STR

## (undated) DEVICE — 34" SINGLE PATIENT USE HOVERMATT BREATHABLE: Brand: SINGLE PATIENT USE HOVERMATT

## (undated) DEVICE — SUTURE CHROMIC GUT SZ 1 L36IN ABSRB BRN L48MM CTX 1/2 CIR 905H

## (undated) DEVICE — SKIN AFFIX SURG ADHESIVE 72/CS 0.55ML: Brand: MEDLINE

## (undated) DEVICE — 4-PORT MANIFOLD: Brand: NEPTUNE 2

## (undated) DEVICE — SUTURE MNCRYL STRATAFIX PS 4-0 30CM

## (undated) DEVICE — TIP COVER ACCESSORY

## (undated) DEVICE — 1LYRTR 16FR10ML 100%SILI SNAP: Brand: MEDLINE INDUSTRIES, INC.

## (undated) DEVICE — GOWN,SIRUS,FABRNF,XL,20/CS: Brand: MEDLINE

## (undated) DEVICE — TUBE BLD COLLECT ST 1 SIL COAT 7ML 10ML

## (undated) DEVICE — BINDER ABD SM M W12IN CIRC 30 45IN 4 PNL E CNTCT CLSR POSTOP

## (undated) DEVICE — SUTURE 0 L36IN ABSRB BRN CT L40MM 1/2 CIR TAPERPOINT SGL 914H

## (undated) DEVICE — ADHESIVE SKIN CLSR 0.7ML TOP DERMBND ADV

## (undated) DEVICE — STAPLER SKIN SQ 30 ABSRB STPL DISP INSORB

## (undated) DEVICE — APPLICATOR GRN CHLORAPREP 2% CHG 70

## (undated) DEVICE — CONTAINER SPEC 64OZ POLYPR PATH SNAP LOK CAP W/ LID

## (undated) DEVICE — SET INSTRUMENT LAP II

## (undated) DEVICE — GLOVE ORANGE PI 7 1/2   MSG9075

## (undated) DEVICE — CESAREAN BIRTH PACK II: Brand: MEDLINE INDUSTRIES, INC.

## (undated) DEVICE — COLUMN DRAPE

## (undated) DEVICE — SUTURE VCRL + SZ 4-0 L27IN ABSRB UD KS STR REV CUT NDL VCP662H

## (undated) DEVICE — BAG SPECIMEN BIOHAZARD 6IN X 9IN

## (undated) DEVICE — CESAREAN BIRTH PACK: Brand: MEDLINE INDUSTRIES, INC.